# Patient Record
Sex: MALE | Race: BLACK OR AFRICAN AMERICAN | Employment: OTHER | URBAN - METROPOLITAN AREA
[De-identification: names, ages, dates, MRNs, and addresses within clinical notes are randomized per-mention and may not be internally consistent; named-entity substitution may affect disease eponyms.]

---

## 2017-01-14 DIAGNOSIS — E10.29: ICD-10-CM

## 2017-01-16 ENCOUNTER — TELEPHONE (OUTPATIENT)
Dept: INTERNAL MEDICINE CLINIC | Age: 72
End: 2017-01-16

## 2017-01-16 RX ORDER — GLIMEPIRIDE 1 MG/1
TABLET ORAL
Qty: 90 TAB | Refills: 2 | Status: SHIPPED | OUTPATIENT
Start: 2017-01-16 | End: 2017-11-29 | Stop reason: SDUPTHER

## 2017-01-18 ENCOUNTER — DOCUMENTATION ONLY (OUTPATIENT)
Dept: INTERNAL MEDICINE CLINIC | Age: 72
End: 2017-01-18

## 2017-01-18 NOTE — PROGRESS NOTES
Pt's son made f/u appt for 01/25/2017 and was given lab appt for 01/20/2016 please order labs or advise if not needed

## 2017-01-19 DIAGNOSIS — E55.9 VITAMIN D DEFICIENCY: ICD-10-CM

## 2017-01-19 DIAGNOSIS — E11.65 TYPE 2 DIABETES MELLITUS WITH HYPERGLYCEMIA, WITHOUT LONG-TERM CURRENT USE OF INSULIN (HCC): ICD-10-CM

## 2017-01-19 DIAGNOSIS — I10 ESSENTIAL HYPERTENSION: ICD-10-CM

## 2017-01-19 DIAGNOSIS — N18.30 CKD (CHRONIC KIDNEY DISEASE) STAGE 3, GFR 30-59 ML/MIN (HCC): ICD-10-CM

## 2017-01-19 DIAGNOSIS — E78.00 HYPERCHOLESTEROLEMIA: Primary | ICD-10-CM

## 2017-01-20 ENCOUNTER — HOSPITAL ENCOUNTER (OUTPATIENT)
Dept: LAB | Age: 72
Discharge: HOME | End: 2017-01-20
Payer: MEDICARE

## 2017-01-20 ENCOUNTER — LAB ONLY (OUTPATIENT)
Dept: INTERNAL MEDICINE CLINIC | Age: 72
End: 2017-01-20

## 2017-01-20 DIAGNOSIS — I10 ESSENTIAL HYPERTENSION: ICD-10-CM

## 2017-01-20 DIAGNOSIS — E78.00 HYPERCHOLESTEROLEMIA: ICD-10-CM

## 2017-01-20 DIAGNOSIS — E11.65 TYPE 2 DIABETES MELLITUS WITH HYPERGLYCEMIA, WITHOUT LONG-TERM CURRENT USE OF INSULIN (HCC): ICD-10-CM

## 2017-01-20 DIAGNOSIS — E55.9 VITAMIN D DEFICIENCY: ICD-10-CM

## 2017-01-20 DIAGNOSIS — N18.30 CKD (CHRONIC KIDNEY DISEASE) STAGE 3, GFR 30-59 ML/MIN (HCC): ICD-10-CM

## 2017-01-20 LAB
ALBUMIN SERPL BCP-MCNC: 3.5 G/DL (ref 3.4–5)
ALBUMIN/GLOB SERPL: 0.9 {RATIO} (ref 0.8–1.7)
ALP SERPL-CCNC: 61 U/L (ref 45–117)
ALT SERPL-CCNC: 18 U/L (ref 16–61)
ANION GAP BLD CALC-SCNC: 12 MMOL/L (ref 3–18)
AST SERPL W P-5'-P-CCNC: 15 U/L (ref 15–37)
BASOPHILS # BLD AUTO: 0 K/UL (ref 0–0.06)
BASOPHILS # BLD: 0 % (ref 0–2)
BILIRUB SERPL-MCNC: 0.8 MG/DL (ref 0.2–1)
BUN SERPL-MCNC: 34 MG/DL (ref 7–18)
BUN/CREAT SERPL: 15 (ref 12–20)
CALCIUM SERPL-MCNC: 9.1 MG/DL (ref 8.5–10.1)
CHLORIDE SERPL-SCNC: 108 MMOL/L (ref 100–108)
CHOLEST SERPL-MCNC: 176 MG/DL
CO2 SERPL-SCNC: 24 MMOL/L (ref 21–32)
CREAT SERPL-MCNC: 2.31 MG/DL (ref 0.6–1.3)
DIFFERENTIAL METHOD BLD: ABNORMAL
EOSINOPHIL # BLD: 0.3 K/UL (ref 0–0.4)
EOSINOPHIL NFR BLD: 4 % (ref 0–5)
ERYTHROCYTE [DISTWIDTH] IN BLOOD BY AUTOMATED COUNT: 17 % (ref 11.6–14.5)
EST. AVERAGE GLUCOSE BLD GHB EST-MCNC: 134 MG/DL
GLOBULIN SER CALC-MCNC: 3.8 G/DL (ref 2–4)
GLUCOSE SERPL-MCNC: 85 MG/DL (ref 74–99)
HBA1C MFR BLD: 6.3 % (ref 4.2–5.6)
HCT VFR BLD AUTO: 38.8 % (ref 36–48)
HDLC SERPL-MCNC: 54 MG/DL (ref 40–60)
HDLC SERPL: 3.3 {RATIO} (ref 0–5)
HGB BLD-MCNC: 12.6 G/DL (ref 13–16)
LDLC SERPL CALC-MCNC: 101.6 MG/DL (ref 0–100)
LIPID PROFILE,FLP: ABNORMAL
LYMPHOCYTES # BLD AUTO: 46 % (ref 21–52)
LYMPHOCYTES # BLD: 3.1 K/UL (ref 0.9–3.6)
MCH RBC QN AUTO: 26.5 PG (ref 24–34)
MCHC RBC AUTO-ENTMCNC: 32.5 G/DL (ref 31–37)
MCV RBC AUTO: 81.7 FL (ref 74–97)
MONOCYTES # BLD: 0.5 K/UL (ref 0.05–1.2)
MONOCYTES NFR BLD AUTO: 7 % (ref 3–10)
NEUTS SEG # BLD: 2.9 K/UL (ref 1.8–8)
NEUTS SEG NFR BLD AUTO: 43 % (ref 40–73)
PLATELET # BLD AUTO: 252 K/UL (ref 135–420)
PMV BLD AUTO: 10.7 FL (ref 9.2–11.8)
POTASSIUM SERPL-SCNC: 4.6 MMOL/L (ref 3.5–5.5)
PROT SERPL-MCNC: 7.3 G/DL (ref 6.4–8.2)
RBC # BLD AUTO: 4.75 M/UL (ref 4.7–5.5)
SODIUM SERPL-SCNC: 144 MMOL/L (ref 136–145)
T4 FREE SERPL-MCNC: 1.2 NG/DL (ref 0.7–1.5)
TRIGL SERPL-MCNC: 102 MG/DL (ref ?–150)
TSH SERPL DL<=0.05 MIU/L-ACNC: 2.01 UIU/ML (ref 0.36–3.74)
VLDLC SERPL CALC-MCNC: 20.4 MG/DL
WBC # BLD AUTO: 6.9 K/UL (ref 4.6–13.2)

## 2017-01-20 PROCEDURE — 80053 COMPREHEN METABOLIC PANEL: CPT | Performed by: HOSPITALIST

## 2017-01-20 PROCEDURE — 82306 VITAMIN D 25 HYDROXY: CPT | Performed by: HOSPITALIST

## 2017-01-20 PROCEDURE — 84443 ASSAY THYROID STIM HORMONE: CPT | Performed by: HOSPITALIST

## 2017-01-20 PROCEDURE — 80061 LIPID PANEL: CPT | Performed by: HOSPITALIST

## 2017-01-20 PROCEDURE — 36415 COLL VENOUS BLD VENIPUNCTURE: CPT | Performed by: HOSPITALIST

## 2017-01-20 PROCEDURE — 85025 COMPLETE CBC W/AUTO DIFF WBC: CPT | Performed by: HOSPITALIST

## 2017-01-20 PROCEDURE — 84439 ASSAY OF FREE THYROXINE: CPT | Performed by: HOSPITALIST

## 2017-01-20 PROCEDURE — 83036 HEMOGLOBIN GLYCOSYLATED A1C: CPT | Performed by: HOSPITALIST

## 2017-01-21 LAB — 25(OH)D3 SERPL-MCNC: 27 NG/ML (ref 30–100)

## 2017-01-24 DIAGNOSIS — M10.00 IDIOPATHIC GOUT, UNSPECIFIED CHRONICITY, UNSPECIFIED SITE: ICD-10-CM

## 2017-01-24 DIAGNOSIS — I10 ESSENTIAL HYPERTENSION WITH GOAL BLOOD PRESSURE LESS THAN 140/90: ICD-10-CM

## 2017-01-24 RX ORDER — CARVEDILOL 12.5 MG/1
TABLET ORAL
Qty: 180 TAB | Refills: 1 | Status: SHIPPED | OUTPATIENT
Start: 2017-01-24 | End: 2017-04-26 | Stop reason: SDUPTHER

## 2017-01-24 RX ORDER — ALLOPURINOL 100 MG/1
TABLET ORAL
Qty: 180 TAB | Refills: 2 | Status: SHIPPED | OUTPATIENT
Start: 2017-01-24 | End: 2017-04-26 | Stop reason: SDUPTHER

## 2017-01-25 ENCOUNTER — OFFICE VISIT (OUTPATIENT)
Dept: INTERNAL MEDICINE CLINIC | Age: 72
End: 2017-01-25

## 2017-01-25 VITALS
DIASTOLIC BLOOD PRESSURE: 70 MMHG | RESPIRATION RATE: 18 BRPM | HEIGHT: 66 IN | BODY MASS INDEX: 28.48 KG/M2 | TEMPERATURE: 98.8 F | HEART RATE: 56 BPM | OXYGEN SATURATION: 97 % | WEIGHT: 177.2 LBS | SYSTOLIC BLOOD PRESSURE: 142 MMHG

## 2017-01-25 DIAGNOSIS — I10 ESSENTIAL HYPERTENSION: ICD-10-CM

## 2017-01-25 DIAGNOSIS — N18.30 CKD (CHRONIC KIDNEY DISEASE) STAGE 3, GFR 30-59 ML/MIN (HCC): ICD-10-CM

## 2017-01-25 DIAGNOSIS — Z00.00 MEDICARE ANNUAL WELLNESS VISIT, SUBSEQUENT: ICD-10-CM

## 2017-01-25 DIAGNOSIS — Z12.5 SCREENING FOR PROSTATE CANCER: ICD-10-CM

## 2017-01-25 DIAGNOSIS — Z13.31 SCREENING FOR DEPRESSION: ICD-10-CM

## 2017-01-25 DIAGNOSIS — E78.00 HYPERCHOLESTEROLEMIA: ICD-10-CM

## 2017-01-25 DIAGNOSIS — Z13.39 SCREENING FOR ALCOHOLISM: ICD-10-CM

## 2017-01-25 DIAGNOSIS — K21.9 GASTROESOPHAGEAL REFLUX DISEASE WITHOUT ESOPHAGITIS: ICD-10-CM

## 2017-01-25 DIAGNOSIS — E11.65 TYPE 2 DIABETES MELLITUS WITH HYPERGLYCEMIA, WITHOUT LONG-TERM CURRENT USE OF INSULIN (HCC): Primary | ICD-10-CM

## 2017-01-25 DIAGNOSIS — Z71.89 ADVANCE CARE PLANNING: ICD-10-CM

## 2017-01-25 DIAGNOSIS — E55.9 VITAMIN D DEFICIENCY: ICD-10-CM

## 2017-01-25 NOTE — PATIENT INSTRUCTIONS
DASH Diet: Care Instructions  Your Care Instructions  The DASH diet is an eating plan that can help lower your blood pressure. DASH stands for Dietary Approaches to Stop Hypertension. Hypertension is high blood pressure. The DASH diet focuses on eating foods that are high in calcium, potassium, and magnesium. These nutrients can lower blood pressure. The foods that are highest in these nutrients are fruits, vegetables, low-fat dairy products, nuts, seeds, and legumes. But taking calcium, potassium, and magnesium supplements instead of eating foods that are high in those nutrients does not have the same effect. The DASH diet also includes whole grains, fish, and poultry. The DASH diet is one of several lifestyle changes your doctor may recommend to lower your high blood pressure. Your doctor may also want you to decrease the amount of sodium in your diet. Lowering sodium while following the DASH diet can lower blood pressure even further than just the DASH diet alone. Follow-up care is a key part of your treatment and safety. Be sure to make and go to all appointments, and call your doctor if you are having problems. It's also a good idea to know your test results and keep a list of the medicines you take. How can you care for yourself at home? Following the DASH diet  · Eat 4 to 5 servings of fruit each day. A serving is 1 medium-sized piece of fruit, ½ cup chopped or canned fruit, 1/4 cup dried fruit, or 4 ounces (½ cup) of fruit juice. Choose fruit more often than fruit juice. · Eat 4 to 5 servings of vegetables each day. A serving is 1 cup of lettuce or raw leafy vegetables, ½ cup of chopped or cooked vegetables, or 4 ounces (½ cup) of vegetable juice. Choose vegetables more often than vegetable juice. · Get 2 to 3 servings of low-fat and fat-free dairy each day. A serving is 8 ounces of milk, 1 cup of yogurt, or 1 ½ ounces of cheese. · Eat 6 to 8 servings of grains each day.  A serving is 1 slice of bread, 1 ounce of dry cereal, or ½ cup of cooked rice, pasta, or cooked cereal. Try to choose whole-grain products as much as possible. · Limit lean meat, poultry, and fish to 2 servings each day. A serving is 3 ounces, about the size of a deck of cards. · Eat 4 to 5 servings of nuts, seeds, and legumes (cooked dried beans, lentils, and split peas) each week. A serving is 1/3 cup of nuts, 2 tablespoons of seeds, or ½ cup of cooked beans or peas. · Limit fats and oils to 2 to 3 servings each day. A serving is 1 teaspoon of vegetable oil or 2 tablespoons of salad dressing. · Limit sweets and added sugars to 5 servings or less a week. A serving is 1 tablespoon jelly or jam, ½ cup sorbet, or 1 cup of lemonade. · Eat less than 2,300 milligrams (mg) of sodium a day. If you limit your sodium to 1,500 mg a day, you can lower your blood pressure even more. Tips for success  · Start small. Do not try to make dramatic changes to your diet all at once. You might feel that you are missing out on your favorite foods and then be more likely to not follow the plan. Make small changes, and stick with them. Once those changes become habit, add a few more changes. · Try some of the following:  ¨ Make it a goal to eat a fruit or vegetable at every meal and at snacks. This will make it easy to get the recommended amount of fruits and vegetables each day. ¨ Try yogurt topped with fruit and nuts for a snack or healthy dessert. ¨ Add lettuce, tomato, cucumber, and onion to sandwiches. ¨ Combine a ready-made pizza crust with low-fat mozzarella cheese and lots of vegetable toppings. Try using tomatoes, squash, spinach, broccoli, carrots, cauliflower, and onions. ¨ Have a variety of cut-up vegetables with a low-fat dip as an appetizer instead of chips and dip. ¨ Sprinkle sunflower seeds or chopped almonds over salads. Or try adding chopped walnuts or almonds to cooked vegetables. ¨ Try some vegetarian meals using beans and peas. Add garbanzo or kidney beans to salads. Make burritos and tacos with mashed anderson beans or black beans. Where can you learn more? Go to http://theresa-mumtaz.info/. Enter U348 in the search box to learn more about \"DASH Diet: Care Instructions. \"  Current as of: March 23, 2016  Content Version: 11.1  © 7698-4990 [employer]. Care instructions adapted under license by [employer] (which disclaims liability or warranty for this information). If you have questions about a medical condition or this instruction, always ask your healthcare professional. [place] [address] any warranty or liability for your use of this information. Learning About Diabetes Food Guidelines  Your Care Instructions  Meal planning is important to manage diabetes. It helps keep your blood sugar at a target level (which you set with your doctor). You don't have to eat special foods. You can eat what your family eats, including sweets once in a while. But you do have to pay attention to how often you eat and how much you eat of certain foods. You may want to work with a dietitian or a certified diabetes educator (CDE) to help you plan meals and snacks. A dietitian or CDE can also help you lose weight if that is one of your goals. What should you know about eating carbs? Managing the amount of carbohydrate (carbs) you eat is an important part of healthy meals when you have diabetes. Carbohydrate is found in many foods. · Learn which foods have carbs. And learn the amounts of carbs in different foods. ¨ Bread, cereal, pasta, and rice have about 15 grams of carbs in a serving. A serving is 1 slice of bread (1 ounce), ½ cup of cooked cereal, or 1/3 cup of cooked pasta or rice. ¨ Fruits have 15 grams of carbs in a serving.  A serving is 1 small fresh fruit, such as an apple or orange; ½ of a banana; ½ cup of cooked or canned fruit; ½ cup of fruit juice; 1 cup of melon or raspberries; or 2 tablespoons of dried fruit. ¨ Milk and no-sugar-added yogurt have 15 grams of carbs in a serving. A serving is 1 cup of milk or 2/3 cup of no-sugar-added yogurt. ¨ Starchy vegetables have 15 grams of carbs in a serving. A serving is ½ cup of mashed potatoes or sweet potato; 1 cup winter squash; ½ of a small baked potato; ½ cup of cooked beans; or ½ cup cooked corn or green peas. · Learn how much carbs to eat each day and at each meal. A dietitian or CDE can teach you how to keep track of the amount of carbs you eat. This is called carbohydrate counting. · If you are not sure how to count carbohydrate grams, use the Plate Method to plan meals. It is a good, quick way to make sure that you have a balanced meal. It also helps you spread carbs throughout the day. ¨ Divide your plate by types of foods. Put non-starchy vegetables on half the plate, meat or other protein food on one-quarter of the plate, and a grain or starchy vegetable in the final quarter of the plate. To this you can add a small piece of fruit and 1 cup of milk or yogurt, depending on how many carbs you are supposed to eat at a meal.  · Try to eat about the same amount of carbs at each meal. Do not \"save up\" your daily allowance of carbs to eat at one meal.  · Proteins have very little or no carbs per serving. Examples of proteins are beef, chicken, turkey, fish, eggs, tofu, cheese, cottage cheese, and peanut butter. A serving size of meat is 3 ounces, which is about the size of a deck of cards. Examples of meat substitute serving sizes (equal to 1 ounce of meat) are 1/4 cup of cottage cheese, 1 egg, 1 tablespoon of peanut butter, and ½ cup of tofu. How can you eat out and still eat healthy? · Learn to estimate the serving sizes of foods that have carbohydrate. If you measure food at home, it will be easier to estimate the amount in a serving of restaurant food.   · If the meal you order has too much carbohydrate (such as potatoes, corn, or baked beans), ask to have a low-carbohydrate food instead. Ask for a salad or green vegetables. · If you use insulin, check your blood sugar before and after eating out to help you plan how much to eat in the future. · If you eat more carbohydrate at a meal than you had planned, take a walk or do other exercise. This will help lower your blood sugar. What else should you know? · Limit saturated fat, such as the fat from meat and dairy products. This is a healthy choice because people who have diabetes are at higher risk of heart disease. So choose lean cuts of meat and nonfat or low-fat dairy products. Use olive or canola oil instead of butter or shortening when cooking. · Don't skip meals. Your blood sugar may drop too low if you skip meals and take insulin or certain medicines for diabetes. · Check with your doctor before you drink alcohol. Alcohol can cause your blood sugar to drop too low. Alcohol can also cause a bad reaction if you take certain diabetes medicines. Follow-up care is a key part of your treatment and safety. Be sure to make and go to all appointments, and call your doctor if you are having problems. It's also a good idea to know your test results and keep a list of the medicines you take. Where can you learn more? Go to http://theresa-mumtaz.info/. Enter U181 in the search box to learn more about \"Learning About Diabetes Food Guidelines. \"  Current as of: May 23, 2016  Content Version: 11.1  © 3916-5049 [employer]. Care instructions adapted under license by [employer] (which disclaims liability or warranty for this information). If you have questions about a medical condition or this instruction, always ask your healthcare professional. [place] [address] any warranty or liability for your use of this information. Well Visit, Over 72: Care Instructions  Your Care Instructions  Physical exams can help you stay healthy.  Your doctor has checked your overall health and may have suggested ways to take good care of yourself. He or she also may have recommended tests. At home, you can help prevent illness with healthy eating, regular exercise, and other steps. Follow-up care is a key part of your treatment and safety. Be sure to make and go to all appointments, and call your doctor if you are having problems. It's also a good idea to know your test results and keep a list of the medicines you take. How can you care for yourself at home? · Reach and stay at a healthy weight. This will lower your risk for many problems, such as obesity, diabetes, heart disease, and high blood pressure. · Get at least 30 minutes of exercise on most days of the week. Walking is a good choice. You also may want to do other activities, such as running, swimming, cycling, or playing tennis or team sports. · Do not smoke. Smoking can make health problems worse. If you need help quitting, talk to your doctor about stop-smoking programs and medicines. These can increase your chances of quitting for good. · Protect your skin from too much sun. When you're outdoors from 10 a.m. to 4 p.m., stay in the shade or cover up with clothing and a hat with a wide brim. Wear sunglasses that block UV rays. Even when it's cloudy, put broad-spectrum sunscreen (SPF 30 or higher) on any exposed skin. · See a dentist one or two times a year for checkups and to have your teeth cleaned. · Wear a seat belt in the car. · Limit alcohol to 2 drinks a day for men and 1 drink a day for women. Too much alcohol can cause health problems. Follow your doctor's advice about when to have certain tests. These tests can spot problems early. For men and women  · Cholesterol. Your doctor will tell you how often to have this done based on your overall health and other things that can increase your risk for heart attack and stroke. · Blood pressure.  Have your blood pressure checked during a routine doctor visit. Your doctor will tell you how often to check your blood pressure based on your age, your blood pressure results, and other factors. · Diabetes. Ask your doctor whether you should have tests for diabetes. · Vision. Experts recommend that you have yearly exams for glaucoma and other age-related eye problems. · Hearing. Tell your doctor if you notice any change in your hearing. You can have tests to find out how well you hear. · Colon cancer tests. Keep having colon cancer tests as your doctor recommends. You can have one of several types of tests. · Heart attack and stroke risk. At least every 4 to 6 years, you should have your risk for heart attack and stroke assessed. Your doctor uses factors such as your age, blood pressure, cholesterol, and whether you smoke or have diabetes to show what your risk for a heart attack or stroke is over the next 10 years. · Osteoporosis. Talk to your doctor about whether you should have a bone density test to find out whether you have thinning bones. Also ask your doctor about whether you should take calcium and vitamin D supplements. For women  · Pap test and pelvic exam. You may no longer need a Pap test. Talk with your doctor about whether to stop or continue to have Pap tests. · Breast exam and mammogram. Ask how often you should have a mammogram, which is an X-ray of your breasts. A mammogram can spot breast cancer before it can be felt and when it is easiest to treat. · Thyroid disease. Talk to your doctor about whether to have your thyroid checked as part of a regular physical exam. Women have an increased chance of a thyroid problem. For men  · Prostate exam. Talk to your doctor about whether you should have a blood test (called a PSA test) for prostate cancer. Experts disagree on whether men should have this test. Some experts recommend that you discuss the benefits and risks of the test with your doctor. · Abdominal aortic aneurysm.  Ask your doctor whether you should have a test to check for an aneurysm. You may need a test if you ever smoked or if your parent, brother, sister, or child has had an aneurysm. When should you call for help? Watch closely for changes in your health, and be sure to contact your doctor if you have any problems or symptoms that concern you. Where can you learn more? Go to http://theresa-mumtaz.info/. Enter P061 in the search box to learn more about \"Well Visit, Over 65: Care Instructions. \"  Current as of: July 19, 2016  Content Version: 11.1  © 4251-9057 [employer]. Care instructions adapted under license by [employer] (which disclaims liability or warranty for this information). If you have questions about a medical condition or this instruction, always ask your healthcare professional. [address] any warranty or liability for your use of this information.

## 2017-01-25 NOTE — PROGRESS NOTES
Patient stated that he is here   1. Have you been to the ER, urgent care clinic since your last visit? Hospitalized since your last visit? No    2. Have you seen or consulted any other health care providers outside of the [address] [place] [address] since your last visit? Include any pap smears or colon screening.  No

## 2017-01-25 NOTE — PROGRESS NOTES
Chief Complaint   Patient presents with    Diabetes     ER follow up       HPI:  Patient is a 66year old  male with medical problems listed below presents today for follow up of DM 2, Hypertension, Hyperlipidemia, CKD 3, GERD, Vit D def, etc and for Medicare Annual Wellness Visit. He has been feeling well and voices no complaints today. He is complaint with his medications with no adverse effects reported. Past Medical History   Diagnosis Date    Cataract      both    Colon polyp 9-2004    Diabetes (HCC)      IDDM    DJD (degenerative joint disease) of knee     ED (erectile dysfunction)     GERD (gastroesophageal reflux disease) 12-01-08    Glaucoma suspect     Gout, unspecified 10-25-06    Hyperlipidemia     Hypertension     Insomnia 7-20-06    Phimosis 6-16-00    Prostate cancer ([facility] [address] 3-3-09    Stroke ([facility]        No Known Allergies      Current Outpatient Prescriptions   Medication Sig Dispense Refill    carvedilol (COREG) 12.5 mg tablet TAKE 1 TABLET BY MOUTH TWICE A  Tab 1    allopurinol (ZYLOPRIM) 100 mg tablet TAKE 1 TABLET BY MOUTH TWICE DAILY 180 Tab 2    glimepiride (AMARYL) 1 mg tablet TAKE 1 TABLET BY MOUTH DAILY BEFORE BREAKFAST 90 Tab 2    Insulin Needles, Disposable, (BD INSULIN PEN NEEDLE UF MINI) 31 gauge x 3/16\" ndle Sig: test blood glucose BID. E11.9 100 Pen Needle 5    CRESTOR 20 mg tablet TAKE 1 TABLET BY MOUTH NIGHTLY 90 Tab 3    dilTIAZem (TIAZAC) 360 mg ER capsule TAKE 1 CAP BY MOUTH DAILY. 90 Cap 1    carvedilol (COREG) 6.25 mg tablet TAKE 1 TABLET BY MOUTH TWICE A DAY 10 Tab 0    insulin glargine (LANTUS SOLOSTAR) 100 unit/mL (3 mL) pen 10 Units by SubCUTAneous route daily. 1 Each 0    pantoprazole (PROTONIX) 40 mg tablet Take 1 Tab by mouth Daily (before breakfast).  30 Tab 1    allopurinol (ZYLOPRIM) 100 mg tablet TAKE 1 TABLET BY MOUTH TWICE DAILY 180 Tab 2    aspirin-dipyridamole (AGGRENOX)  mg per SR capsule Take 1 Cap by mouth two (2) times a day. 180 Cap 3    Cholecalciferol, Vitamin D3, 50,000 unit cap Take 1 Cap by mouth every seven (7) days. 8 Cap 1    sitaGLIPtin (JANUVIA) 50 mg tablet Take 1 Tab by mouth daily. 90 Tab 3    glucose 4 gram chewable tablet Take 4 tablets by mouth as needed. 50 tablet 0    insulin lispro (HUMALOG KWIKPEN) 100 unit/mL kwikpen Check FSBS Three times daily with meals,   For sugar between 150 and 200- give 1 units SQ,   For sugar between 201 and 250- give 3 units SQ,   For sugar between 251 and 300- give 5 units SQ,   For sugar between 301 and 400- give 7 units SQ,  For sugars > 400, contact PCP 1 each 2    OTHER Check CBC, CMP, Mg in 5 days, results to PCP 1 each 0    omega-3 fatty acids-vitamin e (FISH OIL) 1,000 mg Cap Take 1,400 mg by mouth daily.             ROS:  Constitutional: Negative for fever, chills, or fatigue  Neurological: Negative for headache, dizziness, visual disturbance, or loss of conciousness  Respiratory: Negative for SOB, hemoptysis, or wheezing  Cardiovascular: Negative for chest pain, palpitation, or leg swelling  Gastrointestinal: Negative for abdominal pain, nausea, vomiting, diarrhea, blood in stool, melena, or heartburn  Musculoskeletal: Negative for falls      Physical Exam:  Visit Vitals    /70 (BP 1 Location: Left arm, BP Patient Position: Sitting)    Pulse (!) 56    Temp 98.8 °F (37.1 °C) (Oral)    Resp 18    Ht 5' 6\" (1.676 m)    Wt 177 lb 3.2 oz (80.4 kg)    SpO2 97%    BMI 28.6 kg/m2     General: a & o x 3, afebrile, well-nourished, interacting appropriately, in no acute distress  Skin: warm and dry, no rashes, no bruises  Neck: supple, symmetrical, no thyromegaly  Respiratory: symmetrical chest expansion, lung sounds clear bilaterally, good air entry, good respiratory effort, no wheezes or crackles  Cardiovascular: normal S1S2, regular rate and rhythm, no murmurs,  no carotid or abdominal bruits, no peripheral edema, no JVD  Abdomen: non-distended, normoactive bowel sounds x 4 quadrants, soft, non-tender to palpation  Diabetic foot exam: pedal pulses palpable and no calluses noted        Assessment/Plan:    ICD-10-CM ICD-9-CM    1. Type 2 diabetes mellitus with hyperglycemia, without long-term current use of insulin (HCC) E11.65 250.00 Controlled with recent HBA1C at 6.3. Continue current meds and he was counseled on diabetic diet. HEMOGLOBIN A1C W/O EAG     790.29 MICROALBUMIN, UR, RAND W/ MICROALBUMIN/CREA RATIO   2. Essential hypertension I10 401.9 Controlled  Continue current meds and he was counseled on low salt diet. 3. Hypercholesterolemia E78.00 272.0 Recent lipid panel checked 1/20/17 reviewed with pt and revealed Cho 176 and . Continue Crestor and he was counseled on low fat diet. 4. Gastroesophageal reflux disease without esophagitis K21.9 530.81 Stable   5. Vitamin D deficiency E55.9 268.9 Recent Vit D is low at 32 and he was advised to continue taking weekly Vit D.   6. CKD (chronic kidney disease) stage 3, GFR 30-59 ml/min N18.3 585. 3 Stable with Cr slightly improved on recent labs   7. Medicare annual wellness visit, subsequent Z00.00 V70.0 Medicare Annual Wellness Visit was completed at the office today. 8. Screening for alcoholism Z13.89 V79.1    9. Screening for depression Z13.89 V79.0    10. Advance care planning Z71.89 V65.49 Advance directive was discussed with pt today and paperwork previously given and he will fill and return to us   11.  Screening for prostate cancer Z12.5 V76.44 PSA DIAGNOSTIC (PROSTATIC SPECIFIC AG)         Orders Placed This Encounter    PROSTATE SPECIFIC AG     Standing Status:   Future     Standing Expiration Date:   1/26/2018    HEMOGLOBIN A1C W/O EAG     Standing Status:   Future     Standing Expiration Date:   1/26/2018    MICROALBUMIN, UR, RAND W/ MICROALBUMIN/CREA RATIO     Standing Status:   Future     Standing Expiration Date:   1/26/2018       Recent labs reviewed with pt      Additional Notes: Discussed today's diagnosis, treatment plans. Discussed medication indications and side effects. After Visit Summary: Discussed provided printed patient instructions. Answered questions accordingly. Follow-up Disposition: In 3 months with labs 1 week prior        Mary Bucio DO, MPH  Internal Medicine        This is a Subsequent Medicare Annual Wellness Visit providing Personalized Prevention Plan Services (PPPS) (Performed 12 months after initial AWV and PPPS )    I have reviewed the patient's medical history in detail and updated the computerized patient record. History     Past Medical History   Diagnosis Date    Cataract      both    Colon polyp 9-2004    Diabetes (HCC)      IDDM    DJD (degenerative joint disease) of knee     ED (erectile dysfunction)     GERD (gastroesophageal reflux disease) 12-01-08    Glaucoma suspect     Gout, unspecified 10-25-06    Hyperlipidemia     Hypertension     Insomnia 7-20-06    Phimosis 6-16-00    Prostate cancer ([facility] [address] 3-3-09    Stroke [facility])       Past Surgical History   Procedure Laterality Date    Hx polypectomy  9-2004    Pr colonoscopy flx dx w/collj spec when pfrmd  9/1/2004     polyp/Dr Alvarado    Pr colonoscopy flx dx w/collj spec when pfrmd  9-2007     incomplete; Dr. Kevin Chicasoter     Current Outpatient Prescriptions   Medication Sig Dispense Refill    carvedilol (COREG) 12.5 mg tablet TAKE 1 TABLET BY MOUTH TWICE A  Tab 1    allopurinol (ZYLOPRIM) 100 mg tablet TAKE 1 TABLET BY MOUTH TWICE DAILY 180 Tab 2    glimepiride (AMARYL) 1 mg tablet TAKE 1 TABLET BY MOUTH DAILY BEFORE BREAKFAST 90 Tab 2    Insulin Needles, Disposable, (BD INSULIN PEN NEEDLE UF MINI) 31 gauge x 3/16\" ndle Sig: test blood glucose BID. E11.9 100 Pen Needle 5    CRESTOR 20 mg tablet TAKE 1 TABLET BY MOUTH NIGHTLY 90 Tab 3    dilTIAZem (TIAZAC) 360 mg ER capsule TAKE 1 CAP BY MOUTH DAILY.  90 Cap 1    carvedilol (COREG) 6.25 mg tablet TAKE 1 TABLET BY MOUTH TWICE A DAY 10 Tab 0    insulin glargine (LANTUS SOLOSTAR) 100 unit/mL (3 mL) pen 10 Units by SubCUTAneous route daily. 1 Each 0    pantoprazole (PROTONIX) 40 mg tablet Take 1 Tab by mouth Daily (before breakfast). 30 Tab 1    allopurinol (ZYLOPRIM) 100 mg tablet TAKE 1 TABLET BY MOUTH TWICE DAILY 180 Tab 2    aspirin-dipyridamole (AGGRENOX)  mg per SR capsule Take 1 Cap by mouth two (2) times a day. 180 Cap 3    Cholecalciferol, Vitamin D3, 50,000 unit cap Take 1 Cap by mouth every seven (7) days. 8 Cap 1    sitaGLIPtin (JANUVIA) 50 mg tablet Take 1 Tab by mouth daily. 90 Tab 3    glucose 4 gram chewable tablet Take 4 tablets by mouth as needed. 50 tablet 0    insulin lispro (HUMALOG KWIKPEN) 100 unit/mL kwikpen Check FSBS Three times daily with meals,   For sugar between 150 and 200- give 1 units SQ,   For sugar between 201 and 250- give 3 units SQ,   For sugar between 251 and 300- give 5 units SQ,   For sugar between 301 and 400- give 7 units SQ,  For sugars > 400, contact PCP 1 each 2    OTHER Check CBC, CMP, Mg in 5 days, results to PCP 1 each 0    omega-3 fatty acids-vitamin e (FISH OIL) 1,000 mg Cap Take 1,400 mg by mouth daily.        No Known Allergies  Family History   Problem Relation Age of Onset    Hypertension Mother     Diabetes Father     Cancer Father      prostate    Heart Disease Father 70     CABG    Hypertension Sister     Hypertension Brother     Diabetes Brother      Social History   Substance Use Topics    Smoking status: Never Smoker    Smokeless tobacco: Never Used    Alcohol use No     Patient Active Problem List   Diagnosis Code    HTN (hypertension) I10    Type I (juvenile type) diabetes mellitus with renal manifestations, not stated as uncontrolled E10.29    Hypercholesterolemia E78.00    Gout M10.9    GERD (gastroesophageal reflux disease) K21.9    Vitamin D deficiency E55.9    Stroke ([address] I63.9    Prostate cancer ([facility] [address] C61    Acute encephalopathy G93.40    Hypoglycemia E16.2    Need for Tdap vaccination Z23    Proteinuria R80.9    CKD (chronic kidney disease) stage 3, GFR 30-59 ml/min N18.3    Diabetes mellitus type 2, controlled ([facility] E11.9    Advance care planning Z71.89    Encounter for immunization Z23    Need for pneumococcal vaccine Z23    Screening for prostate cancer Z12.5    Medicare annual wellness visit, subsequent Z00.00    History of CVA (cerebrovascular accident) Z80.78    Pneumonia J18.9   [facility] discharge follow-up Z09    Gastrointestinal hemorrhage associated with duodenitis K29.81    Need for influenza vaccination Z23    Hyperglycemia due to type 2 diabetes mellitus ([facility] [address] E11.65       Depression Risk Factor Screening:     PHQ 2 / 9, over the last two weeks 2/26/2014   Little interest or pleasure in doing things Not at all   Feeling down, depressed or hopeless Not at all   Total Score PHQ 2 0     Alcohol Risk Factor Screening: On any occasion during the past 3 months, have you had more than 4 drinks containing alcohol? No    Do you average more than 14 drinks per week? No      Functional Ability and Level of Safety:     Hearing Loss   none    Activities of Daily Living   Self-care. Requires assistance with: no ADLs    Fall Risk     Fall Risk Assessment, last 12 mths 1/13/2016   Able to walk? Yes   Fall in past 12 months? No     Abuse Screen   Patient is not abused    Review of Systems   A comprehensive review of systems was negative except for that written in the HPI.     Physical Examination   GEN: A A O X 3, NAD  CVS: RRR  RESP: CTAB    Evaluation of Cognitive Function:  Mood/affect:  happy  Appearance: age appropriate and casually dressed  Family member/caregiver input: none      Patient Care Team:  Gonzalo Mendes DO as PCP - General (Internal Medicine)    Advice/Referrals/Counseling   Education and counseling provided:  Are appropriate based on today's review and evaluation  End-of-Life planning (with patient's consent) - Advance directive was discussed with pt today and paperwork previously given and he will fill and return to   Pneumococcal Vaccine - Prevnar given 1/13/16  Influenza Vaccine - Flu shot given 9/14/16  Hepatitis B Vaccine  Prostate cancer screening tests (PSA, covered annually) - Last PSA checked 1/13/16 as < 0.1. Colorectal cancer screening tests - Colonoscopy was done 9/1/07  Cardiovascular screening blood test  Bone mass measurement (DEXA)  Screening for glaucoma - Glaucoma screening was done 4/28/16  Diabetes screening test - Last HBA1C done 1/20/17 was 6.3  Medical nutrition therapy for individuals with diabetes or renal disease  Diabetes outpatient self-management training services      Assessment/Plan       ICD-10-CM ICD-9-CM    1. Type 2 diabetes mellitus with hyperglycemia, without long-term current use of insulin (HCC) E11.65 250.00 Controlled with recent HBA1C at 6.3. Continue current meds and he was counseled on diabetic diet. HEMOGLOBIN A1C W/O EAG     790.29 MICROALBUMIN, UR, RAND W/ MICROALBUMIN/CREA RATIO   2. Essential hypertension I10 401.9 Controlled  Continue current meds and he was counseled on low salt diet. 3. Hypercholesterolemia E78.00 272.0 Recent lipid panel checked 1/20/17 reviewed with pt and revealed Cho 176 and . Continue Crestor and he was counseled on low fat diet. 4. Gastroesophageal reflux disease without esophagitis K21.9 530.81 Stable   5. Vitamin D deficiency E55.9 268.9 Recent Vit D is low at 32 and he was advised to continue taking weekly Vit D.   6. CKD (chronic kidney disease) stage 3, GFR 30-59 ml/min N18.3 585. 3 Stable with Cr slightly improved on recent labs   7. Medicare annual wellness visit, subsequent Z00.00 V70.0 Medicare Annual Wellness Visit was completed at the office today. 8. Screening for alcoholism Z13.89 V79.1    9. Screening for depression Z13.89 V79.0    10.  Advance care planning Z71.89 V65.49 Advance directive was discussed with pt today and paperwork previously given and he will fill and return to us   11.  Screening for prostate cancer Z12.5 V76.44 PSA DIAGNOSTIC (PROSTATIC SPECIFIC AG)         Orders Placed This Encounter    PROSTATE SPECIFIC AG     Standing Status:   Future     Standing Expiration Date:   1/26/2018    HEMOGLOBIN A1C W/O EAG     Standing Status:   Future     Standing Expiration Date:   1/26/2018    MICROALBUMIN, UR, RAND W/ MICROALBUMIN/CREA RATIO     Standing Status:   Future     Standing Expiration Date:   1/26/2018           Marielena Hedrick DO, MPH  Internal Medicine

## 2017-01-25 NOTE — ACP (ADVANCE CARE PLANNING)
Advance Care Planning (ACP) Provider Conversation Snapshot    Date of ACP Conversation: 01/25/17  Persons included in Conversation:  patient  Length of ACP Conversation in minutes:  <16 minutes (Non-Billable)    Authorized Decision Maker (if patient is incapable of making informed decisions): This person is:    Other Legally Authorized Decision Maker (e.g. Next of Kin)          For Patients with Decision Making Capacity:   Values/Goals: Exploration of values, goals, and preferences if recovery is not expected, even with continued medical treatment in the event of:  Imminent death    Conversation Outcomes / Follow-Up Plan:   Recommended completion of Advance Directive form after review of ACP materials and conversation with prospective healthcare agent

## 2017-01-25 NOTE — MR AVS SNAPSHOT
Visit Information Date & Time Provider Department Dept. Phone Encounter #  
 1/25/2017  4:15 PM Mary Bucio, DO [facility] [employer] [id number] Follow-up Instructions Return in about 3 months (around 4/25/2017) for Labs 1 week before. Upcoming Health Maintenance Date Due ZOSTER VACCINE AGE 60> 8/31/2004 DTaP/Tdap/Td series (1 - Tdap) 8/16/2013 FOOT EXAM Q1 4/13/2017 EYE EXAM RETINAL OR DILATED Q1 4/28/2017 MICROALBUMIN Q1 7/13/2017 HEMOGLOBIN A1C Q6M 7/20/2017 COLONOSCOPY 9/1/2017 LIPID PANEL Q1 1/20/2018 MEDICARE YEARLY EXAM 1/26/2018 GLAUCOMA SCREENING Q2Y 4/28/2018 Allergies as of 1/25/2017  Review Complete On: 1/25/2017 By: Mary Bucio, DO No Known Allergies Current Immunizations  Reviewed on 9/14/2016 Name Date Influenza Vaccine 8/15/2013 Influenza Vaccine (Quad) PF 9/14/2016, 1/13/2016 Influenza Vaccine PF 9/30/2014  5:47 PM  
 Pneumococcal Conjugate (PCV-13) 1/13/2016 Pneumococcal Vaccine (Unspecified Type) 8/15/2013 Td 8/15/2013 Not reviewed this visit You Were Diagnosed With   
  
 Codes Comments Type 2 diabetes mellitus with hyperglycemia, without long-term current use of insulin (HCC)    -  Primary ICD-10-CM: E11.65 ICD-9-CM: 250.00, 790.29 Essential hypertension     ICD-10-CM: I10 
ICD-9-CM: 401.9 Hypercholesterolemia     ICD-10-CM: E78.00 ICD-9-CM: 272.0 Gastroesophageal reflux disease without esophagitis     ICD-10-CM: K21.9 ICD-9-CM: 530.81 Vitamin D deficiency     ICD-10-CM: E55.9 ICD-9-CM: 268.9 Medicare annual wellness visit, subsequent     ICD-10-CM: Z00.00 ICD-9-CM: V70.0 Screening for alcoholism     ICD-10-CM: Z13.89 ICD-9-CM: V79.1 Screening for depression     ICD-10-CM: Z13.89 ICD-9-CM: V79.0 Advance care planning     ICD-10-CM: Z71.89 ICD-9-CM: V65.49 Screening for prostate cancer     ICD-10-CM: Z12.5 ICD-9-CM: V76.44 Vitals BP Pulse Temp Resp Height(growth percentile) Weight(growth percentile) 142/70 (BP 1 Location: Left arm, BP Patient Position: Sitting) (!) 56 98.8 °F (37.1 °C) (Oral) 18 5' 6\" (1.676 m) 177 lb 3.2 oz (80.4 kg) SpO2 BMI Smoking Status 97% 28.6 kg/m2 Never Smoker BMI and BSA Data Body Mass Index Body Surface Area  
 28.6 kg/m 2 1.93 m 2 Preferred Pharmacy Pharmacy Name Phone [facility][id number] Gonzalez [phone] Your Updated Medication List  
  
   
This list is accurate as of: 1/25/17  5:00 PM.  Always use your most recent med list.  
  
  
  
  
 * allopurinol 100 mg tablet Commonly known as:  ZYLOPRIM  
TAKE 1 TABLET BY MOUTH TWICE DAILY  
  
 * allopurinol 100 mg tablet Commonly known as:  ZYLOPRIM  
TAKE 1 TABLET BY MOUTH TWICE DAILY  
  
 aspirin-dipyridamole  mg per SR capsule Commonly known as:  AGGRENOX Take 1 Cap by mouth two (2) times a day. * carvedilol 6.25 mg tablet Commonly known as:  COREG  
TAKE 1 TABLET BY MOUTH TWICE A DAY * carvedilol 12.5 mg tablet Commonly known as:  COREG  
TAKE 1 TABLET BY MOUTH TWICE A DAY Cholecalciferol (Vitamin D3) 50,000 unit Cap Take 1 Cap by mouth every seven (7) days. CRESTOR 20 mg tablet Generic drug:  rosuvastatin TAKE 1 TABLET BY MOUTH NIGHTLY  
  
 dilTIAZem 360 mg ER capsule Commonly known as:  [facility] TAKE 1 CAP BY MOUTH DAILY. FISH OIL 1,000 mg Cap Generic drug:  omega-3 fatty acids-vitamin e Take 1,400 mg by mouth daily. glimepiride 1 mg tablet Commonly known as:  AMARYL  
TAKE 1 TABLET BY MOUTH DAILY BEFORE BREAKFAST  
  
 glucose 4 gram chewable tablet Take 4 tablets by mouth as needed. insulin glargine 100 unit/mL (3 mL) pen Commonly known as:  LANTUS SOLOSTAR  
10 Units by SubCUTAneous route daily. insulin lispro 100 unit/mL kwikpen Commonly known as:  HumaLOG KwikPen Check FSBS Three times daily with meals,  For sugar between 150 and 200- give 1 units SQ,  For sugar between 201 and 250- give 3 units SQ,  For sugar between 251 and 300- give 5 units SQ,  For sugar between 301 and 400- give 7 units SQ, For sugars > 400, contact PCP Insulin Needles (Disposable) 31 gauge x 3/16\" Ndle Commonly known as:  BD INSULIN PEN NEEDLE UF MINI Sig: test blood glucose BID. E11.9 OTHER Check CBC, CMP, Mg in 5 days, results to PCP  
  
 pantoprazole 40 mg tablet Commonly known as:  PROTONIX Take 1 Tab by mouth Daily (before breakfast). SITagliptin 50 mg tablet Commonly known as:  Mordecai Heads Take 1 Tab by mouth daily. * Notice: This list has 4 medication(s) that are the same as other medications prescribed for you. Read the directions carefully, and ask your doctor or other care provider to review them with you. Follow-up Instructions Return in about 3 months (around 4/25/2017) for Labs 1 week before. To-Do List   
 04/25/2017 Lab:  HEMOGLOBIN A1C W/O EAG   
  
 04/25/2017 Lab:  MICROALBUMIN, UR, RAND W/ MICROALBUMIN/CREA RATIO   
  
 04/25/2017 Lab:  PSA DIAGNOSTIC (PROSTATIC SPECIFIC AG) Patient Instructions DASH Diet: Care Instructions Your Care Instructions The DASH diet is an eating plan that can help lower your blood pressure. DASH stands for Dietary Approaches to Stop Hypertension. Hypertension is high blood pressure. The DASH diet focuses on eating foods that are high in calcium, potassium, and magnesium. These nutrients can lower blood pressure. The foods that are highest in these nutrients are fruits, vegetables, low-fat dairy products, nuts, seeds, and legumes. But taking calcium, potassium, and magnesium supplements instead of eating foods that are high in those nutrients does not have the same effect. The DASH diet also includes whole grains, fish, and poultry. The DASH diet is one of several lifestyle changes your doctor may recommend to lower your high blood pressure. Your doctor may also want you to decrease the amount of sodium in your diet. Lowering sodium while following the DASH diet can lower blood pressure even further than just the DASH diet alone. Follow-up care is a key part of your treatment and safety. Be sure to make and go to all appointments, and call your doctor if you are having problems. It's also a good idea to know your test results and keep a list of the medicines you take. How can you care for yourself at home? Following the DASH diet · Eat 4 to 5 servings of fruit each day. A serving is 1 medium-sized piece of fruit, ½ cup chopped or canned fruit, 1/4 cup dried fruit, or 4 ounces (½ cup) of fruit juice. Choose fruit more often than fruit juice. · Eat 4 to 5 servings of vegetables each day. A serving is 1 cup of lettuce or raw leafy vegetables, ½ cup of chopped or cooked vegetables, or 4 ounces (½ cup) of vegetable juice. Choose vegetables more often than vegetable juice. · Get 2 to 3 servings of low-fat and fat-free dairy each day. A serving is 8 ounces of milk, 1 cup of yogurt, or 1 ½ ounces of cheese. · Eat 6 to 8 servings of grains each day. A serving is 1 slice of bread, 1 ounce of dry cereal, or ½ cup of cooked rice, pasta, or cooked cereal. Try to choose whole-grain products as much as possible. · Limit lean meat, poultry, and fish to 2 servings each day. A serving is 3 ounces, about the size of a deck of cards. · Eat 4 to 5 servings of nuts, seeds, and legumes (cooked dried beans, lentils, and split peas) each week. A serving is 1/3 cup of nuts, 2 tablespoons of seeds, or ½ cup of cooked beans or peas. · Limit fats and oils to 2 to 3 servings each day. A serving is 1 teaspoon of vegetable oil or 2 tablespoons of salad dressing. · Limit sweets and added sugars to 5 servings or less a week.  A serving is 1 tablespoon jelly or jam, ½ cup sorbet, or 1 cup of lemonade. · Eat less than 2,300 milligrams (mg) of sodium a day. If you limit your sodium to 1,500 mg a day, you can lower your blood pressure even more. Tips for success · Start small. Do not try to make dramatic changes to your diet all at once. You might feel that you are missing out on your favorite foods and then be more likely to not follow the plan. Make small changes, and stick with them. Once those changes become habit, add a few more changes. · Try some of the following: ¨ Make it a goal to eat a fruit or vegetable at every meal and at snacks. This will make it easy to get the recommended amount of fruits and vegetables each day. ¨ Try yogurt topped with fruit and nuts for a snack or healthy dessert. ¨ Add lettuce, tomato, cucumber, and onion to sandwiches. ¨ Combine a ready-made pizza crust with low-fat mozzarella cheese and lots of vegetable toppings. Try using tomatoes, squash, spinach, broccoli, carrots, cauliflower, and onions. ¨ Have a variety of cut-up vegetables with a low-fat dip as an appetizer instead of chips and dip. ¨ Sprinkle sunflower seeds or chopped almonds over salads. Or try adding chopped walnuts or almonds to cooked vegetables. ¨ Try some vegetarian meals using beans and peas. Add garbanzo or kidney beans to salads. Make burritos and tacos with mashed anderson beans or black beans. Where can you learn more? Go to http://theresa-mumtaz.info/. Enter W480 in the search box to learn more about \"DASH Diet: Care Instructions. \" Current as of: March 23, 2016 Content Version: 11.1 © 3599-1841 [employer]. Care instructions adapted under license by [employer] (which disclaims liability or warranty for this information).  If you have questions about a medical condition or this instruction, always ask your healthcare professional. [employer] disclaims any warranty or liability for your use of this information. Learning About Diabetes Food Guidelines Your Care Instructions Meal planning is important to manage diabetes. It helps keep your blood sugar at a target level (which you set with your doctor). You don't have to eat special foods. You can eat what your family eats, including sweets once in a while. But you do have to pay attention to how often you eat and how much you eat of certain foods. You may want to work with a dietitian or a certified diabetes educator (CDE) to help you plan meals and snacks. A dietitian or CDE can also help you lose weight if that is one of your goals. What should you know about eating carbs? Managing the amount of carbohydrate (carbs) you eat is an important part of healthy meals when you have diabetes. Carbohydrate is found in many foods. · Learn which foods have carbs. And learn the amounts of carbs in different foods. ¨ Bread, cereal, pasta, and rice have about 15 grams of carbs in a serving. A serving is 1 slice of bread (1 ounce), ½ cup of cooked cereal, or 1/3 cup of cooked pasta or rice. ¨ Fruits have 15 grams of carbs in a serving. A serving is 1 small fresh fruit, such as an apple or orange; ½ of a banana; ½ cup of cooked or canned fruit; ½ cup of fruit juice; 1 cup of melon or raspberries; or 2 tablespoons of dried fruit. ¨ Milk and no-sugar-added yogurt have 15 grams of carbs in a serving. A serving is 1 cup of milk or 2/3 cup of no-sugar-added yogurt. ¨ Starchy vegetables have 15 grams of carbs in a serving. A serving is ½ cup of mashed potatoes or sweet potato; 1 cup winter squash; ½ of a small baked potato; ½ cup of cooked beans; or ½ cup cooked corn or green peas. · Learn how much carbs to eat each day and at each meal. A dietitian or CDE can teach you how to keep track of the amount of carbs you eat. This is called carbohydrate counting. · If you are not sure how to count carbohydrate grams, use the Plate Method to plan meals. It is a good, quick way to make sure that you have a balanced meal. It also helps you spread carbs throughout the day. ¨ Divide your plate by types of foods. Put non-starchy vegetables on half the plate, meat or other protein food on one-quarter of the plate, and a grain or starchy vegetable in the final quarter of the plate. To this you can add a small piece of fruit and 1 cup of milk or yogurt, depending on how many carbs you are supposed to eat at a meal. 
· Try to eat about the same amount of carbs at each meal. Do not \"save up\" your daily allowance of carbs to eat at one meal. 
· Proteins have very little or no carbs per serving. Examples of proteins are beef, chicken, turkey, fish, eggs, tofu, cheese, cottage cheese, and peanut butter. A serving size of meat is 3 ounces, which is about the size of a deck of cards. Examples of meat substitute serving sizes (equal to 1 ounce of meat) are 1/4 cup of cottage cheese, 1 egg, 1 tablespoon of peanut butter, and ½ cup of tofu. How can you eat out and still eat healthy? · Learn to estimate the serving sizes of foods that have carbohydrate. If you measure food at home, it will be easier to estimate the amount in a serving of restaurant food. · If the meal you order has too much carbohydrate (such as potatoes, corn, or baked beans), ask to have a low-carbohydrate food instead. Ask for a salad or green vegetables. · If you use insulin, check your blood sugar before and after eating out to help you plan how much to eat in the future. · If you eat more carbohydrate at a meal than you had planned, take a walk or do other exercise. This will help lower your blood sugar. What else should you know? · Limit saturated fat, such as the fat from meat and dairy products.  This is a healthy choice because people who have diabetes are at higher risk of heart disease. So choose lean cuts of meat and nonfat or low-fat dairy products. Use olive or canola oil instead of butter or shortening when cooking. · Don't skip meals. Your blood sugar may drop too low if you skip meals and take insulin or certain medicines for diabetes. · Check with your doctor before you drink alcohol. Alcohol can cause your blood sugar to drop too low. Alcohol can also cause a bad reaction if you take certain diabetes medicines. Follow-up care is a key part of your treatment and safety. Be sure to make and go to all appointments, and call your doctor if you are having problems. It's also a good idea to know your test results and keep a list of the medicines you take. Where can you learn more? Go to http://theresa-mumtaz.info/. Enter N375 in the search box to learn more about \"Learning About Diabetes Food Guidelines. \" Current as of: May 23, 2016 Content Version: 11.1 © 9287-8718 [employer]. Care instructions adapted under license by [employer] (which disclaims liability or warranty for this information). If you have questions about a medical condition or this instruction, always ask your healthcare professional. [place] [address] any warranty or liability for your use of this information. Well Visit, Over 72: Care Instructions Your Care Instructions Physical exams can help you stay healthy. Your doctor has checked your overall health and may have suggested ways to take good care of yourself. He or she also may have recommended tests. At home, you can help prevent illness with healthy eating, regular exercise, and other steps. Follow-up care is a key part of your treatment and safety. Be sure to make and go to all appointments, and call your doctor if you are having problems. It's also a good idea to know your test results and keep a list of the medicines you take. How can you care for yourself at home? · Reach and stay at a healthy weight. This will lower your risk for many problems, such as obesity, diabetes, heart disease, and high blood pressure. · Get at least 30 minutes of exercise on most days of the week. Walking is a good choice. You also may want to do other activities, such as running, swimming, cycling, or playing tennis or team sports. · Do not smoke. Smoking can make health problems worse. If you need help quitting, talk to your doctor about stop-smoking programs and medicines. These can increase your chances of quitting for good. · Protect your skin from too much sun. When you're outdoors from 10 a.m. to 4 p.m., stay in the shade or cover up with clothing and a hat with a wide brim. Wear sunglasses that block UV rays. Even when it's cloudy, put broad-spectrum sunscreen (SPF 30 or higher) on any exposed skin. · See a dentist one or two times a year for checkups and to have your teeth cleaned. · Wear a seat belt in the car. · Limit alcohol to 2 drinks a day for men and 1 drink a day for women. Too much alcohol can cause health problems. Follow your doctor's advice about when to have certain tests. These tests can spot problems early. For men and women · Cholesterol. Your doctor will tell you how often to have this done based on your overall health and other things that can increase your risk for heart attack and stroke. · Blood pressure. Have your blood pressure checked during a routine doctor visit. Your doctor will tell you how often to check your blood pressure based on your age, your blood pressure results, and other factors. · Diabetes. Ask your doctor whether you should have tests for diabetes. · Vision. Experts recommend that you have yearly exams for glaucoma and other age-related eye problems. · Hearing. Tell your doctor if you notice any change in your hearing. You can have tests to find out how well you hear. · Colon cancer tests.  Keep having colon cancer tests as your doctor recommends. You can have one of several types of tests. · Heart attack and stroke risk. At least every 4 to 6 years, you should have your risk for heart attack and stroke assessed. Your doctor uses factors such as your age, blood pressure, cholesterol, and whether you smoke or have diabetes to show what your risk for a heart attack or stroke is over the next 10 years. · Osteoporosis. Talk to your doctor about whether you should have a bone density test to find out whether you have thinning bones. Also ask your doctor about whether you should take calcium and vitamin D supplements. For women · Pap test and pelvic exam. You may no longer need a Pap test. Talk with your doctor about whether to stop or continue to have Pap tests. · Breast exam and mammogram. Ask how often you should have a mammogram, which is an X-ray of your breasts. A mammogram can spot breast cancer before it can be felt and when it is easiest to treat. · Thyroid disease. Talk to your doctor about whether to have your thyroid checked as part of a regular physical exam. Women have an increased chance of a thyroid problem. For men · Prostate exam. Talk to your doctor about whether you should have a blood test (called a PSA test) for prostate cancer. Experts disagree on whether men should have this test. Some experts recommend that you discuss the benefits and risks of the test with your doctor. · Abdominal aortic aneurysm. Ask your doctor whether you should have a test to check for an aneurysm. You may need a test if you ever smoked or if your parent, brother, sister, or child has had an aneurysm. When should you call for help? Watch closely for changes in your health, and be sure to contact your doctor if you have any problems or symptoms that concern you. Where can you learn more? Go to http://theresa-mumtaz.info/. Enter M198 in the search box to learn more about \"Well Visit, Over 65: Care Instructions. \" 
 Current as of: July 19, 2016 Content Version: 11.1 © 8598-0673 [employer]FFiRiS, Brain[employer]INS. Care instructions adapted under license by [employer] (which disclaims liability or warranty for this information). If you have questions about a medical condition or this instruction, always ask your healthcare professional. [address] any warranty or liability for your use of this information. Introducing [facility]! Trisha Gonzalez introduces [employer] patient portal. Now you can access parts of your medical record, email your doctor's office, and request medication refills online. 1. In your internet browser, go to [email or web address] 2. Click on the First Time User? Click Here link in the Sign In box. You will see the New Member Sign Up page. 3. Enter your LIN[employer] Access Code exactly as it appears below. You will not need to use this code after youve completed the sign-up process. If you do not sign up before the expiration date, you must request a new code. · LIN[employer] Access Code: [email or web address] Expires: 4/25/2017  4:19 PM 
 
4. Enter the last four digits of your Social Security Number (xxxx) and Date of Birth (mm/dd/yyyy) as indicated and click Submit. You will be taken to the next sign-up page. 5. Create a [employer] ID. This will be your [employer] login ID and cannot be changed, so think of one that is secure and easy to remember. 6. Create a [employer] password. You can change your password at any time. 7. Enter your Password Reset Question and Answer. This can be used at a later time if you forget your password. 8. Enter your e-mail address. You will receive e-mail notification when new information is available in [address] 9. Click Sign Up. You can now view and download portions of your medical record. 10. Click the Download Summary menu link to download a portable copy of your medical information. If you have questions, please visit the Frequently Asked Questions section of the [employer] website. Remember, [place] is NOT to be used for urgent needs. For medical emergencies, dial 911. Now available from your iPhone and Android! Please provide this summary of care documentation to your next provider. Your primary care clinician is listed as Marian Bee. If you have any questions after today's visit, please call [phone].

## 2017-02-01 ENCOUNTER — TELEPHONE (OUTPATIENT)
Dept: INTERNAL MEDICINE CLINIC | Age: 72
End: 2017-02-01

## 2017-02-01 DIAGNOSIS — E11.65 TYPE 2 DIABETES MELLITUS WITH HYPERGLYCEMIA, UNSPECIFIED LONG TERM INSULIN USE STATUS: Primary | ICD-10-CM

## 2017-02-01 RX ORDER — INSULIN GLARGINE 100 [IU]/ML
INJECTION, SOLUTION SUBCUTANEOUS
Qty: 1 EACH | Refills: 3 | Status: SHIPPED | OUTPATIENT
Start: 2017-02-01 | End: 2018-10-20

## 2017-02-01 NOTE — TELEPHONE ENCOUNTER
Provider d/c lantus and started the pt on basaglar. Called the pt to inform him of this but there was no answer. So i left him a message requesting a call back. i also called the pharmacy and cancelled the order for the lantus. Spoke with toya.

## 2017-02-03 NOTE — TELEPHONE ENCOUNTER
Pt son Carla Hunag. notified of changes with lantus being discontinued and basalar sent to pharmacy [facility] pharmacy. Mr. Carla Huang verbalized understanding and had no additional questions.

## 2017-02-03 NOTE — TELEPHONE ENCOUNTER
I called the pt again in order to verify with him that he is aware of his medication change. There was no answer and i was unable to reach the pt.

## 2017-04-19 ENCOUNTER — HOSPITAL ENCOUNTER (OUTPATIENT)
Dept: LAB | Age: 72
Discharge: HOME | End: 2017-04-19
Payer: MEDICARE

## 2017-04-19 ENCOUNTER — LAB ONLY (OUTPATIENT)
Dept: INTERNAL MEDICINE CLINIC | Age: 72
End: 2017-04-19

## 2017-04-19 DIAGNOSIS — Z12.5 SCREENING FOR PROSTATE CANCER: ICD-10-CM

## 2017-04-19 DIAGNOSIS — E11.65 TYPE 2 DIABETES MELLITUS WITH HYPERGLYCEMIA, WITHOUT LONG-TERM CURRENT USE OF INSULIN (HCC): ICD-10-CM

## 2017-04-19 LAB
HBA1C MFR BLD: 6.5 % (ref 4.2–5.6)
PSA SERPL-MCNC: <0.1 NG/ML (ref 0–4)

## 2017-04-19 PROCEDURE — 82043 UR ALBUMIN QUANTITATIVE: CPT | Performed by: HOSPITALIST

## 2017-04-19 PROCEDURE — 36415 COLL VENOUS BLD VENIPUNCTURE: CPT | Performed by: HOSPITALIST

## 2017-04-19 PROCEDURE — 84153 ASSAY OF PSA TOTAL: CPT | Performed by: HOSPITALIST

## 2017-04-19 PROCEDURE — 83036 HEMOGLOBIN GLYCOSYLATED A1C: CPT | Performed by: HOSPITALIST

## 2017-04-20 LAB
CREAT UR-MCNC: 65.46 MG/DL (ref 30–125)
MICROALBUMIN UR-MCNC: 155.1 MG/DL (ref 0–3)
MICROALBUMIN/CREAT UR-RTO: 2369 MG/G (ref 0–30)

## 2017-04-26 ENCOUNTER — OFFICE VISIT (OUTPATIENT)
Dept: INTERNAL MEDICINE CLINIC | Age: 72
End: 2017-04-26

## 2017-04-26 VITALS
BODY MASS INDEX: 27.13 KG/M2 | TEMPERATURE: 98.3 F | SYSTOLIC BLOOD PRESSURE: 133 MMHG | DIASTOLIC BLOOD PRESSURE: 70 MMHG | HEART RATE: 67 BPM | RESPIRATION RATE: 18 BRPM | HEIGHT: 66 IN | WEIGHT: 168.8 LBS | OXYGEN SATURATION: 96 %

## 2017-04-26 DIAGNOSIS — I10 ESSENTIAL HYPERTENSION: Primary | ICD-10-CM

## 2017-04-26 DIAGNOSIS — K21.9 GASTROESOPHAGEAL REFLUX DISEASE WITHOUT ESOPHAGITIS: ICD-10-CM

## 2017-04-26 DIAGNOSIS — E78.00 HYPERCHOLESTEROLEMIA: ICD-10-CM

## 2017-04-26 DIAGNOSIS — E11.65 TYPE 2 DIABETES MELLITUS WITH HYPERGLYCEMIA, WITH LONG-TERM CURRENT USE OF INSULIN (HCC): ICD-10-CM

## 2017-04-26 DIAGNOSIS — R05.9 COUGH: ICD-10-CM

## 2017-04-26 DIAGNOSIS — M10.00 IDIOPATHIC GOUT, UNSPECIFIED CHRONICITY, UNSPECIFIED SITE: ICD-10-CM

## 2017-04-26 DIAGNOSIS — E55.9 VITAMIN D DEFICIENCY: ICD-10-CM

## 2017-04-26 DIAGNOSIS — N18.30 CKD (CHRONIC KIDNEY DISEASE) STAGE 3, GFR 30-59 ML/MIN (HCC): ICD-10-CM

## 2017-04-26 DIAGNOSIS — Z79.4 TYPE 2 DIABETES MELLITUS WITH HYPERGLYCEMIA, WITH LONG-TERM CURRENT USE OF INSULIN (HCC): ICD-10-CM

## 2017-04-26 RX ORDER — CARVEDILOL 12.5 MG/1
TABLET ORAL
Qty: 180 TAB | Refills: 1 | Status: SHIPPED | OUTPATIENT
Start: 2017-04-26 | End: 2017-09-11 | Stop reason: SDUPTHER

## 2017-04-26 RX ORDER — DILTIAZEM HYDROCHLORIDE 360 MG/1
CAPSULE, EXTENDED RELEASE ORAL
Qty: 90 CAP | Refills: 1 | Status: SHIPPED | OUTPATIENT
Start: 2017-04-26 | End: 2017-10-18 | Stop reason: SDUPTHER

## 2017-04-26 RX ORDER — HYDROCODONE POLISTIREX AND CHLORPHENIRAMINE POLISTIREX 10; 8 MG/5ML; MG/5ML
1 SUSPENSION, EXTENDED RELEASE ORAL
Qty: 240 ML | Refills: 0 | Status: SHIPPED | OUTPATIENT
Start: 2017-04-26 | End: 2018-06-04 | Stop reason: SDUPTHER

## 2017-04-26 RX ORDER — ALLOPURINOL 100 MG/1
TABLET ORAL
Qty: 180 TAB | Refills: 2 | Status: SHIPPED | OUTPATIENT
Start: 2017-04-26 | End: 2017-04-26 | Stop reason: SDUPTHER

## 2017-04-26 RX ORDER — ALLOPURINOL 100 MG/1
TABLET ORAL
Qty: 180 TAB | Refills: 2 | Status: SHIPPED | OUTPATIENT
Start: 2017-04-26 | End: 2018-10-20

## 2017-04-26 NOTE — PROGRESS NOTES
Chief Complaint   Patient presents with    Hypertension    Diabetes    Cholesterol Problem    Vitamin D Deficiency    Results     labs       HPI:  Patient is a 66year old  male with medical problems listed below presents today for follow up of DM 2, Hypertension, Hyperlipidemia, CKD 3, GERD, Vit D def, etc. He endorsed recent cough that is sometimes productive ongoing for a few months. Otherwise, he has been feeling well and voices no other complaints today. He is complaint with his medications with no adverse effects reported. He is requesting refill of some medications today. Past Medical History:   Diagnosis Date    Cataract     both    Colon polyp 9-2004    Diabetes (HCC)     IDDM    DJD (degenerative joint disease) of knee     ED (erectile dysfunction)     GERD (gastroesophageal reflux disease) 12-01-08    Glaucoma suspect     Gout, unspecified 10-25-06    Hyperlipidemia     Hypertension     Insomnia 7-20-06    Phimosis 6-16-00    Prostate cancer ([facility] [address] 3-3-09    Stroke ([facility] [address]        No Known Allergies      Current Outpatient Prescriptions   Medication Sig Dispense Refill    carvedilol (COREG) 12.5 mg tablet TAKE 1 TABLET BY MOUTH TWICE A  Tab 1    glimepiride (AMARYL) 1 mg tablet TAKE 1 TABLET BY MOUTH DAILY BEFORE BREAKFAST 90 Tab 2    Insulin Needles, Disposable, (BD INSULIN PEN NEEDLE UF MINI) 31 gauge x 3/16\" ndle Sig: test blood glucose BID. E11.9 100 Pen Needle 5    CRESTOR 20 mg tablet TAKE 1 TABLET BY MOUTH NIGHTLY 90 Tab 3    dilTIAZem (TIAZAC) 360 mg ER capsule TAKE 1 CAP BY MOUTH DAILY. 90 Cap 1    carvedilol (COREG) 6.25 mg tablet TAKE 1 TABLET BY MOUTH TWICE A DAY 10 Tab 0    insulin glargine (LANTUS SOLOSTAR) 100 unit/mL (3 mL) pen 10 Units by SubCUTAneous route daily. 1 Each 0    pantoprazole (PROTONIX) 40 mg tablet Take 1 Tab by mouth Daily (before breakfast).  30 Tab 1    aspirin-dipyridamole (AGGRENOX)  mg per SR capsule Take 1 Cap by mouth two (2) times a day. 180 Cap 3    Cholecalciferol, Vitamin D3, 50,000 unit cap Take 1 Cap by mouth every seven (7) days. 8 Cap 1    sitaGLIPtin (JANUVIA) 50 mg tablet Take 1 Tab by mouth daily. 90 Tab 3    glucose 4 gram chewable tablet Take 4 tablets by mouth as needed. 50 tablet 0    OTHER Check CBC, CMP, Mg in 5 days, results to PCP 1 each 0    omega-3 fatty acids-vitamin e (FISH OIL) 1,000 mg Cap Take 1,400 mg by mouth daily.       insulin glargine (BASAGLAR KWIKPEN) 100 unit/mL (3 mL) pen Sig: Take 10 units SC daily 1 Each 3    allopurinol (ZYLOPRIM) 100 mg tablet TAKE 1 TABLET BY MOUTH TWICE DAILY 180 Tab 2    insulin lispro (HUMALOG KWIKPEN) 100 unit/mL kwikpen Check FSBS Three times daily with meals,   For sugar between 150 and 200- give 1 units SQ,   For sugar between 201 and 250- give 3 units SQ,   For sugar between 251 and 300- give 5 units SQ,   For sugar between 301 and 400- give 7 units SQ,  For sugars > 400, contact PCP 1 each 2          ROS:  Constitutional: Negative for fever, chills, or fatigue  Neurological: Negative for headache, dizziness, visual disturbance, or loss of conciousness  Respiratory: Negative for SOB, hemoptysis, or wheezing  Cardiovascular: Negative for chest pain, palpitation, or leg swelling  Gastrointestinal: Negative for abdominal pain, nausea, vomiting, diarrhea, blood in stool, melena, or heartburn  Musculoskeletal: Negative for falls      Physical Exam:  Visit Vitals    /70 (BP 1 Location: Left arm, BP Patient Position: Sitting)    Pulse 67    Temp 98.3 °F (36.8 °C) (Oral)    Resp 18    Ht 5' 6\" (1.676 m)    Wt 168 lb 12.8 oz (76.6 kg)    SpO2 96%    BMI 27.25 kg/m2     General: a & o x 3, afebrile, well-nourished, interacting appropriately, in no acute distress  Skin: warm and dry, no rashes, no bruises  Neck: supple, symmetrical, no thyromegaly  Respiratory: symmetrical chest expansion, lung sounds clear bilaterally, good air entry, good respiratory effort, no wheezes or crackles  Cardiovascular: normal S1S2, regular rate and rhythm, no murmurs,  no carotid or abdominal bruits, no peripheral edema, no JVD  Abdomen: non-distended, normoactive bowel sounds x 4 quadrants, soft, non-tender to palpation  Diabetic foot exam: pedal pulses palpable and no calluses noted        Assessment/Plan:    ICD-10-CM ICD-9-CM    1. Essential hypertension I10 401.9 Controlled  Continue current meds and he was counseled on low salt diet. dilTIAZem (TIAZAC) 360 mg ER capsule      carvedilol (COREG) 12.5 mg tablet      CBC WITH AUTOMATED DIFF      METABOLIC PANEL, COMPREHENSIVE      T4, FREE      TSH 3RD GENERATION   2. Type 2 diabetes mellitus with hyperglycemia, with long-term current use of insulin (HCC) E11.65 250.00 Controlled with recent HBA1C at 6.5. Continue current meds and he was counseled on diabetic diet. HEMOGLOBIN A1C W/O EAG    Z79.4 790.29      V58.67    3. CKD (chronic kidney disease) stage 3, GFR 30-59 ml/min N18.3 585. 3 Stable   4. Hypercholesterolemia E78.00 272.0 Continue Crestor 20 mg daily and he was counseled on low fat diet - will check lipid panel at f/u in 3 months. LIPID PANEL   5. Gastroesophageal reflux disease without esophagitis K21.9 530.81 Stable   6. Vitamin D deficiency E55.9 268.9 VITAMIN D, 25 HYDROXY   7. Idiopathic gout, unspecified chronicity, unspecified site M10.00 274.9 allopurinol (ZYLOPRIM) 100 mg tablet   8. Cough R05 786.2 Tussionex started today.   chlorpheniramine-HYDROcodone (TUSSIONEX) 10-8 mg/5 mL suspension         Orders Placed This Encounter    CBC WITH AUTOMATED DIFF     Standing Status:   Future     Standing Expiration Date:   10/23/2017    HEMOGLOBIN A1C W/O EAG     Standing Status:   Future     Standing Expiration Date:   4/27/2018    LIPID PANEL     Standing Status:   Future     Standing Expiration Date:   74/21/5256    METABOLIC PANEL, COMPREHENSIVE     Standing Status:   Future     Standing Expiration Date:   10/23/2017    T4, FREE     Standing Status:   Future     Standing Expiration Date:   10/23/2017    TSH 3RD GENERATION     Standing Status:   Future     Standing Expiration Date:   8/24/2017    VITAMIN D, 25 HYDROXY     Standing Status:   Future     Standing Expiration Date:   8/26/2017    DISCONTD: allopurinol (ZYLOPRIM) 100 mg tablet     Sig: TAKE 1 TABLET BY MOUTH TWICE DAILY     Dispense:  180 Tab     Refill:  2    dilTIAZem (TIAZAC) 360 mg ER capsule     Sig: TAKE 1 CAP BY MOUTH DAILY. Dispense:  90 Cap     Refill:  1    carvedilol (COREG) 12.5 mg tablet     Sig: TAKE 1 TABLET BY MOUTH TWICE A DAY     Dispense:  180 Tab     Refill:  1    chlorpheniramine-HYDROcodone (TUSSIONEX) 10-8 mg/5 mL suspension     Sig: Take 5 mL by mouth every twelve (12) hours as needed for Cough. Max Daily Amount: 10 mL. Dispense:  240 mL     Refill:  0    allopurinol (ZYLOPRIM) 100 mg tablet     Sig: TAKE 1 TABLET BY MOUTH TWICE DAILY     Dispense:  180 Tab     Refill:  2       Recent labs reviewed with pt      Additional Notes: Discussed today's diagnosis, treatment plans. Discussed medication indications and side effects. After Visit Summary: Discussed provided printed patient instructions. Answered questions accordingly. Follow-up Disposition:  In 3 months with labs 1 week prior        Mary Bucio DO, MPH  Internal Medicine

## 2017-04-26 NOTE — MR AVS SNAPSHOT
Visit Information Date & Time Provider Department Dept. Phone Encounter #  
 4/26/2017  4:15  Navarro Bucio,  [facility]ists at [facility] 8 [id number] Follow-up Instructions Return in about 3 months (around 7/26/2017) for Labs 1 week before. Upcoming Health Maintenance Date Due ZOSTER VACCINE AGE 60> 8/31/2004 DTaP/Tdap/Td series (1 - Tdap) 8/16/2013 FOOT EXAM Q1 4/13/2017 EYE EXAM RETINAL OR DILATED Q1 4/28/2017 COLONOSCOPY 9/1/2017 HEMOGLOBIN A1C Q6M 10/19/2017 LIPID PANEL Q1 1/20/2018 MEDICARE YEARLY EXAM 1/26/2018 MICROALBUMIN Q1 4/19/2018 GLAUCOMA SCREENING Q2Y 4/28/2018 Allergies as of 4/26/2017  Review Complete On: 4/26/2017 By: Musa Silva LPN No Known Allergies Current Immunizations  Reviewed on 9/14/2016 Name Date Influenza Vaccine 8/15/2013 Influenza Vaccine (Quad) PF 9/14/2016, 1/13/2016 Influenza Vaccine PF 9/30/2014  5:47 PM  
 Pneumococcal Conjugate (PCV-13) 1/13/2016 Pneumococcal Vaccine (Unspecified Type) 8/15/2013 Td 8/15/2013 Not reviewed this visit You Were Diagnosed With   
  
 Codes Comments Type 2 diabetes mellitus with hyperglycemia, with long-term current use of insulin (HCC)    -  Primary ICD-10-CM: E11.65, Z79.4 ICD-9-CM: 250.00, 790.29, V58.67 Idiopathic gout, unspecified chronicity, unspecified site     ICD-10-CM: M10.00 ICD-9-CM: 274.9 Essential hypertension     ICD-10-CM: I10 
ICD-9-CM: 401.9 CKD (chronic kidney disease) stage 3, GFR 30-59 ml/min     ICD-10-CM: N18.3 ICD-9-CM: 716. 3 Hypercholesterolemia     ICD-10-CM: E78.00 ICD-9-CM: 272.0 Gastroesophageal reflux disease without esophagitis     ICD-10-CM: K21.9 ICD-9-CM: 530.81 Vitamin D deficiency     ICD-10-CM: E55.9 ICD-9-CM: 268.9 Cough     ICD-10-CM: R05 ICD-9-CM: 719. 2 Vitals BP Pulse Temp Resp Height(growth percentile) Weight(growth percentile) 133/70 (BP 1 Location: Left arm, BP Patient Position: Sitting) 67 98.3 °F (36.8 °C) (Oral) 18 5' 6\" (1.676 m) 168 lb 12.8 oz (76.6 kg) SpO2 BMI Smoking Status 96% 27.25 kg/m2 Never Smoker Vitals History BMI and BSA Data Body Mass Index Body Surface Area  
 27.25 kg/m 2 1.89 m 2 Preferred Pharmacy Pharmacy Name Phone [id number] [phone] [facility] [place] [address] [phone] Your Updated Medication List  
  
   
This list is accurate as of: 4/26/17  5:06 PM.  Always use your most recent med list.  
  
  
  
  
 allopurinol 100 mg tablet Commonly known as:  ZYLOPRIM  
TAKE 1 TABLET BY MOUTH TWICE DAILY  
  
 aspirin-dipyridamole  mg per SR capsule Commonly known as:  AGGRENOX Take 1 Cap by mouth two (2) times a day. * carvedilol 6.25 mg tablet Commonly known as:  COREG  
TAKE 1 TABLET BY MOUTH TWICE A DAY * carvedilol 12.5 mg tablet Commonly known as:  COREG  
TAKE 1 TABLET BY MOUTH TWICE A DAY  
  
 chlorpheniramine-HYDROcodone 10-8 mg/5 mL suspension Commonly known as:  [place] Take 5 mL by mouth every twelve (12) hours as needed for Cough. Max Daily Amount: 10 mL. Cholecalciferol (Vitamin D3) 50,000 unit Cap Take 1 Cap by mouth every seven (7) days. CRESTOR 20 mg tablet Generic drug:  rosuvastatin TAKE 1 TABLET BY MOUTH NIGHTLY  
  
 dilTIAZem 360 mg ER capsule Commonly known as:  [facility] TAKE 1 CAP BY MOUTH DAILY. FISH OIL 1,000 mg Cap Generic drug:  omega-3 fatty acids-vitamin e Take 1,400 mg by mouth daily. glimepiride 1 mg tablet Commonly known as:  AMARYL  
TAKE 1 TABLET BY MOUTH DAILY BEFORE BREAKFAST  
  
 glucose 4 gram chewable tablet Take 4 tablets by mouth as needed. * insulin glargine 100 unit/mL (3 mL) pen Commonly known as:  LANTUS SOLOSTAR  
10 Units by SubCUTAneous route daily. * insulin glargine 100 unit/mL (3 mL) pen Commonly known Leeroy Boast Sig: Take 10 units SC daily  
  
 insulin lispro 100 unit/mL kwikpen Commonly known as:  HumaLOG KwikPen Check FSBS Three times daily with meals,  For sugar between 150 and 200- give 1 units SQ,  For sugar between 201 and 250- give 3 units SQ,  For sugar between 251 and 300- give 5 units SQ,  For sugar between 301 and 400- give 7 units SQ, For sugars > 400, contact PCP Insulin Needles (Disposable) 31 gauge x 3/16\" Ndle Commonly known as:  BD INSULIN PEN NEEDLE UF MINI Sig: test blood glucose BID. E11.9 OTHER Check CBC, CMP, Mg in 5 days, results to PCP  
  
 pantoprazole 40 mg tablet Commonly known as:  PROTONIX Take 1 Tab by mouth Daily (before breakfast). SITagliptin 50 mg tablet Commonly known as:  Mahi Aleajndro Take 1 Tab by mouth daily. * Notice: This list has 4 medication(s) that are the same as other medications prescribed for you. Read the directions carefully, and ask your doctor or other care provider to review them with you. Prescriptions Printed Refills  
 dilTIAZem (TIAZAC) 360 mg ER capsule 1 Sig: TAKE 1 CAP BY MOUTH DAILY. Class: Print  
 carvedilol (COREG) 12.5 mg tablet 1 Sig: TAKE 1 TABLET BY MOUTH TWICE A DAY Class: Print  
 chlorpheniramine-HYDROcodone (TUSSIONEX) 10-8 mg/5 mL suspension 0 Sig: Take 5 mL by mouth every twelve (12) hours as needed for Cough. Max Daily Amount: 10 mL. Class: Print Route: Oral  
  
Prescriptions Sent to Pharmacy Refills  
 allopurinol (ZYLOPRIM) 100 mg tablet 2 Sig: TAKE 1 TABLET BY MOUTH TWICE DAILY Class: Normal  
 Pharmacy: [address] [place] [address] #: [phone] Follow-up Instructions Return in about 3 months (around 7/26/2017) for Labs 1 week before. To-Do List   
 07/25/2017 Lab:  CBC WITH AUTOMATED DIFF   
  
 07/25/2017 Lab:  HEMOGLOBIN A1C W/O EAG   
  
 07/25/2017 Lab: LIPID PANEL   
  
 07/25/2017 Lab:  METABOLIC PANEL, COMPREHENSIVE   
  
 07/25/2017 Lab:  T4, FREE   
  
 07/25/2017 Lab:  TSH 3RD GENERATION   
  
 07/25/2017 Lab:  VITAMIN D, 25 HYDROXY Patient Instructions A Healthy Lifestyle: Care Instructions Your Care Instructions A healthy lifestyle can help you feel good, stay at a healthy weight, and have plenty of energy for both work and play. A healthy lifestyle is something you can share with your whole family. A healthy lifestyle also can lower your risk for serious health problems, such as high blood pressure, heart disease, and diabetes. You can follow a few steps listed below to improve your health and the health of your family. Follow-up care is a key part of your treatment and safety. Be sure to make and go to all appointments, and call your doctor if you are having problems. Its also a good idea to know your test results and keep a list of the medicines you take. How can you care for yourself at home? · Do not eat too much sugar, fat, or fast foods. You can still have dessert and treats now and then. The goal is moderation. · Start small to improve your eating habits. Pay attention to portion sizes, drink less juice and soda pop, and eat more fruits and vegetables. ¨ Eat a healthy amount of food. A 3-ounce serving of meat, for example, is about the size of a deck of cards. Fill the rest of your plate with vegetables and whole grains. ¨ Limit the amount of soda and sports drinks you have every day. Drink more water when you are thirsty. ¨ Eat at least 5 servings of fruits and vegetables every day. It may seem like a lot, but it is not hard to reach this goal. A serving or helping is 1 piece of fruit, 1 cup of vegetables, or 2 cups of leafy, raw vegetables. Have an apple or some carrot sticks as an afternoon snack instead of a candy bar.  Try to have fruits and/or vegetables at every meal. 
 · Make exercise part of your daily routine. You may want to start with simple activities, such as walking, bicycling, or slow swimming. Try to be active 30 to 60 minutes every day. You do not need to do all 30 to 60 minutes all at once. For example, you can exercise 3 times a day for 10 or 20 minutes. Moderate exercise is safe for most people, but it is always a good idea to talk to your doctor before starting an exercise program. 
· Keep moving. Emmanuel Boers the lawn, work in the garden, or [employer]. Take the stairs instead of the elevator at work. · If you smoke, quit. People who smoke have an increased risk for heart attack, stroke, cancer, and other lung illnesses. Quitting is hard, but there are ways to boost your chance of quitting tobacco for good. ¨ Use nicotine gum, patches, or lozenges. ¨ Ask your doctor about stop-smoking programs and medicines. ¨ Keep trying. In addition to reducing your risk of diseases in the future, you will notice some benefits soon after you stop using tobacco. If you have shortness of breath or asthma symptoms, they will likely get better within a few weeks after you quit. · Limit how much alcohol you drink. Moderate amounts of alcohol (up to 2 drinks a day for men, 1 drink a day for women) are okay. But drinking too much can lead to liver problems, high blood pressure, and other health problems. Family health If you have a family, there are many things you can do together to improve your health. · Eat meals together as a family as often as possible. · Eat healthy foods. This includes fruits, vegetables, lean meats and dairy, and whole grains. · Include your family in your fitness plan. Most people think of activities such as jogging or tennis as the way to fitness, but there are many ways you and your family can be more active. Anything that makes you breathe hard and gets your heart pumping is exercise. Here are some tips: ¨ Walk to do errands or to take your child to school or the bus. ¨ Go for a family bike ride after dinner instead of watching TV. Where can you learn more? Go to http://theresa-mumtaz.info/. Enter P543 in the search box to learn more about \"A Healthy Lifestyle: Care Instructions. \" Current as of: July 26, 2016 Content Version: 11.2 © 6013-7533 [employer]. Care instructions adapted under license by [employer] (which disclaims liability or warranty for this information). If you have questions about a medical condition or this instruction, always ask your healthcare professional. [place] [address] any warranty or liability for your use of this information. DASH Diet: Care Instructions Your Care Instructions The DASH diet is an eating plan that can help lower your blood pressure. DASH stands for Dietary Approaches to Stop Hypertension. Hypertension is high blood pressure. The DASH diet focuses on eating foods that are high in calcium, potassium, and magnesium. These nutrients can lower blood pressure. The foods that are highest in these nutrients are fruits, vegetables, low-fat dairy products, nuts, seeds, and legumes. But taking calcium, potassium, and magnesium supplements instead of eating foods that are high in those nutrients does not have the same effect. The DASH diet also includes whole grains, fish, and poultry. The DASH diet is one of several lifestyle changes your doctor may recommend to lower your high blood pressure. Your doctor may also want you to decrease the amount of sodium in your diet. Lowering sodium while following the DASH diet can lower blood pressure even further than just the DASH diet alone. Follow-up care is a key part of your treatment and safety. Be sure to make and go to all appointments, and call your doctor if you are having problems.  It's also a good idea to know your test results and keep a list of the medicines you take. How can you care for yourself at home? Following the DASH diet · Eat 4 to 5 servings of fruit each day. A serving is 1 medium-sized piece of fruit, ½ cup chopped or canned fruit, 1/4 cup dried fruit, or 4 ounces (½ cup) of fruit juice. Choose fruit more often than fruit juice. · Eat 4 to 5 servings of vegetables each day. A serving is 1 cup of lettuce or raw leafy vegetables, ½ cup of chopped or cooked vegetables, or 4 ounces (½ cup) of vegetable juice. Choose vegetables more often than vegetable juice. · Get 2 to 3 servings of low-fat and fat-free dairy each day. A serving is 8 ounces of milk, 1 cup of yogurt, or 1 ½ ounces of cheese. · Eat 6 to 8 servings of grains each day. A serving is 1 slice of bread, 1 ounce of dry cereal, or ½ cup of cooked rice, pasta, or cooked cereal. Try to choose whole-grain products as much as possible. · Limit lean meat, poultry, and fish to 2 servings each day. A serving is 3 ounces, about the size of a deck of cards. · Eat 4 to 5 servings of nuts, seeds, and legumes (cooked dried beans, lentils, and split peas) each week. A serving is 1/3 cup of nuts, 2 tablespoons of seeds, or ½ cup of cooked beans or peas. · Limit fats and oils to 2 to 3 servings each day. A serving is 1 teaspoon of vegetable oil or 2 tablespoons of salad dressing. · Limit sweets and added sugars to 5 servings or less a week. A serving is 1 tablespoon jelly or jam, ½ cup sorbet, or 1 cup of lemonade. · Eat less than 2,300 milligrams (mg) of sodium a day. If you limit your sodium to 1,500 mg a day, you can lower your blood pressure even more. Tips for success · Start small. Do not try to make dramatic changes to your diet all at once. You might feel that you are missing out on your favorite foods and then be more likely to not follow the plan. Make small changes, and stick with them. Once those changes become habit, add a few more changes. · Try some of the following: ¨ Make it a goal to eat a fruit or vegetable at every meal and at snacks. This will make it easy to get the recommended amount of fruits and vegetables each day. ¨ Try yogurt topped with fruit and nuts for a snack or healthy dessert. ¨ Add lettuce, tomato, cucumber, and onion to sandwiches. ¨ Combine a ready-made pizza crust with low-fat mozzarella cheese and lots of vegetable toppings. Try using tomatoes, squash, spinach, broccoli, carrots, cauliflower, and onions. ¨ Have a variety of cut-up vegetables with a low-fat dip as an appetizer instead of chips and dip. ¨ Sprinkle sunflower seeds or chopped almonds over salads. Or try adding chopped walnuts or almonds to cooked vegetables. ¨ Try some vegetarian meals using beans and peas. Add garbanzo or kidney beans to salads. Make burritos and tacos with mashed anderson beans or black beans. Where can you learn more? Go to http://theresaITI Techmumtaz.info/. Enter S437 in the search box to learn more about \"DASH Diet: Care Instructions. \" Current as of: March 23, 2016 Content Version: 11.2 © 7169-1352 [employer]. Care instructions adapted under license by [employer] (which disclaims liability or warranty for this information). If you have questions about a medical condition or this instruction, always ask your healthcare professional. [place] [address] any warranty or liability for your use of this information. Learning About Diabetes Food Guidelines Your Care Instructions Meal planning is important to manage diabetes. It helps keep your blood sugar at a target level (which you set with your doctor). You don't have to eat special foods. You can eat what your family eats, including sweets once in a while. But you do have to pay attention to how often you eat and how much you eat of certain foods.  
You may want to work with a dietitian or a certified diabetes educator (CDE) to help you plan meals and snacks. A dietitian or CDE can also help you lose weight if that is one of your goals. What should you know about eating carbs? Managing the amount of carbohydrate (carbs) you eat is an important part of healthy meals when you have diabetes. Carbohydrate is found in many foods. · Learn which foods have carbs. And learn the amounts of carbs in different foods. ¨ Bread, cereal, pasta, and rice have about 15 grams of carbs in a serving. A serving is 1 slice of bread (1 ounce), ½ cup of cooked cereal, or 1/3 cup of cooked pasta or rice. ¨ Fruits have 15 grams of carbs in a serving. A serving is 1 small fresh fruit, such as an apple or orange; ½ of a banana; ½ cup of cooked or canned fruit; ½ cup of fruit juice; 1 cup of melon or raspberries; or 2 tablespoons of dried fruit. ¨ Milk and no-sugar-added yogurt have 15 grams of carbs in a serving. A serving is 1 cup of milk or 2/3 cup of no-sugar-added yogurt. ¨ Starchy vegetables have 15 grams of carbs in a serving. A serving is ½ cup of mashed potatoes or sweet potato; 1 cup winter squash; ½ of a small baked potato; ½ cup of cooked beans; or ½ cup cooked corn or green peas. · Learn how much carbs to eat each day and at each meal. A dietitian or CDE can teach you how to keep track of the amount of carbs you eat. This is called carbohydrate counting. · If you are not sure how to count carbohydrate grams, use the Plate Method to plan meals. It is a good, quick way to make sure that you have a balanced meal. It also helps you spread carbs throughout the day. ¨ Divide your plate by types of foods. Put non-starchy vegetables on half the plate, meat or other protein food on one-quarter of the plate, and a grain or starchy vegetable in the final quarter of the plate.  To this you can add a small piece of fruit and 1 cup of milk or yogurt, depending on how many carbs you are supposed to eat at a meal. 
 · Try to eat about the same amount of carbs at each meal. Do not \"save up\" your daily allowance of carbs to eat at one meal. 
· Proteins have very little or no carbs per serving. Examples of proteins are beef, chicken, turkey, fish, eggs, tofu, cheese, cottage cheese, and peanut butter. A serving size of meat is 3 ounces, which is about the size of a deck of cards. Examples of meat substitute serving sizes (equal to 1 ounce of meat) are 1/4 cup of cottage cheese, 1 egg, 1 tablespoon of peanut butter, and ½ cup of tofu. How can you eat out and still eat healthy? · Learn to estimate the serving sizes of foods that have carbohydrate. If you measure food at home, it will be easier to estimate the amount in a serving of restaurant food. · If the meal you order has too much carbohydrate (such as potatoes, corn, or baked beans), ask to have a low-carbohydrate food instead. Ask for a salad or green vegetables. · If you use insulin, check your blood sugar before and after eating out to help you plan how much to eat in the future. · If you eat more carbohydrate at a meal than you had planned, take a walk or do other exercise. This will help lower your blood sugar. What else should you know? · Limit saturated fat, such as the fat from meat and dairy products. This is a healthy choice because people who have diabetes are at higher risk of heart disease. So choose lean cuts of meat and nonfat or low-fat dairy products. Use olive or canola oil instead of butter or shortening when cooking. · Don't skip meals. Your blood sugar may drop too low if you skip meals and take insulin or certain medicines for diabetes. · Check with your doctor before you drink alcohol. Alcohol can cause your blood sugar to drop too low. Alcohol can also cause a bad reaction if you take certain diabetes medicines. Follow-up care is a key part of your treatment and safety.  Be sure to make and go to all appointments, and call your doctor if you are having problems. It's also a good idea to know your test results and keep a list of the medicines you take. Where can you learn more? Go to http://theresa-mumtaz.info/. Enter IMartin in the search box to learn more about \"Learning About Diabetes Food Guidelines. \" Current as of: May 23, 2016 Content Version: 11.2 © 0174-9432 [employer]. Care instructions adapted under license by [employer] (which disclaims liability or warranty for this information). If you have questions about a medical condition or this instruction, always ask your healthcare professional. [address] any warranty or liability for your use of this information. Introducing [facility]! Adriano Parrish introduces [employer] patient portal. Now you can access parts of your medical record, email your doctor's office, and request medication refills online. 1. In your internet browser, go to [email or web address] 2. Click on the First Time User? Click Here link in the Sign In box. You will see the New Member Sign Up page. 3. Enter your [employer] Access Code exactly as it appears below. You will not need to use this code after youve completed the sign-up process. If you do not sign up before the expiration date, you must request a new code. · [employer] Access Code: [email or web address] Expires: 7/25/2017  5:06 PM 
 
4. Enter the last four digits of your Social Security Number (xxxx) and Date of Birth (mm/dd/yyyy) as indicated and click Submit. You will be taken to the next sign-up page. 5. Create a Counsylt ID. This will be your [employer] login ID and cannot be changed, so think of one that is secure and easy to remember. 6. Create a [employer] password. You can change your password at any time. 7. Enter your Password Reset Question and Answer.  This can be used at a later time if you forget your password. 8. Enter your e-mail address. You will receive e-mail notification when new information is available in [address] 9. Click Sign Up. You can now view and download portions of your medical record. 10. Click the Download Summary menu link to download a portable copy of your medical information. If you have questions, please visit the Frequently Asked Questions section of the [employer] website. Remember, [employer] is NOT to be used for urgent needs. For medical emergencies, dial 911. Now available from your iPhone and Android! Please provide this summary of care documentation to your next provider. Your primary care clinician is listed as [address] If you have any questions after today's visit, please call [phone].

## 2017-04-26 NOTE — PROGRESS NOTES
Chief Complaint   Patient presents with    Hypertension    Diabetes    Cholesterol Problem    Vitamin D Deficiency    Results     labs   Patient is here today for 4 month F/U.    1. Have you been to the ER, urgent care clinic since your last visit? Hospitalized since your last visit? No    2. Have you seen or consulted any other health care providers outside of the [address] [place] [address] since your last visit? Include any pap smears or colon screening.  No

## 2017-04-26 NOTE — PATIENT INSTRUCTIONS
A Healthy Lifestyle: Care Instructions  Your Care Instructions  A healthy lifestyle can help you feel good, stay at a healthy weight, and have plenty of energy for both work and play. A healthy lifestyle is something you can share with your whole family. A healthy lifestyle also can lower your risk for serious health problems, such as high blood pressure, heart disease, and diabetes. You can follow a few steps listed below to improve your health and the health of your family. Follow-up care is a key part of your treatment and safety. Be sure to make and go to all appointments, and call your doctor if you are having problems. Its also a good idea to know your test results and keep a list of the medicines you take. How can you care for yourself at home? · Do not eat too much sugar, fat, or fast foods. You can still have dessert and treats now and then. The goal is moderation. · Start small to improve your eating habits. Pay attention to portion sizes, drink less juice and soda pop, and eat more fruits and vegetables. ¨ Eat a healthy amount of food. A 3-ounce serving of meat, for example, is about the size of a deck of cards. Fill the rest of your plate with vegetables and whole grains. ¨ Limit the amount of soda and sports drinks you have every day. Drink more water when you are thirsty. ¨ Eat at least 5 servings of fruits and vegetables every day. It may seem like a lot, but it is not hard to reach this goal. A serving or helping is 1 piece of fruit, 1 cup of vegetables, or 2 cups of leafy, raw vegetables. Have an apple or some carrot sticks as an afternoon snack instead of a candy bar. Try to have fruits and/or vegetables at every meal.  · Make exercise part of your daily routine. You may want to start with simple activities, such as walking, bicycling, or slow swimming. Try to be active 30 to 60 minutes every day. You do not need to do all 30 to 60 minutes all at once.  For example, you can exercise 3 times a day for 10 or 20 minutes. Moderate exercise is safe for most people, but it is always a good idea to talk to your doctor before starting an exercise program.  · Keep moving. Meghan Biswas the lawn, work in the garden, or [employer]. Take the stairs instead of the elevator at work. · If you smoke, quit. People who smoke have an increased risk for heart attack, stroke, cancer, and other lung illnesses. Quitting is hard, but there are ways to boost your chance of quitting tobacco for good. ¨ Use nicotine gum, patches, or lozenges. ¨ Ask your doctor about stop-smoking programs and medicines. ¨ Keep trying. In addition to reducing your risk of diseases in the future, you will notice some benefits soon after you stop using tobacco. If you have shortness of breath or asthma symptoms, they will likely get better within a few weeks after you quit. · Limit how much alcohol you drink. Moderate amounts of alcohol (up to 2 drinks a day for men, 1 drink a day for women) are okay. But drinking too much can lead to liver problems, high blood pressure, and other health problems. Family health  If you have a family, there are many things you can do together to improve your health. · Eat meals together as a family as often as possible. · Eat healthy foods. This includes fruits, vegetables, lean meats and dairy, and whole grains. · Include your family in your fitness plan. Most people think of activities such as jogging or tennis as the way to fitness, but there are many ways you and your family can be more active. Anything that makes you breathe hard and gets your heart pumping is exercise. Here are some tips:  ¨ Walk to do errands or to take your child to school or the bus. ¨ Go for a family bike ride after dinner instead of watching TV. Where can you learn more? Go to http://theresa-mumtaz.info/. Enter M150 in the search box to learn more about \"A Healthy Lifestyle: Care Instructions. \"  Current as of: July 26, 2016  Content Version: 11.2  © 5569-8501 [employer]. Care instructions adapted under license by [employer] (which disclaims liability or warranty for this information). If you have questions about a medical condition or this instruction, always ask your healthcare professional. [address] any warranty or liability for your use of this information. DASH Diet: Care Instructions  Your Care Instructions  The DASH diet is an eating plan that can help lower your blood pressure. DASH stands for Dietary Approaches to Stop Hypertension. Hypertension is high blood pressure. The DASH diet focuses on eating foods that are high in calcium, potassium, and magnesium. These nutrients can lower blood pressure. The foods that are highest in these nutrients are fruits, vegetables, low-fat dairy products, nuts, seeds, and legumes. But taking calcium, potassium, and magnesium supplements instead of eating foods that are high in those nutrients does not have the same effect. The DASH diet also includes whole grains, fish, and poultry. The DASH diet is one of several lifestyle changes your doctor may recommend to lower your high blood pressure. Your doctor may also want you to decrease the amount of sodium in your diet. Lowering sodium while following the DASH diet can lower blood pressure even further than just the DASH diet alone. Follow-up care is a key part of your treatment and safety. Be sure to make and go to all appointments, and call your doctor if you are having problems. It's also a good idea to know your test results and keep a list of the medicines you take. How can you care for yourself at home? Following the DASH diet  · Eat 4 to 5 servings of fruit each day. A serving is 1 medium-sized piece of fruit, ½ cup chopped or canned fruit, 1/4 cup dried fruit, or 4 ounces (½ cup) of fruit juice. Choose fruit more often than fruit juice.   · Eat 4 to 5 servings of vegetables each day. A serving is 1 cup of lettuce or raw leafy vegetables, ½ cup of chopped or cooked vegetables, or 4 ounces (½ cup) of vegetable juice. Choose vegetables more often than vegetable juice. · Get 2 to 3 servings of low-fat and fat-free dairy each day. A serving is 8 ounces of milk, 1 cup of yogurt, or 1 ½ ounces of cheese. · Eat 6 to 8 servings of grains each day. A serving is 1 slice of bread, 1 ounce of dry cereal, or ½ cup of cooked rice, pasta, or cooked cereal. Try to choose whole-grain products as much as possible. · Limit lean meat, poultry, and fish to 2 servings each day. A serving is 3 ounces, about the size of a deck of cards. · Eat 4 to 5 servings of nuts, seeds, and legumes (cooked dried beans, lentils, and split peas) each week. A serving is 1/3 cup of nuts, 2 tablespoons of seeds, or ½ cup of cooked beans or peas. · Limit fats and oils to 2 to 3 servings each day. A serving is 1 teaspoon of vegetable oil or 2 tablespoons of salad dressing. · Limit sweets and added sugars to 5 servings or less a week. A serving is 1 tablespoon jelly or jam, ½ cup sorbet, or 1 cup of lemonade. · Eat less than 2,300 milligrams (mg) of sodium a day. If you limit your sodium to 1,500 mg a day, you can lower your blood pressure even more. Tips for success  · Start small. Do not try to make dramatic changes to your diet all at once. You might feel that you are missing out on your favorite foods and then be more likely to not follow the plan. Make small changes, and stick with them. Once those changes become habit, add a few more changes. · Try some of the following:  ¨ Make it a goal to eat a fruit or vegetable at every meal and at snacks. This will make it easy to get the recommended amount of fruits and vegetables each day. ¨ Try yogurt topped with fruit and nuts for a snack or healthy dessert. ¨ Add lettuce, tomato, cucumber, and onion to sandwiches.   ¨ Combine a ready-made pizza crust with low-fat mozzarella cheese and lots of vegetable toppings. Try using tomatoes, squash, spinach, broccoli, carrots, cauliflower, and onions. ¨ Have a variety of cut-up vegetables with a low-fat dip as an appetizer instead of chips and dip. ¨ Sprinkle sunflower seeds or chopped almonds over salads. Or try adding chopped walnuts or almonds to cooked vegetables. ¨ Try some vegetarian meals using beans and peas. Add garbanzo or kidney beans to salads. Make burritos and tacos with mashed anderson beans or black beans. Where can you learn more? Go to http://theresaMobilyTripmumtaz.info/. Enter R031 in the search box to learn more about \"DASH Diet: Care Instructions. \"  Current as of: March 23, 2016  Content Version: 11.2  © 5929-9457 [employer]. Care instructions adapted under license by [employer] (which disclaims liability or warranty for this information). If you have questions about a medical condition or this instruction, always ask your healthcare professional. [place] [address] any warranty or liability for your use of this information. Learning About Diabetes Food Guidelines  Your Care Instructions  Meal planning is important to manage diabetes. It helps keep your blood sugar at a target level (which you set with your doctor). You don't have to eat special foods. You can eat what your family eats, including sweets once in a while. But you do have to pay attention to how often you eat and how much you eat of certain foods. You may want to work with a dietitian or a certified diabetes educator (CDE) to help you plan meals and snacks. A dietitian or CDE can also help you lose weight if that is one of your goals. What should you know about eating carbs? Managing the amount of carbohydrate (carbs) you eat is an important part of healthy meals when you have diabetes. Carbohydrate is found in many foods. · Learn which foods have carbs.  And learn the amounts of carbs in different foods. ¨ Bread, cereal, pasta, and rice have about 15 grams of carbs in a serving. A serving is 1 slice of bread (1 ounce), ½ cup of cooked cereal, or 1/3 cup of cooked pasta or rice. ¨ Fruits have 15 grams of carbs in a serving. A serving is 1 small fresh fruit, such as an apple or orange; ½ of a banana; ½ cup of cooked or canned fruit; ½ cup of fruit juice; 1 cup of melon or raspberries; or 2 tablespoons of dried fruit. ¨ Milk and no-sugar-added yogurt have 15 grams of carbs in a serving. A serving is 1 cup of milk or 2/3 cup of no-sugar-added yogurt. ¨ Starchy vegetables have 15 grams of carbs in a serving. A serving is ½ cup of mashed potatoes or sweet potato; 1 cup winter squash; ½ of a small baked potato; ½ cup of cooked beans; or ½ cup cooked corn or green peas. · Learn how much carbs to eat each day and at each meal. A dietitian or CDE can teach you how to keep track of the amount of carbs you eat. This is called carbohydrate counting. · If you are not sure how to count carbohydrate grams, use the Plate Method to plan meals. It is a good, quick way to make sure that you have a balanced meal. It also helps you spread carbs throughout the day. ¨ Divide your plate by types of foods. Put non-starchy vegetables on half the plate, meat or other protein food on one-quarter of the plate, and a grain or starchy vegetable in the final quarter of the plate. To this you can add a small piece of fruit and 1 cup of milk or yogurt, depending on how many carbs you are supposed to eat at a meal.  · Try to eat about the same amount of carbs at each meal. Do not \"save up\" your daily allowance of carbs to eat at one meal.  · Proteins have very little or no carbs per serving. Examples of proteins are beef, chicken, turkey, fish, eggs, tofu, cheese, cottage cheese, and peanut butter. A serving size of meat is 3 ounces, which is about the size of a deck of cards.  Examples of meat substitute serving sizes (equal to 1 ounce of meat) are 1/4 cup of cottage cheese, 1 egg, 1 tablespoon of peanut butter, and ½ cup of tofu. How can you eat out and still eat healthy? · Learn to estimate the serving sizes of foods that have carbohydrate. If you measure food at home, it will be easier to estimate the amount in a serving of restaurant food. · If the meal you order has too much carbohydrate (such as potatoes, corn, or baked beans), ask to have a low-carbohydrate food instead. Ask for a salad or green vegetables. · If you use insulin, check your blood sugar before and after eating out to help you plan how much to eat in the future. · If you eat more carbohydrate at a meal than you had planned, take a walk or do other exercise. This will help lower your blood sugar. What else should you know? · Limit saturated fat, such as the fat from meat and dairy products. This is a healthy choice because people who have diabetes are at higher risk of heart disease. So choose lean cuts of meat and nonfat or low-fat dairy products. Use olive or canola oil instead of butter or shortening when cooking. · Don't skip meals. Your blood sugar may drop too low if you skip meals and take insulin or certain medicines for diabetes. · Check with your doctor before you drink alcohol. Alcohol can cause your blood sugar to drop too low. Alcohol can also cause a bad reaction if you take certain diabetes medicines. Follow-up care is a key part of your treatment and safety. Be sure to make and go to all appointments, and call your doctor if you are having problems. It's also a good idea to know your test results and keep a list of the medicines you take. Where can you learn more? Go to http://theresa-mumtaz.info/. Enter D931 in the search box to learn more about \"Learning About Diabetes Food Guidelines. \"  Current as of: May 23, 2016  Content Version: 11.2  © 3246-1636 [employer].  Care instructions adapted under license by [address] (which disclaims liability or warranty for this information). If you have questions about a medical condition or this instruction, always ask your healthcare professional. [address] any warranty or liability for your use of this information.

## 2017-06-05 DIAGNOSIS — I10 ESSENTIAL HYPERTENSION: ICD-10-CM

## 2017-06-05 RX ORDER — DILTIAZEM HYDROCHLORIDE 360 MG/1
CAPSULE, EXTENDED RELEASE ORAL
Qty: 90 CAP | Refills: 1 | Status: CANCELLED | OUTPATIENT
Start: 2017-06-05

## 2017-06-05 NOTE — TELEPHONE ENCOUNTER
Requested Prescriptions     Pending Prescriptions Disp Refills    dilTIAZem (TIAZAC) 360 mg ER capsule 90 Cap 1     Sig: TAKE 1 CAP BY MOUTH DAILY.        Last office visit was  4/26/17  Next office visit : 8/26/17    Please assist.

## 2017-08-12 DIAGNOSIS — I10 ESSENTIAL HYPERTENSION: ICD-10-CM

## 2017-08-15 RX ORDER — DILTIAZEM HYDROCHLORIDE 360 MG/1
CAPSULE, EXTENDED RELEASE ORAL
Qty: 90 CAP | Refills: 0 | Status: SHIPPED | OUTPATIENT
Start: 2017-08-15 | End: 2017-11-10 | Stop reason: SDUPTHER

## 2017-09-11 DIAGNOSIS — I10 ESSENTIAL HYPERTENSION: ICD-10-CM

## 2017-09-11 RX ORDER — CARVEDILOL 12.5 MG/1
TABLET ORAL
Qty: 180 TAB | Refills: 0 | Status: SHIPPED | OUTPATIENT
Start: 2017-09-11 | End: 2017-12-07 | Stop reason: SDUPTHER

## 2017-09-11 NOTE — TELEPHONE ENCOUNTER
I call Pt in regards to Rx refill on [facility]. Informed Pt that Rx was refilled and sent to the pharmacy. Pt verbalized understanding.

## 2017-10-11 ENCOUNTER — HOSPITAL ENCOUNTER (OUTPATIENT)
Dept: LAB | Age: 72
Discharge: HOME | End: 2017-10-11
Payer: MEDICARE

## 2017-10-11 DIAGNOSIS — E78.00 HYPERCHOLESTEROLEMIA: ICD-10-CM

## 2017-10-11 DIAGNOSIS — Z79.4 TYPE 2 DIABETES MELLITUS WITH HYPERGLYCEMIA, WITH LONG-TERM CURRENT USE OF INSULIN (HCC): ICD-10-CM

## 2017-10-11 DIAGNOSIS — I10 ESSENTIAL HYPERTENSION: ICD-10-CM

## 2017-10-11 DIAGNOSIS — E11.65 TYPE 2 DIABETES MELLITUS WITH HYPERGLYCEMIA, WITH LONG-TERM CURRENT USE OF INSULIN (HCC): ICD-10-CM

## 2017-10-11 LAB
ALBUMIN SERPL-MCNC: 3.5 G/DL (ref 3.4–5)
ALBUMIN/GLOB SERPL: 0.9 {RATIO} (ref 0.8–1.7)
ALP SERPL-CCNC: 56 U/L (ref 45–117)
ALT SERPL-CCNC: 21 U/L (ref 16–61)
ANION GAP SERPL CALC-SCNC: 11 MMOL/L (ref 3–18)
AST SERPL-CCNC: 14 U/L (ref 15–37)
BASOPHILS # BLD: 0 K/UL (ref 0–0.06)
BASOPHILS NFR BLD: 1 % (ref 0–2)
BILIRUB SERPL-MCNC: 0.3 MG/DL (ref 0.2–1)
BUN SERPL-MCNC: 35 MG/DL (ref 7–18)
BUN/CREAT SERPL: 12 (ref 12–20)
CALCIUM SERPL-MCNC: 8.7 MG/DL (ref 8.5–10.1)
CHLORIDE SERPL-SCNC: 111 MMOL/L (ref 100–108)
CHOLEST SERPL-MCNC: 102 MG/DL
CO2 SERPL-SCNC: 21 MMOL/L (ref 21–32)
CREAT SERPL-MCNC: 2.87 MG/DL (ref 0.6–1.3)
DIFFERENTIAL METHOD BLD: ABNORMAL
EOSINOPHIL # BLD: 0.3 K/UL (ref 0–0.4)
EOSINOPHIL NFR BLD: 4 % (ref 0–5)
ERYTHROCYTE [DISTWIDTH] IN BLOOD BY AUTOMATED COUNT: 17.5 % (ref 11.6–14.5)
GLOBULIN SER CALC-MCNC: 3.8 G/DL (ref 2–4)
GLUCOSE SERPL-MCNC: 93 MG/DL (ref 74–99)
HBA1C MFR BLD: 6.4 % (ref 4.2–5.6)
HCT VFR BLD AUTO: 37 % (ref 36–48)
HDLC SERPL-MCNC: 51 MG/DL (ref 40–60)
HDLC SERPL: 2 {RATIO} (ref 0–5)
HGB BLD-MCNC: 12.1 G/DL (ref 13–16)
LDLC SERPL CALC-MCNC: 40 MG/DL (ref 0–100)
LIPID PROFILE,FLP: NORMAL
LYMPHOCYTES # BLD: 3 K/UL (ref 0.9–3.6)
LYMPHOCYTES NFR BLD: 45 % (ref 21–52)
MCH RBC QN AUTO: 26.8 PG (ref 24–34)
MCHC RBC AUTO-ENTMCNC: 32.7 G/DL (ref 31–37)
MCV RBC AUTO: 81.9 FL (ref 74–97)
MONOCYTES # BLD: 0.4 K/UL (ref 0.05–1.2)
MONOCYTES NFR BLD: 6 % (ref 3–10)
NEUTS SEG # BLD: 3 K/UL (ref 1.8–8)
NEUTS SEG NFR BLD: 44 % (ref 40–73)
PLATELET # BLD AUTO: 225 K/UL (ref 135–420)
PMV BLD AUTO: 10.6 FL (ref 9.2–11.8)
POTASSIUM SERPL-SCNC: 4.5 MMOL/L (ref 3.5–5.5)
PROT SERPL-MCNC: 7.3 G/DL (ref 6.4–8.2)
RBC # BLD AUTO: 4.52 M/UL (ref 4.7–5.5)
SODIUM SERPL-SCNC: 143 MMOL/L (ref 136–145)
T4 FREE SERPL-MCNC: 1.2 NG/DL (ref 0.7–1.5)
TRIGL SERPL-MCNC: 55 MG/DL (ref ?–150)
TSH SERPL DL<=0.05 MIU/L-ACNC: 1.77 UIU/ML (ref 0.36–3.74)
VLDLC SERPL CALC-MCNC: 11 MG/DL
WBC # BLD AUTO: 6.6 K/UL (ref 4.6–13.2)

## 2017-10-11 PROCEDURE — 80061 LIPID PANEL: CPT | Performed by: HOSPITALIST

## 2017-10-11 PROCEDURE — 80053 COMPREHEN METABOLIC PANEL: CPT | Performed by: HOSPITALIST

## 2017-10-11 PROCEDURE — 85025 COMPLETE CBC W/AUTO DIFF WBC: CPT | Performed by: HOSPITALIST

## 2017-10-11 PROCEDURE — 84439 ASSAY OF FREE THYROXINE: CPT | Performed by: HOSPITALIST

## 2017-10-11 PROCEDURE — 83036 HEMOGLOBIN GLYCOSYLATED A1C: CPT | Performed by: HOSPITALIST

## 2017-10-11 PROCEDURE — 84443 ASSAY THYROID STIM HORMONE: CPT | Performed by: HOSPITALIST

## 2017-10-11 PROCEDURE — 82306 VITAMIN D 25 HYDROXY: CPT | Performed by: HOSPITALIST

## 2017-10-11 PROCEDURE — 36415 COLL VENOUS BLD VENIPUNCTURE: CPT | Performed by: HOSPITALIST

## 2017-10-12 LAB — 25(OH)D3 SERPL-MCNC: 36.7 NG/ML (ref 30–100)

## 2017-10-18 ENCOUNTER — OFFICE VISIT (OUTPATIENT)
Dept: FAMILY MEDICINE CLINIC | Age: 72
End: 2017-10-18

## 2017-10-18 VITALS
TEMPERATURE: 98.3 F | DIASTOLIC BLOOD PRESSURE: 92 MMHG | OXYGEN SATURATION: 98 % | SYSTOLIC BLOOD PRESSURE: 158 MMHG | HEIGHT: 66 IN | WEIGHT: 166.9 LBS | RESPIRATION RATE: 18 BRPM | BODY MASS INDEX: 26.82 KG/M2 | HEART RATE: 78 BPM

## 2017-10-18 DIAGNOSIS — E78.00 HYPERCHOLESTEROLEMIA: ICD-10-CM

## 2017-10-18 DIAGNOSIS — K21.9 GASTROESOPHAGEAL REFLUX DISEASE WITHOUT ESOPHAGITIS: ICD-10-CM

## 2017-10-18 DIAGNOSIS — E11.65 TYPE 2 DIABETES MELLITUS WITH HYPERGLYCEMIA, WITHOUT LONG-TERM CURRENT USE OF INSULIN (HCC): Primary | ICD-10-CM

## 2017-10-18 DIAGNOSIS — N40.0 BENIGN PROSTATIC HYPERPLASIA, UNSPECIFIED WHETHER LOWER URINARY TRACT SYMPTOMS PRESENT: ICD-10-CM

## 2017-10-18 DIAGNOSIS — Z12.11 ENCOUNTER FOR SCREENING COLONOSCOPY: ICD-10-CM

## 2017-10-18 DIAGNOSIS — E55.9 VITAMIN D DEFICIENCY: ICD-10-CM

## 2017-10-18 DIAGNOSIS — N18.30 CKD (CHRONIC KIDNEY DISEASE) STAGE 3, GFR 30-59 ML/MIN (HCC): ICD-10-CM

## 2017-10-18 DIAGNOSIS — I10 ESSENTIAL HYPERTENSION: ICD-10-CM

## 2017-10-18 RX ORDER — CARVEDILOL 25 MG/1
25 TABLET ORAL 2 TIMES DAILY WITH MEALS
Qty: 60 TAB | Refills: 5 | Status: SHIPPED | OUTPATIENT
Start: 2017-10-18 | End: 2018-10-20

## 2017-10-18 NOTE — PATIENT INSTRUCTIONS
A Healthy Lifestyle: Care Instructions  Your Care Instructions  A healthy lifestyle can help you feel good, stay at a healthy weight, and have plenty of energy for both work and play. A healthy lifestyle is something you can share with your whole family. A healthy lifestyle also can lower your risk for serious health problems, such as high blood pressure, heart disease, and diabetes. You can follow a few steps listed below to improve your health and the health of your family. Follow-up care is a key part of your treatment and safety. Be sure to make and go to all appointments, and call your doctor if you are having problems. Its also a good idea to know your test results and keep a list of the medicines you take. How can you care for yourself at home? · Do not eat too much sugar, fat, or fast foods. You can still have dessert and treats now and then. The goal is moderation. · Start small to improve your eating habits. Pay attention to portion sizes, drink less juice and soda pop, and eat more fruits and vegetables. ¨ Eat a healthy amount of food. A 3-ounce serving of meat, for example, is about the size of a deck of cards. Fill the rest of your plate with vegetables and whole grains. ¨ Limit the amount of soda and sports drinks you have every day. Drink more water when you are thirsty. ¨ Eat at least 5 servings of fruits and vegetables every day. It may seem like a lot, but it is not hard to reach this goal. A serving or helping is 1 piece of fruit, 1 cup of vegetables, or 2 cups of leafy, raw vegetables. Have an apple or some carrot sticks as an afternoon snack instead of a candy bar. Try to have fruits and/or vegetables at every meal.  · Make exercise part of your daily routine. You may want to start with simple activities, such as walking, bicycling, or slow swimming. Try to be active 30 to 60 minutes every day. You do not need to do all 30 to 60 minutes all at once.  For example, you can exercise 3 times a day for 10 or 20 minutes. Moderate exercise is safe for most people, but it is always a good idea to talk to your doctor before starting an exercise program.  · Keep moving. Janice [place] the lawn, work in the garden, or [employer]. Take the stairs instead of the elevator at work. · If you smoke, quit. People who smoke have an increased risk for heart attack, stroke, cancer, and other lung illnesses. Quitting is hard, but there are ways to boost your chance of quitting tobacco for good. ¨ Use nicotine gum, patches, or lozenges. ¨ Ask your doctor about stop-smoking programs and medicines. ¨ Keep trying. In addition to reducing your risk of diseases in the future, you will notice some benefits soon after you stop using tobacco. If you have shortness of breath or asthma symptoms, they will likely get better within a few weeks after you quit. · Limit how much alcohol you drink. Moderate amounts of alcohol (up to 2 drinks a day for men, 1 drink a day for women) are okay. But drinking too much can lead to liver problems, high blood pressure, and other health problems. Family health  If you have a family, there are many things you can do together to improve your health. · Eat meals together as a family as often as possible. · Eat healthy foods. This includes fruits, vegetables, lean meats and dairy, and whole grains. · Include your family in your fitness plan. Most people think of activities such as jogging or tennis as the way to fitness, but there are many ways you and your family can be more active. Anything that makes you breathe hard and gets your heart pumping is exercise. Here are some tips:  ¨ Walk to do errands or to take your child to school or the bus. ¨ Go for a family bike ride after dinner instead of watching TV. Where can you learn more? Go to http://theresa-mumtaz.info/. Enter W415 in the search box to learn more about \"A Healthy Lifestyle: Care Instructions. \"  Current as of: July 26, 2016  Content Version: 11.3  © 6969-5746 [employer]. Care instructions adapted under license by [employer] (which disclaims liability or warranty for this information). If you have questions about a medical condition or this instruction, always ask your healthcare professional. [address] any warranty or liability for your use of this information. DASH Diet: Care Instructions  Your Care Instructions  The DASH diet is an eating plan that can help lower your blood pressure. DASH stands for Dietary Approaches to Stop Hypertension. Hypertension is high blood pressure. The DASH diet focuses on eating foods that are high in calcium, potassium, and magnesium. These nutrients can lower blood pressure. The foods that are highest in these nutrients are fruits, vegetables, low-fat dairy products, nuts, seeds, and legumes. But taking calcium, potassium, and magnesium supplements instead of eating foods that are high in those nutrients does not have the same effect. The DASH diet also includes whole grains, fish, and poultry. The DASH diet is one of several lifestyle changes your doctor may recommend to lower your high blood pressure. Your doctor may also want you to decrease the amount of sodium in your diet. Lowering sodium while following the DASH diet can lower blood pressure even further than just the DASH diet alone. Follow-up care is a key part of your treatment and safety. Be sure to make and go to all appointments, and call your doctor if you are having problems. It's also a good idea to know your test results and keep a list of the medicines you take. How can you care for yourself at home? Following the DASH diet  · Eat 4 to 5 servings of fruit each day. A serving is 1 medium-sized piece of fruit, ½ cup chopped or canned fruit, 1/4 cup dried fruit, or 4 ounces (½ cup) of fruit juice. Choose fruit more often than fruit juice.   · Eat 4 to 5 servings of vegetables each day. A serving is 1 cup of lettuce or raw leafy vegetables, ½ cup of chopped or cooked vegetables, or 4 ounces (½ cup) of vegetable juice. Choose vegetables more often than vegetable juice. · Get 2 to 3 servings of low-fat and fat-free dairy each day. A serving is 8 ounces of milk, 1 cup of yogurt, or 1 ½ ounces of cheese. · Eat 6 to 8 servings of grains each day. A serving is 1 slice of bread, 1 ounce of dry cereal, or ½ cup of cooked rice, pasta, or cooked cereal. Try to choose whole-grain products as much as possible. · Limit lean meat, poultry, and fish to 2 servings each day. A serving is 3 ounces, about the size of a deck of cards. · Eat 4 to 5 servings of nuts, seeds, and legumes (cooked dried beans, lentils, and split peas) each week. A serving is 1/3 cup of nuts, 2 tablespoons of seeds, or ½ cup of cooked beans or peas. · Limit fats and oils to 2 to 3 servings each day. A serving is 1 teaspoon of vegetable oil or 2 tablespoons of salad dressing. · Limit sweets and added sugars to 5 servings or less a week. A serving is 1 tablespoon jelly or jam, ½ cup sorbet, or 1 cup of lemonade. · Eat less than 2,300 milligrams (mg) of sodium a day. If you limit your sodium to 1,500 mg a day, you can lower your blood pressure even more. Tips for success  · Start small. Do not try to make dramatic changes to your diet all at once. You might feel that you are missing out on your favorite foods and then be more likely to not follow the plan. Make small changes, and stick with them. Once those changes become habit, add a few more changes. · Try some of the following:  ¨ Make it a goal to eat a fruit or vegetable at every meal and at snacks. This will make it easy to get the recommended amount of fruits and vegetables each day. ¨ Try yogurt topped with fruit and nuts for a snack or healthy dessert. ¨ Add lettuce, tomato, cucumber, and onion to sandwiches.   ¨ Combine a ready-made pizza crust with low-fat mozzarella cheese and lots of vegetable toppings. Try using tomatoes, squash, spinach, broccoli, carrots, cauliflower, and onions. ¨ Have a variety of cut-up vegetables with a low-fat dip as an appetizer instead of chips and dip. ¨ Sprinkle sunflower seeds or chopped almonds over salads. Or try adding chopped walnuts or almonds to cooked vegetables. ¨ Try some vegetarian meals using beans and peas. Add garbanzo or kidney beans to salads. Make burritos and tacos with mashed anderson beans or black beans. Where can you learn more? Go to http://theresa-mumtaz.info/. Enter I333 in the search box to learn more about \"DASH Diet: Care Instructions. \"  Current as of: April 3, 2017  Content Version: 11.3  © 5608-8194 [employer]. Care instructions adapted under license by [employer] (which disclaims liability or warranty for this information). If you have questions about a medical condition or this instruction, always ask your healthcare professional. [place] [address] any warranty or liability for your use of this information. Learning About Diabetes Food Guidelines  Your Care Instructions  Meal planning is important to manage diabetes. It helps keep your blood sugar at a target level (which you set with your doctor). You don't have to eat special foods. You can eat what your family eats, including sweets once in a while. But you do have to pay attention to how often you eat and how much you eat of certain foods. You may want to work with a dietitian or a certified diabetes educator (CDE) to help you plan meals and snacks. A dietitian or CDE can also help you lose weight if that is one of your goals. What should you know about eating carbs? Managing the amount of carbohydrate (carbs) you eat is an important part of healthy meals when you have diabetes. Carbohydrate is found in many foods. · Learn which foods have carbs.  And learn the amounts of carbs in different foods. ¨ Bread, cereal, pasta, and rice have about 15 grams of carbs in a serving. A serving is 1 slice of bread (1 ounce), ½ cup of cooked cereal, or 1/3 cup of cooked pasta or rice. ¨ Fruits have 15 grams of carbs in a serving. A serving is 1 small fresh fruit, such as an apple or orange; ½ of a banana; ½ cup of cooked or canned fruit; ½ cup of fruit juice; 1 cup of melon or raspberries; or 2 tablespoons of dried fruit. ¨ Milk and no-sugar-added yogurt have 15 grams of carbs in a serving. A serving is 1 cup of milk or 2/3 cup of no-sugar-added yogurt. ¨ Starchy vegetables have 15 grams of carbs in a serving. A serving is ½ cup of mashed potatoes or sweet potato; 1 cup winter squash; ½ of a small baked potato; ½ cup of cooked beans; or ½ cup cooked corn or green peas. · Learn how much carbs to eat each day and at each meal. A dietitian or CDE can teach you how to keep track of the amount of carbs you eat. This is called carbohydrate counting. · If you are not sure how to count carbohydrate grams, use the Plate Method to plan meals. It is a good, quick way to make sure that you have a balanced meal. It also helps you spread carbs throughout the day. ¨ Divide your plate by types of foods. Put non-starchy vegetables on half the plate, meat or other protein food on one-quarter of the plate, and a grain or starchy vegetable in the final quarter of the plate. To this you can add a small piece of fruit and 1 cup of milk or yogurt, depending on how many carbs you are supposed to eat at a meal.  · Try to eat about the same amount of carbs at each meal. Do not \"save up\" your daily allowance of carbs to eat at one meal.  · Proteins have very little or no carbs per serving. Examples of proteins are beef, chicken, turkey, fish, eggs, tofu, cheese, cottage cheese, and peanut butter. A serving size of meat is 3 ounces, which is about the size of a deck of cards.  Examples of meat substitute serving sizes (equal to 1 ounce of meat) are 1/4 cup of cottage cheese, 1 egg, 1 tablespoon of peanut butter, and ½ cup of tofu. How can you eat out and still eat healthy? · Learn to estimate the serving sizes of foods that have carbohydrate. If you measure food at home, it will be easier to estimate the amount in a serving of restaurant food. · If the meal you order has too much carbohydrate (such as potatoes, corn, or baked beans), ask to have a low-carbohydrate food instead. Ask for a salad or green vegetables. · If you use insulin, check your blood sugar before and after eating out to help you plan how much to eat in the future. · If you eat more carbohydrate at a meal than you had planned, take a walk or do other exercise. This will help lower your blood sugar. What else should you know? · Limit saturated fat, such as the fat from meat and dairy products. This is a healthy choice because people who have diabetes are at higher risk of heart disease. So choose lean cuts of meat and nonfat or low-fat dairy products. Use olive or canola oil instead of butter or shortening when cooking. · Don't skip meals. Your blood sugar may drop too low if you skip meals and take insulin or certain medicines for diabetes. · Check with your doctor before you drink alcohol. Alcohol can cause your blood sugar to drop too low. Alcohol can also cause a bad reaction if you take certain diabetes medicines. Follow-up care is a key part of your treatment and safety. Be sure to make and go to all appointments, and call your doctor if you are having problems. It's also a good idea to know your test results and keep a list of the medicines you take. Where can you learn more? Go to http://theresa-mumtaz.info/. Enter H675 in the search box to learn more about \"Learning About Diabetes Food Guidelines. \"  Current as of: March 13, 2017  Content Version: 11.3  © 4971-9893 [employer].  Care instructions adapted under license by [address] (which disclaims liability or warranty for this information). If you have questions about a medical condition or this instruction, always ask your healthcare professional. [address] any warranty or liability for your use of this information.

## 2017-10-18 NOTE — PROGRESS NOTES
Chief Complaint   Patient presents with    Hypertension    Vitamin D Deficiency    Diabetes    Results     lab       HPI:  Patient is a 67year old  male with medical problems listed below presents today for follow up of DM 2, Hypertension, Hyperlipidemia, CKD 3, GERD, Vit D def, etc. He has been feeling well and voices no complaints today. He is complaint with his medications with no adverse effects reported. He was accompanied by his son to today's visit who stated that patient has BPH and wants referral to Urology. Blood pressure is elevated at the office today initially at 158/92 taken by nurse with repeat by me at 170/84. Past Medical History:   Diagnosis Date    Cataract     both    Colon polyp 9-2004    Diabetes (HCC)     IDDM    DJD (degenerative joint disease) of knee     ED (erectile dysfunction)     GERD (gastroesophageal reflux disease) 12-01-08    Glaucoma suspect     Gout, unspecified 10-25-06    Hyperlipidemia     Hypertension     Insomnia 7-20-06    Phimosis 6-16-00    Prostate cancer ([facility] [address] 3-3-09    Stroke ([facility] [address]        No Known Allergies      Current Outpatient Prescriptions   Medication Sig Dispense Refill    carvedilol (COREG) 12.5 mg tablet TAKE 1 TABLET BY MOUTH TWICE A  Tab 0    dilTIAZem (TIAZAC) 360 mg ER capsule TAKE ONE CAPSULE BY MOUTH EVERY DAY 90 Cap 0    dilTIAZem (TIAZAC) 360 mg ER capsule TAKE 1 CAP BY MOUTH DAILY.  90 Cap 1    allopurinol (ZYLOPRIM) 100 mg tablet TAKE 1 TABLET BY MOUTH TWICE DAILY 180 Tab 2    insulin glargine (BASAGLAR KWIKPEN) 100 unit/mL (3 mL) pen Sig: Take 10 units SC daily 1 Each 3    glimepiride (AMARYL) 1 mg tablet TAKE 1 TABLET BY MOUTH DAILY BEFORE BREAKFAST 90 Tab 2    Insulin Needles, Disposable, (BD INSULIN PEN NEEDLE UF MINI) 31 gauge x 3/16\" ndle Sig: test blood glucose BID. E11.9 100 Pen Needle 5    CRESTOR 20 mg tablet TAKE 1 TABLET BY MOUTH NIGHTLY 90 Tab 3    insulin glargine (LANTUS SOLOSTAR) 100 unit/mL (3 mL) pen 10 Units by SubCUTAneous route daily. 1 Each 0    aspirin-dipyridamole (AGGRENOX)  mg per SR capsule Take 1 Cap by mouth two (2) times a day. 180 Cap 3    Cholecalciferol, Vitamin D3, 50,000 unit cap Take 1 Cap by mouth every seven (7) days. 8 Cap 1    glucose 4 gram chewable tablet Take 4 tablets by mouth as needed. 50 tablet 0    insulin lispro (HUMALOG KWIKPEN) 100 unit/mL kwikpen Check FSBS Three times daily with meals,   For sugar between 150 and 200- give 1 units SQ,   For sugar between 201 and 250- give 3 units SQ,   For sugar between 251 and 300- give 5 units SQ,   For sugar between 301 and 400- give 7 units SQ,  For sugars > 400, contact PCP 1 each 2    omega-3 fatty acids-vitamin e (FISH OIL) 1,000 mg Cap Take 1,400 mg by mouth daily.  chlorpheniramine-HYDROcodone (TUSSIONEX) 10-8 mg/5 mL suspension Take 5 mL by mouth every twelve (12) hours as needed for Cough. Max Daily Amount: 10 mL. 240 mL 0    pantoprazole (PROTONIX) 40 mg tablet Take 1 Tab by mouth Daily (before breakfast). 30 Tab 1    sitaGLIPtin (JANUVIA) 50 mg tablet Take 1 Tab by mouth daily.  90 Tab 3    OTHER Check CBC, CMP, Mg in 5 days, results to PCP 1 each 0          ROS:  Constitutional: Negative for fever, chills, or fatigue  Neurological: Negative for headache, dizziness, visual disturbance, or loss of conciousness  Respiratory: Negative for SOB, hemoptysis, or wheezing  Cardiovascular: Negative for chest pain, palpitation, or leg swelling  Gastrointestinal: Negative for abdominal pain, nausea, vomiting, diarrhea, blood in stool, melena, or heartburn  Musculoskeletal: Negative for falls      Physical Exam:  Visit Vitals    BP (!) 158/92 (BP 1 Location: Left arm, BP Patient Position: Sitting)    Pulse 78    Temp 98.3 °F (36.8 °C) (Oral)    Resp 18    Ht 5' 6\" (1.676 m)    Wt 166 lb 14.4 oz (75.7 kg)    SpO2 98%    BMI 26.94 kg/m2     General: a & o x 3, afebrile, well-nourished, interacting appropriately, in no acute distress  Skin: warm and dry, no rashes, no bruises  Neck: supple, symmetrical, no thyromegaly  Respiratory: symmetrical chest expansion, lung sounds clear bilaterally, good air entry, good respiratory effort, no wheezes or crackles  Cardiovascular: normal S1S2, regular rate and rhythm, no murmurs,  no carotid or abdominal bruits, no peripheral edema, no JVD  Abdomen: non-distended, normoactive bowel sounds x 4 quadrants, soft, non-tender to palpation  Diabetic foot exam: pedal pulses palpable, no calluses noted, and passed monofilament test.        Assessment/Plan:    ICD-10-CM ICD-9-CM    1. Type 2 diabetes mellitus with hyperglycemia, without long-term current use of insulin (HCC) E11.65 250.00 Controlled with recent HBA1C at 6.4. Continue current meds and he was counseled on diabetic diet. Diabetic foot exam:     Left: Reflexes 2+     Vibratory sensation normal    Proprioception normal   Sharp/dull discrimination normal    Filament test normal sensation with micro filament   Pulse DP: 2+ (normal)   Pulse PT: 2+ (normal)   Deformities: None  Right: Reflexes 2+   Vibratory sensation normal   Proprioception normal   Sharp/dull discrimination normal   Filament test normal sensation with micro filament   Pulse DP: 2+ (normal)   Pulse PT: 2+ (normal)   Deformities: None  HEMOGLOBIN A1C W/O EAG     790.29    2. Essential hypertension I10 401.9 BP not optimal.  Coreg increased from 12.5 to 25 mg BID and he was advised to continue other BP meds and was counseled on low salt diet. carvedilol (COREG) 25 mg tablet   3. Hypercholesterolemia E78.00 272.0 Recent lipid panel done 10/11/17 reviewed with pt and improved revealing Cho 102 and LDL 40. Continue Crestor 20 mg daily and he was counseled on low fat diet    4. Vitamin D deficiency E55.9 268.9 Recent Vit D is good. 5. Gastroesophageal reflux disease without esophagitis K21.9 530.81 Stable   6.  CKD (chronic kidney disease) stage 3, GFR 30-59 ml/min N18.3 585. 3 Recent Cr increased at 2.87 and will refer to Nephrology today. REFERRAL TO NEPHROLOGY   7. Benign prostatic hyperplasia, unspecified whether lower urinary tract symptoms present N40.0 600.00 REFERRAL TO UROLOGY   8. Encounter for screening colonoscopy Z12.11 V76.51 REFERRAL TO COLON AND RECTAL SURGERY           Orders Placed This Encounter    HEMOGLOBIN A1C W/O EAG     Standing Status:   Future     Standing Expiration Date:   10/19/2018    REFERRAL TO NEPHROLOGY     Referral Priority:   Routine     Referral Type:   Consultation     Referral Reason:   Specialty Services Required     Referred to Provider:   MD Devon Chapaihsan [facility]     Referral Priority:   Routine     Referral Type:   Consultation     Referral Reason:   Specialty Services Required     Referred to Provider:   Susana Guerra MD    REFERRAL TO COLON AND RECTAL SURGERY     Referral Priority:   Routine     Referral Type:   Consultation     Referral Reason:   Specialty Services Required     Referred to Provider:   Jocelyn Sin MD    carvedilol (COREG) 25 mg tablet     Sig: Take 1 Tab by mouth two (2) times daily (with meals). Dispense:  60 Tab     Refill:  5       Recent labs reviewed with pt      Additional Notes: Discussed today's diagnosis, treatment plans. Discussed medication indications and side effects. After Visit Summary: Discussed provided printed patient instructions. Answered questions accordingly. Follow-up Disposition:  In 3 months with labs 1 week prior        Mary Bucio DO, MPH  Internal Medicine

## 2017-10-18 NOTE — PROGRESS NOTES
Chief Complaint   Patient presents with    Hypertension    Vitamin D Deficiency    Diabetes    Results     lab   Patient is here today for 3 mth F/U. Pt will need referral for Colon screening and Podiatry and Glaucoma. Pt will need a [place]. Pt is no longer taking Januvia and Tussionex. 1. Have you been to the ER, urgent care clinic since your last visit? Hospitalized since your last visit? No  2. Have you seen or consulted any other health care providers outside of the [facility] since your last visit? Include any pap smears or colon screening.  No

## 2017-11-10 DIAGNOSIS — I10 ESSENTIAL HYPERTENSION: ICD-10-CM

## 2017-11-10 RX ORDER — DILTIAZEM HYDROCHLORIDE 360 MG/1
CAPSULE, EXTENDED RELEASE ORAL
Qty: 90 CAP | Refills: 0 | Status: SHIPPED | OUTPATIENT
Start: 2017-11-10 | End: 2018-01-16 | Stop reason: SDUPTHER

## 2017-11-17 ENCOUNTER — TELEPHONE (OUTPATIENT)
Dept: SURGERY | Age: 72
End: 2017-11-17

## 2017-11-17 ENCOUNTER — DOCUMENTATION ONLY (OUTPATIENT)
Dept: SURGERY | Age: 72
End: 2017-11-17

## 2017-11-17 ENCOUNTER — TELEPHONE (OUTPATIENT)
Dept: FAMILY MEDICINE CLINIC | Age: 72
End: 2017-11-17

## 2017-11-17 NOTE — TELEPHONE ENCOUNTER
Spoke with shan at [facility] office and made aware pt had last cn with [facility] and they are going to contact them regarding f/u cn

## 2017-11-20 NOTE — TELEPHONE ENCOUNTER
I called [facility] [place] for appt. Office is currently closed due to lunch will call back at 1:00 p.m.

## 2017-11-29 DIAGNOSIS — E78.00 HYPERCHOLESTEROLEMIA: ICD-10-CM

## 2017-12-07 DIAGNOSIS — I10 ESSENTIAL HYPERTENSION: ICD-10-CM

## 2017-12-08 RX ORDER — GLIMEPIRIDE 1 MG/1
TABLET ORAL
Qty: 90 TAB | Refills: 2 | Status: SHIPPED | OUTPATIENT
Start: 2017-12-08 | End: 2018-02-05 | Stop reason: SDUPTHER

## 2017-12-08 RX ORDER — CARVEDILOL 12.5 MG/1
TABLET ORAL
Qty: 180 TAB | Refills: 0 | Status: SHIPPED | OUTPATIENT
Start: 2017-12-08 | End: 2018-04-05 | Stop reason: SDUPTHER

## 2017-12-08 RX ORDER — ROSUVASTATIN CALCIUM 20 MG/1
TABLET, COATED ORAL
Qty: 90 TAB | Refills: 3 | Status: SHIPPED | OUTPATIENT
Start: 2017-12-08 | End: 2018-03-21 | Stop reason: SDUPTHER

## 2018-01-01 DIAGNOSIS — I10 ESSENTIAL HYPERTENSION: ICD-10-CM

## 2018-01-03 RX ORDER — CARVEDILOL 12.5 MG/1
TABLET ORAL
Qty: 180 TAB | Refills: 0 | Status: SHIPPED | OUTPATIENT
Start: 2018-01-03 | End: 2018-10-23 | Stop reason: SDUPTHER

## 2018-01-16 DIAGNOSIS — I10 ESSENTIAL HYPERTENSION: ICD-10-CM

## 2018-01-16 RX ORDER — DILTIAZEM HYDROCHLORIDE 360 MG/1
CAPSULE, EXTENDED RELEASE ORAL
Qty: 90 CAP | Refills: 1 | Status: SHIPPED | OUTPATIENT
Start: 2018-01-16 | End: 2018-07-31 | Stop reason: SDUPTHER

## 2018-01-17 NOTE — TELEPHONE ENCOUNTER
I called Pt in regards to Rx refill. Unable to LM no Pts ph#. Called emergency contact they don't know the Pt.

## 2018-02-05 DIAGNOSIS — E11.9 DIABETES MELLITUS WITH NO COMPLICATION (HCC): ICD-10-CM

## 2018-02-05 RX ORDER — GLIMEPIRIDE 1 MG/1
TABLET ORAL
Qty: 90 TAB | Refills: 1 | Status: SHIPPED | OUTPATIENT
Start: 2018-02-05 | End: 2018-05-31 | Stop reason: SDUPTHER

## 2018-02-05 NOTE — TELEPHONE ENCOUNTER
Pt called in requesting refill of his   Requested Prescriptions     Pending Prescriptions Disp Refills    glimepiride (AMARYL) 1 mg tablet 90 Tab 2   .

## 2018-02-15 DIAGNOSIS — E11.9 DIABETES MELLITUS WITH NO COMPLICATION (HCC): ICD-10-CM

## 2018-02-15 RX ORDER — PEN NEEDLE, DIABETIC 31 GX3/16"
NEEDLE, DISPOSABLE MISCELLANEOUS
Qty: 100 PEN NEEDLE | Refills: 5 | Status: SHIPPED | OUTPATIENT
Start: 2018-02-15 | End: 2019-03-27 | Stop reason: SDUPTHER

## 2018-02-15 NOTE — TELEPHONE ENCOUNTER
Pt son called in for refill asap due to the old rx being  and he doesn't have enough for today.  Thank you

## 2018-06-04 ENCOUNTER — OFFICE VISIT (OUTPATIENT)
Dept: FAMILY MEDICINE CLINIC | Age: 73
End: 2018-06-04

## 2018-06-04 ENCOUNTER — HOSPITAL ENCOUNTER (OUTPATIENT)
Dept: LAB | Age: 73
Discharge: HOME | End: 2018-06-04
Payer: MEDICARE

## 2018-06-04 VITALS
BODY MASS INDEX: 26.33 KG/M2 | WEIGHT: 163.8 LBS | DIASTOLIC BLOOD PRESSURE: 78 MMHG | OXYGEN SATURATION: 98 % | HEART RATE: 64 BPM | RESPIRATION RATE: 18 BRPM | HEIGHT: 66 IN | SYSTOLIC BLOOD PRESSURE: 146 MMHG | TEMPERATURE: 98.5 F

## 2018-06-04 DIAGNOSIS — E55.9 VITAMIN D DEFICIENCY: ICD-10-CM

## 2018-06-04 DIAGNOSIS — Z00.00 MEDICARE ANNUAL WELLNESS VISIT, SUBSEQUENT: ICD-10-CM

## 2018-06-04 DIAGNOSIS — Z71.89 ADVANCE CARE PLANNING: ICD-10-CM

## 2018-06-04 DIAGNOSIS — R05.9 COUGH: ICD-10-CM

## 2018-06-04 DIAGNOSIS — I10 ESSENTIAL HYPERTENSION: ICD-10-CM

## 2018-06-04 DIAGNOSIS — E10.29: ICD-10-CM

## 2018-06-04 DIAGNOSIS — E78.00 HYPERCHOLESTEROLEMIA: ICD-10-CM

## 2018-06-04 DIAGNOSIS — E11.65 TYPE 2 DIABETES MELLITUS WITH HYPERGLYCEMIA, WITHOUT LONG-TERM CURRENT USE OF INSULIN (HCC): ICD-10-CM

## 2018-06-04 DIAGNOSIS — Z13.31 SCREENING FOR DEPRESSION: ICD-10-CM

## 2018-06-04 DIAGNOSIS — Z13.39 SCREENING FOR ALCOHOLISM: ICD-10-CM

## 2018-06-04 DIAGNOSIS — I10 ESSENTIAL HYPERTENSION: Primary | ICD-10-CM

## 2018-06-04 DIAGNOSIS — M10.00 IDIOPATHIC GOUT, UNSPECIFIED CHRONICITY, UNSPECIFIED SITE: ICD-10-CM

## 2018-06-04 PROBLEM — E11.21 TYPE 2 DIABETES WITH NEPHROPATHY (HCC): Status: ACTIVE | Noted: 2018-06-04

## 2018-06-04 LAB
ALBUMIN SERPL-MCNC: 3.6 G/DL (ref 3.4–5)
ALBUMIN/GLOB SERPL: 0.9 {RATIO} (ref 0.8–1.7)
ALP SERPL-CCNC: 62 U/L (ref 45–117)
ALT SERPL-CCNC: 18 U/L (ref 16–61)
ANION GAP SERPL CALC-SCNC: 8 MMOL/L (ref 3–18)
AST SERPL-CCNC: 14 U/L (ref 15–37)
BASOPHILS # BLD: 0 K/UL (ref 0–0.06)
BASOPHILS NFR BLD: 0 % (ref 0–2)
BILIRUB SERPL-MCNC: 0.3 MG/DL (ref 0.2–1)
BUN SERPL-MCNC: 30 MG/DL (ref 7–18)
BUN/CREAT SERPL: 10 (ref 12–20)
CALCIUM SERPL-MCNC: 8.7 MG/DL (ref 8.5–10.1)
CHLORIDE SERPL-SCNC: 112 MMOL/L (ref 100–108)
CHOLEST SERPL-MCNC: 115 MG/DL
CO2 SERPL-SCNC: 23 MMOL/L (ref 21–32)
CREAT SERPL-MCNC: 3.04 MG/DL (ref 0.6–1.3)
DIFFERENTIAL METHOD BLD: ABNORMAL
EOSINOPHIL # BLD: 0.2 K/UL (ref 0–0.4)
EOSINOPHIL NFR BLD: 4 % (ref 0–5)
ERYTHROCYTE [DISTWIDTH] IN BLOOD BY AUTOMATED COUNT: 17.4 % (ref 11.6–14.5)
EST. AVERAGE GLUCOSE BLD GHB EST-MCNC: 137 MG/DL
GLOBULIN SER CALC-MCNC: 4 G/DL (ref 2–4)
GLUCOSE SERPL-MCNC: 201 MG/DL (ref 74–99)
HBA1C MFR BLD: 6.4 % (ref 4.2–5.6)
HCT VFR BLD AUTO: 36.1 % (ref 36–48)
HDLC SERPL-MCNC: 63 MG/DL (ref 40–60)
HDLC SERPL: 1.8 {RATIO} (ref 0–5)
HGB BLD-MCNC: 11.8 G/DL (ref 13–16)
LDLC SERPL CALC-MCNC: 41.2 MG/DL (ref 0–100)
LIPID PROFILE,FLP: ABNORMAL
LYMPHOCYTES # BLD: 2.4 K/UL (ref 0.9–3.6)
LYMPHOCYTES NFR BLD: 39 % (ref 21–52)
MCH RBC QN AUTO: 27.1 PG (ref 24–34)
MCHC RBC AUTO-ENTMCNC: 32.7 G/DL (ref 31–37)
MCV RBC AUTO: 83 FL (ref 74–97)
MONOCYTES # BLD: 0.5 K/UL (ref 0.05–1.2)
MONOCYTES NFR BLD: 7 % (ref 3–10)
NEUTS SEG # BLD: 3.1 K/UL (ref 1.8–8)
NEUTS SEG NFR BLD: 50 % (ref 40–73)
PLATELET # BLD AUTO: 232 K/UL (ref 135–420)
PMV BLD AUTO: 11.3 FL (ref 9.2–11.8)
POTASSIUM SERPL-SCNC: 5 MMOL/L (ref 3.5–5.5)
PROT SERPL-MCNC: 7.6 G/DL (ref 6.4–8.2)
RBC # BLD AUTO: 4.35 M/UL (ref 4.7–5.5)
SODIUM SERPL-SCNC: 143 MMOL/L (ref 136–145)
T4 FREE SERPL-MCNC: 1.1 NG/DL (ref 0.7–1.5)
TRIGL SERPL-MCNC: 54 MG/DL (ref ?–150)
TSH SERPL DL<=0.05 MIU/L-ACNC: 1.15 UIU/ML (ref 0.36–3.74)
VLDLC SERPL CALC-MCNC: 10.8 MG/DL
WBC # BLD AUTO: 6.3 K/UL (ref 4.6–13.2)

## 2018-06-04 PROCEDURE — 83036 HEMOGLOBIN GLYCOSYLATED A1C: CPT | Performed by: HOSPITALIST

## 2018-06-04 PROCEDURE — 82043 UR ALBUMIN QUANTITATIVE: CPT | Performed by: HOSPITALIST

## 2018-06-04 PROCEDURE — 82306 VITAMIN D 25 HYDROXY: CPT | Performed by: HOSPITALIST

## 2018-06-04 PROCEDURE — 36415 COLL VENOUS BLD VENIPUNCTURE: CPT | Performed by: HOSPITALIST

## 2018-06-04 PROCEDURE — 80061 LIPID PANEL: CPT | Performed by: HOSPITALIST

## 2018-06-04 PROCEDURE — 80053 COMPREHEN METABOLIC PANEL: CPT | Performed by: HOSPITALIST

## 2018-06-04 PROCEDURE — 84443 ASSAY THYROID STIM HORMONE: CPT | Performed by: HOSPITALIST

## 2018-06-04 PROCEDURE — 85025 COMPLETE CBC W/AUTO DIFF WBC: CPT | Performed by: HOSPITALIST

## 2018-06-04 PROCEDURE — 84439 ASSAY OF FREE THYROXINE: CPT | Performed by: HOSPITALIST

## 2018-06-04 RX ORDER — PANTOPRAZOLE SODIUM 40 MG/1
40 TABLET, DELAYED RELEASE ORAL
Qty: 90 TAB | Refills: 0 | Status: SHIPPED | OUTPATIENT
Start: 2018-06-04 | End: 2018-09-07 | Stop reason: SDUPTHER

## 2018-06-04 RX ORDER — HYDROCODONE POLISTIREX AND CHLORPHENIRAMINE POLISTIREX 10; 8 MG/5ML; MG/5ML
1 SUSPENSION, EXTENDED RELEASE ORAL
Qty: 240 ML | Refills: 0 | Status: SHIPPED | OUTPATIENT
Start: 2018-06-04 | End: 2018-11-15

## 2018-06-04 RX ORDER — ROSUVASTATIN CALCIUM 20 MG/1
TABLET, COATED ORAL
Qty: 90 TAB | Refills: 0 | Status: SHIPPED | OUTPATIENT
Start: 2018-06-04 | End: 2019-05-15 | Stop reason: SDUPTHER

## 2018-06-04 RX ORDER — MAGNESIUM SULFATE 100 %
16 CRYSTALS MISCELLANEOUS AS NEEDED
Qty: 50 TAB | Refills: 0 | Status: SHIPPED | OUTPATIENT
Start: 2018-06-04

## 2018-06-04 RX ORDER — DILTIAZEM HYDROCHLORIDE 360 MG/1
CAPSULE, EXTENDED RELEASE ORAL
Qty: 90 CAP | Refills: 0 | Status: SHIPPED | OUTPATIENT
Start: 2018-06-04 | End: 2019-05-15 | Stop reason: SDUPTHER

## 2018-06-04 RX ORDER — ALLOPURINOL 100 MG/1
TABLET ORAL
Qty: 180 TAB | Refills: 0 | Status: SHIPPED | OUTPATIENT
Start: 2018-06-04 | End: 2018-10-20

## 2018-06-04 RX ORDER — CARVEDILOL 12.5 MG/1
TABLET ORAL
Qty: 180 TAB | Refills: 0 | Status: SHIPPED | OUTPATIENT
Start: 2018-06-04 | End: 2018-09-07 | Stop reason: SDUPTHER

## 2018-06-04 NOTE — MR AVS SNAPSHOT
[facility] [place] 
 
 
 [address] [address] [facility] [address] [phone] Patient: Zach Anne MRN:  [id number] Visit Information Date & Time Provider Department Dept. Phone Encounter #  
 6/4/2018  1:30  [address] [facility] [phone] [id number] Follow-up Instructions Return in about 1 week (around 6/11/2018) for to review labs. Upcoming Health Maintenance Date Due ZOSTER VACCINE AGE 60> 6/30/2004 DTaP/Tdap/Td series (1 - Tdap) 8/16/2013 EYE EXAM RETINAL OR DILATED Q1 4/28/2017 COLONOSCOPY 9/1/2017 MEDICARE YEARLY EXAM 3/28/2018 HEMOGLOBIN A1C Q6M 4/11/2018 MICROALBUMIN Q1 4/19/2018 GLAUCOMA SCREENING Q2Y 4/28/2018 LIPID PANEL Q1 10/11/2018 FOOT EXAM Q1 10/18/2018 Allergies as of 6/4/2018  Review Complete On: 6/4/2018 By: Zenaida Ocampo No Known Allergies Current Immunizations  Reviewed on 9/14/2016 Name Date Influenza Vaccine 8/15/2013 Influenza Vaccine (Quad) PF 9/14/2016, 1/13/2016 Influenza Vaccine PF 9/30/2014  5:47 PM  
 Pneumococcal Conjugate (PCV-13) 1/13/2016 Pneumococcal Vaccine (Unspecified Type) 8/15/2013 Td 8/15/2013 Not reviewed this visit You Were Diagnosed With   
  
 Codes Comments Medicare annual wellness visit, subsequent    -  Primary ICD-10-CM: Z00.00 ICD-9-CM: V70.0 Essential hypertension     ICD-10-CM: I10 
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 2 Vitamin D deficiency     ICD-10-CM: E55.9 ICD-9-CM: 268.9  Advance care planning     ICD-10-CM: Z71.89 
 ICD-9-CM: V65.49 Screening for depression     ICD-10-CM: Z13.89 ICD-9-CM: V79.0 Screening for alcoholism     ICD-10-CM: Z13.89 ICD-9-CM: V79.1 Vitals BP Pulse Temp Resp Height(growth percentile) Weight(growth percentile) 146/78 64 98.5 °F (36.9 °C) (Oral) 18 5' 6\" (1.676 m) 163 lb 12.8 oz (74.3 kg) SpO2 BMI Smoking Status 98% 26.44 kg/m2 Never Smoker Vitals History BMI and BSA Data Body Mass Index Body Surface Area  
 26.44 kg/m 2 1.86 m 2 Preferred Pharmacy Pharmacy Name Phone [id number]- [address] [phone] Your Updated Medication List  
  
   
This list is accurate as of 6/4/18  2:27 PM.  Always use your most recent med list.  
  
  
  
  
 * allopurinol 100 mg tablet Commonly known as:  ZYLOPRIM  
TAKE 1 TABLET BY MOUTH TWICE DAILY  
  
 * allopurinol 100 mg tablet Commonly known as:  ZYLOPRIM  
TAKE 1 TABLET BY MOUTH TWICE DAILY  
  
 aspirin-dipyridamole  mg per SR capsule Commonly known as:  AGGRENOX Take 1 Cap by mouth two (2) times a day. * carvedilol 25 mg tablet Commonly known as:  Vicente Mood Take 1 Tab by mouth two (2) times daily (with meals). * carvedilol 12.5 mg tablet Commonly known as:  COREG  
TAKE 1 TABLET BY MOUTH TWICE A DAY * carvedilol 12.5 mg tablet Commonly known as:  COREG  
TAKE 1 TABLET BY MOUTH TWICE A DAY  
  
 chlorpheniramine-HYDROcodone 10-8 mg/5 mL suspension Commonly known as:  Vannie Games Take 5 mL by mouth every twelve (12) hours as needed for Cough. Max Daily Amount: 10 mL. cholecalciferol 50,000 unit capsule Commonly known as:  VITAMIN D3 Take 1 Cap by mouth every seven (7) days. * dilTIAZem 360 mg ER capsule Commonly known as:  [facility] TAKE ONE CAPSULE BY MOUTH EVERY DAY  
  
 * dilTIAZem 360 mg ER capsule Commonly known as:  [facility] TAKE ONE CAPSULE BY MOUTH EVERY DAY  
  
 FISH OIL 1,000 mg Cap Generic drug:  omega-3 fatty acids-vitamin e Take 1,400 mg by mouth daily. glimepiride 1 mg tablet Commonly known as:  AMARYL  
TAKE 1 TABLET BY MOUTH DAILY BEFORE BREAKFAST  
  
 glucose 4 gram chewable tablet Take 4 Tabs by mouth as needed. * insulin glargine 100 unit/mL (3 mL) Inpn Commonly known as:  LANTUS SOLOSTAR U-100 INSULIN  
10 Units by SubCUTAneous route daily. * insulin glargine 100 unit/mL (3 mL) Inpn Commonly known as:  BASAGLAR KWIKPEN U-100 INSULIN Sig: Take 10 units SC daily  
  
 insulin lispro 100 unit/mL kwikpen Commonly known as:  HumaLOG KwikPen Insulin Check FSBS Three times daily with meals,  For sugar between 150 and 200- give 1 units SQ,  For sugar between 201 and 250- give 3 units SQ, Insulin Needles (Disposable) 31 gauge x 3/16\" Ndle Commonly known as:  BD ULTRA-FINE MINI PEN NEEDLE Sig: test blood glucose BID. E11.9 OTHER Check CBC, CMP, Mg in 5 days, results to PCP  
  
 pantoprazole 40 mg tablet Commonly known as:  PROTONIX Take 1 Tab by mouth Daily (before breakfast). rosuvastatin 20 mg tablet Commonly known as:  CRESTOR  
TAKE 1 TABLET BY MOUTH NIGHTLY SITagliptin 50 mg tablet Commonly known as:  Monse Dy Take 1 Tab by mouth daily. * Notice: This list has 9 medication(s) that are the same as other medications prescribed for you. Read the directions carefully, and ask your doctor or other care provider to review them with you. Prescriptions Printed Refills  
 chlorpheniramine-HYDROcodone (TUSSIONEX) 10-8 mg/5 mL suspension 0 Sig: Take 5 mL by mouth every twelve (12) hours as needed for Cough. Max Daily Amount: 10 mL. Class: Print Route: Oral  
  
Prescriptions Sent to Pharmacy Refills  
 carvedilol (COREG) 12.5 mg tablet 0 Sig: TAKE 1 TABLET BY MOUTH TWICE A DAY  Class: Normal  
 Pharmacy: [address] [place] [address], [address] [place] [address] RD Ph #: [phone]  
 allopurinol (ZYLOPRIM) 100 mg tablet 0 Sig: TAKE 1 TABLET BY MOUTH TWICE DAILY Class: Normal  
 Pharmacy: [facility] [address] Ph #: [phone]  
 dilTIAZem (TIAZAC) 360 mg ER capsule 0 Sig: TAKE ONE CAPSULE BY MOUTH EVERY DAY Class: Normal  
 Pharmacy: [address] [place] [address] Ph #: [phone] SITagliptin (JANUVIA) 50 mg tablet 1 Sig: Take 1 Tab by mouth daily. Class: Normal  
 Pharmacy: [address] [place] [address] Ph #: [phone] Route: Oral  
 glucose 4 gram chewable tablet 0 Sig: Take 4 Tabs by mouth as needed. Class: Normal  
 Pharmacy: [address] [place] [address] Ph #: [phone] Route: Oral  
 rosuvastatin (CRESTOR) 20 mg tablet 0 Sig: TAKE 1 TABLET BY MOUTH NIGHTLY Class: Normal  
 Pharmacy: [address] [place] [address] [address] Ph #: [phone]  
 pantoprazole (PROTONIX) 40 mg tablet 0 Sig: Take 1 Tab by mouth Daily (before breakfast). Class: Normal  
 Pharmacy: [address] [place] [address] Ph #: [phone] Route: Oral  
  
We Performed the Following REFERRAL TO OPHTHALMOLOGY [REF57 Custom] Comments:  
 Pt with DM 2 and needs diabetic eye exam - please evaluate and treat. Thanks. Follow-up Instructions Return in about 1 week (around 6/11/2018) for to review labs. To-Do List   
 06/04/2018 Lab:  CBC WITH AUTOMATED DIFF   
  
 06/04/2018 Lab:  HEMOGLOBIN A1C WITH EAG   
  
 06/04/2018 Lab:  LIPID PANEL   
  
 06/04/2018 Lab:  METABOLIC PANEL, COMPREHENSIVE   
  
 06/04/2018 Lab:  MICROALBUMIN, UR, RAND W/ MICROALB/CREAT RATIO   
  
 06/04/2018 Lab:  T4, FREE   
  
 06/04/2018 Lab:  TSH 3RD GENERATION   
  
 06/04/2018 Lab:  VITAMIN D, 25 HYDROXY Referral Information Referral ID Referred By Referred To  
  
 [id number] Kadi Melton, [address] [facility] [address] [facility], [address] Phone: [phone] Fax: [phone] Visits Status Start Date End Date 1 New Request 6/4/18 6/4/19 If your referral has a status of pending review or denied, additional information will be sent to support the outcome of this decision. Patient Instructions DASH Diet: Care Instructions Your Care Instructions The DASH diet is an eating plan that can help lower your blood pressure. DASH stands for Dietary Approaches to Stop Hypertension. Hypertension is high blood pressure. The DASH diet focuses on eating foods that are high in calcium, potassium, and magnesium. These nutrients can lower blood pressure. The foods that are highest in these nutrients are fruits, vegetables, low-fat dairy products, nuts, seeds, and legumes. But taking calcium, potassium, and magnesium supplements instead of eating foods that are high in those nutrients does not have the same effect. The DASH diet also includes whole grains, fish, and poultry. The DASH diet is one of several lifestyle changes your doctor may recommend to lower your high blood pressure. Your doctor may also want you to decrease the amount of sodium in your diet. Lowering sodium while following the DASH diet can lower blood pressure even further than just the DASH diet alone. Follow-up care is a key part of your treatment and safety. Be sure to make and go to all appointments, and call your doctor if you are having problems. It's also a good idea to know your test results and keep a list of the medicines you take. How can you care for yourself at home? Following the DASH diet · Eat 4 to 5 servings of fruit each day.  A serving is 1 medium-sized piece of fruit, ½ cup chopped or canned fruit, 1/4 cup dried fruit, or 4 ounces (½ cup) of fruit juice. Choose fruit more often than fruit juice. · Eat 4 to 5 servings of vegetables each day. A serving is 1 cup of lettuce or raw leafy vegetables, ½ cup of chopped or cooked vegetables, or 4 ounces (½ cup) of vegetable juice. Choose vegetables more often than vegetable juice. · Get 2 to 3 servings of low-fat and fat-free dairy each day. A serving is 8 ounces of milk, 1 cup of yogurt, or 1 ½ ounces of cheese. · Eat 6 to 8 servings of grains each day. A serving is 1 slice of bread, 1 ounce of dry cereal, or ½ cup of cooked rice, pasta, or cooked cereal. Try to choose whole-grain products as much as possible. · Limit lean meat, poultry, and fish to 2 servings each day. A serving is 3 ounces, about the size of a deck of cards. · Eat 4 to 5 servings of nuts, seeds, and legumes (cooked dried beans, lentils, and split peas) each week. A serving is 1/3 cup of nuts, 2 tablespoons of seeds, or ½ cup of cooked beans or peas. · Limit fats and oils to 2 to 3 servings each day. A serving is 1 teaspoon of vegetable oil or 2 tablespoons of salad dressing. · Limit sweets and added sugars to 5 servings or less a week. A serving is 1 tablespoon jelly or jam, ½ cup sorbet, or 1 cup of lemonade. · Eat less than 2,300 milligrams (mg) of sodium a day. If you limit your sodium to 1,500 mg a day, you can lower your blood pressure even more. Tips for success · Start small. Do not try to make dramatic changes to your diet all at once. You might feel that you are missing out on your favorite foods and then be more likely to not follow the plan. Make small changes, and stick with them. Once those changes become habit, add a few more changes. · Try some of the following: ¨ Make it a goal to eat a fruit or vegetable at every meal and at snacks. This will make it easy to get the recommended amount of fruits and vegetables each day. ¨ Try yogurt topped with fruit and nuts for a snack or healthy dessert. ¨ Add lettuce, tomato, cucumber, and onion to sandwiches. ¨ Combine a ready-made pizza crust with low-fat mozzarella cheese and lots of vegetable toppings. Try using tomatoes, squash, spinach, broccoli, carrots, cauliflower, and onions. ¨ Have a variety of cut-up vegetables with a low-fat dip as an appetizer instead of chips and dip. ¨ Sprinkle sunflower seeds or chopped almonds over salads. Or try adding chopped walnuts or almonds to cooked vegetables. ¨ Try some vegetarian meals using beans and peas. Add garbanzo or kidney beans to salads. Make burritos and tacos with mashed anderson beans or black beans. Where can you learn more? Go to http://theresaAlter-Gmumtaz.info/. Enter L166 in the search box to learn more about \"DASH Diet: Care Instructions. \" Current as of: September 21, 2016 Content Version: 11.4 © 3588-3121 [employer]. Care instructions adapted under license by Apps[employer]er (which disclaims liability or warranty for this information). If you have questions about a medical condition or this instruction, always ask your healthcare professional. [address] any warranty or liability for your use of this information. Learning About the [address] Diet What is the Mediterranean diet? The Mediterranean diet is a style of eating rather than a diet plan. It features foods eaten in [place], [place], [place] and [place], and other countries along the [facility]. It emphasizes eating foods like fish, fruits, vegetables, beans, high-fiber breads and whole grains, nuts, and olive oil. This style of eating includes limited red meat, cheese, and sweets. Why choose the Mediterranean diet? A Mediterranean-style diet may improve heart health. It contains more fat than other heart-healthy diets.  But the fats are mainly from nuts, unsaturated oils (such as fish oils and olive oil), and certain nut or seed oils (such as canola, soybean, or flaxseed oil). These fats may help protect the heart and blood vessels. How can you get started on the Mediterranean diet? Here are some things you can do to switch to a more Mediterranean way of eating. What to eat · Eat a variety of fruits and vegetables each day, such as grapes, blueberries, tomatoes, broccoli, peppers, figs, olives, spinach, eggplant, beans, lentils, and chickpeas. · Eat a variety of whole-grain foods each day, such as oats, brown rice, and whole wheat bread, pasta, and couscous. · Eat fish at least 2 times a week. Try tuna, salmon, mackerel, lake trout, herring, or sardines. · Eat moderate amounts of low-fat dairy products, such as milk, cheese, or yogurt. · Eat moderate amounts of poultry and eggs. · Choose healthy (unsaturated) fats, such as nuts, olive oil, and certain nut or seed oils like canola, soybean, and flaxseed. · Limit unhealthy (saturated) fats, such as butter, palm oil, and coconut oil. And limit fats found in animal products, such as meat and dairy products made with whole milk. Try to eat red meat only a few times a month in very small amounts. · Limit sweets and desserts to only a few times a week. This includes sugar-sweetened drinks like soda. The Mediterranean diet may also include red wine with your meal-1 glass each day for women and up to 2 glasses a day for men. Tips for eating at home · Use herbs, spices, garlic, lemon zest, and citrus juice instead of salt to add flavor to foods. · Add avocado slices to your sandwich instead of willis. · Have fish for lunch or dinner instead of red meat. Brush the fish with olive oil, and broil or grill it. · Sprinkle your salad with seeds or nuts instead of cheese. · Cook with olive or canola oil instead of butter or oils that are high in saturated fat. · Switch from 2% milk or whole milk to 1% or fat-free milk. · Dip raw vegetables in a vinaigrette dressing or hummus instead of dips made from mayonnaise or sour cream. 
· Have a piece of fruit for dessert instead of a piece of cake. Try baked apples, or have some dried fruit. Tips for eating out · Try broiled, grilled, baked, or poached fish instead of having it fried or breaded. · Ask your  to have your meals prepared with olive oil instead of butter. · Order dishes made with marinara sauce or sauces made from olive oil. Avoid sauces made from cream or mayonnaise. · Choose whole-grain breads, whole wheat pasta and pizza crust, brown rice, beans, and lentils. · Cut back on butter or margarine on bread. Instead, you can dip your bread in a small amount of olive oil. · Ask for a side salad or grilled vegetables instead of french fries or chips. Where can you learn more? Go to http://theresa-ummtaz.info/. Enter 285-060-2056 in the search box to learn more about \"Learning About the Mediterranean Diet. \" Current as of: May 12, 2017 Content Version: 11.4 © 7432-9535 HazelTree. Care instructions adapted under license by [employer] (which disclaims liability or warranty for this information). If you have questions about a medical condition or this instruction, always ask your healthcare professional. [address] [address] any warranty or liability for your use of this information. Learning About Diabetes Food Guidelines Your Care Instructions Meal planning is important to manage diabetes. It helps keep your blood sugar at a target level (which you set with your doctor). You don't have to eat special foods. You can eat what your family eats, including sweets once in a while. But you do have to pay attention to how often you eat and how much you eat of certain foods.  
You may want to work with a dietitian or a certified diabetes educator (CDE) to help you plan meals and snacks. A dietitian or CDE can also help you lose weight if that is one of your goals. What should you know about eating carbs? Managing the amount of carbohydrate (carbs) you eat is an important part of healthy meals when you have diabetes. Carbohydrate is found in many foods. · Learn which foods have carbs. And learn the amounts of carbs in different foods. ¨ Bread, cereal, pasta, and rice have about 15 grams of carbs in a serving. A serving is 1 slice of bread (1 ounce), ½ cup of cooked cereal, or 1/3 cup of cooked pasta or rice. ¨ Fruits have 15 grams of carbs in a serving. A serving is 1 small fresh fruit, such as an apple or orange; ½ of a banana; ½ cup of cooked or canned fruit; ½ cup of fruit juice; 1 cup of melon or raspberries; or 2 tablespoons of dried fruit. ¨ Milk and no-sugar-added yogurt have 15 grams of carbs in a serving. A serving is 1 cup of milk or 2/3 cup of no-sugar-added yogurt. ¨ Starchy vegetables have 15 grams of carbs in a serving. A serving is ½ cup of mashed potatoes or sweet potato; 1 cup winter squash; ½ of a small baked potato; ½ cup of cooked beans; or ½ cup cooked corn or green peas. · Learn how much carbs to eat each day and at each meal. A dietitian or CDE can teach you how to keep track of the amount of carbs you eat. This is called carbohydrate counting. · If you are not sure how to count carbohydrate grams, use the Plate Method to plan meals. It is a good, quick way to make sure that you have a balanced meal. It also helps you spread carbs throughout the day. ¨ Divide your plate by types of foods. Put non-starchy vegetables on half the plate, meat or other protein food on one-quarter of the plate, and a grain or starchy vegetable in the final quarter of the plate.  To this you can add a small piece of fruit and 1 cup of milk or yogurt, depending on how many carbs you are supposed to eat at a meal. 
 · Try to eat about the same amount of carbs at each meal. Do not \"save up\" your daily allowance of carbs to eat at one meal. 
· Proteins have very little or no carbs per serving. Examples of proteins are beef, chicken, turkey, fish, eggs, tofu, cheese, cottage cheese, and peanut butter. A serving size of meat is 3 ounces, which is about the size of a deck of cards. Examples of meat substitute serving sizes (equal to 1 ounce of meat) are 1/4 cup of cottage cheese, 1 egg, 1 tablespoon of peanut butter, and ½ cup of tofu. How can you eat out and still eat healthy? · Learn to estimate the serving sizes of foods that have carbohydrate. If you measure food at home, it will be easier to estimate the amount in a serving of restaurant food. · If the meal you order has too much carbohydrate (such as potatoes, corn, or baked beans), ask to have a low-carbohydrate food instead. Ask for a salad or green vegetables. · If you use insulin, check your blood sugar before and after eating out to help you plan how much to eat in the future. · If you eat more carbohydrate at a meal than you had planned, take a walk or do other exercise. This will help lower your blood sugar. What else should you know? · Limit saturated fat, such as the fat from meat and dairy products. This is a healthy choice because people who have diabetes are at higher risk of heart disease. So choose lean cuts of meat and nonfat or low-fat dairy products. Use olive or canola oil instead of butter or shortening when cooking. · Don't skip meals. Your blood sugar may drop too low if you skip meals and take insulin or certain medicines for diabetes. · Check with your doctor before you drink alcohol. Alcohol can cause your blood sugar to drop too low. Alcohol can also cause a bad reaction if you take certain diabetes medicines. Follow-up care is a key part of your treatment and safety.  Be sure to make and go to all appointments, and call your doctor if you are having problems. It's also a good idea to know your test results and keep a list of the medicines you take. Where can you learn more? Go to http://theresa-mumtaz.info/. Enter G170 in the search box to learn more about \"Learning About Diabetes Food Guidelines. \" Current as of: March 13, 2017 Content Version: 11.4 © 1695-4815 Nextt. Care instructions adapted under license by Moximed (which disclaims liability or warranty for this information). If you have questions about a medical condition or this instruction, always ask your healthcare professional. [address] any warranty or liability for your use of this information. Introducing [facility]! Alma Mtz introduces Vidcaster patient portal. Now you can access parts of your medical record, email your doctor's office, and request medication refills online. 1. In your internet browser, go to [email or web address] 2. Click on the First Time User? Click Here link in the Sign In box. You will see the New Member Sign Up page. 3. Enter your Vidcaster Access Code exactly as it appears below. You will not need to use this code after youve completed the sign-up process. If you do not sign up before the expiration date, you must request a new code. · Vidcaster Access Code: [email or web address] Expires: 9/2/2018  2:27 PM 
 
4. Enter the last four digits of your Social Security Number (xxxx) and Date of Birth (mm/dd/yyyy) as indicated and click Submit. You will be taken to the next sign-up page. 5. Create a Vidcaster ID. This will be your Vidcaster login ID and cannot be changed, so think of one that is secure and easy to remember. 6. Create a Vidcaster password. You can change your password at any time. 7. Enter your Password Reset Question and Answer.  This can be used at a later time if you forget your password. 8. Enter your e-mail address. You will receive e-mail notification when new information is available in [address] 9. Click Sign Up. You can now view and download portions of your medical record. 10. Click the Download Summary menu link to download a portable copy of your medical information. If you have questions, please visit the Frequently Asked Questions section of the Whyd website. Remember, Whyd is NOT to be used for urgent needs. For medical emergencies, dial 911. Now available from your iPhone and Android! Please provide this summary of care documentation to your next provider. Your primary care clinician is listed as Corona Gregg. If you have any questions after today's visit, please call [phone].

## 2018-06-04 NOTE — ACP (ADVANCE CARE PLANNING)

## 2018-06-04 NOTE — PROGRESS NOTES
Chief Complaint   Patient presents with    Other     reschuled from missed appointment 6 month check up       HPI:  Patient is a 67year old  male with medical problems listed below presents today for follow up of DM 2, Hypertension, Hyperlipidemia, Vit D def, etc and for Medicare Annual Wellness Visit. He endorsed recent productive cough for which he requests refill of Tussionex. Otherwise, he has been feeling well and voices no other complaints today. He is complaint with his medications with no adverse effects reported. He is requesting refill of several medications and was accompanied by his son to today's visit. He did not have labs done prior to today's appointment and will be done today.       Past Medical History:   Diagnosis Date    Cataract     both    Colon polyp 9-2004    Diabetes (HCC)     IDDM    DJD (degenerative joint disease) of knee     ED (erectile dysfunction)     GERD (gastroesophageal reflux disease) 12-01-08    Glaucoma suspect     Gout, unspecified 10-25-06    Hyperlipidemia     Hypertension     Insomnia 7-20-06    Phimosis 6-16-00    Prostate cancer ([facility] 3-3-09    Stroke ([facility]        No Known Allergies      Current Outpatient Prescriptions   Medication Sig Dispense Refill    glimepiride (AMARYL) 1 mg tablet TAKE 1 TABLET BY MOUTH DAILY BEFORE BREAKFAST 90 Tab 1    carvedilol (COREG) 12.5 mg tablet TAKE 1 TABLET BY MOUTH TWICE A DAY 60 Tab 0    insulin lispro (HUMALOG KWIKPEN INSULIN) 100 unit/mL kwikpen Check FSBS Three times daily with meals,  For sugar between 150 and 200- give 1 units SQ,  For sugar between 201 and 250- give 3 units SQ, 5 Pen 2    dilTIAZem (TIAZAC) 360 mg ER capsule TAKE ONE CAPSULE BY MOUTH EVERY DAY 90 Cap 0    allopurinol (ZYLOPRIM) 100 mg tablet TAKE 1 TABLET BY MOUTH TWICE DAILY 180 Tab 0    Insulin Needles, Disposable, (BD INSULIN PEN NEEDLE UF MINI) 31 gauge x 3/16\" ndle Sig: test blood glucose BID. E11.9 100 Pen Needle 5    dilTIAZem (TIAZAC) 360 mg ER capsule TAKE ONE CAPSULE BY MOUTH EVERY DAY 90 Cap 1    carvedilol (COREG) 12.5 mg tablet TAKE 1 TABLET BY MOUTH TWICE A  Tab 0    carvedilol (COREG) 25 mg tablet Take 1 Tab by mouth two (2) times daily (with meals). 60 Tab 5    allopurinol (ZYLOPRIM) 100 mg tablet TAKE 1 TABLET BY MOUTH TWICE DAILY 180 Tab 2    insulin glargine (BASAGLAR KWIKPEN) 100 unit/mL (3 mL) pen Sig: Take 10 units SC daily 1 Each 3    insulin glargine (LANTUS SOLOSTAR) 100 unit/mL (3 mL) pen 10 Units by SubCUTAneous route daily. 1 Each 0    aspirin-dipyridamole (AGGRENOX)  mg per SR capsule Take 1 Cap by mouth two (2) times a day. 180 Cap 3    Cholecalciferol, Vitamin D3, 50,000 unit cap Take 1 Cap by mouth every seven (7) days. 8 Cap 1    sitaGLIPtin (JANUVIA) 50 mg tablet Take 1 Tab by mouth daily. 90 Tab 3    glucose 4 gram chewable tablet Take 4 tablets by mouth as needed. 50 tablet 0    OTHER Check CBC, CMP, Mg in 5 days, results to PCP 1 each 0    omega-3 fatty acids-vitamin e (FISH OIL) 1,000 mg Cap Take 1,400 mg by mouth daily.  rosuvastatin (CRESTOR) 20 mg tablet TAKE 1 TABLET BY MOUTH NIGHTLY 90 Tab 0    chlorpheniramine-HYDROcodone (TUSSIONEX) 10-8 mg/5 mL suspension Take 5 mL by mouth every twelve (12) hours as needed for Cough. Max Daily Amount: 10 mL. 240 mL 0    pantoprazole (PROTONIX) 40 mg tablet Take 1 Tab by mouth Daily (before breakfast).  30 Tab 1          ROS:  Constitutional: Negative for fever, chills, or fatigue  Neurological: Negative for headache, dizziness, visual disturbance, or loss of conciousness  Respiratory: Negative for SOB, hemoptysis, or wheezing  Cardiovascular: Negative for chest pain, palpitation, or leg swelling  Gastrointestinal: Negative for abdominal pain, nausea, vomiting, diarrhea, blood in stool, melena, or heartburn  Musculoskeletal: Negative for falls      Physical Exam:  Visit Vitals    /78    Pulse 64    Temp 98.5 °F (36.9 °C) (Oral)    Resp 18    Ht 5' 6\" (1.676 m)    Wt 163 lb 12.8 oz (74.3 kg)    SpO2 98%    BMI 26.44 kg/m2       General: a & o x 3, afebrile, well-nourished, interacting appropriately, in no acute distress  Skin: warm and dry, no rashes, no bruises  Neck: supple, symmetrical, no thyromegaly  Respiratory: symmetrical chest expansion, lung sounds clear bilaterally, good air entry, good respiratory effort, no wheezes or crackles  Cardiovascular: normal S1S2, regular rate and rhythm, no murmurs,  no carotid or abdominal bruits, no peripheral edema, no JVD  Abdomen: non-distended, normoactive bowel sounds x 4 quadrants, soft, non-tender to palpation  Diabetic foot exam: pedal pulses palpable, no calluses noted, and passed monofilament test.        Assessment/Plan:    ICD-10-CM ICD-9-CM    1. Essential hypertension I10 401.9 Moderate control  Continue current meds and he was counseled on low salt diet. carvedilol (COREG) 12.5 mg tablet      dilTIAZem (TIAZAC) 360 mg ER capsule      METABOLIC PANEL, COMPREHENSIVE      CBC WITH AUTOMATED DIFF      TSH 3RD GENERATION      T4, FREE   2. Type 2 diabetes mellitus with hyperglycemia, without long-term current use of insulin (HCC) E11.65 250.00 HEMOGLOBIN A1C W/O EAG     790.29    3. Idiopathic gout, unspecified chronicity, unspecified site M10.00 274.9 allopurinol (ZYLOPRIM) 100 mg tablet   4. DM renal manif type I E10.29 250.41 Continue current meds and he was counseled on diabetic diet. SITagliptin (JANUVIA) 50 mg tablet      HEMOGLOBIN A1C WITH EAG      MICROALBUMIN, UR, RAND W/ MICROALB/CREAT RATIO      REFERRAL TO OPHTHALMOLOGY   5. Hypercholesterolemia E78.00 272.0 Continue Crestor and he was counseled on low fat diet. rosuvastatin (CRESTOR) 20 mg tablet      LIPID PANEL   6. Cough R05 786.2 chlorpheniramine-HYDROcodone (TUSSIONEX) 10-8 mg/5 mL suspension   7.  Vitamin D deficiency E55.9 268.9 VITAMIN D, 25 HYDROXY   8. Medicare annual wellness visit, subsequent Z00.00 V70.0 Medicare Annual Wellness Visit was done at the office today. 9. Advance care planning Z71.89 V65.49    10. Screening for depression Z13.89 V79.0    11. Screening for alcoholism Z13.89 V79.1          Orders Placed This Encounter    METABOLIC PANEL, COMPREHENSIVE     Standing Status:   Future     Standing Expiration Date:   6/5/2019    CBC WITH AUTOMATED DIFF     Standing Status:   Future     Standing Expiration Date:   6/5/2019    LIPID PANEL     Standing Status:   Future     Standing Expiration Date:   6/5/2019    VITAMIN D, 25 HYDROXY     Standing Status:   Future     Standing Expiration Date:   5/30/2019    TSH 3RD GENERATION     Standing Status:   Future     Standing Expiration Date:   6/5/2019    T4, FREE     Standing Status:   Future     Standing Expiration Date:   6/5/2019    HEMOGLOBIN A1C WITH EAG     Standing Status:   Future     Standing Expiration Date:   6/5/2019    MICROALBUMIN, UR, RAND W/ MICROALB/CREAT RATIO     Standing Status:   Future     Standing Expiration Date:   6/5/2019    REFERRAL TO OPHTHALMOLOGY     Referral Priority:   Routine     Referral Type:   Consultation     Referral Reason:   Specialty Services Required     Referred to Provider:   Fred Welch MD    carvedilol (COREG) 12.5 mg tablet     Sig: TAKE 1 TABLET BY MOUTH TWICE A DAY     Dispense:  180 Tab     Refill:  0    allopurinol (ZYLOPRIM) 100 mg tablet     Sig: TAKE 1 TABLET BY MOUTH TWICE DAILY     Dispense:  180 Tab     Refill:  0    dilTIAZem (TIAZAC) 360 mg ER capsule     Sig: TAKE ONE CAPSULE BY MOUTH EVERY DAY     Dispense:  90 Cap     Refill:  0    SITagliptin (JANUVIA) 50 mg tablet     Sig: Take 1 Tab by mouth daily. Dispense:  90 Tab     Refill:  1    glucose 4 gram chewable tablet     Sig: Take 4 Tabs by mouth as needed.      Dispense:  50 Tab     Refill:  0    rosuvastatin (CRESTOR) 20 mg tablet     Sig: TAKE 1 TABLET BY MOUTH NIGHTLY     Dispense:  90 Tab     Refill:  0  chlorpheniramine-HYDROcodone (TUSSIONEX) 10-8 mg/5 mL suspension     Sig: Take 5 mL by mouth every twelve (12) hours as needed for Cough. Max Daily Amount: 10 mL. Dispense:  240 mL     Refill:  0    pantoprazole (PROTONIX) 40 mg tablet     Sig: Take 1 Tab by mouth Daily (before breakfast). Dispense:  90 Tab     Refill:  0       Recent labs reviewed with pt      Additional Notes: Discussed today's diagnosis, treatment plans. Discussed medication indications and side effects. After Visit Summary: Discussed provided printed patient instructions. Answered questions accordingly. Follow-up Disposition: In 1 week to review labs      Mary Bucio DO, MPH  Internal Medicine          This is the Subsequent Medicare Annual Wellness Exam, performed 12 months or more after the Initial AWV or the last Subsequent AWV    I have reviewed the patient's medical history in detail and updated the computerized patient record.      History     Past Medical History:   Diagnosis Date    Cataract     both    Colon polyp 9-2004    Diabetes (HCC)     IDDM    DJD (degenerative joint disease) of knee     ED (erectile dysfunction)     GERD (gastroesophageal reflux disease) 12-01-08    Glaucoma suspect     Gout, unspecified 10-25-06    Hyperlipidemia     Hypertension     Insomnia 7-20-06    Phimosis 6-16-00    [facility] 3-3-09    Stroke [facility])       Past Surgical History:   Procedure Laterality Date    HX POLYPECTOMY  9-2004    SD COLONOSCOPY FLX DX W/COLLJ SPEC WHEN PFRMD  9/1/2004    polyp/Dr Alvarado    SD COLONOSCOPY FLX DX W/COLLJ SPEC WHEN PFRMD  9-2007    incomplete; Dr. Chloe Wheeler     Current Outpatient Prescriptions   Medication Sig Dispense Refill    carvedilol (COREG) 12.5 mg tablet TAKE 1 TABLET BY MOUTH TWICE A  Tab 0    allopurinol (ZYLOPRIM) 100 mg tablet TAKE 1 TABLET BY MOUTH TWICE DAILY 180 Tab 0    dilTIAZem (TIAZAC) 360 mg ER capsule TAKE ONE CAPSULE BY MOUTH EVERY DAY 90 Cap 0    SITagliptin (JANUVIA) 50 mg tablet Take 1 Tab by mouth daily. 90 Tab 1    glucose 4 gram chewable tablet Take 4 Tabs by mouth as needed. 50 Tab 0    rosuvastatin (CRESTOR) 20 mg tablet TAKE 1 TABLET BY MOUTH NIGHTLY 90 Tab 0    chlorpheniramine-HYDROcodone (TUSSIONEX) 10-8 mg/5 mL suspension Take 5 mL by mouth every twelve (12) hours as needed for Cough. Max Daily Amount: 10 mL. 240 mL 0    pantoprazole (PROTONIX) 40 mg tablet Take 1 Tab by mouth Daily (before breakfast). 90 Tab 0    glimepiride (AMARYL) 1 mg tablet TAKE 1 TABLET BY MOUTH DAILY BEFORE BREAKFAST 90 Tab 1    insulin lispro (HUMALOG KWIKPEN INSULIN) 100 unit/mL kwikpen Check FSBS Three times daily with meals,  For sugar between 150 and 200- give 1 units SQ,  For sugar between 201 and 250- give 3 units SQ, 5 Pen 2    Insulin Needles, Disposable, (BD INSULIN PEN NEEDLE UF MINI) 31 gauge x 3/16\" ndle Sig: test blood glucose BID. E11.9 100 Pen Needle 5    dilTIAZem (TIAZAC) 360 mg ER capsule TAKE ONE CAPSULE BY MOUTH EVERY DAY 90 Cap 1    carvedilol (COREG) 12.5 mg tablet TAKE 1 TABLET BY MOUTH TWICE A  Tab 0    carvedilol (COREG) 25 mg tablet Take 1 Tab by mouth two (2) times daily (with meals). 60 Tab 5    allopurinol (ZYLOPRIM) 100 mg tablet TAKE 1 TABLET BY MOUTH TWICE DAILY 180 Tab 2    insulin glargine (BASAGLAR KWIKPEN) 100 unit/mL (3 mL) pen Sig: Take 10 units SC daily 1 Each 3    insulin glargine (LANTUS SOLOSTAR) 100 unit/mL (3 mL) pen 10 Units by SubCUTAneous route daily. 1 Each 0    aspirin-dipyridamole (AGGRENOX)  mg per SR capsule Take 1 Cap by mouth two (2) times a day. 180 Cap 3    Cholecalciferol, Vitamin D3, 50,000 unit cap Take 1 Cap by mouth every seven (7) days. 8 Cap 1    OTHER Check CBC, CMP, Mg in 5 days, results to PCP 1 each 0    omega-3 fatty acids-vitamin e (FISH OIL) 1,000 mg Cap Take 1,400 mg by mouth daily.        No Known Allergies  Family History   Problem Relation Age of Onset  Hypertension Mother     Diabetes Father     Cancer Father      prostate    Heart Disease Father 70     CABG    Hypertension Sister     Hypertension Brother     Diabetes Brother      Social History   Substance Use Topics    Smoking status: Never Smoker    Smokeless tobacco: Never Used    Alcohol use No     Patient Active Problem List   Diagnosis Code    HTN (hypertension) I10    Type I (juvenile type) diabetes mellitus with renal manifestations, not stated as uncontrolled(250.41) ([facility] [address] E10.29    Hypercholesterolemia E78.00    Gout M10.9    GERD (gastroesophageal reflux disease) K21.9    Vitamin D deficiency E55.9    Stroke ([facility] [address] I63.9    [facility] C61    Acute encephalopathy G93.40    Hypoglycemia E16.2    Need for Tdap vaccination Z23    Proteinuria R80.9    CKD (chronic kidney disease) stage 3, GFR 30-59 ml/min N18.3    Diabetes mellitus type 2, controlled ([facility] E11.9    Advance care planning Z71.89    Encounter for immunization Z23    Need for pneumococcal vaccine Z23    Screening for alcoholism Z13.89    Medicare annual wellness visit, subsequent Z00.00    History of CVA (cerebrovascular accident) Z80.78    Pneumonia J18.9   [facility] discharge follow-up Z09    Gastrointestinal hemorrhage associated with duodenitis K29.81    Need for influenza vaccination Z23    Hyperglycemia due to type 2 diabetes mellitus (HCC) E11.65    Cough R05    BPH (benign prostatic hyperplasia) N40.0    Type 2 diabetes with nephropathy (HCC) E11.21       Depression Risk Factor Screening:     PHQ over the last two weeks 2/26/2014   Little interest or pleasure in doing things Not at all   Feeling down, depressed or hopeless Not at all   Total Score PHQ 2 0     Alcohol Risk Factor Screening: You do not drink alcohol or very rarely. Functional Ability and Level of Safety:   Hearing Loss  Hearing is good. Activities of Daily Living  The home contains: no safety equipment.   Patient does total self care    Fall Risk  Fall Risk Assessment, last 12 mths 6/4/2018   Able to walk? Yes   Fall in past 12 months? No       Abuse Screen  Patient is not abused    Cognitive Screening   Evaluation of Cognitive Function:  Has your family/caregiver stated any concerns about your memory: no  Normal    Patient Care Team   Patient Care Team:  Mary Bucio, DO as PCP - General (Internal Medicine)  Education and counseling provided:  Are appropriate based on today's review and evaluation  End-of-Life planning (with patient's consent) - Advance directive was discussed with pt today and paperwork previously given and he will fill and return to us  Pneumococcal Vaccine - Prevnar given 1/13/16  Influenza Vaccine   Hepatitis B Vaccine  Prostate cancer screening tests (PSA, covered annually) - Last PSA checked 1/13/16 as < 0.1. Colorectal cancer screening tests - Colonoscopy was done 9/1/07  Cardiovascular screening blood test  Bone mass measurement (DEXA)  Screening for glaucoma - Glaucoma screening was done 4/28/16 and was referred today  Diabetes screening test - Last HBA1C done 10/11/17 was 6.4. Medical nutrition therapy for individuals with diabetes or renal disease  Diabetes outpatient self-management training services       Diagnoses and all orders for this visit:    1. Medicare annual wellness visit, subsequent    2. Essential hypertension  -     carvedilol (COREG) 12.5 mg tablet; TAKE 1 TABLET BY MOUTH TWICE A DAY  -     dilTIAZem (TIAZAC) 360 mg ER capsule; TAKE ONE CAPSULE BY MOUTH EVERY DAY  -     METABOLIC PANEL, COMPREHENSIVE; Future  -     CBC WITH AUTOMATED DIFF; Future  -     TSH 3RD GENERATION; Future  -     T4, FREE; Future    3. Idiopathic gout, unspecified chronicity, unspecified site  -     allopurinol (ZYLOPRIM) 100 mg tablet; TAKE 1 TABLET BY MOUTH TWICE DAILY    4. DM renal manif type I  -     SITagliptin (JANUVIA) 50 mg tablet;  Take 1 Tab by mouth daily.  -     HEMOGLOBIN A1C WITH EAG; Future  -     MICROALBUMIN, UR, RAND W/ MICROALB/CREAT RATIO; Future  -     REFERRAL TO OPHTHALMOLOGY    5. Hypercholesterolemia  -     rosuvastatin (CRESTOR) 20 mg tablet; TAKE 1 TABLET BY MOUTH NIGHTLY  -     LIPID PANEL; Future    6. Cough  -     chlorpheniramine-HYDROcodone (TUSSIONEX) 10-8 mg/5 mL suspension; Take 5 mL by mouth every twelve (12) hours as needed for Cough. Max Daily Amount: 10 mL. 7. Vitamin D deficiency  -     VITAMIN D, 25 HYDROXY; Future    8. Advance care planning    9. Screening for depression    10. Screening for alcoholism    Other orders  -     glucose 4 gram chewable tablet; Take 4 Tabs by mouth as needed. -     pantoprazole (PROTONIX) 40 mg tablet; Take 1 Tab by mouth Daily (before breakfast).         Health Maintenance Due   Topic Date Due    ZOSTER VACCINE AGE 60>  06/30/2004    DTaP/Tdap/Td series (1 - Tdap) 08/16/2013    EYE EXAM RETINAL OR DILATED Q1  04/28/2017    COLONOSCOPY  09/01/2017    MEDICARE YEARLY EXAM  03/28/2018    HEMOGLOBIN A1C Q6M  04/11/2018    MICROALBUMIN Q1  04/19/2018    GLAUCOMA SCREENING Q2Y  04/28/2018

## 2018-06-04 NOTE — PROGRESS NOTES
Angus Cunningham is a  67 y.o. male presents today for office visit for routine follow up. 6 month visit. 1. Have you been to the ER, urgent care clinic or hospitalized since your last visit? NO    2. Have you seen or consulted any other health care providers outside of the [facility] since your last visit (Include any pap smears or colon screening)?  NO

## 2018-06-04 NOTE — PATIENT INSTRUCTIONS
DASH Diet: Care Instructions  Your Care Instructions    The DASH diet is an eating plan that can help lower your blood pressure. DASH stands for Dietary Approaches to Stop Hypertension. Hypertension is high blood pressure. The DASH diet focuses on eating foods that are high in calcium, potassium, and magnesium. These nutrients can lower blood pressure. The foods that are highest in these nutrients are fruits, vegetables, low-fat dairy products, nuts, seeds, and legumes. But taking calcium, potassium, and magnesium supplements instead of eating foods that are high in those nutrients does not have the same effect. The DASH diet also includes whole grains, fish, and poultry. The DASH diet is one of several lifestyle changes your doctor may recommend to lower your high blood pressure. Your doctor may also want you to decrease the amount of sodium in your diet. Lowering sodium while following the DASH diet can lower blood pressure even further than just the DASH diet alone. Follow-up care is a key part of your treatment and safety. Be sure to make and go to all appointments, and call your doctor if you are having problems. It's also a good idea to know your test results and keep a list of the medicines you take. How can you care for yourself at home? Following the DASH diet  · Eat 4 to 5 servings of fruit each day. A serving is 1 medium-sized piece of fruit, ½ cup chopped or canned fruit, 1/4 cup dried fruit, or 4 ounces (½ cup) of fruit juice. Choose fruit more often than fruit juice. · Eat 4 to 5 servings of vegetables each day. A serving is 1 cup of lettuce or raw leafy vegetables, ½ cup of chopped or cooked vegetables, or 4 ounces (½ cup) of vegetable juice. Choose vegetables more often than vegetable juice. · Get 2 to 3 servings of low-fat and fat-free dairy each day. A serving is 8 ounces of milk, 1 cup of yogurt, or 1 ½ ounces of cheese. · Eat 6 to 8 servings of grains each day.  A serving is 1 slice of bread, 1 ounce of dry cereal, or ½ cup of cooked rice, pasta, or cooked cereal. Try to choose whole-grain products as much as possible. · Limit lean meat, poultry, and fish to 2 servings each day. A serving is 3 ounces, about the size of a deck of cards. · Eat 4 to 5 servings of nuts, seeds, and legumes (cooked dried beans, lentils, and split peas) each week. A serving is 1/3 cup of nuts, 2 tablespoons of seeds, or ½ cup of cooked beans or peas. · Limit fats and oils to 2 to 3 servings each day. A serving is 1 teaspoon of vegetable oil or 2 tablespoons of salad dressing. · Limit sweets and added sugars to 5 servings or less a week. A serving is 1 tablespoon jelly or jam, ½ cup sorbet, or 1 cup of lemonade. · Eat less than 2,300 milligrams (mg) of sodium a day. If you limit your sodium to 1,500 mg a day, you can lower your blood pressure even more. Tips for success  · Start small. Do not try to make dramatic changes to your diet all at once. You might feel that you are missing out on your favorite foods and then be more likely to not follow the plan. Make small changes, and stick with them. Once those changes become habit, add a few more changes. · Try some of the following:  ¨ Make it a goal to eat a fruit or vegetable at every meal and at snacks. This will make it easy to get the recommended amount of fruits and vegetables each day. ¨ Try yogurt topped with fruit and nuts for a snack or healthy dessert. ¨ Add lettuce, tomato, cucumber, and onion to sandwiches. ¨ Combine a ready-made pizza crust with low-fat mozzarella cheese and lots of vegetable toppings. Try using tomatoes, squash, spinach, broccoli, carrots, cauliflower, and onions. ¨ Have a variety of cut-up vegetables with a low-fat dip as an appetizer instead of chips and dip. ¨ Sprinkle sunflower seeds or chopped almonds over salads. Or try adding chopped walnuts or almonds to cooked vegetables.   ¨ Try some vegetarian meals using beans and peas. Add garbanzo or kidney beans to salads. Make burritos and tacos with mashed anderson beans or black beans. Where can you learn more? Go to http://theresa-mumtaz.info/. Enter J136 in the search box to learn more about \"DASH Diet: Care Instructions. \"  Current as of: September 21, 2016  Content Version: 11.4  © 3392-9742 [employer]. Care instructions adapted under license by [employer] (which disclaims liability or warranty for this information). If you have questions about a medical condition or this instruction, always ask your healthcare professional. [address] any warranty or liability for your use of this information. Learning About the [address] Diet  What is the Mediterranean diet? The Mediterranean diet is a style of eating rather than a diet plan. It features foods eaten in [place], [place], [place] and [place], and other countries along the [facility]. It emphasizes eating foods like fish, fruits, vegetables, beans, high-fiber breads and whole grains, nuts, and olive oil. This style of eating includes limited red meat, cheese, and sweets. Why choose the Mediterranean diet? A Mediterranean-style diet may improve heart health. It contains more fat than other heart-healthy diets. But the fats are mainly from nuts, unsaturated oils (such as fish oils and olive oil), and certain nut or seed oils (such as canola, soybean, or flaxseed oil). These fats may help protect the heart and blood vessels. How can you get started on the Mediterranean diet? Here are some things you can do to switch to a more Mediterranean way of eating. What to eat  · Eat a variety of fruits and vegetables each day, such as grapes, blueberries, tomatoes, broccoli, peppers, figs, olives, spinach, eggplant, beans, lentils, and chickpeas.   · Eat a variety of whole-grain foods each day, such as oats, brown rice, and whole wheat bread, pasta, and couscous. · Eat fish at least 2 times a week. Try tuna, salmon, mackerel, lake trout, herring, or sardines. · Eat moderate amounts of low-fat dairy products, such as milk, cheese, or yogurt. · Eat moderate amounts of poultry and eggs. · Choose healthy (unsaturated) fats, such as nuts, olive oil, and certain nut or seed oils like canola, soybean, and flaxseed. · Limit unhealthy (saturated) fats, such as butter, palm oil, and coconut oil. And limit fats found in animal products, such as meat and dairy products made with whole milk. Try to eat red meat only a few times a month in very small amounts. · Limit sweets and desserts to only a few times a week. This includes sugar-sweetened drinks like soda. The Mediterranean diet may also include red wine with your meal-1 glass each day for women and up to 2 glasses a day for men. Tips for eating at home  · Use herbs, spices, garlic, lemon zest, and citrus juice instead of salt to add flavor to foods. · Add avocado slices to your sandwich instead of willis. · Have fish for lunch or dinner instead of red meat. Brush the fish with olive oil, and broil or grill it. · Sprinkle your salad with seeds or nuts instead of cheese. · Cook with olive or canola oil instead of butter or oils that are high in saturated fat. · Switch from 2% milk or whole milk to 1% or fat-free milk. · Dip raw vegetables in a vinaigrette dressing or hummus instead of dips made from mayonnaise or sour cream.  · Have a piece of fruit for dessert instead of a piece of cake. Try baked apples, or have some dried fruit. Tips for eating out  · Try broiled, grilled, baked, or poached fish instead of having it fried or breaded. · Ask your  to have your meals prepared with olive oil instead of butter. · Order dishes made with marinara sauce or sauces made from olive oil. Avoid sauces made from cream or mayonnaise.   · Choose whole-grain breads, whole wheat pasta and pizza crust, brown rice, beans, and lentils. · Cut back on butter or margarine on bread. Instead, you can dip your bread in a small amount of olive oil. · Ask for a side salad or grilled vegetables instead of french fries or chips. Where can you learn more? Go to http://theresa-mumtaz.info/. Enter [phone] in the search box to learn more about \"Learning About the Mediterranean Diet. \"  Current as of: May 12, 2017  Content Version: 11.4  © 3097-1994 [employer]. Care instructions adapted under license by [employer] (which disclaims liability or warranty for this information). If you have questions about a medical condition or this instruction, always ask your healthcare professional. [address] any warranty or liability for your use of this information. Learning About Diabetes Food Guidelines  Your Care Instructions    Meal planning is important to manage diabetes. It helps keep your blood sugar at a target level (which you set with your doctor). You don't have to eat special foods. You can eat what your family eats, including sweets once in a while. But you do have to pay attention to how often you eat and how much you eat of certain foods. You may want to work with a dietitian or a certified diabetes educator (CDE) to help you plan meals and snacks. A dietitian or CDE can also help you lose weight if that is one of your goals. What should you know about eating carbs? Managing the amount of carbohydrate (carbs) you eat is an important part of healthy meals when you have diabetes. Carbohydrate is found in many foods. · Learn which foods have carbs. And learn the amounts of carbs in different foods. ¨ Bread, cereal, pasta, and rice have about 15 grams of carbs in a serving. A serving is 1 slice of bread (1 ounce), ½ cup of cooked cereal, or 1/3 cup of cooked pasta or rice. ¨ Fruits have 15 grams of carbs in a serving.  A serving is 1 small fresh fruit, such as an apple or orange; ½ of a banana; ½ cup of cooked or canned fruit; ½ cup of fruit juice; 1 cup of melon or raspberries; or 2 tablespoons of dried fruit. ¨ Milk and no-sugar-added yogurt have 15 grams of carbs in a serving. A serving is 1 cup of milk or 2/3 cup of no-sugar-added yogurt. ¨ Starchy vegetables have 15 grams of carbs in a serving. A serving is ½ cup of mashed potatoes or sweet potato; 1 cup winter squash; ½ of a small baked potato; ½ cup of cooked beans; or ½ cup cooked corn or green peas. · Learn how much carbs to eat each day and at each meal. A dietitian or CDE can teach you how to keep track of the amount of carbs you eat. This is called carbohydrate counting. · If you are not sure how to count carbohydrate grams, use the Plate Method to plan meals. It is a good, quick way to make sure that you have a balanced meal. It also helps you spread carbs throughout the day. ¨ Divide your plate by types of foods. Put non-starchy vegetables on half the plate, meat or other protein food on one-quarter of the plate, and a grain or starchy vegetable in the final quarter of the plate. To this you can add a small piece of fruit and 1 cup of milk or yogurt, depending on how many carbs you are supposed to eat at a meal.  · Try to eat about the same amount of carbs at each meal. Do not \"save up\" your daily allowance of carbs to eat at one meal.  · Proteins have very little or no carbs per serving. Examples of proteins are beef, chicken, turkey, fish, eggs, tofu, cheese, cottage cheese, and peanut butter. A serving size of meat is 3 ounces, which is about the size of a deck of cards. Examples of meat substitute serving sizes (equal to 1 ounce of meat) are 1/4 cup of cottage cheese, 1 egg, 1 tablespoon of peanut butter, and ½ cup of tofu. How can you eat out and still eat healthy? · Learn to estimate the serving sizes of foods that have carbohydrate.  If you measure food at home, it will be easier to estimate the amount in a serving of restaurant food. · If the meal you order has too much carbohydrate (such as potatoes, corn, or baked beans), ask to have a low-carbohydrate food instead. Ask for a salad or green vegetables. · If you use insulin, check your blood sugar before and after eating out to help you plan how much to eat in the future. · If you eat more carbohydrate at a meal than you had planned, take a walk or do other exercise. This will help lower your blood sugar. What else should you know? · Limit saturated fat, such as the fat from meat and dairy products. This is a healthy choice because people who have diabetes are at higher risk of heart disease. So choose lean cuts of meat and nonfat or low-fat dairy products. Use olive or canola oil instead of butter or shortening when cooking. · Don't skip meals. Your blood sugar may drop too low if you skip meals and take insulin or certain medicines for diabetes. · Check with your doctor before you drink alcohol. Alcohol can cause your blood sugar to drop too low. Alcohol can also cause a bad reaction if you take certain diabetes medicines. Follow-up care is a key part of your treatment and safety. Be sure to make and go to all appointments, and call your doctor if you are having problems. It's also a good idea to know your test results and keep a list of the medicines you take. Where can you learn more? Go to http://theresa-mumtaz.info/. Enter U992 in the search box to learn more about \"Learning About Diabetes Food Guidelines. \"  Current as of: March 13, 2017  Content Version: 11.4  © 6035-5462 [employer]. Care instructions adapted under license by [employer] (which disclaims liability or warranty for this information).  If you have questions about a medical condition or this instruction, always ask your healthcare professional. [address] any warranty or liability for your use of this information.

## 2018-06-05 LAB
25(OH)D3 SERPL-MCNC: 22.5 NG/ML (ref 30–100)
CREAT UR-MCNC: 92.75 MG/DL (ref 30–125)
MICROALBUMIN UR-MCNC: 185.9 MG/DL (ref 0–3)
MICROALBUMIN/CREAT UR-RTO: 2004 MG/G (ref 0–30)

## 2018-06-11 ENCOUNTER — OFFICE VISIT (OUTPATIENT)
Dept: FAMILY MEDICINE CLINIC | Age: 73
End: 2018-06-11

## 2018-06-11 VITALS
DIASTOLIC BLOOD PRESSURE: 82 MMHG | HEIGHT: 66 IN | TEMPERATURE: 98.1 F | BODY MASS INDEX: 26.39 KG/M2 | SYSTOLIC BLOOD PRESSURE: 160 MMHG | HEART RATE: 66 BPM | WEIGHT: 164.2 LBS | OXYGEN SATURATION: 98 % | RESPIRATION RATE: 18 BRPM

## 2018-06-11 DIAGNOSIS — N18.30 CKD (CHRONIC KIDNEY DISEASE) STAGE 3, GFR 30-59 ML/MIN (HCC): ICD-10-CM

## 2018-06-11 DIAGNOSIS — Z12.11 ENCOUNTER FOR SCREENING COLONOSCOPY: ICD-10-CM

## 2018-06-11 DIAGNOSIS — E78.00 HYPERCHOLESTEROLEMIA: ICD-10-CM

## 2018-06-11 DIAGNOSIS — E11.65 TYPE 2 DIABETES MELLITUS WITH HYPERGLYCEMIA, WITHOUT LONG-TERM CURRENT USE OF INSULIN (HCC): Primary | ICD-10-CM

## 2018-06-11 DIAGNOSIS — E55.9 VITAMIN D DEFICIENCY: ICD-10-CM

## 2018-06-11 DIAGNOSIS — K21.9 GASTROESOPHAGEAL REFLUX DISEASE WITHOUT ESOPHAGITIS: ICD-10-CM

## 2018-06-11 DIAGNOSIS — I10 ESSENTIAL HYPERTENSION: ICD-10-CM

## 2018-06-11 RX ORDER — ASPIRIN 325 MG
50000 TABLET, DELAYED RELEASE (ENTERIC COATED) ORAL
Qty: 8 CAP | Refills: 1 | Status: SHIPPED | OUTPATIENT
Start: 2018-06-11 | End: 2018-10-20

## 2018-06-11 NOTE — PROGRESS NOTES
Chief Complaint   Patient presents with    Labs     results       HPI:  Patient is a 67year old  male with medical problems listed below presents today to review results of labs done at his recent visit one week ago. He has been feeling well and voices no complaints today. He is complaint with his medications with no adverse effects reported and was accompanied by his son to today's visit. He is due for colonoscopy and will be referred today. Past Medical History:   Diagnosis Date    Cataract     both    Colon polyp 9-2004    Diabetes (HCC)     IDDM    DJD (degenerative joint disease) of knee     ED (erectile dysfunction)     GERD (gastroesophageal reflux disease) 12-01-08    Glaucoma suspect     Gout, unspecified 10-25-06    Hyperlipidemia     Hypertension     Insomnia 7-20-06    Phimosis 6-16-00    Prostate cancer ([address] 3-3-09    Stroke ([facility]        No Known Allergies      Current Outpatient Prescriptions   Medication Sig Dispense Refill    allopurinol (ZYLOPRIM) 100 mg tablet TAKE 1 TABLET BY MOUTH TWICE DAILY 180 Tab 0    carvedilol (COREG) 12.5 mg tablet TAKE 1 TABLET BY MOUTH TWICE A  Tab 0    allopurinol (ZYLOPRIM) 100 mg tablet TAKE 1 TABLET BY MOUTH TWICE DAILY 180 Tab 0    dilTIAZem (TIAZAC) 360 mg ER capsule TAKE ONE CAPSULE BY MOUTH EVERY DAY 90 Cap 0    SITagliptin (JANUVIA) 50 mg tablet Take 1 Tab by mouth daily. 90 Tab 1    glucose 4 gram chewable tablet Take 4 Tabs by mouth as needed. 50 Tab 0    rosuvastatin (CRESTOR) 20 mg tablet TAKE 1 TABLET BY MOUTH NIGHTLY 90 Tab 0    pantoprazole (PROTONIX) 40 mg tablet Take 1 Tab by mouth Daily (before breakfast).  90 Tab 0    glimepiride (AMARYL) 1 mg tablet TAKE 1 TABLET BY MOUTH DAILY BEFORE BREAKFAST 90 Tab 1    insulin lispro (HUMALOG KWIKPEN INSULIN) 100 unit/mL kwikpen Check FSBS Three times daily with meals,  For sugar between 150 and 200- give 1 units SQ,  For sugar between 201 and 250- give 3 units SQ, 5 Pen 2    Insulin Needles, Disposable, (BD INSULIN PEN NEEDLE UF MINI) 31 gauge x 3/16\" ndle Sig: test blood glucose BID. E11.9 100 Pen Needle 5    dilTIAZem (TIAZAC) 360 mg ER capsule TAKE ONE CAPSULE BY MOUTH EVERY DAY 90 Cap 1    carvedilol (COREG) 12.5 mg tablet TAKE 1 TABLET BY MOUTH TWICE A  Tab 0    allopurinol (ZYLOPRIM) 100 mg tablet TAKE 1 TABLET BY MOUTH TWICE DAILY 180 Tab 2    insulin glargine (BASAGLAR KWIKPEN) 100 unit/mL (3 mL) pen Sig: Take 10 units SC daily 1 Each 3    insulin glargine (LANTUS SOLOSTAR) 100 unit/mL (3 mL) pen 10 Units by SubCUTAneous route daily. 1 Each 0    aspirin-dipyridamole (AGGRENOX)  mg per SR capsule Take 1 Cap by mouth two (2) times a day. 180 Cap 3    Cholecalciferol, Vitamin D3, 50,000 unit cap Take 1 Cap by mouth every seven (7) days. 8 Cap 1    OTHER Check CBC, CMP, Mg in 5 days, results to PCP 1 each 0    omega-3 fatty acids-vitamin e (FISH OIL) 1,000 mg Cap Take 1,400 mg by mouth daily.  chlorpheniramine-HYDROcodone (TUSSIONEX) 10-8 mg/5 mL suspension Take 5 mL by mouth every twelve (12) hours as needed for Cough. Max Daily Amount: 10 mL. 240 mL 0    carvedilol (COREG) 25 mg tablet Take 1 Tab by mouth two (2) times daily (with meals).  60 Tab 5          ROS:  Constitutional: Negative for fever, chills, or fatigue  Neurological: Negative for headache, dizziness, visual disturbance, or loss of conciousness  Respiratory: Negative for SOB, hemoptysis, or wheezing  Cardiovascular: Negative for chest pain, palpitation, or leg swelling  Gastrointestinal: Negative for abdominal pain, nausea, vomiting, diarrhea, blood in stool, melena, or heartburn  Musculoskeletal: Negative for falls      Physical Exam:  Visit Vitals    /82    Pulse 66    Temp 98.1 °F (36.7 °C) (Oral)    Resp 18    Ht 5' 6\" (1.676 m)    Wt 164 lb 3.2 oz (74.5 kg)    SpO2 98%    BMI 26.5 kg/m2     General: a & o x 3, afebrile, well-nourished, interacting appropriately, in no acute distress  Skin: warm and dry, no rashes, no bruises  Neck: supple, symmetrical, no thyromegaly  Respiratory: symmetrical chest expansion, lung sounds clear bilaterally, good air entry, good respiratory effort, no wheezes or crackles  Cardiovascular: normal S1S2, regular rate and rhythm, no murmurs,  no carotid or abdominal bruits, no JVD  Abdomen: non-distended, normoactive bowel sounds x 4 quadrants, soft, non-tender to palpation  Diabetic foot exam: pedal pulses palpable and no calluses noted  Extremity: No edema noted        Assessment/Plan:    ICD-10-CM ICD-9-CM    1. Type 2 diabetes mellitus with hyperglycemia, without long-term current use of insulin (HCC) E11.65 250.00 Controlled with recent HBA1C at 6.4. Continue current meds and he was counseled on diabetic diet. HEMOGLOBIN A1C W/O EAG     790.29    2. Essential hypertension I10 401.9 Fair control  Continue current meds and he was counseled on low salt diet. 3. Vitamin D deficiency E55.9 268.9 Recent Vit D is low at 22.5  cholecalciferol (VITAMIN D3) 50,000 unit capsule Q week sent to pharmacy. 4. Hypercholesterolemia E78.00 272.0 Recent lipid panel is well controlled. Continue Crestor and he was counseled on low fat diet. 5. CKD (chronic kidney disease) stage 3, GFR 30-59 ml/min N18.3 585. 3 Followed by Nephrology   6. Gastroesophageal reflux disease without esophagitis K21.9 530.81 Stable   7. Encounter for screening colonoscopy Z12.11 V76.51 REFERRAL TO GASTROENTEROLOGY           Orders Placed This Encounter    HEMOGLOBIN A1C W/O EAG     Standing Status:   Future     Standing Expiration Date:   6/12/2019    REFERRAL TO GASTROENTEROLOGY     Referral Priority:   Routine     Referral Type:   Consultation     Referral Reason:   Specialty Services Required     Referred to Provider:   Geneva Martinez MD    cholecalciferol (VITAMIN D3) 50,000 unit capsule     Sig: Take 1 Cap by mouth every seven (7) days. Dispense:  8 Cap     Refill:  1           Recent labs reviewed with pt        Additional Notes: Discussed today's diagnosis, treatment plans. Discussed medication indications and side effects. After Visit Summary: Discussed provided printed patient instructions. Answered questions accordingly. Follow-up Disposition:  In 3 months with labs 1 week prior      Mary Bucio DO, MPH  Internal Medicine

## 2018-06-11 NOTE — PATIENT INSTRUCTIONS
DASH Diet: Care Instructions  Your Care Instructions    The DASH diet is an eating plan that can help lower your blood pressure. DASH stands for Dietary Approaches to Stop Hypertension. Hypertension is high blood pressure. The DASH diet focuses on eating foods that are high in calcium, potassium, and magnesium. These nutrients can lower blood pressure. The foods that are highest in these nutrients are fruits, vegetables, low-fat dairy products, nuts, seeds, and legumes. But taking calcium, potassium, and magnesium supplements instead of eating foods that are high in those nutrients does not have the same effect. The DASH diet also includes whole grains, fish, and poultry. The DASH diet is one of several lifestyle changes your doctor may recommend to lower your high blood pressure. Your doctor may also want you to decrease the amount of sodium in your diet. Lowering sodium while following the DASH diet can lower blood pressure even further than just the DASH diet alone. Follow-up care is a key part of your treatment and safety. Be sure to make and go to all appointments, and call your doctor if you are having problems. It's also a good idea to know your test results and keep a list of the medicines you take. How can you care for yourself at home? Following the DASH diet  · Eat 4 to 5 servings of fruit each day. A serving is 1 medium-sized piece of fruit, ½ cup chopped or canned fruit, 1/4 cup dried fruit, or 4 ounces (½ cup) of fruit juice. Choose fruit more often than fruit juice. · Eat 4 to 5 servings of vegetables each day. A serving is 1 cup of lettuce or raw leafy vegetables, ½ cup of chopped or cooked vegetables, or 4 ounces (½ cup) of vegetable juice. Choose vegetables more often than vegetable juice. · Get 2 to 3 servings of low-fat and fat-free dairy each day. A serving is 8 ounces of milk, 1 cup of yogurt, or 1 ½ ounces of cheese. · Eat 6 to 8 servings of grains each day.  A serving is 1 slice of bread, 1 ounce of dry cereal, or ½ cup of cooked rice, pasta, or cooked cereal. Try to choose whole-grain products as much as possible. · Limit lean meat, poultry, and fish to 2 servings each day. A serving is 3 ounces, about the size of a deck of cards. · Eat 4 to 5 servings of nuts, seeds, and legumes (cooked dried beans, lentils, and split peas) each week. A serving is 1/3 cup of nuts, 2 tablespoons of seeds, or ½ cup of cooked beans or peas. · Limit fats and oils to 2 to 3 servings each day. A serving is 1 teaspoon of vegetable oil or 2 tablespoons of salad dressing. · Limit sweets and added sugars to 5 servings or less a week. A serving is 1 tablespoon jelly or jam, ½ cup sorbet, or 1 cup of lemonade. · Eat less than 2,300 milligrams (mg) of sodium a day. If you limit your sodium to 1,500 mg a day, you can lower your blood pressure even more. Tips for success  · Start small. Do not try to make dramatic changes to your diet all at once. You might feel that you are missing out on your favorite foods and then be more likely to not follow the plan. Make small changes, and stick with them. Once those changes become habit, add a few more changes. · Try some of the following:  ¨ Make it a goal to eat a fruit or vegetable at every meal and at snacks. This will make it easy to get the recommended amount of fruits and vegetables each day. ¨ Try yogurt topped with fruit and nuts for a snack or healthy dessert. ¨ Add lettuce, tomato, cucumber, and onion to sandwiches. ¨ Combine a ready-made pizza crust with low-fat mozzarella cheese and lots of vegetable toppings. Try using tomatoes, squash, spinach, broccoli, carrots, cauliflower, and onions. ¨ Have a variety of cut-up vegetables with a low-fat dip as an appetizer instead of chips and dip. ¨ Sprinkle sunflower seeds or chopped almonds over salads. Or try adding chopped walnuts or almonds to cooked vegetables.   ¨ Try some vegetarian meals using beans and peas. Add garbanzo or kidney beans to salads. Make burritos and tacos with mashed anderson beans or black beans. Where can you learn more? Go to http://theresa-mumtaz.info/. Enter B923 in the search box to learn more about \"DASH Diet: Care Instructions. \"  Current as of: September 21, 2016  Content Version: 11.4  © 2701-0887 [employer]. Care instructions adapted under license by [employer] (which disclaims liability or warranty for this information). If you have questions about a medical condition or this instruction, always ask your healthcare professional. [address] any warranty or liability for your use of this information. Learning About Diabetes Food Guidelines  Your Care Instructions    Meal planning is important to manage diabetes. It helps keep your blood sugar at a target level (which you set with your doctor). You don't have to eat special foods. You can eat what your family eats, including sweets once in a while. But you do have to pay attention to how often you eat and how much you eat of certain foods. You may want to work with a dietitian or a certified diabetes educator (CDE) to help you plan meals and snacks. A dietitian or CDE can also help you lose weight if that is one of your goals. What should you know about eating carbs? Managing the amount of carbohydrate (carbs) you eat is an important part of healthy meals when you have diabetes. Carbohydrate is found in many foods. · Learn which foods have carbs. And learn the amounts of carbs in different foods. ¨ Bread, cereal, pasta, and rice have about 15 grams of carbs in a serving. A serving is 1 slice of bread (1 ounce), ½ cup of cooked cereal, or 1/3 cup of cooked pasta or rice. ¨ Fruits have 15 grams of carbs in a serving.  A serving is 1 small fresh fruit, such as an apple or orange; ½ of a banana; ½ cup of cooked or canned fruit; ½ cup of fruit juice; 1 cup of melon or raspberries; or 2 tablespoons of dried fruit. ¨ Milk and no-sugar-added yogurt have 15 grams of carbs in a serving. A serving is 1 cup of milk or 2/3 cup of no-sugar-added yogurt. ¨ Starchy vegetables have 15 grams of carbs in a serving. A serving is ½ cup of mashed potatoes or sweet potato; 1 cup winter squash; ½ of a small baked potato; ½ cup of cooked beans; or ½ cup cooked corn or green peas. · Learn how much carbs to eat each day and at each meal. A dietitian or CDE can teach you how to keep track of the amount of carbs you eat. This is called carbohydrate counting. · If you are not sure how to count carbohydrate grams, use the Plate Method to plan meals. It is a good, quick way to make sure that you have a balanced meal. It also helps you spread carbs throughout the day. ¨ Divide your plate by types of foods. Put non-starchy vegetables on half the plate, meat or other protein food on one-quarter of the plate, and a grain or starchy vegetable in the final quarter of the plate. To this you can add a small piece of fruit and 1 cup of milk or yogurt, depending on how many carbs you are supposed to eat at a meal.  · Try to eat about the same amount of carbs at each meal. Do not \"save up\" your daily allowance of carbs to eat at one meal.  · Proteins have very little or no carbs per serving. Examples of proteins are beef, chicken, turkey, fish, eggs, tofu, cheese, cottage cheese, and peanut butter. A serving size of meat is 3 ounces, which is about the size of a deck of cards. Examples of meat substitute serving sizes (equal to 1 ounce of meat) are 1/4 cup of cottage cheese, 1 egg, 1 tablespoon of peanut butter, and ½ cup of tofu. How can you eat out and still eat healthy? · Learn to estimate the serving sizes of foods that have carbohydrate. If you measure food at home, it will be easier to estimate the amount in a serving of restaurant food.   · If the meal you order has too much carbohydrate (such as potatoes, corn, or baked beans), ask to have a low-carbohydrate food instead. Ask for a salad or green vegetables. · If you use insulin, check your blood sugar before and after eating out to help you plan how much to eat in the future. · If you eat more carbohydrate at a meal than you had planned, take a walk or do other exercise. This will help lower your blood sugar. What else should you know? · Limit saturated fat, such as the fat from meat and dairy products. This is a healthy choice because people who have diabetes are at higher risk of heart disease. So choose lean cuts of meat and nonfat or low-fat dairy products. Use olive or canola oil instead of butter or shortening when cooking. · Don't skip meals. Your blood sugar may drop too low if you skip meals and take insulin or certain medicines for diabetes. · Check with your doctor before you drink alcohol. Alcohol can cause your blood sugar to drop too low. Alcohol can also cause a bad reaction if you take certain diabetes medicines. Follow-up care is a key part of your treatment and safety. Be sure to make and go to all appointments, and call your doctor if you are having problems. It's also a good idea to know your test results and keep a list of the medicines you take. Where can you learn more? Go to http://theresa-mumtaz.info/. Enter L230 in the search box to learn more about \"Learning About Diabetes Food Guidelines. \"  Current as of: March 13, 2017  Content Version: 11.4  © 4074-3065 [employer]. Care instructions adapted under license by [employer] (which disclaims liability or warranty for this information). If you have questions about a medical condition or this instruction, always ask your healthcare professional. [place] [address] any warranty or liability for your use of this information. Learning About the [address] Diet  What is the Mediterranean diet?     The Mediterranean diet is a style of eating rather than a diet plan. It features foods eaten in [place], [place], [place], and other countries along the [facility]. It emphasizes eating foods like fish, fruits, vegetables, beans, high-fiber breads and whole grains, nuts, and olive oil. This style of eating includes limited red meat, cheese, and sweets. Why choose the Mediterranean diet? A Mediterranean-style diet may improve heart health. It contains more fat than other heart-healthy diets. But the fats are mainly from nuts, unsaturated oils (such as fish oils and olive oil), and certain nut or seed oils (such as canola, soybean, or flaxseed oil). These fats may help protect the heart and blood vessels. How can you get started on the Mediterranean diet? Here are some things you can do to switch to a more Mediterranean way of eating. What to eat  · Eat a variety of fruits and vegetables each day, such as grapes, blueberries, tomatoes, broccoli, peppers, figs, olives, spinach, eggplant, beans, lentils, and chickpeas. · Eat a variety of whole-grain foods each day, such as oats, brown rice, and whole wheat bread, pasta, and couscous. · Eat fish at least 2 times a week. Try tuna, salmon, mackerel, lake trout, herring, or sardines. · Eat moderate amounts of low-fat dairy products, such as milk, cheese, or yogurt. · Eat moderate amounts of poultry and eggs. · Choose healthy (unsaturated) fats, such as nuts, olive oil, and certain nut or seed oils like canola, soybean, and flaxseed. · Limit unhealthy (saturated) fats, such as butter, palm oil, and coconut oil. And limit fats found in animal products, such as meat and dairy products made with whole milk. Try to eat red meat only a few times a month in very small amounts. · Limit sweets and desserts to only a few times a week. This includes sugar-sweetened drinks like soda.   The Mediterranean diet may also include red wine with your meal-1 glass each day for women and up to 2 glasses a day for men. Tips for eating at home  · Use herbs, spices, garlic, lemon zest, and citrus juice instead of salt to add flavor to foods. · Add avocado slices to your sandwich instead of willis. · Have fish for lunch or dinner instead of red meat. Brush the fish with olive oil, and broil or grill it. · Sprinkle your salad with seeds or nuts instead of cheese. · Cook with olive or canola oil instead of butter or oils that are high in saturated fat. · Switch from 2% milk or whole milk to 1% or fat-free milk. · Dip raw vegetables in a vinaigrette dressing or hummus instead of dips made from mayonnaise or sour cream.  · Have a piece of fruit for dessert instead of a piece of cake. Try baked apples, or have some dried fruit. Tips for eating out  · Try broiled, grilled, baked, or poached fish instead of having it fried or breaded. · Ask your  to have your meals prepared with olive oil instead of butter. · Order dishes made with marinara sauce or sauces made from olive oil. Avoid sauces made from cream or mayonnaise. · Choose whole-grain breads, whole wheat pasta and pizza crust, brown rice, beans, and lentils. · Cut back on butter or margarine on bread. Instead, you can dip your bread in a small amount of olive oil. · Ask for a side salad or grilled vegetables instead of french fries or chips. Where can you learn more? Go to http://theresa-mumtaz.info/. Enter [phone] in the search box to learn more about \"Learning About the Mediterranean Diet. \"  Current as of: May 12, 2017  Content Version: 11.4  © 8577-2622 [employer]. Care instructions adapted under license by [employer] (which disclaims liability or warranty for this information).  If you have questions about a medical condition or this instruction, always ask your healthcare professional. [address] any warranty or liability for your use of this information.

## 2018-06-11 NOTE — PROGRESS NOTES
Pietro Ayala is a  67 y.o. male presents today for office visit for routine follow up. 1. Have you been to the ER, urgent care clinic or hospitalized since your last visit? NO    2. Have you seen or consulted any other health care providers outside of the [facility] since your last visit (Include any pap smears or colon screening)?  NO

## 2018-06-11 NOTE — MR AVS SNAPSHOT
[facility] 
 
 
 [address] [address] [address]10 [facility] [address] 
[phone] Patient: Pietro Ayala MRN:  [id number] Visit Information Date & Time Provider Department Dept. Phone Encounter #  
 6/11/2018  1:30  [address] [facility] [phone] [id number] Follow-up Instructions Return in about 3 months (around 9/11/2018) for Labs 1 week before. Upcoming Health Maintenance Date Due ZOSTER VACCINE AGE 60> 6/30/2004 DTaP/Tdap/Td series (1 - Tdap) 8/16/2013 EYE EXAM RETINAL OR DILATED Q1 4/28/2017 COLONOSCOPY 9/1/2017 GLAUCOMA SCREENING Q2Y 4/28/2018 FOOT EXAM Q1 10/18/2018 HEMOGLOBIN A1C Q6M 12/4/2018 MICROALBUMIN Q1 6/4/2019 LIPID PANEL Q1 6/4/2019 Allergies as of 6/11/2018  Review Complete On: 6/11/2018 By: Monica Lu No Known Allergies Current Immunizations  Reviewed on 9/14/2016 Name Date Influenza Vaccine 8/15/2013 Influenza Vaccine (Quad) PF 9/14/2016, 1/13/2016 Influenza Vaccine PF 9/30/2014  5:47 PM  
 Pneumococcal Conjugate (PCV-13) 1/13/2016 Pneumococcal Vaccine (Unspecified Type) 8/15/2013 Td 8/15/2013 Not reviewed this visit You Were Diagnosed With   
  
 Codes Comments Essential hypertension    -  Primary ICD-10-CM: I10 
ICD-9-CM: 401.9 Vitamin D deficiency     ICD-10-CM: E55.9 ICD-9-CM: 268.9 Type 2 diabetes mellitus with hyperglycemia, without long-term current use of insulin (HCC)     ICD-10-CM: E11.65 ICD-9-CM: 250.00, 790.29 Hypercholesterolemia     ICD-10-CM: E78.00 ICD-9-CM: 272.0 CKD (chronic kidney disease) stage 3, GFR 30-59 ml/min     ICD-10-CM: N18.3 ICD-9-CM: 375. 3 Gastroesophageal reflux disease without esophagitis     ICD-10-CM: K21.9 ICD-9-CM: 530.81 Encounter for screening colonoscopy     ICD-10-CM: Z12.11 ICD-9-CM: V76.51 Vitals BP Pulse Temp Resp Height(growth percentile) Weight(growth percentile) 160/82 66 98.1 °F (36.7 °C) (Oral) 18 5' 6\" (1.676 m) 164 lb 3.2 oz (74.5 kg) SpO2 BMI Smoking Status 98% 26.5 kg/m2 Never Smoker Vitals History BMI and BSA Data Body Mass Index Body Surface Area  
 26.5 kg/m 2 1.86 m 2 Preferred Pharmacy Pharmacy Name Phone [facility] #[id number] Jacinto [phone] Your Updated Medication List  
  
   
This list is accurate as of 6/11/18  2:54 PM.  Always use your most recent med list.  
  
  
  
  
 * allopurinol 100 mg tablet Commonly known as:  ZYLOPRIM  
TAKE 1 TABLET BY MOUTH TWICE DAILY  
  
 * allopurinol 100 mg tablet Commonly known as:  ZYLOPRIM  
TAKE 1 TABLET BY MOUTH TWICE DAILY  
  
 * allopurinol 100 mg tablet Commonly known as:  ZYLOPRIM  
TAKE 1 TABLET BY MOUTH TWICE DAILY  
  
 aspirin-dipyridamole  mg per SR capsule Commonly known as:  AGGRENOX Take 1 Cap by mouth two (2) times a day. * carvedilol 25 mg tablet Commonly known as:  Nico Heal Take 1 Tab by mouth two (2) times daily (with meals). * carvedilol 12.5 mg tablet Commonly known as:  COREG  
TAKE 1 TABLET BY MOUTH TWICE A DAY * carvedilol 12.5 mg tablet Commonly known as:  COREG  
TAKE 1 TABLET BY MOUTH TWICE A DAY  
  
 chlorpheniramine-HYDROcodone 10-8 mg/5 mL suspension Commonly known as:  Irene President Take 5 mL by mouth every twelve (12) hours as needed for Cough. Max Daily Amount: 10 mL. cholecalciferol 50,000 unit capsule Commonly known as:  VITAMIN D3 Take 1 Cap by mouth every seven (7) days. * dilTIAZem 360 mg ER capsule Commonly known as:  [facility] TAKE ONE CAPSULE BY MOUTH EVERY DAY  
  
 * dilTIAZem 360 mg ER capsule Commonly known as:  [facility] TAKE ONE CAPSULE BY MOUTH EVERY DAY  
  
 FISH OIL 1,000 mg Cap Generic drug:  omega-3 fatty acids-vitamin e  
 Take 1,400 mg by mouth daily. glimepiride 1 mg tablet Commonly known as:  AMARYL  
TAKE 1 TABLET BY MOUTH DAILY BEFORE BREAKFAST  
  
 glucose 4 gram chewable tablet Take 4 Tabs by mouth as needed. * insulin glargine 100 unit/mL (3 mL) Inpn Commonly known as:  LANTUS SOLOSTAR U-100 INSULIN  
10 Units by SubCUTAneous route daily. * insulin glargine 100 unit/mL (3 mL) Inpn Commonly known as:  BASAGLAR KWIKPEN U-100 INSULIN Sig: Take 10 units SC daily  
  
 insulin lispro 100 unit/mL kwikpen Commonly known as:  HumaLOG KwikPen Insulin Check FSBS Three times daily with meals,  For sugar between 150 and 200- give 1 units SQ,  For sugar between 201 and 250- give 3 units SQ, Insulin Needles (Disposable) 31 gauge x 3/16\" Ndle Commonly known as:  BD ULTRA-FINE MINI PEN NEEDLE Sig: test blood glucose BID. E11.9 OTHER Check CBC, CMP, Mg in 5 days, results to PCP  
  
 pantoprazole 40 mg tablet Commonly known as:  PROTONIX Take 1 Tab by mouth Daily (before breakfast). rosuvastatin 20 mg tablet Commonly known as:  CRESTOR  
TAKE 1 TABLET BY MOUTH NIGHTLY SITagliptin 50 mg tablet Commonly known as:  Leonela Landaman Take 1 Tab by mouth daily. * Notice: This list has 10 medication(s) that are the same as other medications prescribed for you. Read the directions carefully, and ask your doctor or other care provider to review them with you. Prescriptions Sent to Pharmacy Refills  
 cholecalciferol (VITAMIN D3) 50,000 unit capsule 1 Sig: Take 1 Cap by mouth every seven (7) days. Class: Normal  
 Pharmacy: [address] [place] [address] #: [phone] Route: Oral  
  
We Performed the Following REFERRAL TO GASTROENTEROLOGY [QZU98 Custom] Follow-up Instructions Return in about 3 months (around 9/11/2018) for Labs 1 week before. To-Do List   
 09/09/2018   Lab:  HEMOGLOBIN A1C W/O EAG   
 Referral Information Referral ID Referred By Referred To  
  
 [id number] Disha Pérez MD   
   [address] [facility] [address] [place], [address] Visits Status Start Date End Date 1 New Request 6/11/18 6/11/19 If your referral has a status of pending review or denied, additional information will be sent to support the outcome of this decision. Patient Instructions DASH Diet: Care Instructions Your Care Instructions The DASH diet is an eating plan that can help lower your blood pressure. DASH stands for Dietary Approaches to Stop Hypertension. Hypertension is high blood pressure. The DASH diet focuses on eating foods that are high in calcium, potassium, and magnesium. These nutrients can lower blood pressure. The foods that are highest in these nutrients are fruits, vegetables, low-fat dairy products, nuts, seeds, and legumes. But taking calcium, potassium, and magnesium supplements instead of eating foods that are high in those nutrients does not have the same effect. The DASH diet also includes whole grains, fish, and poultry. The DASH diet is one of several lifestyle changes your doctor may recommend to lower your high blood pressure. Your doctor may also want you to decrease the amount of sodium in your diet. Lowering sodium while following the DASH diet can lower blood pressure even further than just the DASH diet alone. Follow-up care is a key part of your treatment and safety. Be sure to make and go to all appointments, and call your doctor if you are having problems. It's also a good idea to know your test results and keep a list of the medicines you take. How can you care for yourself at home? Following the DASH diet · Eat 4 to 5 servings of fruit each day. A serving is 1 medium-sized piece of fruit, ½ cup chopped or canned fruit, 1/4 cup dried fruit, or 4 ounces (½ cup) of fruit juice. Choose fruit more often than fruit juice. · Eat 4 to 5 servings of vegetables each day. A serving is 1 cup of lettuce or raw leafy vegetables, ½ cup of chopped or cooked vegetables, or 4 ounces (½ cup) of vegetable juice. Choose vegetables more often than vegetable juice. · Get 2 to 3 servings of low-fat and fat-free dairy each day. A serving is 8 ounces of milk, 1 cup of yogurt, or 1 ½ ounces of cheese. · Eat 6 to 8 servings of grains each day. A serving is 1 slice of bread, 1 ounce of dry cereal, or ½ cup of cooked rice, pasta, or cooked cereal. Try to choose whole-grain products as much as possible. · Limit lean meat, poultry, and fish to 2 servings each day. A serving is 3 ounces, about the size of a deck of cards. · Eat 4 to 5 servings of nuts, seeds, and legumes (cooked dried beans, lentils, and split peas) each week. A serving is 1/3 cup of nuts, 2 tablespoons of seeds, or ½ cup of cooked beans or peas. · Limit fats and oils to 2 to 3 servings each day. A serving is 1 teaspoon of vegetable oil or 2 tablespoons of salad dressing. · Limit sweets and added sugars to 5 servings or less a week. A serving is 1 tablespoon jelly or jam, ½ cup sorbet, or 1 cup of lemonade. · Eat less than 2,300 milligrams (mg) of sodium a day. If you limit your sodium to 1,500 mg a day, you can lower your blood pressure even more. Tips for success · Start small. Do not try to make dramatic changes to your diet all at once. You might feel that you are missing out on your favorite foods and then be more likely to not follow the plan. Make small changes, and stick with them. Once those changes become habit, add a few more changes. · Try some of the following: ¨ Make it a goal to eat a fruit or vegetable at every meal and at snacks. This will make it easy to get the recommended amount of fruits and vegetables each day. ¨ Try yogurt topped with fruit and nuts for a snack or healthy dessert. ¨ Add lettuce, tomato, cucumber, and onion to sandwiches. ¨ Combine a ready-made pizza crust with low-fat mozzarella cheese and lots of vegetable toppings. Try using tomatoes, squash, spinach, broccoli, carrots, cauliflower, and onions. ¨ Have a variety of cut-up vegetables with a low-fat dip as an appetizer instead of chips and dip. ¨ Sprinkle sunflower seeds or chopped almonds over salads. Or try adding chopped walnuts or almonds to cooked vegetables. ¨ Try some vegetarian meals using beans and peas. Add garbanzo or kidney beans to salads. Make burritos and tacos with mashed anderson beans or black beans. Where can you learn more? Go to http://theresaAcetec Semiconductormumtaz.info/. Enter W668 in the search box to learn more about \"DASH Diet: Care Instructions. \" Current as of: September 21, 2016 Content Version: 11.4 © 6947-4436 Adjacent Applications. Care instructions adapted under license by [employer] (which disclaims liability or warranty for this information). If you have questions about a medical condition or this instruction, always ask your healthcare professional. [address] [address] any warranty or liability for your use of this information. Learning About Diabetes Food Guidelines Your Care Instructions Meal planning is important to manage diabetes. It helps keep your blood sugar at a target level (which you set with your doctor). You don't have to eat special foods. You can eat what your family eats, including sweets once in a while. But you do have to pay attention to how often you eat and how much you eat of certain foods. You may want to work with a dietitian or a certified diabetes educator (CDE) to help you plan meals and snacks. A dietitian or CDE can also help you lose weight if that is one of your goals. What should you know about eating carbs? Managing the amount of carbohydrate (carbs) you eat is an important part of healthy meals when you have diabetes. Carbohydrate is found in many foods. · Learn which foods have carbs. And learn the amounts of carbs in different foods. ¨ Bread, cereal, pasta, and rice have about 15 grams of carbs in a serving. A serving is 1 slice of bread (1 ounce), ½ cup of cooked cereal, or 1/3 cup of cooked pasta or rice. ¨ Fruits have 15 grams of carbs in a serving. A serving is 1 small fresh fruit, such as an apple or orange; ½ of a banana; ½ cup of cooked or canned fruit; ½ cup of fruit juice; 1 cup of melon or raspberries; or 2 tablespoons of dried fruit. ¨ Milk and no-sugar-added yogurt have 15 grams of carbs in a serving. A serving is 1 cup of milk or 2/3 cup of no-sugar-added yogurt. ¨ Starchy vegetables have 15 grams of carbs in a serving. A serving is ½ cup of mashed potatoes or sweet potato; 1 cup winter squash; ½ of a small baked potato; ½ cup of cooked beans; or ½ cup cooked corn or green peas. · Learn how much carbs to eat each day and at each meal. A dietitian or CDE can teach you how to keep track of the amount of carbs you eat. This is called carbohydrate counting. · If you are not sure how to count carbohydrate grams, use the Plate Method to plan meals. It is a good, quick way to make sure that you have a balanced meal. It also helps you spread carbs throughout the day. ¨ Divide your plate by types of foods. Put non-starchy vegetables on half the plate, meat or other protein food on one-quarter of the plate, and a grain or starchy vegetable in the final quarter of the plate. To this you can add a small piece of fruit and 1 cup of milk or yogurt, depending on how many carbs you are supposed to eat at a meal. 
· Try to eat about the same amount of carbs at each meal. Do not \"save up\" your daily allowance of carbs to eat at one meal. 
· Proteins have very little or no carbs per serving. Examples of proteins are beef, chicken, turkey, fish, eggs, tofu, cheese, cottage cheese, and peanut butter.  A serving size of meat is 3 ounces, which is about the size of a deck of cards. Examples of meat substitute serving sizes (equal to 1 ounce of meat) are 1/4 cup of cottage cheese, 1 egg, 1 tablespoon of peanut butter, and ½ cup of tofu. How can you eat out and still eat healthy? · Learn to estimate the serving sizes of foods that have carbohydrate. If you measure food at home, it will be easier to estimate the amount in a serving of restaurant food. · If the meal you order has too much carbohydrate (such as potatoes, corn, or baked beans), ask to have a low-carbohydrate food instead. Ask for a salad or green vegetables. · If you use insulin, check your blood sugar before and after eating out to help you plan how much to eat in the future. · If you eat more carbohydrate at a meal than you had planned, take a walk or do other exercise. This will help lower your blood sugar. What else should you know? · Limit saturated fat, such as the fat from meat and dairy products. This is a healthy choice because people who have diabetes are at higher risk of heart disease. So choose lean cuts of meat and nonfat or low-fat dairy products. Use olive or canola oil instead of butter or shortening when cooking. · Don't skip meals. Your blood sugar may drop too low if you skip meals and take insulin or certain medicines for diabetes. · Check with your doctor before you drink alcohol. Alcohol can cause your blood sugar to drop too low. Alcohol can also cause a bad reaction if you take certain diabetes medicines. Follow-up care is a key part of your treatment and safety. Be sure to make and go to all appointments, and call your doctor if you are having problems. It's also a good idea to know your test results and keep a list of the medicines you take. Where can you learn more? Go to http://theresa-mumtaz.info/. Enter N231 in the search box to learn more about \"Learning About Diabetes Food Guidelines. \" Current as of: March 13, 2017 Content Version: 11.4 © 4494-3158 Healthwise, Incorporated. Care instructions adapted under license by [employer] (which disclaims liability or warranty for this information). If you have questions about a medical condition or this instruction, always ask your healthcare professional. Mary Johnson any warranty or liability for your use of this information. Learning About the [address] Diet What is the Mediterranean diet? The Mediterranean diet is a style of eating rather than a diet plan. It features foods eaten in [place], [place], [place] and [place], and other countries along the [facility]. It emphasizes eating foods like fish, fruits, vegetables, beans, high-fiber breads and whole grains, nuts, and olive oil. This style of eating includes limited red meat, cheese, and sweets. Why choose the Mediterranean diet? A Mediterranean-style diet may improve heart health. It contains more fat than other heart-healthy diets. But the fats are mainly from nuts, unsaturated oils (such as fish oils and olive oil), and certain nut or seed oils (such as canola, soybean, or flaxseed oil). These fats may help protect the heart and blood vessels. How can you get started on the Mediterranean diet? Here are some things you can do to switch to a more Mediterranean way of eating. What to eat · Eat a variety of fruits and vegetables each day, such as grapes, blueberries, tomatoes, broccoli, peppers, figs, olives, spinach, eggplant, beans, lentils, and chickpeas. · Eat a variety of whole-grain foods each day, such as oats, brown rice, and whole wheat bread, pasta, and couscous. · Eat fish at least 2 times a week. Try tuna, salmon, mackerel, lake trout, herring, or sardines. · Eat moderate amounts of low-fat dairy products, such as milk, cheese, or yogurt. · Eat moderate amounts of poultry and eggs.  
· Choose healthy (unsaturated) fats, such as nuts, olive oil, and certain nut or seed oils like canola, soybean, and flaxseed. · Limit unhealthy (saturated) fats, such as butter, palm oil, and coconut oil. And limit fats found in animal products, such as meat and dairy products made with whole milk. Try to eat red meat only a few times a month in very small amounts. · Limit sweets and desserts to only a few times a week. This includes sugar-sweetened drinks like soda. The Mediterranean diet may also include red wine with your meal-1 glass each day for women and up to 2 glasses a day for men. Tips for eating at home · Use herbs, spices, garlic, lemon zest, and citrus juice instead of salt to add flavor to foods. · Add avocado slices to your sandwich instead of willis. · Have fish for lunch or dinner instead of red meat. Brush the fish with olive oil, and broil or grill it. · Sprinkle your salad with seeds or nuts instead of cheese. · Cook with olive or canola oil instead of butter or oils that are high in saturated fat. · Switch from 2% milk or whole milk to 1% or fat-free milk. · Dip raw vegetables in a vinaigrette dressing or hummus instead of dips made from mayonnaise or sour cream. 
· Have a piece of fruit for dessert instead of a piece of cake. Try baked apples, or have some dried fruit. Tips for eating out · Try broiled, grilled, baked, or poached fish instead of having it fried or breaded. · Ask your  to have your meals prepared with olive oil instead of butter. · Order dishes made with marinara sauce or sauces made from olive oil. Avoid sauces made from cream or mayonnaise. · Choose whole-grain breads, whole wheat pasta and pizza crust, brown rice, beans, and lentils. · Cut back on butter or margarine on bread. Instead, you can dip your bread in a small amount of olive oil. · Ask for a side salad or grilled vegetables instead of french fries or chips. Where can you learn more? Go to http://theresa-mumtaz.info/. Enter 623-156-3055 in the search box to learn more about \"Learning About the Mediterranean Diet. \" Current as of: May 12, 2017 Content Version: 11.4 © 4322-0001 Healthwise, Incorporated. Care instructions adapted under license by [employer] (which disclaims liability or warranty for this information). If you have questions about a medical condition or this instruction, always ask your healthcare professional. [place] [address] any warranty or liability for your use of this information. Introducing [address] Candelario Dahl introduces [employer] patient portal. Now you can access parts of your medical record, email your doctor's office, and request medication refills online. 1. In your internet browser, go to https://Try The World. inVentiv Health/Try The World 2. Click on the First Time User? Click Here link in the Sign In box. You will see the New Member Sign Up page. 3. Enter your [employer] Access Code exactly as it appears below. You will not need to use this code after youve completed the sign-up process. If you do not sign up before the expiration date, you must request a new code. · [employer] Access Code: [email or web address] Expires: 9/2/2018  2:27 PM 
 
4. Enter the last four digits of your Social Security Number (xxxx) and Date of Birth (mm/dd/yyyy) as indicated and click Submit. You will be taken to the next sign-up page. 5. Create a [employer] ID. This will be your [employer] login ID and cannot be changed, so think of one that is secure and easy to remember. 6. Create a [employer] password. You can change your password at any time. 7. Enter your Password Reset Question and Answer. This can be used at a later time if you forget your password. 8. Enter your e-mail address. You will receive e-mail notification when new information is available in [address] 9. Click Sign Up. You can now view and download portions of your medical record. 10. Click the Download Summary menu link to download a portable copy of your medical information. If you have questions, please visit the Frequently Asked Questions section of the [employer] website. Remember, [employer] is NOT to be used for urgent needs. For medical emergencies, dial 911. Now available from your iPhone and Android! Please provide this summary of care documentation to your next provider. Your primary care clinician is listed as Noemi Suero. If you have any questions after today's visit, please call [phone].

## 2018-06-11 NOTE — TELEPHONE ENCOUNTER
Daily Note     Today's date: 2018  Patient name: Khang Preston  : 1983  MRN: [id number]  Referring provider: Joesph Gong MD  Dx:   Encounter Diagnosis     ICD-10-CM    1  Rupture of anterior cruciate ligament of right knee, initial encounter S83 511A    2  Right knee pain, unspecified chronicity M25 561        Start Time: 1600  Stop Time: 1700  Total time in clinic (min): 60 minutes    Subjective: Pt reports she is feeling ok today, states she has pain especially with rain or at night  Objective: See treatment diary below    Precautions: Anxiety, latex allergy, neck pain, GERD    Daily Treatment Diary         Manual              Patellar mobs HY            PROM HY                                                       Exercise Diary              Heel slides 10" x 15            Hamstring stretch 20" x 4            Quad sets (towel at knee) 10x5"            Quad sets (towel at ankle) 10x5"            SLR:  4 way c brace 2x10 ea            Clam shells 10x2"            Prone hamstring curls 15x                                                                                                                                                               Modalities              CP prn 10'                                          Assessment: Tolerated treatment well  Patient demonstrated fatigue post treatment  Pt performed entire exercise program with no signs of increased pain or adverse symptoms  Pt educated on proper fit and alignment of brace and how to adjust with decreased swelling, Pt verbalized understanding  Pt also educated on scar tissue massage once scar is fully closed  Pt will benefit from further skilled PT to increase strength, flexibility and function  Continue to progress as able  Plan: Continue per plan of care  Per Latosha Nelson Pt already have a relationship with [facility] and Dr. [place] office referred pt back tho that office. Latosha Nelson did call [facility] and wanted to let us know in case we needed to send them a referral.    Please advise.

## 2018-09-10 ENCOUNTER — HOSPITAL ENCOUNTER (OUTPATIENT)
Dept: LAB | Age: 73
Discharge: HOME | End: 2018-09-10
Payer: MEDICARE

## 2018-09-10 DIAGNOSIS — E11.65 TYPE 2 DIABETES MELLITUS WITH HYPERGLYCEMIA, WITHOUT LONG-TERM CURRENT USE OF INSULIN (HCC): ICD-10-CM

## 2018-09-10 LAB — HBA1C MFR BLD: 7 % (ref 4.2–5.6)

## 2018-09-10 PROCEDURE — 83036 HEMOGLOBIN GLYCOSYLATED A1C: CPT | Performed by: HOSPITALIST

## 2018-09-10 PROCEDURE — 36415 COLL VENOUS BLD VENIPUNCTURE: CPT | Performed by: HOSPITALIST

## 2018-10-20 ENCOUNTER — APPOINTMENT (OUTPATIENT)
Dept: GENERAL RADIOLOGY | Age: 73
End: 2018-10-20
Attending: EMERGENCY MEDICINE
Payer: COMMERCIAL

## 2018-10-20 ENCOUNTER — HOSPITAL ENCOUNTER (EMERGENCY)
Age: 73
Discharge: HOME | End: 2018-10-20
Attending: EMERGENCY MEDICINE
Payer: COMMERCIAL

## 2018-10-20 ENCOUNTER — APPOINTMENT (OUTPATIENT)
Dept: GENERAL RADIOLOGY | Age: 73
End: 2018-10-20
Attending: PHYSICIAN ASSISTANT
Payer: COMMERCIAL

## 2018-10-20 ENCOUNTER — APPOINTMENT (OUTPATIENT)
Dept: CT IMAGING | Age: 73
End: 2018-10-20
Attending: PHYSICIAN ASSISTANT
Payer: COMMERCIAL

## 2018-10-20 VITALS
DIASTOLIC BLOOD PRESSURE: 55 MMHG | OXYGEN SATURATION: 91 % | RESPIRATION RATE: 18 BRPM | TEMPERATURE: 99.3 F | HEART RATE: 60 BPM | SYSTOLIC BLOOD PRESSURE: 135 MMHG

## 2018-10-20 DIAGNOSIS — E86.0 DEHYDRATION: ICD-10-CM
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
## 2018-10-20 DIAGNOSIS — S93.401A SPRAIN OF RIGHT ANKLE, UNSPECIFIED LIGAMENT, INITIAL ENCOUNTER: Primary | ICD-10-CM

## 2018-10-20 LAB
ALBUMIN SERPL-MCNC: 3.2 G/DL (ref 3.4–5)
ALBUMIN/GLOB SERPL: 0.7 {RATIO} (ref 0.8–1.7)
ALP SERPL-CCNC: 62 U/L (ref 45–117)
ALT SERPL-CCNC: 18 U/L (ref 16–61)
AMORPH CRY URNS QL MICRO: ABNORMAL
ANION GAP SERPL CALC-SCNC: 10 MMOL/L (ref 3–18)
APPEARANCE UR: CLEAR
AST SERPL-CCNC: 18 U/L (ref 15–37)
BACTERIA URNS QL MICRO: NEGATIVE /HPF
BASOPHILS # BLD: 0 K/UL (ref 0–0.1)
BASOPHILS NFR BLD: 0 % (ref 0–2)
BILIRUB SERPL-MCNC: 0.4 MG/DL (ref 0.2–1)
BILIRUB UR QL: NEGATIVE
BUN SERPL-MCNC: 35 MG/DL (ref 7–18)
BUN/CREAT SERPL: 11 (ref 12–20)
CALCIUM SERPL-MCNC: 9 MG/DL (ref 8.5–10.1)
CHLORIDE SERPL-SCNC: 103 MMOL/L (ref 100–108)
CO2 SERPL-SCNC: 23 MMOL/L (ref 21–32)
COLOR UR: YELLOW
CREAT SERPL-MCNC: 3.05 MG/DL (ref 0.6–1.3)
DIFFERENTIAL METHOD BLD: ABNORMAL
EOSINOPHIL # BLD: 0.3 K/UL (ref 0–0.4)
EOSINOPHIL NFR BLD: 3 % (ref 0–5)
EPITH CASTS URNS QL MICRO: ABNORMAL /LPF (ref 0–5)
ERYTHROCYTE [DISTWIDTH] IN BLOOD BY AUTOMATED COUNT: 16.3 % (ref 11.6–14.5)
GLOBULIN SER CALC-MCNC: 4.7 G/DL (ref 2–4)
GLUCOSE BLD STRIP.AUTO-MCNC: 154 MG/DL (ref 70–110)
GLUCOSE SERPL-MCNC: 230 MG/DL (ref 74–99)
GLUCOSE UR STRIP.AUTO-MCNC: 500 MG/DL
HCT VFR BLD AUTO: 34.6 % (ref 36–48)
HGB BLD-MCNC: 11.4 G/DL (ref 13–16)
HGB UR QL STRIP: ABNORMAL
INR PPP: 1.3 (ref 0.8–1.2)
KETONES UR QL STRIP.AUTO: NEGATIVE MG/DL
LEUKOCYTE ESTERASE UR QL STRIP.AUTO: NEGATIVE
LYMPHOCYTES # BLD: 2 K/UL (ref 0.9–3.6)
LYMPHOCYTES NFR BLD: 20 % (ref 21–52)
MCH RBC QN AUTO: 27 PG (ref 24–34)
MCHC RBC AUTO-ENTMCNC: 32.9 G/DL (ref 31–37)
MCV RBC AUTO: 81.8 FL (ref 74–97)
MONOCYTES # BLD: 0.5 K/UL (ref 0.05–1.2)
MONOCYTES NFR BLD: 5 % (ref 3–10)
NEUTS SEG # BLD: 7 K/UL (ref 1.8–8)
NEUTS SEG NFR BLD: 72 % (ref 40–73)
NITRITE UR QL STRIP.AUTO: NEGATIVE
PH UR STRIP: 5 [PH] (ref 5–8)
PLATELET # BLD AUTO: 202 K/UL (ref 135–420)
PMV BLD AUTO: 11.1 FL (ref 9.2–11.8)
POTASSIUM SERPL-SCNC: 4.6 MMOL/L (ref 3.5–5.5)
PROT SERPL-MCNC: 7.9 G/DL (ref 6.4–8.2)
PROT UR STRIP-MCNC: 300 MG/DL
PROTHROMBIN TIME: 16 SEC (ref 11.5–15.2)
RBC # BLD AUTO: 4.23 M/UL (ref 4.7–5.5)
RBC #/AREA URNS HPF: ABNORMAL /HPF (ref 0–5)
SODIUM SERPL-SCNC: 136 MMOL/L (ref 136–145)
SP GR UR REFRACTOMETRY: 1.01 (ref 1–1.03)
UROBILINOGEN UR QL STRIP.AUTO: 0.2 EU/DL (ref 0.2–1)
WBC # BLD AUTO: 9.8 K/UL (ref 4.6–13.2)
WBC URNS QL MICRO: ABNORMAL /HPF (ref 0–4)

## 2018-10-20 PROCEDURE — 85025 COMPLETE CBC W/AUTO DIFF WBC: CPT | Performed by: PHYSICIAN ASSISTANT

## 2018-10-20 PROCEDURE — 93005 ELECTROCARDIOGRAM TRACING: CPT

## 2018-10-20 PROCEDURE — 96360 HYDRATION IV INFUSION INIT: CPT

## 2018-10-20 PROCEDURE — 74011250637 HC RX REV CODE- 250/637: Performed by: EMERGENCY MEDICINE

## 2018-10-20 PROCEDURE — 99284 EMERGENCY DEPT VISIT MOD MDM: CPT

## 2018-10-20 PROCEDURE — 70450 CT HEAD/BRAIN W/O DYE: CPT

## 2018-10-20 PROCEDURE — 71046 X-RAY EXAM CHEST 2 VIEWS: CPT

## 2018-10-20 PROCEDURE — 81001 URINALYSIS AUTO W/SCOPE: CPT | Performed by: PHYSICIAN ASSISTANT

## 2018-10-20 PROCEDURE — 73610 X-RAY EXAM OF ANKLE: CPT

## 2018-10-20 PROCEDURE — 80053 COMPREHEN METABOLIC PANEL: CPT | Performed by: PHYSICIAN ASSISTANT

## 2018-10-20 PROCEDURE — 74011250636 HC RX REV CODE- 250/636: Performed by: EMERGENCY MEDICINE

## 2018-10-20 PROCEDURE — 82962 GLUCOSE BLOOD TEST: CPT

## 2018-10-20 PROCEDURE — 73630 X-RAY EXAM OF FOOT: CPT

## 2018-10-20 PROCEDURE — L1930 AFO PLASTIC: HCPCS

## 2018-10-20 PROCEDURE — 85610 PROTHROMBIN TIME: CPT | Performed by: PHYSICIAN ASSISTANT

## 2018-10-20 PROCEDURE — 75810000053 HC SPLINT APPLICATION

## 2018-10-20 RX ORDER — ACETAMINOPHEN 500 MG
1000 TABLET ORAL ONCE
Status: COMPLETED | OUTPATIENT
Start: 2018-10-20 | End: 2018-10-20

## 2018-10-20 RX ADMIN — SODIUM CHLORIDE 1000 ML: 900 INJECTION, SOLUTION INTRAVENOUS at 16:35

## 2018-10-20 RX ADMIN — ACETAMINOPHEN 1000 MG: 500 TABLET, FILM COATED ORAL at 18:45

## 2018-10-20 NOTE — ED PROVIDER NOTES
EMERGENCY DEPARTMENT HISTORY AND PHYSICAL EXAM 
 
3:57 PM 
 
 
Date: 10/20/2018 Patient Name: Lucy Caldera History of Presenting Illness Chief Complaint Patient presents with  Foot Injury History Provided By: Patient and Patient's Daughter Chief Complaint: Fall; Foot Swelling Duration:  1 Day Timing:  Acute Location: Right foot Quality: N/A Severity: N/A Modifying Factors: Needs more assistance with walking since fall Associated Symptoms: denies any other associated signs or symptoms Additional History (Context): Lucy Caldera is a 75 y.o. male with PMHx of HTN, DM, and stroke presenting to the ED with daughter at bedside c/o acute onset of right foot swelling that she noticed earlier today. Daughter states pt tripped and fell over a rug yesterday and injured his right foot/ankle and had difficulty getting up after the fall. She states pt has PMHx of stroke and usually walks with a cane, states pt has needed more assistance with walking since the fall. No other modifying factors for pt's symptoms. Pt denies head injury. Per daughter, pt is not on blood thinners. Denies any other symptoms or complaints. PCP: Jamir Phan,  
 
Current Facility-Administered Medications Medication Dose Route Frequency Provider Last Rate Last Dose  acetaminophen (TYLENOL) tablet 1,000 mg  1,000 mg Oral ONCE Tabby Negro MD      
 
Current Outpatient Medications Medication Sig Dispense Refill  carvedilol (COREG) 12.5 mg tablet TAKE 1 TABLET BY MOUTH TWICE A  Tab 0  
 allopurinol (ZYLOPRIM) 100 mg tablet TAKE 1 TABLET BY MOUTH TWICE DAILY 180 Tab 0  
 dilTIAZem (TIAZAC) 360 mg ER capsule TAKE ONE CAPSULE BY MOUTH EVERY DAY 90 Cap 0  
 rosuvastatin (CRESTOR) 20 mg tablet TAKE 1 TABLET BY MOUTH NIGHTLY 90 Tab 0  
 insulin lispro (HUMALOG KWIKPEN INSULIN) 100 unit/mL kwikpen Check FSBS Three times daily with meals,  For sugar between 150 and 200- give 1 units SQ,  For sugar between 201 and 250- give 3 units SQ, 5 Pen 2  
 aspirin-dipyridamole (AGGRENOX)  mg per SR capsule Take 1 Cap by mouth two (2) times a day. 180 Cap 3  
 omega-3 fatty acids-vitamin e (FISH OIL) 1,000 mg Cap Take 1,400 mg by mouth daily.  glucose 4 gram chewable tablet Take 4 Tabs by mouth as needed. 50 Tab 0  chlorpheniramine-HYDROcodone (TUSSIONEX) 10-8 mg/5 mL suspension Take 5 mL by mouth every twelve (12) hours as needed for Cough. Max Daily Amount: 10 mL. 240 mL 0  
 Insulin Needles, Disposable, (BD INSULIN PEN NEEDLE UF MINI) 31 gauge x 3/16\" ndle Sig: test blood glucose BID. E11.9 100 Pen Needle 5  carvedilol (COREG) 12.5 mg tablet TAKE 1 TABLET BY MOUTH TWICE A  Tab 0  
 OTHER Check CBC, CMP, Mg in 5 days, results to PCP 1 each 0 Past History Past Medical History: 
Past Medical History:  
Diagnosis Date  Cataract   
 both  Colon polyp 9-2004  Diabetes ([facility] [address] IDDM  DJD (degenerative joint disease) of knee  ED (erectile dysfunction)  GERD (gastroesophageal reflux disease) 12-01-08  Glaucoma suspect   Gout, unspecified 10-25-06  Hyperlipidemia  Hypertension  Insomnia 7-20-06  Phimosis 6-16-00  Prostate cancer ([facility] [address] 3-3-09  Stroke ([facility] [address] Past Surgical History: 
Past Surgical History:  
Procedure Laterality Date  HX POLYPECTOMY  [id number]  TN COLONOSCOPY FLX DX W/COLLJ [facility] WHEN PFRMD  9/1/2004  
 polyp/Dr Alvarado  TN COLONOSCOPY FLX DX W/COLLJ SPEC WHEN PFRMD  9-2007  
 incomplete; Dr. Pooja Meadows Family History: 
Family History Problem Relation Age of Onset  Hypertension Mother  Diabetes Father  Cancer Father   
     prostate  Heart Disease Father 70 CABG  Hypertension Sister  Hypertension Brother  Diabetes Brother Social History: 
Social History Tobacco Use  Smoking status: Never Smoker  Smokeless tobacco: Never Used Substance Use Topics  Alcohol use: No  
 Drug use: No  
 
 
Allergies: 
No Known Allergies Review of Systems Review of Systems Constitutional: Negative for chills, fatigue and fever. HENT: Negative for congestion, rhinorrhea and sore throat. Eyes: Negative for pain, redness, itching and visual disturbance. Respiratory: Negative for cough, chest tightness, shortness of breath and wheezing. Cardiovascular: Negative for chest pain, palpitations and leg swelling. Gastrointestinal: Negative for abdominal pain, diarrhea, nausea and vomiting. Genitourinary: Negative for decreased urine volume, dysuria, flank pain and urgency. Musculoskeletal: Negative for arthralgias, back pain and myalgias.  
     + Foot swelling (right) Skin: Negative for color change, rash and wound. Allergic/Immunologic: Negative for environmental allergies, food allergies and immunocompromised state. Neurological: Negative for dizziness and headaches. Physical Exam  
 
Visit Vitals /55 Pulse 60 Temp 99.3 °F (37.4 °C) Resp 18 SpO2 91% Physical Exam  
Constitutional: He appears well-developed and well-nourished. No distress. HENT:  
Head: Normocephalic and atraumatic. Mouth/Throat: Oropharynx is clear and moist.  
Eyes: Conjunctivae and EOM are normal. Pupils are equal, round, and reactive to light. Neck: Normal range of motion. Neck supple. Cardiovascular: Normal rate, regular rhythm and normal heart sounds. No murmur heard. Pulmonary/Chest: Effort normal and breath sounds normal. He has no wheezes. He has no rales. Abdominal: Soft. Bowel sounds are normal. He exhibits no distension. There is no tenderness. Musculoskeletal: Normal range of motion. He exhibits no deformity. Right ankle: He exhibits swelling. He exhibits no deformity. Tenderness. Medial malleolus tenderness found. Lymphadenopathy:  
  He has no cervical adenopathy. Neurological: He is alert. He has normal strength. No cranial nerve deficit or sensory deficit. He exhibits normal muscle tone. Coordination normal.  
Skin: Skin is warm and dry. No rash noted. No erythema. Psychiatric: He has a normal mood and affect. His behavior is normal.  
Nursing note and vitals reviewed. Diagnostic Study Results Labs - Recent Results (from the past 12 hour(s)) EKG, 12 LEAD, INITIAL Collection Time: 10/20/18  3:31 PM  
Result Value Ref Range Ventricular Rate 58 BPM  
 Atrial Rate 58 BPM  
 P-R Interval 140 ms QRS Duration 80 ms  
 Q-T Interval 396 ms QTC Calculation (Bezet) 388 ms Calculated P Axis 51 degrees Calculated R Axis 18 degrees Calculated T Axis 89 degrees Diagnosis Sinus bradycardia Nonspecific T wave abnormality Abnormal ECG When compared with ECG of 21-OCT-2016 20:52, No significant change was found CBC WITH AUTOMATED DIFF Collection Time: 10/20/18  3:58 PM  
Result Value Ref Range WBC 9.8 4.6 - 13.2 K/uL  
 RBC 4.23 (L) 4.70 - 5.50 M/uL  
 HGB 11.4 (L) 13.0 - 16.0 g/dL HCT 34.6 (L) 36.0 - 48.0 % MCV 81.8 74.0 - 97.0 FL  
 MCH 27.0 24.0 - 34.0 PG  
 MCHC 32.9 31.0 - 37.0 g/dL  
 RDW 16.3 (H) 11.6 - 14.5 % PLATELET 278 701 - 043 K/uL MPV 11.1 9.2 - 11.8 FL  
 NEUTROPHILS 72 40 - 73 % LYMPHOCYTES 20 (L) 21 - 52 % MONOCYTES 5 3 - 10 % EOSINOPHILS 3 0 - 5 % BASOPHILS 0 0 - 2 %  
 ABS. NEUTROPHILS 7.0 1.8 - 8.0 K/UL  
 ABS. LYMPHOCYTES 2.0 0.9 - 3.6 K/UL  
 ABS. MONOCYTES 0.5 0.05 - 1.2 K/UL  
 ABS. EOSINOPHILS 0.3 0.0 - 0.4 K/UL  
 ABS. BASOPHILS 0.0 0.0 - 0.1 K/UL  
 DF AUTOMATED METABOLIC PANEL, COMPREHENSIVE Collection Time: 10/20/18  3:58 PM  
Result Value Ref Range Sodium 136 136 - 145 mmol/L Potassium 4.6 3.5 - 5.5 mmol/L Chloride 103 100 - 108 mmol/L  
 CO2 23 21 - 32 mmol/L Anion gap 10 3.0 - 18 mmol/L Glucose 230 (H) 74 - 99 mg/dL  BUN 35 (H) 7.0 - 18 MG/DL  
 Creatinine 3.05 (H) 0.6 - 1.3 MG/DL  
 BUN/Creatinine ratio 11 (L) 12 - 20 GFR est AA 24 (L) >60 ml/min/1.73m2 GFR est non-AA 20 (L) >60 ml/min/1.73m2 Calcium 9.0 8.5 - 10.1 MG/DL Bilirubin, total 0.4 0.2 - 1.0 MG/DL  
 ALT (SGPT) 18 16 - 61 U/L  
 AST (SGOT) 18 15 - 37 U/L Alk. phosphatase 62 45 - 117 U/L Protein, total 7.9 6.4 - 8.2 g/dL Albumin 3.2 (L) 3.4 - 5.0 g/dL Globulin 4.7 (H) 2.0 - 4.0 g/dL A-G Ratio 0.7 (L) 0.8 - 1.7 PROTHROMBIN TIME + INR Collection Time: 10/20/18  3:58 PM  
Result Value Ref Range Prothrombin time 16.0 (H) 11.5 - 15.2 sec INR 1.3 (H) 0.8 - 1.2 URINALYSIS W/ RFLX MICROSCOPIC Collection Time: 10/20/18  5:31 PM  
Result Value Ref Range Color YELLOW Appearance CLEAR Specific gravity 1.015 1.005 - 1.030    
 pH (UA) 5.0 5.0 - 8.0 Protein 300 (A) NEG mg/dL Glucose 500 (A) NEG mg/dL Ketone NEGATIVE  NEG mg/dL Bilirubin NEGATIVE  NEG Blood TRACE (A) NEG Urobilinogen 0.2 0.2 - 1.0 EU/dL Nitrites NEGATIVE  NEG Leukocyte Esterase NEGATIVE  NEG    
URINE MICROSCOPIC ONLY Collection Time: 10/20/18  5:31 PM  
Result Value Ref Range WBC NONE 0 - 4 /hpf  
 RBC 0 to 3 0 - 5 /hpf Epithelial cells FEW 0 - 5 /lpf Bacteria NEGATIVE  NEG /hpf Amorphous Crystals 3+ (A) NEG Radiologic Studies -  
CT HEAD WO CONT Final Result IMPRESSION:  
 
No acute infarct, mass, nor hemorrhage. Moderate stable Atrophy and Small vessel disease.   
 Chronic lacunar infarcts. XR FOOT RT MIN 3 V    (Results Pending) XR CHEST PA LAT    (Results Pending) XR ANKLE RT MIN 3 V    (Results Pending) Medical Decision Making I am the first provider for this patient. I reviewed the vital signs, available nursing notes, past medical history, past surgical history, family history and social history. Vital Signs-Reviewed the patient's vital signs. EKG: Interpreted by the EP. Time Interpreted: 15:33 Rate: 58 Rhythm: Sinus Bradycardia Interpretation: No acute ST changes. Records Reviewed: Nursing Notes and Old Medical Records (Time of Review: 3:57 PM) ED Course: Progress Notes, Reevaluation, and Consults: 
6:59 PM 
Patient feels better after IVF and Tylenol. He is ambulating well with use of the walker. Says he feels well walking. Family states he looks much better and essentially back to baseline. Suspect his difficulty walking today was due to ankle sprain from mechanical fall and mild dehydration. No signs of infection, metabolic derangement, or CVA. Renal function at baseline. He will f/u with primary care for further assessment. Advised family on return precautions Diagnosis Clinical Impression: 1. Sprain of right ankle, unspecified ligament, initial encounter 2. Fall, initial encounter 3. Dehydration Disposition: discharge Follow-up Information None Medication List  
  
ASK your doctor about these medications   
allopurinol 100 mg tablet Commonly known as:  ZYLOPRIM 
TAKE 1 TABLET BY MOUTH TWICE DAILY 
  
aspirin-dipyridamole  mg per SR capsule Commonly known as:  AGGRENOX Take 1 Cap by mouth two (2) times a day. * carvedilol 12.5 mg tablet Commonly known as:  COREG 
TAKE 1 TABLET BY MOUTH TWICE A DAY * carvedilol 12.5 mg tablet Commonly known as:  COREG 
TAKE 1 TABLET BY MOUTH TWICE A DAY 
  
chlorpheniramine-HYDROcodone 10-8 mg/5 mL suspension Commonly known as:  [place] Take 5 mL by mouth every twelve (12) hours as needed for Cough. Max Daily Amount: 10 mL. dilTIAZem 360 mg ER capsule Commonly known as:  [place] TAKE ONE CAPSULE BY MOUTH EVERY DAY 
  
FISH OIL 1,000 mg Cap Generic drug:  omega-3 fatty acids-vitamin e 
  
glucose 4 gram chewable tablet Take 4 Tabs by mouth as needed. insulin lispro 100 unit/mL kwikpen Commonly known as:  HumaLOG KwikPen Insulin Check FSBS Three times daily with meals,  For sugar between 150 and 200- give 1 units SQ,  For sugar between 201 and 250- give 3 units SQ, Insulin Needles (Disposable) 31 gauge x 3/16\" Ndle Commonly known as:  BD ULTRA-FINE MINI PEN NEEDLE Sig: test blood glucose BID. E11.9 OTHER Check CBC, CMP, Mg in 5 days, results to PCP 
  
rosuvastatin 20 mg tablet Commonly known as:  CRESTOR 
TAKE 1 TABLET BY MOUTH NIGHTLY * This list has 2 medication(s) that are the same as other medications prescribed for you. Read the directions carefully, and ask your doctor or other care provider to review them with you.  
  
  
  
 
_______________________________ Attestations: 
Scribe Attestation Viktoria Bains acting as a scribe for and in the presence of Zbigniew Daugherty MD     
October 20, 2018 at 3:57 PM 
    
Provider Attestation:     
I personally performed the services described in the documentation, reviewed the documentation, as recorded by the scribe in my presence, and it accurately and completely records my words and actions. October 20, 2018 at 3:57 PM - Zbigniew Daugherty MD   
_______________________________

## 2018-10-20 NOTE — DISCHARGE INSTRUCTIONS
IF YOU HAVE NEW OR WORSENING SYMPTOMS, SEVERE PAIN, FALLING, WEAKNESS, CONFUSION, VISION OR SPEECH PROBLEMS, OR ANY OTHER WORRYING SIGNS THEN RETURN TO THE ER RIGHT AWAY. Ankle Sprain: Care Instructions  Your Care Instructions    An ankle sprain can happen when you twist your ankle. The ligaments that support the ankle can get stretched and torn. Often the ankle is swollen and painful. Ankle sprains may take from several weeks to several months to heal. Usually, the more pain and swelling you have, the more severe your ankle sprain is and the longer it will take to heal. You can heal faster and regain strength in your ankle with good home treatment. It is very important to give your ankle time to heal completely, so that you do not easily hurt your ankle again. Follow-up care is a key part of your treatment and safety. Be sure to make and go to all appointments, and call your doctor if you are having problems. It's also a good idea to know your test results and keep a list of the medicines you take. How can you care for yourself at home? · Prop up your foot on pillows as much as possible for the next 3 days. Try to keep your ankle above the level of your heart. This will help reduce the swelling. · Follow your doctor's directions for wearing a splint or elastic bandage. Wrapping the ankle may help reduce or prevent swelling. · Your doctor may give you a splint, a brace, an air stirrup, or another form of ankle support to protect your ankle until it is healed. Wear it as directed while your ankle is healing. Do not remove it unless your doctor tells you to. After your ankle has healed, ask your doctor whether you should wear the brace when you exercise. · Put ice or cold packs on your injured ankle for 10 to 20 minutes at a time. Try to do this every 1 to 2 hours for the next 3 days (when you are awake) or until the swelling goes down. Put a thin cloth between the ice and your skin.   · You may need to use crutches until you can walk without pain. If you do use crutches, try to bear some weight on your injured ankle if you can do so without pain. This helps the ankle heal.  · Take pain medicines exactly as directed. ? If the doctor gave you a prescription medicine for pain, take it as prescribed. ? If you are not taking a prescription pain medicine, ask your doctor if you can take an over-the-counter medicine. · If you have been given ankle exercises to do at home, do them exactly as instructed. These can promote healing and help prevent lasting weakness. When should you call for help? Call your doctor now or seek immediate medical care if:    · Your pain is getting worse.     · Your swelling is getting worse.     · Your splint feels too tight or you are unable to loosen it.    Watch closely for changes in your health, and be sure to contact your doctor if:    · You are not getting better after 1 week. Where can you learn more? Go to http://theresaLiibookmumtaz.info/. Enter A726 in the search box to learn more about \"Ankle Sprain: Care Instructions. \"  Current as of: November 29, 2017  Content Version: 11.8  © 9823-6250 [employer]. Care instructions adapted under license by [employer] (which disclaims liability or warranty for this information). If you have questions about a medical condition or this instruction, always ask your healthcare professional. [address] any warranty or liability for your use of this information. Learning About RICE (Rest, Ice, Compression, and Elevation)  What is RICE? RICE is a way to care for an injury. RICE helps relieve pain and swelling. It may also help with healing and flexibility. RICE stands for:  · Rest and protect the injured or sore area. · Ice or a cold pack used as soon as possible. · Compression, or wrapping the injured or sore area with an elastic bandage.   · Elevation (propping up) the injured or sore area. How do you do RICE? You can use RICE for home treatment when you have general aches and pains or after an injury or surgery. Rest  · Do not put weight on the injury for at least 24 to 48 hours. · Use crutches for a badly sprained knee or ankle. · Support a sprained wrist, elbow, or shoulder with a sling. Ice  · Put ice or a cold pack on the injury right away to reduce pain and swelling. Frozen vegetables will also work as an ice pack. Put a thin cloth between the ice or cold pack and your skin. The cloth protects the injured area from getting too cold. · Use ice for 10 to 15 minutes at a time for the first 48 to 72 hours. Compression  · Use compression for sprains, strains, and surgeries of the arms and legs. · Wrap the injured area with an elastic bandage or compression sleeve to reduce swelling. · Don't wrap it too tightly. If the area below it feels numb, tingles, or feels cool, loosen the wrap. Elevation  · Use elevation for areas of the body that can be propped up, such as arms and legs. · Prop up the injured area on pillows whenever you use ice. Keep it propped up anytime you sit or lie down. · Try to keep the injured area at or above the level of your heart. This will help reduce swelling and bruising. Where can you learn more? Go to http://theresa-mumtaz.info/. Enter X840 in the search box to learn more about \"Learning About RICE (Rest, Ice, Compression, and Elevation). \"  Current as of: November 29, 2017  Content Version: 11.8  © 2494-5605 "[employer]". Care instructions adapted under license by [employer] (which disclaims liability or warranty for this information). If you have questions about a medical condition or this instruction, always ask your healthcare professional. [address] any warranty or liability for your use of this information.            Oral Rehydration: Care Instructions  Your Care Instructions    Dehydration occurs when your body loses too much water. This can happen if you do not drink enough fluids or lose a lot of fluid due to diarrhea, vomiting, or sweating. Being dehydrated can cause health problems and can even be life-threatening. To replace lost fluids, you need to drink liquid that contains special chemicals called electrolytes. Electrolytes keep your body working well. Plain water does not have electrolytes. You also need to rest to prevent more fluid loss. Replacing water and electrolytes (oral rehydration) completely takes about 36 hours. But you should feel better within a few hours. Follow-up care is a key part of your treatment and safety. Be sure to make and go to all appointments, and call your doctor if you are having problems. It's also a good idea to know your test results and keep a list of the medicines you take. How can you care for yourself at home? · Take frequent sips of a drink such as Gatorade, Powerade, or other rehydration drinks that your doctor suggests. These replace both fluid and important chemicals (electrolytes) you need for balance in your blood. · Drink 2 quarts of cool liquid over 2 to 4 hours. You should have at least 10 glasses of liquid a day to replace lost fluid. If you have kidney, heart, or liver disease and have to limit fluids, talk with your doctor before you increase the amount of fluids you drink. · Make your own drink. Measure everything carefully. The drink may not work well or may even be harmful if the amounts are off. ? 1 quart water  ? ½ teaspoon salt  ? 6 teaspoons sugar  · Do not drink liquid with caffeine, such as coffee and debra. · Do not drink any alcohol. It can make you dehydrated. · Drink plenty of fluids, enough so that your urine is light yellow or clear like water. If you have kidney, heart, or liver disease and have to limit fluids, talk with your doctor before you increase the amount of fluids you drink.   When should you call for help? Call 911 anytime you think you may need emergency care. For example, call if:    · You have signs of severe dehydration, such as:  ? You are confused or unable to stay awake.  ? You passed out (lost consciousness).    Call your doctor now or seek immediate medical care if:    · You still have signs of dehydration. You have sunken eyes and a dry mouth, and you pass only a little dark urine.     · You are dizzy or lightheaded, or you feel like you may faint.     · You are not able to keep down fluids.    Watch closely for changes in your health, and be sure to contact your doctor if:    · You do not get better as expected. Where can you learn more? Go to http://theresa-mumtaz.info/. Enter I040 in the search box to learn more about \"Oral Rehydration: Care Instructions. \"  Current as of: November 20, 2017  Content Version: 11.8  © 9970-3867 [employer]. Care instructions adapted under license by [employer] (which disclaims liability or warranty for this information). If you have questions about a medical condition or this instruction, always ask your healthcare professional. [place] [address] any warranty or liability for your use of this information.

## 2018-10-20 NOTE — ED NOTES
I performed a brief evaluation, including history and physical, of the patient here in triage and I have determined that pt will need further treatment and evaluation from the main side ER physician. I have placed initial orders to help in expediting patients care. October 20, 2018 at 3:07 PM - Fany Alcazar PA-C Visit Vitals /78 (BP 1 Location: Left arm) Pulse 65 Temp 99.3 °F (37.4 °C) Resp 20 SpO2 96%

## 2018-10-20 NOTE — ED TRIAGE NOTES
Pt tripped and fell over a rug yesterday. Today his daughter noticed that his right foot was swollen and pt needs help to walk. Normally walks with a cane due to a previous CVA

## 2018-10-21 LAB
ATRIAL RATE: 58 BPM
CALCULATED P AXIS, ECG09: 51 DEGREES
CALCULATED R AXIS, ECG10: 18 DEGREES
CALCULATED T AXIS, ECG11: 89 DEGREES
DIAGNOSIS, 93000: NORMAL
P-R INTERVAL, ECG05: 140 MS
Q-T INTERVAL, ECG07: 396 MS
QRS DURATION, ECG06: 80 MS
QTC CALCULATION (BEZET), ECG08: 388 MS
VENTRICULAR RATE, ECG03: 58 BPM

## 2018-10-21 NOTE — ED NOTES
I have reviewed discharge instructions with the patient and spouse. The patient and spouse verbalized understanding. Patient armband removed and shredded Current Discharge Medication List

## 2018-10-23 ENCOUNTER — DOCUMENTATION ONLY (OUTPATIENT)
Dept: FAMILY MEDICINE CLINIC | Age: 73
End: 2018-10-23

## 2018-10-23 ENCOUNTER — OFFICE VISIT (OUTPATIENT)
Dept: FAMILY MEDICINE CLINIC | Age: 73
End: 2018-10-23

## 2018-10-23 VITALS
TEMPERATURE: 99.1 F | OXYGEN SATURATION: 97 % | WEIGHT: 165 LBS | HEART RATE: 61 BPM | RESPIRATION RATE: 20 BRPM | DIASTOLIC BLOOD PRESSURE: 62 MMHG | HEIGHT: 66 IN | BODY MASS INDEX: 26.52 KG/M2 | SYSTOLIC BLOOD PRESSURE: 140 MMHG

## 2018-10-23 DIAGNOSIS — R93.6 ABNORMAL X-RAY OF EXTREMITY: Primary | ICD-10-CM

## 2018-10-23 RX ORDER — CHOLECALCIFEROL TAB 125 MCG (5000 UNIT) 125 MCG
TAB ORAL
COMMUNITY
End: 2019-05-15 | Stop reason: SDUPTHER

## 2018-10-23 RX ORDER — ASPIRIN 81 MG/1
81 TABLET ORAL DAILY
COMMUNITY

## 2018-10-23 NOTE — PATIENT INSTRUCTIONS
Broken Ankle: Care Instructions  Your Care Instructions    An ankle may break (fracture) during sports, a fall, or other accidents. Fractures can range from a small, hairline crack, to a bone or bones broken into two or more pieces. Your treatment depends on how bad the break is. Your doctor may have put your ankle in a splint or cast to allow it to heal or to keep it stable until you see another doctor. It may take weeks or months for your ankle to heal. You can help your ankle heal with some care at home. You heal best when you take good care of yourself. Eat a variety of healthy foods, and don't smoke. You may have had a sedative to help you relax. You may be unsteady after having sedation. It can take a few hours for the medicine's effects to wear off. Common side effects of sedation include nausea, vomiting, and feeling sleepy or tired. The doctor has checked you carefully, but problems can develop later. If you notice any problems or new symptoms,  get medical treatment right away. Follow-up care is a key part of your treatment and safety. Be sure to make and go to all appointments, and call your doctor if you are having problems. It's also a good idea to know your test results and keep a list of the medicines you take. How can you care for yourself at home? · If the doctor gave you a sedative:  ? For 24 hours, don't do anything that requires attention to detail. It takes time for the medicine's effects to completely wear off.  ? For your safety, do not drive or operate any machinery that could be dangerous. Wait until the medicine wears off and you can think clearly and react easily. · Put ice or a cold pack on your ankle for 10 to 20 minutes at a time. Try to do this every 1 to 2 hours for the next 3 days (when you are awake). Put a thin cloth between the ice and your cast or splint. Keep your cast or splint dry. · Follow the cast care instructions your doctor gives you.  If you have a splint, do not take it off unless your doctor tells you to. · Be safe with medicines. Take pain medicines exactly as directed. ? If the doctor gave you a prescription medicine for pain, take it as prescribed. ? If you are not taking a prescription pain medicine, ask your doctor if you can take an over-the-counter medicine. · Prop up your leg on pillows in the first few days after the injury. Keep the ankle higher than the level of your heart. This will help reduce swelling. · Do not put weight on your ankle unless your doctor tells you to. Use crutches to walk. · Follow instructions for exercises to keep your leg strong. · Wiggle your toes often to reduce swelling and stiffness. When should you call for help? Call 911 anytime you think you may need emergency care. For example, call if:    · You have chest pain, are short of breath, or you cough up blood.     · You are very sleepy and you have trouble waking up.    Call your doctor now or seek immediate medical care if:    · You have new or worse nausea or vomiting.     · You have new or worse pain.     · Your foot is cool or pale or changes color.     · You have tingling, weakness, or numbness in your toes.     · Your cast or splint feels too tight.     · You have signs of a blood clot in your leg (called a deep vein thrombosis), such as:  ? Pain in your calf, back of the knee, thigh, or groin. ? Redness or swelling in your leg.    Watch closely for changes in your health, and be sure to contact your doctor if:    · You have a problem with your splint or cast.     · You do not get better as expected. Where can you learn more? Go to http://theresa-mumtaz.info/. Enter P763 in the search box to learn more about \"Broken Ankle: Care Instructions. \"  Current as of: November 29, 2017  Content Version: 11.8  © 9360-5052 [employer].  Care instructions adapted under license by [employer] (which disclaims liability or warranty for this information). If you have questions about a medical condition or this instruction, always ask your healthcare professional. [place] [address] any warranty or liability for your use of this information.

## 2018-10-23 NOTE — PROGRESS NOTES
1. Have you been to the ER, urgent care clinic since your last visit? Hospitalized since your last visit? Yes Where: [facility] emergency room    2. Have you seen or consulted any other health care providers outside of the [address] [place] [address] since your last visit? Include any pap smears or colon screening.  No Spoke with Lady Logan    Incision status: healing well    Edema/Swelling: mild     Pain level and status: mild to moderate amount of pain    Use of pain medications: Bowels: active and without     [address]  active: completed     Outpatient therapy: none    Do you have all of your medications: yes, but is not taking as prescribed    Changes in medications: none    Has an appt with cardiologist in December. Next call will be the last call.      Follow up appointments:    Future Appointments  Date Time Provider Kevin Haji   12/11/2017 11:15 AM Aryan Apotne MD [facility]   12/21/2017 1:45 PM Bruce Alcala MD  [facility]     Naheed Mei MSN, MA, RN  Care Transition Coordinator  [phone]

## 2018-10-23 NOTE — PROGRESS NOTES
Pt's daughter Brenda Diaz dropped off [place] paperwork today.  She said it is not new paperwork    She was given a copy of \"form for a form\" and is aware paperwork may take 7 to 10 business days for completion    Please call when ready [phone]

## 2018-10-23 NOTE — PROGRESS NOTES
HISTORY OF PRESENT ILLNESS  Aung Henriquez is a 75 y.o. male. HPI  Patient is here today for evaluation and treatment of:  Right Foot Swelling    Right Foot Swelling:  Pt fell on Friday and afterward he was unable to get up. He was taken to the ED on Saturday morning. He had an xray of the foot. Was given IV fluids as it was thought that pt may be dehydrated,. He has pain in the ankle  The right ankle is more swollen than the left. Pt's daughter also noted that his right foot was darker than his left foot. Xrays of the foot and ankle have been reviewed. He has no hip pain; He has had a stroke. Pts BP is initially elevated. Some better at second check. Current Outpatient Medications:     carvedilol (COREG) 12.5 mg tablet, TAKE 1 TABLET BY MOUTH TWICE A DAY, Disp: 180 Tab, Rfl: 0    allopurinol (ZYLOPRIM) 100 mg tablet, TAKE 1 TABLET BY MOUTH TWICE DAILY, Disp: 180 Tab, Rfl: 0    dilTIAZem (TIAZAC) 360 mg ER capsule, TAKE ONE CAPSULE BY MOUTH EVERY DAY, Disp: 90 Cap, Rfl: 0    glucose 4 gram chewable tablet, Take 4 Tabs by mouth as needed. , Disp: 50 Tab, Rfl: 0    rosuvastatin (CRESTOR) 20 mg tablet, TAKE 1 TABLET BY MOUTH NIGHTLY, Disp: 90 Tab, Rfl: 0    chlorpheniramine-HYDROcodone (TUSSIONEX) 10-8 mg/5 mL suspension, Take 5 mL by mouth every twelve (12) hours as needed for Cough. Max Daily Amount: 10 mL., Disp: 240 mL, Rfl: 0    insulin lispro (HUMALOG KWIKPEN INSULIN) 100 unit/mL kwikpen, Check FSBS Three times daily with meals,  For sugar between 150 and 200- give 1 units SQ,  For sugar between 201 and 250- give 3 units SQ,, Disp: 5 Pen, Rfl: 2    Insulin Needles, Disposable, (BD INSULIN PEN NEEDLE UF MINI) 31 gauge x 3/16\" ndle, Sig: test blood glucose BID. E11.9, Disp: 100 Pen Needle, Rfl: 5    omega-3 fatty acids-vitamin e (FISH OIL) 1,000 mg Cap, Take 1,400 mg by mouth daily. , Disp: , Rfl:     aspirin delayed-release 81 mg tablet, Take 81 mg by mouth daily. , Disp: , Rfl:    cholecalciferol, VITAMIN D3, (VITAMIN D3) 5,000 unit tab tablet, Take  by mouth every seven (7) days. , Disp: , Rfl:     OTHER, Check CBC, CMP, Mg in 5 days, results to PCP, Disp: 1 each, Rfl: 0     PMH,  Meds, Allergies, Family History, Social history reviewed    Review of Systems   Constitutional: Negative for chills and fever. Respiratory: Negative for shortness of breath. Cardiovascular: Negative for chest pain. Physical Exam   Constitutional: He appears well-developed and well-nourished. No distress. Cardiovascular: Normal rate and regular rhythm. Pulmonary/Chest: Breath sounds normal. No respiratory distress. He has no wheezes. He has no rales. Musculoskeletal: He exhibits edema (at the forefoot of the right foot). He exhibits no tenderness or deformity. No TTP at the medial malleolus ; ROM is intact with flexion, inversion and eversion of the right ankle. - no TTP      Visit Vitals  /65 (BP 1 Location: Left arm, BP Patient Position: Sitting)   Pulse 61   Temp 99.1 °F (37.3 °C) (Oral)   Resp 20   Ht 5' 6\" (1.676 m)   Wt 165 lb (74.8 kg)   SpO2 97%   BMI 26.63 kg/m²     Xray ankle - either an ossicle or avulsion fracture. ASSESSMENT and PLAN    ICD-10-CM ICD-9-CM    1. Abnormal x-ray of extremity R93.7 793.7 REFERRAL TO ORTHOPEDICS       As above, new   treatment plan as listed below  Orders Placed This Encounter    Dorothy Mathias [address] [facility]    aspirin delayed-release 81 mg tablet    cholecalciferol, VITAMIN D3, (VITAMIN D3) 5,000 unit tab tablet     Ortho consult given xray findings  Continue in ankle brace at this time  Follow-up Disposition:  Return if symptoms worsen or fail to improve. An After Visit Summary was printed and given to the patient. This has been fully explained to the patient, who indicates understanding.

## 2018-10-26 ENCOUNTER — HOSPITAL ENCOUNTER (EMERGENCY)
Age: 73
Discharge: HOME | End: 2018-10-26
Attending: EMERGENCY MEDICINE
Payer: MEDICARE

## 2018-10-26 VITALS
DIASTOLIC BLOOD PRESSURE: 87 MMHG | WEIGHT: 165 LBS | OXYGEN SATURATION: 97 % | BODY MASS INDEX: 25.9 KG/M2 | RESPIRATION RATE: 18 BRPM | SYSTOLIC BLOOD PRESSURE: 172 MMHG | HEART RATE: 84 BPM | TEMPERATURE: 98.5 F | HEIGHT: 67 IN

## 2018-10-26 DIAGNOSIS — N30.01 ACUTE CYSTITIS WITH HEMATURIA: ICD-10-CM

## 2018-10-26 DIAGNOSIS — N18.9 CHRONIC KIDNEY DISEASE, UNSPECIFIED CKD STAGE: ICD-10-CM

## 2018-10-26 DIAGNOSIS — R33.9 URINARY RETENTION: Primary | ICD-10-CM

## 2018-10-26 LAB
ANION GAP SERPL CALC-SCNC: 14 MMOL/L (ref 3–18)
APPEARANCE UR: ABNORMAL
BACTERIA URNS QL MICRO: ABNORMAL /HPF
BASOPHILS # BLD: 0 K/UL (ref 0–0.1)
BASOPHILS NFR BLD: 0 % (ref 0–2)
BILIRUB UR QL: NEGATIVE
BUN SERPL-MCNC: 39 MG/DL (ref 7–18)
BUN/CREAT SERPL: 13 (ref 12–20)
CALCIUM SERPL-MCNC: 8.6 MG/DL (ref 8.5–10.1)
CHLORIDE SERPL-SCNC: 106 MMOL/L (ref 100–108)
CO2 SERPL-SCNC: 20 MMOL/L (ref 21–32)
COLOR UR: YELLOW
CREAT SERPL-MCNC: 2.92 MG/DL (ref 0.6–1.3)
DIFFERENTIAL METHOD BLD: ABNORMAL
EOSINOPHIL # BLD: 0 K/UL (ref 0–0.4)
EOSINOPHIL NFR BLD: 0 % (ref 0–5)
EPITH CASTS URNS QL MICRO: ABNORMAL /LPF (ref 0–5)
ERYTHROCYTE [DISTWIDTH] IN BLOOD BY AUTOMATED COUNT: 15.8 % (ref 11.6–14.5)
GLUCOSE SERPL-MCNC: 183 MG/DL (ref 74–99)
GLUCOSE UR STRIP.AUTO-MCNC: NEGATIVE MG/DL
HCT VFR BLD AUTO: 32.1 % (ref 36–48)
HGB BLD-MCNC: 10.5 G/DL (ref 13–16)
HGB UR QL STRIP: ABNORMAL
KETONES UR QL STRIP.AUTO: ABNORMAL MG/DL
LEUKOCYTE ESTERASE UR QL STRIP.AUTO: ABNORMAL
LYMPHOCYTES # BLD: 1.2 K/UL (ref 0.9–3.6)
LYMPHOCYTES NFR BLD: 16 % (ref 21–52)
MCH RBC QN AUTO: 26.5 PG (ref 24–34)
MCHC RBC AUTO-ENTMCNC: 32.7 G/DL (ref 31–37)
MCV RBC AUTO: 81.1 FL (ref 74–97)
MONOCYTES # BLD: 0.7 K/UL (ref 0.05–1.2)
MONOCYTES NFR BLD: 9 % (ref 3–10)
MUCOUS THREADS URNS QL MICRO: ABNORMAL /LPF
NEUTS SEG # BLD: 5.7 K/UL (ref 1.8–8)
NEUTS SEG NFR BLD: 75 % (ref 40–73)
NITRITE UR QL STRIP.AUTO: NEGATIVE
PH UR STRIP: 5.5 [PH] (ref 5–8)
PLATELET # BLD AUTO: 258 K/UL (ref 135–420)
PMV BLD AUTO: 10.5 FL (ref 9.2–11.8)
POTASSIUM SERPL-SCNC: 4.8 MMOL/L (ref 3.5–5.5)
PROT UR STRIP-MCNC: 300 MG/DL
RBC # BLD AUTO: 3.96 M/UL (ref 4.7–5.5)
RBC #/AREA URNS HPF: ABNORMAL /HPF (ref 0–5)
SODIUM SERPL-SCNC: 140 MMOL/L (ref 136–145)
SP GR UR REFRACTOMETRY: 1.01 (ref 1–1.03)
UROBILINOGEN UR QL STRIP.AUTO: 0.2 EU/DL (ref 0.2–1)
WBC # BLD AUTO: 7.6 K/UL (ref 4.6–13.2)
WBC URNS QL MICRO: ABNORMAL /HPF (ref 0–4)

## 2018-10-26 PROCEDURE — 77030005514 HC CATH URETH FOL14 BARD -A

## 2018-10-26 PROCEDURE — 74011000250 HC RX REV CODE- 250: Performed by: EMERGENCY MEDICINE

## 2018-10-26 PROCEDURE — 80048 BASIC METABOLIC PNL TOTAL CA: CPT | Performed by: EMERGENCY MEDICINE

## 2018-10-26 PROCEDURE — 51702 INSERT TEMP BLADDER CATH: CPT

## 2018-10-26 PROCEDURE — 96374 THER/PROPH/DIAG INJ IV PUSH: CPT

## 2018-10-26 PROCEDURE — 99283 EMERGENCY DEPT VISIT LOW MDM: CPT

## 2018-10-26 PROCEDURE — 74011250636 HC RX REV CODE- 250/636: Performed by: EMERGENCY MEDICINE

## 2018-10-26 PROCEDURE — 81001 URINALYSIS AUTO W/SCOPE: CPT | Performed by: EMERGENCY MEDICINE

## 2018-10-26 PROCEDURE — 51798 US URINE CAPACITY MEASURE: CPT

## 2018-10-26 PROCEDURE — 85025 COMPLETE CBC W/AUTO DIFF WBC: CPT | Performed by: EMERGENCY MEDICINE

## 2018-10-26 PROCEDURE — 87077 CULTURE AEROBIC IDENTIFY: CPT | Performed by: EMERGENCY MEDICINE

## 2018-10-26 PROCEDURE — 87186 SC STD MICRODIL/AGAR DIL: CPT | Performed by: EMERGENCY MEDICINE

## 2018-10-26 PROCEDURE — 87086 URINE CULTURE/COLONY COUNT: CPT | Performed by: EMERGENCY MEDICINE

## 2018-10-26 RX ORDER — SULFAMETHOXAZOLE AND TRIMETHOPRIM 800; 160 MG/1; MG/1
1 TABLET ORAL 2 TIMES DAILY
Qty: 14 TAB | Refills: 0 | Status: SHIPPED | OUTPATIENT
Start: 2018-10-26 | End: 2018-11-02

## 2018-10-26 RX ADMIN — CEFTRIAXONE SODIUM 1 G: 1 INJECTION, POWDER, FOR SOLUTION INTRAMUSCULAR; INTRAVENOUS at 11:38

## 2018-10-26 NOTE — DISCHARGE INSTRUCTIONS
Chronic Kidney Disease: Care Instructions  Your Care Instructions    Chronic kidney disease happens when your kidneys don't work as well as they should. Your kidneys have a few important jobs. They remove waste from your blood. This waste leaves your body in your urine. They also balance your body's fluids and chemicals. When your kidneys don't work well, extra waste and fluid can build up. This can poison the body and sometimes cause death. The most common causes of this disease are diabetes and high blood pressure. In some cases, the disease develops in 2 to 3 months. But it usually develops over many years. If you take medicine and make healthy changes to your lifestyle, you may be able to prevent the disease from getting worse. But if your kidney damage gets worse, you may need dialysis or a kidney transplant. Dialysis uses a machine to filter waste from the blood. A transplant is surgery to give you a healthy kidney from another person. Follow-up care is a key part of your treatment and safety. Be sure to make and go to all appointments, and call your doctor if you are having problems. It's also a good idea to know your test results and keep a list of the medicines you take. How can you care for yourself at home?   Treatments and appointments    · Be safe with medicines. Take your medicines exactly as prescribed. Call your doctor if you have any problems with your medicine. You also may take medicine to control your blood pressure or to treat diabetes. Many people who have diabetes take blood pressure medicine.     · If you have diabetes, do your best to keep your blood sugar in your target range. You may do this by eating healthy food and exercising. You may also take medicines.     · Go to your dialysis appointments if you have this treatment.     · Do not take ibuprofen (Advil, Motrin), naproxen (Aleve), or similar medicines, unless your doctor tells you to.  These may make the disease worse.     · Do not take any vitamins, over-the-counter medicines, or herbal products without talking to your doctor first.     · Do not smoke or use other tobacco products. Smoking can reduce blood flow to the kidneys. If you need help quitting, talk to your doctor about stop-smoking programs and medicines. These can increase your chances of quitting for good.     · Do not drink alcohol or use illegal drugs.     · Talk to your doctor about an exercise plan. Exercise helps lower your blood pressure. It also makes you feel better.     · If you have an advance directive, let your doctor know. It may include a living will and a durable power of  for health care. If you don't have one, you may want to prepare one. It lets your doctor and loved ones know your health care wishes if you become unable to speak for yourself. Diet    · Talk to a registered dietitian. He or she can help you make a meal plan that is right for you. Most people with kidney disease need to limit salt (sodium), fluids, and protein. Some also have to limit potassium and phosphorus.     · You may have to give up many foods you like. But try to focus on the fact that this will help you stay healthy for as long as possible.     · If you have a hard time eating enough, talk to your doctor or dietitian about ways to add calories to your diet.     · Your diet may change as your disease changes. See your doctor for regular testing. And work with a dietitian to change your diet as needed. When should you call for help? Call 911 anytime you think you may need emergency care.  For example, call if:    · You passed out (lost consciousness).    Call your doctor now or seek immediate medical care if:    · You have less urine than normal or no urine.     · You have trouble urinating or can urinate only very small amounts.     · You are confused or have trouble thinking clearly.     · You feel weaker or more tired than usual.     · You are very thirsty, lightheaded, or dizzy.     · You have nausea and vomiting.     · You have new swelling of your arms or feet, or your swelling is worse.     · You have blood in your urine.     · You have new or worse trouble breathing.    Watch closely for changes in your health, and be sure to contact your doctor if:    · You have any problems with your medicine or other treatment. Where can you learn more? Go to http://theresa-mumtaz.info/. Enter N276 in the search box to learn more about \"Chronic Kidney Disease: Care Instructions. \"  Current as of: March 15, 2018  Content Version: 11.8  © 1224-9272 [employer]. Care instructions adapted under license by [employer] (which disclaims liability or warranty for this information). If you have questions about a medical condition or this instruction, always ask your healthcare professional. [place] [address] any warranty or liability for your use of this information. Urinary Tract Infections in Men: Care Instructions  Your Care Instructions    A urinary tract infection, or UTI, is a general term for an infection anywhere between the kidneys and the tip of the penis. UTIs can also be a result of a prostate problem. Most cause pain or burning when you urinate. Most UTIs are caused by bacteria and can be cured with antibiotics. It is important to complete your treatment so that the infection does not get worse. Follow-up care is a key part of your treatment and safety. Be sure to make and go to all appointments, and call your doctor if you are having problems. It's also a good idea to know your test results and keep a list of the medicines you take. How can you care for yourself at home? · Take your antibiotics as prescribed. Do not stop taking them just because you feel better. You need to take the full course of antibiotics. · Take your medicines exactly as prescribed.  Your doctor may have prescribed a medicine, such as phenazopyridine (Pyridium), to help relieve pain when you urinate. This turns your urine orange. You may stop taking it when your symptoms get better. But be sure to take all of your antibiotics, which treat the infection. · Drink extra water for the next day or two. This will help make the urine less concentrated and help wash out the bacteria causing the infection. (If you have kidney, heart, or liver disease and have to limit your fluids, talk with your doctor before you increase your fluid intake.)  · Avoid drinks that are carbonated or have caffeine. They can irritate the bladder. · Urinate often. Try to empty your bladder each time. · To relieve pain, take a hot bath or lay a heating pad (set on low) over your lower belly or genital area. Never go to sleep with a heating pad in place. To help prevent UTIs  · Drink plenty of fluids, enough so that your urine is light yellow or clear like water. If you have kidney, heart, or liver disease and have to limit fluids, talk with your doctor before you increase the amount of fluids you drink. · Urinate when you have the urge. Do not hold your urine for a long time. Urinate before you go to sleep. · Keep your penis clean. Catheter care  If you have a drainage tube (catheter) in place, the following steps will help you care for it. · Always wash your hands before and after touching your catheter. · Check the area around the urethra for inflammation or signs of infection. Signs of infection include irritated, swollen, red, or tender skin, or pus around the catheter. · Clean the area around the catheter with soap and water two times a day. Dry with a clean towel afterward. · Do not apply powder or lotion to the skin around the catheter. To empty the urine collection bag  · Wash your hands with soap and water. · Without touching the drain spout, remove the spout from its sleeve at the bottom of the collection bag. Open the valve on the spout.   · Let the urine flow out of the bag and into the toilet or a container. Do not let the tubing or drain spout touch anything. · After you empty the bag, clean the end of the drain spout with tissue and water. Close the valve and put the drain spout back into its sleeve at the bottom of the collection bag. · Wash your hands with soap and water. When should you call for help? Call your doctor now or seek immediate medical care if:    · Symptoms such as a fever, chills, nausea, or vomiting get worse or happen for the first time.     · You have new pain in your back just below your rib cage. This is called flank pain.     · There is new blood or pus in your urine.     · You are not able to take or keep down your antibiotics.    Watch closely for changes in your health, and be sure to contact your doctor if:    · You are not getting better after taking an antibiotic for 2 days.     · Your symptoms go away but then come back. Where can you learn more? Go to http://theresa-mumtaz.info/. Enter X130 in the search box to learn more about \"Urinary Tract Infections in Men: Care Instructions. \"  Current as of: March 21, 2018  Content Version: 11.8  © 5193-7732 [employer]. Care instructions adapted under license by [employer] (which disclaims liability or warranty for this information). If you have questions about a medical condition or this instruction, always ask your healthcare professional. [place] [address] any warranty or liability for your use of this information. Urinary Retention: Care Instructions  Your Care Instructions    Urinary retention means that you aren't able to urinate. In men, it is often caused by a blockage of the urinary tract from an enlarged prostate gland. In men and women, it can also be caused by an infection or nerve damage. Or it may be a side effect of a medicine. The doctor may have drained the urine from your bladder.  If you still have problems passing urine, you may need to use a catheter at home. This is used to empty your bladder until the problem can be fixed. Your doctor may put a catheter in your bladder before you go home. If so, he or she will tell you when to come back to have the catheter removed. The doctor has checked you closely. But problems can develop later. If you notice any problems or new symptoms, get medical treatment right away. Follow-up care is a key part of your treatment and safety. Be sure to make and go to all appointments, and call your doctor if you are having problems. It's also a good idea to know your test results and keep a list of the medicines you take. How can you care for yourself at home? · Take your medicines exactly as prescribed. Call your doctor if you think you are having a problem with your medicine. You will get more details on the specific medicines your doctor prescribes. · Check with your doctor before you use any over-the-counter medicines. Many cold and allergy medicines, for example, can make this problem worse. Make sure your doctor knows all of the medicines, vitamins, supplements, and herbal remedies you take. · Spread out through the day the amount of fluid you drink. Do not drink a lot at bedtime. · Avoid alcohol and caffeine. · If you have been given a catheter, or if one is already in place, follow the instructions you were given. Always wash your hands before and after you handle the catheter. When should you call for help? Call your doctor now or seek immediate medical care if:    · You cannot urinate at all, or it is getting harder to urinate.     · You have symptoms of a urinary tract infection. These may include:  ? Pain or burning when you urinate. ? A frequent need to urinate without being able to pass much urine. ? Pain in the flank, which is just below the rib cage and above the waist on either side of the back. ? Blood in your urine.   ? A fever.    Watch closely for changes in your health, and be sure to contact your doctor if:    · You have any problems with your catheter.     · You do not get better as expected. Where can you learn more? Go to http://theresa-mumtaz.info/. Enter M244 in the search box to learn more about \"Urinary Retention: Care Instructions. \"  Current as of: March 21, 2018  Content Version: 11.8  © 8629-1570 [employer]. Care instructions adapted under license by [employer] (which disclaims liability or warranty for this information). If you have questions about a medical condition or this instruction, always ask your healthcare professional. [address] any warranty or liability for your use of this information.

## 2018-10-26 NOTE — ED PROVIDER NOTES
EMERGENCY DEPARTMENT HISTORY AND PHYSICAL EXAM 
 
10:03 AM 
 
 
Date: 10/26/2018 Patient Name: Cade Dia History of Presenting Illness Chief Complaint Patient presents with  Urinary Retention History Provided By: Patient and Patient's Son Chief Complaint: Urinary Retention Duration: 1 Days Timing:  Acute Location: Urinary area Quality: decreased urine flow Severity: Moderate Modifying Factors: Nothing makes the Sx better or worse. Associated Symptoms: denies any other associated signs or symptoms Additional History (Context): Cade Dia is a 75 y.o. male with PMHx of HTN, diabetes, GERD, stroke, DJD, gout who presents with an acute onset of moderate urinary retention that started x 1 day ago. Pt has decreased urine flow and nothing makes the Sx better or worse. Pt reports that he last urinated x 1 day ago and per pt son his last episode of urinary retention was x 2 years ago. Pt is followed by a Urologist (Dr. Lori Mesa). Pt son also reports that he had a fall x 1 week ago and after the fall, the pt wore a \"diaper\". They are unsure if wearing the diaper caused the urinary retention. Denies any current or prior dysuria, hematuria, fever, back pain, abd pain, vomiting. Denies any further complaints or symptoms at the moment. PCP: Gunnar Wade DO Past History Past Medical History: 
Past Medical History:  
Diagnosis Date  Cataract   
 both  Colon polyp 9-2004  Diabetes ([facility] [address] IDDM  DJD (degenerative joint disease) of knee  ED (erectile dysfunction)  GERD (gastroesophageal reflux disease) 12-01-08  Glaucoma suspect   Gout, unspecified 10-25-06  Hyperlipidemia  Hypertension  Insomnia 7-20-06  Phimosis 6-16-00  Prostate cancer ([address] 3-3-09  Stroke ([facility] [address] Past Surgical History: 
Past Surgical History:  
Procedure Laterality Date  HX POLYPECTOMY  [id number]  [place] COLONOSCOPY FLX DX W/COLLJ [facility] INPATIENT REHABILITATION WHEN PFRMD  9/1/2004  
 polyp/Dr Alvarado  IA COLONOSCOPY FLX DX W/COLLJ SPEC WHEN PFRMD  9-2007  
 incomplete; Dr. Mejia Late Family History: 
Family History Problem Relation Age of Onset  Hypertension Mother  Diabetes Father  Cancer Father   
     prostate  Heart Disease Father 70 CABG  Hypertension Sister  Hypertension Brother  Diabetes Brother Social History: 
Social History Tobacco Use  Smoking status: Former Smoker  Smokeless tobacco: Never Used Substance Use Topics  Alcohol use: No  
 Drug use: No  
 
 
Allergies: 
No Known Allergies Review of Systems Review of Systems Constitutional: Negative for fever. HENT: Negative for sore throat. Eyes: Negative for redness and visual disturbance. Respiratory: Negative for shortness of breath and wheezing. Cardiovascular: Negative for chest pain. Gastrointestinal: Negative for abdominal pain, nausea and vomiting. Endocrine: Negative for polyuria. Genitourinary: Positive for decreased urine volume. Negative for dysuria and hematuria. Musculoskeletal: Negative for arthralgias, back pain and neck stiffness. Skin: Negative for rash. Neurological: Negative for headaches. All other systems reviewed and are negative. Physical Exam  
 
Visit Vitals /87 Pulse 84 Temp 98.5 °F (36.9 °C) Resp 18 Ht 5' 6.5\" (1.689 m) Wt 74.8 kg (165 lb) SpO2 97% BMI 26.23 kg/m² Physical Exam  
Constitutional: He is oriented to person, place, and time. He appears well-developed and well-nourished. No distress. HENT:  
Head: Normocephalic and atraumatic. Mouth/Throat: Oropharynx is clear and moist.  
Eyes: Conjunctivae are normal. Pupils are equal, round, and reactive to light. No scleral icterus. Neck: Normal range of motion. Neck supple. Cardiovascular: Normal rate and intact distal pulses. Capillary refill < 3 seconds Pulmonary/Chest: Effort normal and breath sounds normal. No respiratory distress. He has no wheezes. Abdominal: Soft. Bowel sounds are normal. He exhibits no distension. There is no tenderness. Musculoskeletal: Normal range of motion. He exhibits no edema. Lymphadenopathy:  
  He has no cervical adenopathy. Neurological: He is alert and oriented to person, place, and time. No cranial nerve deficit. Skin: Skin is warm and dry. He is not diaphoretic. Nursing note and vitals reviewed. Diagnostic Study Results Labs - Recent Results (from the past 12 hour(s)) URINALYSIS W/ RFLX MICROSCOPIC Collection Time: 10/26/18 10:05 AM  
Result Value Ref Range Color YELLOW Appearance TURBID Specific gravity 1.011 1.005 - 1.030    
 pH (UA) 5.5 5.0 - 8.0 Protein 300 (A) NEG mg/dL Glucose NEGATIVE  NEG mg/dL Ketone TRACE (A) NEG mg/dL Bilirubin NEGATIVE  NEG Blood MODERATE (A) NEG Urobilinogen 0.2 0.2 - 1.0 EU/dL Nitrites NEGATIVE  NEG Leukocyte Esterase LARGE (A) NEG URINE MICROSCOPIC ONLY Collection Time: 10/26/18 10:05 AM  
Result Value Ref Range WBC TOO NUMEROUS TO COUNT 0 - 4 /hpf  
 RBC 4 to 10 0 - 5 /hpf Epithelial cells FEW 0 - 5 /lpf Bacteria 2+ (A) NEG /hpf Mucus 1+ (A) NEG /lpf METABOLIC PANEL, BASIC Collection Time: 10/26/18 11:30 AM  
Result Value Ref Range Sodium 140 136 - 145 mmol/L Potassium 4.8 3.5 - 5.5 mmol/L Chloride 106 100 - 108 mmol/L  
 CO2 20 (L) 21 - 32 mmol/L Anion gap 14 3.0 - 18 mmol/L Glucose 183 (H) 74 - 99 mg/dL BUN 39 (H) 7.0 - 18 MG/DL Creatinine 2.92 (H) 0.6 - 1.3 MG/DL  
 BUN/Creatinine ratio 13 12 - 20 GFR est AA 26 (L) >60 ml/min/1.73m2 GFR est non-AA 21 (L) >60 ml/min/1.73m2 Calcium 8.6 8.5 - 10.1 MG/DL  
CBC WITH AUTOMATED DIFF Collection Time: 10/26/18 11:30 AM  
Result Value Ref Range WBC 7.6 4.6 - 13.2 K/uL  
 RBC 3.96 (L) 4.70 - 5.50 M/uL HGB 10.5 (L) 13.0 - 16.0 g/dL HCT 32.1 (L) 36.0 - 48.0 % MCV 81.1 74.0 - 97.0 FL  
 MCH 26.5 24.0 - 34.0 PG  
 MCHC 32.7 31.0 - 37.0 g/dL  
 RDW 15.8 (H) 11.6 - 14.5 % PLATELET 361 068 - 261 K/uL MPV 10.5 9.2 - 11.8 FL  
 NEUTROPHILS 75 (H) 40 - 73 % LYMPHOCYTES 16 (L) 21 - 52 % MONOCYTES 9 3 - 10 % EOSINOPHILS 0 0 - 5 % BASOPHILS 0 0 - 2 %  
 ABS. NEUTROPHILS 5.7 1.8 - 8.0 K/UL  
 ABS. LYMPHOCYTES 1.2 0.9 - 3.6 K/UL  
 ABS. MONOCYTES 0.7 0.05 - 1.2 K/UL  
 ABS. EOSINOPHILS 0.0 0.0 - 0.4 K/UL  
 ABS. BASOPHILS 0.0 0.0 - 0.1 K/UL  
 DF AUTOMATED Radiologic Studies - No orders to display Medical Decision Making I am the first provider for this patient. I reviewed the vital signs, available nursing notes, past medical history, past surgical history, family history and social history. Vital Signs-Reviewed the patient's vital signs. Pulse Oximetry Analysis - 98 % on RA, normal  
 
Records Reviewed: Nursing Notes (Time of Review: 10:03 AM) Provider Notes (Medical Decision Making): MDM Number of Diagnoses or Management Options Acute cystitis with hematuria:  
Chronic kidney disease, unspecified CKD stage:  
Urinary retention:  
Diagnosis management comments: DDX: acute urinary retention, 750 cc on bladder scanner. Cloudy urine in gutierrez after emergently placed. Possible UTI. Check kidney function. Plan to leave gutierrez catheter in with leg bag and give pt a Urology follow up. Amount and/or Complexity of Data Reviewed Clinical lab tests: ordered and reviewed Tests in the medicine section of CPT®: reviewed and ordered Patient Progress Patient progress: improved Medications  
cefTRIAXone (ROCEPHIN) 1 g in sterile water (preservative free) 10 mL IV syringe (1 g IntraVENous Given 10/26/18 4464) ED Course: Progress Notes, Reevaluation, and Consults: 
Pt has 744 cc at bedside on bladder scanner. The urine is cloudy. Pt has UTI. Gave Rocephin and cultured the urine. Cr stable, has CKD I have reassessed the patient. I have discussed the workup, results and plan with the patient and patient is in agreement. Patient is feeling better. Patient will be prescribed bactrim. Patient was discharge in stable condition. Patient was given outpatient follow up. Patient is to return to emergency department if any new or worsening condition. Diagnosis Clinical Impression: 1. Urinary retention 2. Acute cystitis with hematuria 3. Chronic kidney disease, unspecified CKD stage Disposition: discharged Follow-up Information Follow up With Specialties Details Why Contact Info Simran Wade MD Urology Schedule an appointment as soon as possible for a visit today  [address] [facility] [address] [facility] 
[phone] Atul Lopez DO Internal Medicine Schedule an appointment as soon as possible for a visit in 3 days  [address] [facility] [address] [place] [id number] 
[phone] [address] [facility] EMERGENCY DEPT Emergency Medicine  As needed, If symptoms worsen Talia [address] [facility] [id number] [phone] Medication List  
  
START taking these medications   
trimethoprim-sulfamethoxazole 160-800 mg per tablet Commonly known as:  BACTRIM DS Take 1 Tab by mouth two (2) times a day for 7 days. ASK your doctor about these medications   
allopurinol 100 mg tablet Commonly known as:  ZYLOPRIM 
TAKE 1 TABLET BY MOUTH TWICE DAILY 
  
aspirin delayed-release 81 mg tablet 
  
carvedilol 12.5 mg tablet Commonly known as:  COREG 
TAKE 1 TABLET BY MOUTH TWICE A DAY 
  
chlorpheniramine-HYDROcodone 10-8 mg/5 mL suspension Commonly known as:  Collette Grieve Take 5 mL by mouth every twelve (12) hours as needed for Cough. Max Daily Amount: 10 mL. dilTIAZem 360 mg ER capsule Commonly known as:  [facility] TAKE ONE CAPSULE BY MOUTH EVERY DAY 
  
 FISH OIL 1,000 mg Cap Generic drug:  omega-3 fatty acids-vitamin e 
  
glucose 4 gram chewable tablet Take 4 Tabs by mouth as needed. insulin lispro 100 unit/mL kwikpen Commonly known as:  HumaLOG KwikPen Insulin Check FSBS Three times daily with meals,  For sugar between 150 and 200- give 1 units SQ,  For sugar between 201 and 250- give 3 units SQ, Insulin Needles (Disposable) 31 gauge x 3/16\" Ndle Commonly known as:  BD ULTRA-FINE MINI PEN NEEDLE Sig: test blood glucose BID. E11.9 OTHER Check CBC, CMP, Mg in 5 days, results to PCP 
  
rosuvastatin 20 mg tablet Commonly known as:  CRESTOR 
TAKE 1 TABLET BY MOUTH NIGHTLY 
  
VITAMIN D3 5,000 unit Tab tablet Generic drug:  cholecalciferol (VITAMIN D3) Where to Get Your Medications Information about where to get these medications is not yet available Ask your nurse or doctor about these medications · trimethoprim-sulfamethoxazole 160-800 mg per tablet 
  
 
_______________________________ Attestations: 
Scribe Attestation Maty Gonzalez acting as a scribe for and in the presence of David AguileraPahokeeDO October 26, 2018 at 10:03 AM 
    
Provider Attestation:     
I personally performed the services described in the documentation, reviewed the documentation, as recorded by the scribe in my presence, and it accurately and completely records my words and actions. October 26, 2018 at 10:03 AM - Orlando Garcia DO   
____________ 
___________________

## 2018-10-26 NOTE — ED NOTES
Pt bladder scan = 744 urinary retention. Dr. Teresita Casillas aware. Sparrow catheter 16 Micronesian placed - pt tolerated well - draining cloudy yellow urine foamy with 650 cc drained.

## 2018-10-28 LAB
BACTERIA SPEC CULT: ABNORMAL
SERVICE CMNT-IMP: ABNORMAL

## 2018-10-29 ENCOUNTER — TELEPHONE (OUTPATIENT)
Dept: FAMILY MEDICINE CLINIC | Age: 73
End: 2018-10-29

## 2018-10-29 NOTE — TELEPHONE ENCOUNTER
Pt son came into office today wanting to know can he get a referral to go to the urologist. They never went to Dr. Maranda Oro office before. Can you do another referral for the urologist. He was seen in [facility] Friday morning. He couldn't pass his urine.  Please advise [phone]

## 2018-10-30 DIAGNOSIS — R33.9 URINARY RETENTION: Primary | ICD-10-CM

## 2018-10-30 NOTE — TELEPHONE ENCOUNTER
Son is aware of the following:   Dr. Ching Cremani  [phone]  Nov 2, 2018 at 8:45 am check in 8:30 am  [address]

## 2018-10-30 NOTE — TELEPHONE ENCOUNTER
Please call and notify pt that he has been referred to Urologist Dr. Eddy Love for urinary retention.

## 2018-11-02 ENCOUNTER — OFFICE VISIT (OUTPATIENT)
Dept: UROLOGY | Age: 73
End: 2018-11-02

## 2018-11-02 ENCOUNTER — TELEPHONE (OUTPATIENT)
Dept: FAMILY MEDICINE CLINIC | Age: 73
End: 2018-11-02

## 2018-11-02 VITALS
TEMPERATURE: 98.7 F | BODY MASS INDEX: 26.23 KG/M2 | OXYGEN SATURATION: 94 % | HEART RATE: 64 BPM | HEIGHT: 67 IN | SYSTOLIC BLOOD PRESSURE: 148 MMHG | DIASTOLIC BLOOD PRESSURE: 67 MMHG

## 2018-11-02 DIAGNOSIS — C61 CANCER OF PROSTATE (HCC): ICD-10-CM

## 2018-11-02 DIAGNOSIS — R33.9 URINARY RETENTION: Primary | ICD-10-CM

## 2018-11-02 DIAGNOSIS — N30.00 ACUTE CYSTITIS WITHOUT HEMATURIA: ICD-10-CM

## 2018-11-02 RX ORDER — TAMSULOSIN HYDROCHLORIDE 0.4 MG/1
CAPSULE ORAL
Qty: 30 CAP | Refills: 6 | Status: SHIPPED | OUTPATIENT
Start: 2018-11-02 | End: 2019-04-29 | Stop reason: SDUPTHER

## 2018-11-02 RX ORDER — SULFAMETHOXAZOLE AND TRIMETHOPRIM 800; 160 MG/1; MG/1
1 TABLET ORAL 2 TIMES DAILY
Qty: 20 TAB | Refills: 0 | Status: SHIPPED | OUTPATIENT
Start: 2018-11-02 | End: 2018-11-15

## 2018-11-02 NOTE — PROGRESS NOTES
Mr. Ayleen Moncada has a reminder for a \"due or due soon\" health maintenance. I have asked that he contact his primary care provider for follow-up on this health maintenance. RBV Per Dr. John Valdes perform voiding trial now. Per Dr. John Valdes urinary bag removed and piston syringe connected to catheter, 150 cc of sterile water instilled into bladder, balloon deflated, catheter removed. Patient unable to void any urine back. Patient tolerated well. RBV Per Dr. John Valdes urinary meatus cleansed with betadine, 16 FR coude catheter inserted into bladder, balloon inflated with 10 cc of sterile water, catheter connected to leg bag placed on left thigh. Patient tolerated well.

## 2018-11-02 NOTE — PATIENT INSTRUCTIONS
Urinary Retention: Care Instructions Your Care Instructions Urinary retention means that you aren't able to urinate. In men, it is often caused by a blockage of the urinary tract from an enlarged prostate gland. In men and women, it can also be caused by an infection or nerve damage. Or it may be a side effect of a medicine. The doctor may have drained the urine from your bladder. If you still have problems passing urine, you may need to use a catheter at home. This is used to empty your bladder until the problem can be fixed. Your doctor may put a catheter in your bladder before you go home. If so, he or she will tell you when to come back to have the catheter removed. The doctor has checked you closely. But problems can develop later. If you notice any problems or new symptoms, get medical treatment right away. Follow-up care is a key part of your treatment and safety. Be sure to make and go to all appointments, and call your doctor if you are having problems. It's also a good idea to know your test results and keep a list of the medicines you take. How can you care for yourself at home? · Take your medicines exactly as prescribed. Call your doctor if you think you are having a problem with your medicine. You will get more details on the specific medicines your doctor prescribes. · Check with your doctor before you use any over-the-counter medicines. Many cold and allergy medicines, for example, can make this problem worse. Make sure your doctor knows all of the medicines, vitamins, supplements, and herbal remedies you take. · Spread out through the day the amount of fluid you drink. Do not drink a lot at bedtime. · Avoid alcohol and caffeine. · If you have been given a catheter, or if one is already in place, follow the instructions you were given. Always wash your hands before and after you handle the catheter. When should you call for help? Call your doctor now or seek immediate medical care if: 
  · You cannot urinate at all, or it is getting harder to urinate.  
  · You have symptoms of a urinary tract infection. These may include: 
? Pain or burning when you urinate. ? A frequent need to urinate without being able to pass much urine. ? Pain in the flank, which is just below the rib cage and above the waist on either side of the back. ? Blood in your urine. ? A fever.  
 Watch closely for changes in your health, and be sure to contact your doctor if: 
  · You have any problems with your catheter.  
  · You do not get better as expected. Where can you learn more? Go to http://theresa-mumtaz.info/. Enter M244 in the search box to learn more about \"Urinary Retention: Care Instructions. \" Current as of: March 21, 2018 Content Version: 11.8 © 7091-9542 [employer]. Care instructions adapted under license by [employer] (which disclaims liability or warranty for this information). If you have questions about a medical condition or this instruction, always ask your healthcare professional. [address] any warranty or liability for your use of this information.

## 2018-11-02 NOTE — PROGRESS NOTES
Terrance Jiménez Chief Complaint Patient presents with  New Patient Has Catheter  Urinary Retention History and Physical   
The patient is a pleasant 78-year-old -American male who presents with acute urinary retention. The patient states that he did not have any preceding difficulties and no irritative symptoms. He had not taken any decongestants or antihistamines but found he was unable to void. History reveals that the week before the patient had sustained some type of a fall and for some reason was wearing a diaper perhaps because of overflow urinary leakage. The patient was seen in the emergency room department where a third was placed with removal of 750 cc of urine. The culture ultimately grew Enterobacter and the patient was given Rocephin and then ultimately had a prescription called in for sulfa and the patient is continuing on that medication Pertinent history reveals that somewhere around 2008 the patient had prostate cancer diagnosed and underwent cryoablation of the prostate. About a year and a half ago his PSA was undetectable and I do not see any recent determinations Past Medical History:  
Diagnosis Date  Cataract   
 both  Colon polyp 9-2004  Diabetes ([facility] [address] IDDM  DJD (degenerative joint disease) of knee  ED (erectile dysfunction)  GERD (gastroesophageal reflux disease) 12-01-08  Glaucoma suspect   Gout, unspecified 10-25-06  Hyperlipidemia  Hypertension  Insomnia 7-20-06  Phimosis 6-16-00  Prostate cancer ([address] 3-3-09  Stroke ([facility] [address] Patient Active Problem List  
Diagnosis Code  
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  
 Hypercholesterolemia E78.00  Gout M10.9  GERD (gastroesophageal reflux disease) K21.9  Vitamin D deficiency E55.9  Stroke ([address] I63.9  Prostate cancer ([facility] C61  Acute encephalopathy G93.40  Hypoglycemia E16.2  Need for Tdap vaccination Z23  Proteinuria R80.9  CKD (chronic kidney disease) stage 3, GFR 30-59 ml/min (HCC) N18.3  Diabetes mellitus type 2, controlled ([facility] [address] E11.9  Advance care planning Z71.89  
 Encounter for immunization Z23  Need for pneumococcal vaccine Z23  Encounter for screening colonoscopy Z12.11  
 Medicare annual wellness visit, subsequent Z00.00  
 History of CVA (cerebrovascular accident) Z80.78  
 Pneumonia J18.9  
[facility] discharge follow-up Z09  Gastrointestinal hemorrhage associated with duodenitis K29.81  Need for influenza vaccination Z23  Hyperglycemia due to type 2 diabetes mellitus ([address] E11.65  Cough R05  BPH (benign prostatic hyperplasia) N40.0  Type 2 diabetes with nephropathy (HCC) E11.21 Past Surgical History:  
Procedure Laterality Date  HX POLYPECTOMY  [id number]  [place] COLONOSCOPY FLX DX W/COLLJ [facility] INPATIENT REHABILITATION WHEN PFRMD  9/1/2004  
 polyp/Dr Alvarado  [place] COLONOSCOPY FLX DX W/COLLJ SPEC WHEN PFRMD  9-2007  
 incomplete; Dr. Nanci Henderson Current Outpatient Medications Medication Sig Dispense Refill  cholecalciferol, VITAMIN D3, (VITAMIN D3) 5,000 unit tab tablet Take  by mouth every seven (7) days.     
 carvedilol (COREG) 12.5 mg tablet TAKE 1 TABLET BY MOUTH TWICE A  Tab 0  
 allopurinol (ZYLOPRIM) 100 mg tablet TAKE 1 TABLET BY MOUTH TWICE DAILY 180 Tab 0  
 dilTIAZem (TIAZAC) 360 mg ER capsule TAKE ONE CAPSULE BY MOUTH EVERY DAY 90 Cap 0  
 rosuvastatin (CRESTOR) 20 mg tablet TAKE 1 TABLET BY MOUTH NIGHTLY 90 Tab 0  
 insulin lispro (HUMALOG KWIKPEN INSULIN) 100 unit/mL kwikpen Check FSBS Three times daily with meals,  For sugar between 150 and 200- give 1 units SQ,  For sugar between 201 and 250- give 3 units SQ, 5 Pen 2  
 Insulin Needles, Disposable, (BD INSULIN PEN NEEDLE UF MINI) 31 gauge x 3/16\" ndle Sig: test blood glucose BID. E11.9 100 Pen Needle 5  
 trimethoprim-sulfamethoxazole (BACTRIM DS) 160-800 mg per tablet Take 1 Tab by mouth two (2) times a day for 7 days. 14 Tab 0  
 aspirin delayed-release 81 mg tablet Take 81 mg by mouth daily.  glucose 4 gram chewable tablet Take 4 Tabs by mouth as needed. 50 Tab 0  chlorpheniramine-HYDROcodone (TUSSIONEX) 10-8 mg/5 mL suspension Take 5 mL by mouth every twelve (12) hours as needed for Cough. Max Daily Amount: 10 mL. 240 mL 0  
 OTHER Check CBC, CMP, Mg in 5 days, results to PCP 1 each 0  
 omega-3 fatty acids-vitamin e (FISH OIL) 1,000 mg Cap Take 1,400 mg by mouth daily. No Known Allergies Social History Socioeconomic History  Marital status:  Spouse name: Not on file  Number of children: Not on file  Years of education: Not on file  Highest education level: Not on file Social Needs  Financial resource strain: Not on file  Food insecurity - worry: Not on file  Food insecurity - inability: Not on file  Transportation needs - medical: Not on file  Transportation needs - non-medical: Not on file Occupational History  Not on file Tobacco Use  Smoking status: Former Smoker  Smokeless tobacco: Never Used Substance and Sexual Activity  Alcohol use: No  
 Drug use: No  
 Sexual activity: No  
Other Topics Concern  Not on file Social History Narrative  Not on file Family History Problem Relation Age of Onset  Hypertension Mother  Diabetes Father  Cancer Father   
     prostate  Heart Disease Father 70 CABG  Hypertension Sister  Hypertension Brother  Diabetes Brother Visit Vitals /67 (BP 1 Location: Left arm, BP Patient Position: Sitting) Pulse 64 Temp 98.7 °F (37.1 °C) (Oral) Ht 5' 6.5\" (1.689 m) SpO2 94% BMI 26.23 kg/m² Physical   
 
 
Gen: WDWN adult NAD 
 Head  : normocephalic,  Normal ROM; eyes without normal pupils, EOMs, no masses;  conjunctiva normal 
Neck: normal movement,  no evident mass,  No evident adenopathy, trachea midline, 
Lungs: no respiratory distress or difficulties CV:  No evident peripheral swelling Abd :bowel sounds normal, no masses, tenderness, organomegaly Flanks    
-rectal exam is not done today because the patient is catheterized Extremities- no edema, arthritis, deformity, swelling Psych- oriented, no evident anxiety, no cognitive impairment evident Databases reviewed. The patient is on a Tussionex prescription but I cannot find when it was started or whether it is being continued The patient fails the voiding trial and a catheter is reinserted. I am going to continue the patient's antibiotics and I will add Flomax. Side effect and method of consumption discussed. The patient will return in 1 week for another voiding trial 
 
 
 
 
 
 
 
 
Impression/ PLAN Urinary retention, urinary infection Plan: 
Flomax Continue antibiotics Return for voiding trial repeat This visit exceeded 45minutes and >50% was counselling. It should be noted that the database review required a significant amount of time The patient understands the discussion and plan PLEASE NOTE: 
    This document has been produced using voice recognition software. Unrecognized errors in transcription may be present Triston Calderon MD

## 2018-11-02 NOTE — TELEPHONE ENCOUNTER
Patients son Linda Joy dropped off FMLA forms to be completed by Dr. Cristiane Levin. Son is the primary care giver for the patient. Patient recently had a stroke. Patient's son is aware that forms take 7 to 10 business. Forms given to Tom Mullins. Please be advised.

## 2018-11-05 ENCOUNTER — TELEPHONE (OUTPATIENT)
Dept: FAMILY MEDICINE CLINIC | Age: 73
End: 2018-11-05

## 2018-11-05 NOTE — TELEPHONE ENCOUNTER
Called pt and left message for son to call us back as I need his input while filling out the FMLA form

## 2018-11-05 NOTE — TELEPHONE ENCOUNTER
FMLA completed and family memebr notified that they can pick it up at the . Notified patient that FMLA is complete and he can pick it up at the .     Patient picked up [facility]

## 2018-11-06 NOTE — PROGRESS NOTES
Patient Daughter Caprice Monson called to check the status of [place] paperwork that she dropped off on  10/24/18. She was wondering if its been signed and is it ready for .  She can be contacted back at [phone]

## 2018-11-07 ENCOUNTER — OFFICE VISIT (OUTPATIENT)
Dept: ORTHOPEDIC SURGERY | Age: 73
End: 2018-11-07

## 2018-11-07 VITALS
RESPIRATION RATE: 16 BRPM | HEIGHT: 66 IN | WEIGHT: 158 LBS | SYSTOLIC BLOOD PRESSURE: 120 MMHG | HEART RATE: 67 BPM | TEMPERATURE: 98.6 F | BODY MASS INDEX: 25.39 KG/M2 | OXYGEN SATURATION: 97 % | DIASTOLIC BLOOD PRESSURE: 62 MMHG

## 2018-11-07 DIAGNOSIS — S93.401A SPRAIN OF RIGHT ANKLE, UNSPECIFIED LIGAMENT, INITIAL ENCOUNTER: Primary | ICD-10-CM

## 2018-11-07 NOTE — PATIENT INSTRUCTIONS
Ankle Sprain: Care Instructions Your Care Instructions An ankle sprain can happen when you twist your ankle. The ligaments that support the ankle can get stretched and torn. Often the ankle is swollen and painful. Ankle sprains may take from several weeks to several months to heal. Usually, the more pain and swelling you have, the more severe your ankle sprain is and the longer it will take to heal. You can heal faster and regain strength in your ankle with good home treatment. It is very important to give your ankle time to heal completely, so that you do not easily hurt your ankle again. Follow-up care is a key part of your treatment and safety. Be sure to make and go to all appointments, and call your doctor if you are having problems. It's also a good idea to know your test results and keep a list of the medicines you take. How can you care for yourself at home? · Prop up your foot on pillows as much as possible for the next 3 days. Try to keep your ankle above the level of your heart. This will help reduce the swelling. · Follow your doctor's directions for wearing a splint or elastic bandage. Wrapping the ankle may help reduce or prevent swelling. · Your doctor may give you a splint, a brace, an air stirrup, or another form of ankle support to protect your ankle until it is healed. Wear it as directed while your ankle is healing. Do not remove it unless your doctor tells you to. After your ankle has healed, ask your doctor whether you should wear the brace when you exercise. · Put ice or cold packs on your injured ankle for 10 to 20 minutes at a time. Try to do this every 1 to 2 hours for the next 3 days (when you are awake) or until the swelling goes down. Put a thin cloth between the ice and your skin. · You may need to use crutches until you can walk without pain.  If you do use crutches, try to bear some weight on your injured ankle if you can do so without pain. This helps the ankle heal. 
· Take pain medicines exactly as directed. ? If the doctor gave you a prescription medicine for pain, take it as prescribed. ? If you are not taking a prescription pain medicine, ask your doctor if you can take an over-the-counter medicine. · If you have been given ankle exercises to do at home, do them exactly as instructed. These can promote healing and help prevent lasting weakness. When should you call for help? Call your doctor now or seek immediate medical care if: 
  · Your pain is getting worse.  
  · Your swelling is getting worse.  
  · Your splint feels too tight or you are unable to loosen it.  
 Watch closely for changes in your health, and be sure to contact your doctor if: 
  · You are not getting better after 1 week. Where can you learn more? Go to http://theresa-mumtaz.info/. Enter H682 in the search box to learn more about \"Ankle Sprain: Care Instructions. \" Current as of: November 29, 2017 Content Version: 11.8 © 2070-9389 Healthwise, Incorporated. Care instructions adapted under license by [employer] (which disclaims liability or warranty for this information). If you have questions about a medical condition or this instruction, always ask your healthcare professional. [address] any warranty or liability for your use of this information.

## 2018-11-07 NOTE — PROGRESS NOTES
AMBULATORY PROGRESS NOTE Patient: Ed Crockett             MRN: [id number]     SSN: [id number] Body mass index is 25.5 kg/m². YOB: 1945     AGE: 75 y.o. EX: male PCP: Harry Stovall DO IMPRESSION/DIAGNOSIS AND TREATMENT PLAN  
 
DIAGNOSES 1. Sprain of right ankle, unspecified ligament, initial encounter Orders Placed This Encounter  AMB SUPPLY ORDER Ed Crockett understands his diagnoses and the proposed plan. Plan: 
 
1) DME Order: Right AS/AC brace. 2) Wear brace for 2 weeks as directed. 3) Continue activity modification as directed. RTO - 3 weeks HPI AND EXAMINATION Pio Medina IS A 75 y.o. male who presents to my outpatient office complaining of right foot pain. Mr. Emilie Guzman states that 1 week ago, he fell and injured his right medial ankle. He was placed in an AS/AC brace by the [facility] ED and reports that his pain had subsided some. He notes that he is not experiencing any pain today. He presents in the office today with his cane, which he uses regularly. Visit Vitals /62 Pulse 67 Temp 98.6 °F (37 °C) (Oral) Resp 16 Ht 5' 6\" (1.676 m) Wt 158 lb (71.7 kg) SpO2 97% BMI 25.50 kg/m² Appearance: Alert, well appearing and pleasant patient who is in no distress, oriented to person, place/time, and who follows commands. Psychiatric: Affect and mood are appropriate. Visit Vitals /62 Pulse 67 Temp 98.6 °F (37 °C) (Oral) Resp 16 Ht 5' 6\" (1.676 m) Wt 158 lb (71.7 kg) SpO2 97% BMI 25.50 kg/m² Appearance: Alert, well appearing and pleasant patient who is in no distress, oriented to person, place/time, and who follows commands. This patient is accompanied in the examination room by his  son. no dementia Psychiatric: Affect and mood are appropriate.   
Patient arrives to office via: with assistive device: CAM BOOT/CRUTCHES 
 H EENT (2): Head normocephalic & atraumatic. Both pupils are round, non icteric sclera Eye: EOM are intact and sclera are clear Neck: ROM WNL Hearings Intact, does not require hearing aid device Respiratory: Breathing is unlabored without accessory chest muscle use Cardiovascular/Peripheral Vascular: Normal Pulses to each  foot Musculoskeletal:  LOCATION:  Right FOOT/ANKLE Integumentary: No rashes, skin patches, wounds, or abrasions to the right or left legs Warm and normal color. No regions of expressible drainage. Swelling to lateral Ankle not present Swelling to Medial Ankle minimal present Gait: normal 
    Tenderness: ATFL/CFL Anterolateral ankle ligaments tenderness is not present Anterior Syndesmosis is not present Medial deltoid ligament tenderness is mild present Peroneal tendon sheath no swelling 4/5 Metatarsal base tenderness is not present Midfoot tenderness is not present Achilles tenderness is not present Cuboid tenderness is not present Proximal fibula tenderness: is not present Motor Strength/Tone Exam: Normal to the toes with respect to extension/flexion Sensory Exam:   Intact Normal Sensation to ankle/foot Stability Testing: Peroneal tendon instability : not present Instability of ankle not present, Subtalar instability not present ROM: Normal ROM noted to ankle Mild decrease to Inversion Hindfoot (Subtalar, CC, TN regions) Normal Forefoot Contractures: No Achilles or Gastrocnemius Contractures Calf tenderness: Absent for calf or gastrocnemius muscle regions Soft, supple, non tender, non taut lower extremity compartments Alignment: Neutral Hindfoot CHART REVIEW Past Medical History:  
Diagnosis Date  Cataract   
 both  Colon polyp 9-2004  Diabetes ([facility] [address] IDDM  DJD (degenerative joint disease) of knee  ED (erectile dysfunction)  GERD (gastroesophageal reflux disease) 12-01-08  Glaucoma suspect   Gout, unspecified 10-25-06  Hyperlipidemia  Hypertension  Insomnia 7-20-06  Phimosis 6-16-00  Prostate cancer ([facility] [address] 3-3-09  Stroke ([facility] Current Outpatient Medications Medication Sig  
 trimethoprim-sulfamethoxazole (BACTRIM DS, SEPTRA DS) 160-800 mg per tablet Take 1 Tab by mouth two (2) times a day for 10 days.  tamsulosin (FLOMAX) 0.4 mg capsule One cap q d pc  
 aspirin delayed-release 81 mg tablet Take 81 mg by mouth daily.  cholecalciferol, VITAMIN D3, (VITAMIN D3) 5,000 unit tab tablet Take  by mouth every seven (7) days.  carvedilol (COREG) 12.5 mg tablet TAKE 1 TABLET BY MOUTH TWICE A DAY  allopurinol (ZYLOPRIM) 100 mg tablet TAKE 1 TABLET BY MOUTH TWICE DAILY  dilTIAZem (TIAZAC) 360 mg ER capsule TAKE ONE CAPSULE BY MOUTH EVERY DAY  glucose 4 gram chewable tablet Take 4 Tabs by mouth as needed.  rosuvastatin (CRESTOR) 20 mg tablet TAKE 1 TABLET BY MOUTH NIGHTLY  chlorpheniramine-HYDROcodone (TUSSIONEX) 10-8 mg/5 mL suspension Take 5 mL by mouth every twelve (12) hours as needed for Cough. Max Daily Amount: 10 mL.  insulin lispro (HUMALOG KWIKPEN INSULIN) 100 unit/mL kwikpen Check FSBS Three times daily with meals,  For sugar between 150 and 200- give 1 units SQ,  For sugar between 201 and 250- give 3 units SQ,  
 Insulin Needles, Disposable, (BD INSULIN PEN NEEDLE UF MINI) 31 gauge x 3/16\" ndle Sig: test blood glucose BID. E11.9  
 OTHER Check CBC, CMP, Mg in 5 days, results to PCP  
 omega-3 fatty acids-vitamin e (FISH OIL) 1,000 mg Cap Take 1,400 mg by mouth daily. No current facility-administered medications for this visit. No Known Allergies Past Surgical History:  
Procedure Laterality Date  HX POLYPECTOMY  [id number]  [place] COLONOSCOPY FLX DX W/COLLJ [facility] WHEN PFRMD  9/1/2004  
 polyp/Dr Alvarado  [place] COLONOSCOPY FLX DX W/COLLJ SPEC WHEN PFRMD  9-2007  
 incomplete; Dr. Amparo Portillo Social History Occupational History  Not on file Tobacco Use  Smoking status: Former Smoker  Smokeless tobacco: Never Used Substance and Sexual Activity  Alcohol use: No  
 Drug use: No  
 Sexual activity: No  
 
Family History Problem Relation Age of Onset  Hypertension Mother  Diabetes Father  Cancer Father   
     prostate  Heart Disease Father 70 CABG  Hypertension Sister  Hypertension Brother  Diabetes Brother REVIEW OF SYSTEMS : 11/7/2018  ALL BELOW ARE Negative except : SEE HPI Constitutional: Negative for fever, chills and weight loss. Neg Weight Loss Cardiovascular: Negative for chest pain, claudication and leg swelling. SOB, DOS SANTOS Gastrointestinal/Urological: Negative for  pain, N/V/D/C, Blood in stool or urine,dysuria                         Hematuria, Incontinence, pelvic pain Musculoskeletal: see HPI. Neurological: Negative for dizziness and weakness, headaches,Visual Changes             Confusion,  Or Seizures, Psychiatric/Behavioral: Negative for depression, memory loss and substance abuse. Extremities:  Negative for hair changes, rash or skin lesion changes. Hematologic: Negative for Bleeding problems, bruising, pallor or swollen lymph nodes. Peripheral Vascular: No calf pain, vascular vein tenderness to calf pain No calf throbbing, posterior knee throbbing pain DIAGNOSTIC IMAGING  
 
XR Results (most recent): 
Results from Hospital Encounter encounter on 10/20/18 XR ANKLE RT MIN 3 V Narrative EXAM: Right Ankle X-ray CLINICAL INDICATION: Fall with swollen foot. Unable to ambulate. TECHNIQUE: 3 views of the right ankle COMPARISON: None FINDINGS:  
There is an ossicle or small avulsion fracture from the anterior aspect of the 
medial malleolus. Correlate with physical tenderness is recommended.  No 
 additional fracture or dislocation identified. The joint spaces are preserved. No evidence of erosions or periostitis. No lytic or blastic focus present. Subcutaneous edema is noted. Impression IMPRESSION: 
1. Small ossicle versus small avulsion fracture from the anterior tip of the 
medial malleolus. Correlate with physical tenderness. Please see above section of this report. I have personally reviewed the results of the above study. The interpretation of this study is my professional opinion. Written by Marbella Hough, as dictated by Dr. Boo Li. I, Dr. Boo Li, confirm that all documentation is accurate.

## 2018-11-09 ENCOUNTER — OFFICE VISIT (OUTPATIENT)
Dept: UROLOGY | Age: 73
End: 2018-11-09

## 2018-11-09 ENCOUNTER — TELEPHONE (OUTPATIENT)
Dept: FAMILY MEDICINE CLINIC | Age: 73
End: 2018-11-09

## 2018-11-09 VITALS
HEART RATE: 72 BPM | BODY MASS INDEX: 25.39 KG/M2 | OXYGEN SATURATION: 93 % | HEIGHT: 66 IN | DIASTOLIC BLOOD PRESSURE: 54 MMHG | SYSTOLIC BLOOD PRESSURE: 128 MMHG | WEIGHT: 158 LBS

## 2018-11-09 DIAGNOSIS — R33.9 URINARY RETENTION: Primary | ICD-10-CM

## 2018-11-09 NOTE — TELEPHONE ENCOUNTER
Fransisco Joceline is calling to check on the status of her [facility] paperwork that she turned in on October 23rd, She said she has not heard anything back or received any paperwork. Please advise.       [phone]

## 2018-11-09 NOTE — PATIENT INSTRUCTIONS
Acute Urinary Retention: After Your Visit to the Emergency Room  Your Care Instructions  Acute urinary retention means that you are suddenly not able to urinate. It can cause pain and other problems. Treatment depends on what is causing the problem. The doctor may have drained the urine from your bladder. If you still have problems urinating, you may need to use a catheter at home to empty your bladder until the problem can be fixed. You will need follow-up care with a doctor. Even though you have been released from the emergency room, you still need to watch for any problems. The doctor carefully checked you. But sometimes problems can develop later. If you have new symptoms, or if your symptoms do not get better, return to the emergency room or call your doctor right away. A visit to the emergency room is only one step in your treatment. Even if you feel better, you still need to do what your doctor recommends, such as going to all suggested follow-up appointments and taking medicines exactly as directed. This will help you recover and help prevent future problems. How can you care for yourself at home? · Check with your doctor before you use any over-the-counter medicines. For example, many cold medicines can make urinary retention worse. Make sure your doctor knows all of the medicines, vitamins, supplements, and herbal remedies you take. · Spread the amount of fluid you drink throughout the day. Do not drink a lot at bedtime. · Avoid alcohol and caffeine. · If you have been given a catheter, follow the instructions you were given. Always wash your hands before and after you handle the catheter. When should you call for help? Return to the emergency room now if:  · Your pain gets worse. · You have trouble urinating, or you cannot urinate at all. Call your doctor today if:  · You have symptoms of a urinary infection. For example:  ¨ You have blood or pus in your urine.   ¨ You have pain in your back just below your rib cage. This is called flank pain. ¨ You have a fever, chills, or body aches. ¨ It hurts to urinate. ¨ You have groin or belly pain. · You are urinating more often than usual.  · You have any problems with your catheter. Where can you learn more? Go to [employer]  Enter H995 in the search box to learn more about \"Acute Urinary Retention: After Your Visit to the Emergency Room. \"   © 0170-7618 Healthwise, Incorporated. Care instructions adapted under license by [employer] (which disclaims liability or warranty for this information). This care instruction is for use with your licensed healthcare professional. If you have questions about a medical condition or this instruction, always ask your healthcare professional. [address] any warranty or liability for your use of this information. Content Version: 9.4.00644;  Last Revised: September 10, 2010

## 2018-11-09 NOTE — PROGRESS NOTES
Terrance Jiménez    Chief Complaint   Patient presents with    Voiding Trial    Urinary Retention       History and Physical    The patient is a 76-year-old -American male who is known to this office with a last visit here 1 week ago. The patient had developed urinary retention with urinary infection and had a catheter placed and failed a first voiding trial.  The catheter was put back in and the patient was begun on Flomax and comes in for another voiding trial.    The patent has a pertinent history in that the patient was diagnosed in 2008 with prostate cancer and underwent cryoablation.   The patient had an undetectable PSA about a year and a half ago but there were no recent determinations    Past Medical History:   Diagnosis Date    Cataract     both    Colon polyp 9-2004    Diabetes ([address]     IDDM    DJD (degenerative joint disease) of knee     ED (erectile dysfunction)     GERD (gastroesophageal reflux disease) 12-01-08    Glaucoma suspect     Gout, unspecified 10-25-06    Hyperlipidemia     Hypertension     Insomnia 7-20-06    Phimosis 6-16-00    Prostate cancer ([address] 3-3-09    Stroke [facility])      Patient Active Problem List   Diagnosis Code    HTN (hypertension) I10    Type I (juvenile type) diabetes mellitus with renal manifestations, not stated as uncontrolled(250.41) ([address] E10.29    Hypercholesterolemia E78.00    Gout M10.9    GERD (gastroesophageal reflux disease) K21.9    Vitamin D deficiency E55.9    Stroke ([address] I63.9    Prostate cancer ([address] C61    Acute encephalopathy G93.40    Hypoglycemia E16.2    Need for Tdap vaccination Z23    Proteinuria R80.9    CKD (chronic kidney disease) stage 3, GFR 30-59 ml/min ([facility]) N18.3    Diabetes mellitus type 2, controlled ([address] E11.9    Advance care planning Z71.89    Encounter for immunization Z23    Need for pneumococcal vaccine Z23    Encounter for screening colonoscopy Z12.11    Medicare annual wellness visit, subsequent Z00.00    History of CVA (cerebrovascular accident) Z80.78    Pneumonia J18.9   [facility] discharge follow-up Z09    Gastrointestinal hemorrhage associated with duodenitis K29.81    Need for influenza vaccination Z23    Hyperglycemia due to type 2 diabetes mellitus (HCC) E11.65    Cough R05    BPH (benign prostatic hyperplasia) N40.0    Type 2 diabetes with nephropathy (HCC) E11.21     Past Surgical History:   Procedure Laterality Date    HX POLYPECTOMY  9-2004    [place] COLONOSCOPY FLX DX W/COLLJ SPEC WHEN PFRMD  9/1/2004    polyp/Dr Alvarado    [place] COLONOSCOPY FLX DX W/COLLJ SPEC WHEN PFRMD  9-2007    incomplete; Dr. Louis French     Current Outpatient Medications   Medication Sig Dispense Refill    trimethoprim-sulfamethoxazole (BACTRIM DS, SEPTRA DS) 160-800 mg per tablet Take 1 Tab by mouth two (2) times a day for 10 days. 20 Tab 0    tamsulosin (FLOMAX) 0.4 mg capsule One cap q d pc 30 Cap 6    aspirin delayed-release 81 mg tablet Take 81 mg by mouth daily.  cholecalciferol, VITAMIN D3, (VITAMIN D3) 5,000 unit tab tablet Take  by mouth every seven (7) days.  carvedilol (COREG) 12.5 mg tablet TAKE 1 TABLET BY MOUTH TWICE A  Tab 0    allopurinol (ZYLOPRIM) 100 mg tablet TAKE 1 TABLET BY MOUTH TWICE DAILY 180 Tab 0    dilTIAZem (TIAZAC) 360 mg ER capsule TAKE ONE CAPSULE BY MOUTH EVERY DAY 90 Cap 0    rosuvastatin (CRESTOR) 20 mg tablet TAKE 1 TABLET BY MOUTH NIGHTLY 90 Tab 0    chlorpheniramine-HYDROcodone (TUSSIONEX) 10-8 mg/5 mL suspension Take 5 mL by mouth every twelve (12) hours as needed for Cough.  Max Daily Amount: 10 mL. 240 mL 0    insulin lispro (HUMALOG KWIKPEN INSULIN) 100 unit/mL kwikpen Check FSBS Three times daily with meals,  For sugar between 150 and 200- give 1 units SQ,  For sugar between 201 and 250- give 3 units SQ, 5 Pen 2    Insulin Needles, Disposable, (BD INSULIN PEN NEEDLE UF MINI) 31 gauge x 3/16\" ndle Sig: test blood glucose BID. E11.9 100 Pen Needle 5    omega-3 fatty acids-vitamin e (FISH OIL) 1,000 mg Cap Take 1,400 mg by mouth daily.  glucose 4 gram chewable tablet Take 4 Tabs by mouth as needed.  50 Tab 0    OTHER Check CBC, CMP, Mg in 5 days, results to PCP 1 each 0     No Known Allergies  Social History     Socioeconomic History    Marital status:      Spouse name: Not on file    Number of children: Not on file    Years of education: Not on file    Highest education level: Not on file   Social Needs    Financial resource strain: Not on file    Food insecurity - worry: Not on file    Food insecurity - inability: Not on file    Transportation needs - medical: Not on file   [employer] needs - non-medical: Not on file   Occupational History    Not on file   Tobacco Use    Smoking status: Former Smoker    Smokeless tobacco: Never Used   Substance and Sexual Activity    Alcohol use: No    Drug use: No    Sexual activity: No   Other Topics Concern    Not on file   Social History Narrative    Not on file      Family History   Problem Relation Age of Onset    Hypertension Mother     Diabetes Father     Cancer Father         prostate    Heart Disease Father 70        CABG    Hypertension Sister     Hypertension Brother     Diabetes Brother                  Visit Vitals  /54 (BP 1 Location: Right arm, BP Patient Position: Sitting)   Pulse 72   Ht 5' 6\" (1.676 m)   Wt 158 lb (71.7 kg)   SpO2 93%   BMI 25.50 kg/m²     Physical        Gen: WDWN adult NAD  Head  : normocephalic,  Normal ROM; eyes without normal pupils, EOMs, no masses;  conjunctiva normal  Neck: normal movement,  no evident mass,  No evident adenopathy, trachea midline,  Lungs: no respiratory distress or difficulties  CV:  No evident peripheral swelling  Abd :bowel sounds normal, no masses, tenderness, organomegaly  Flanks     -    Extremities- no edema, arthritis, deformity, swelling  Psych- oriented, no evident anxiety, no cognitive impairment evident    The patient is prepared for cystoscopy. He has been counseled regarding the risks which include, but are not limited to, infection, bleeding, injury, failure to diagnose, and possible need for additional procedures. He wishes to proceed, giving his informed consent. Procedure note: The patient is in the slightly inclined supine position where he is prepped and draped in sterile fashion. Lidocaine jelly has been inserted into the urethra and a suitable time has been permitted to elapse for establishment of anesthesia. A flexible cystoscope is inserted and video cystoscopy is carried out with the patient able to observe the monitor. The findings are as follows:  1. Pendulous urethra normal    2. Bulbar urethra normal with typical catheterization changes  3. External sphincter normal  4. Prostate lateral lobe encroachment with convergence that is obstructing the prostatic fossa. The prostatic fossa is not elongated  5. Bladder neck normal  6. Trigone normal  7. Bladder wall there are typical catheterization changes. There is trabeculation present. However, the bladder is decompressed through the side-port by aspirating with the syringe and the bladder wall folds on itself rather than terra symmetrically    Pertinent observations are pointed out to the patient and all the patient's questions are entertained. The procedure is terminated . Full discussion regarding the cystoscopic findings will be carried out. Following removal of the cystoscope, the patient is left in position for a suitable period of time and allowed to dress. Post procedure instructions are given. The patient tolerated the procedure well. Impression/ PLAN  Urinary retention probably on a benign obstructive basis with secondary urinary infection. Distant history of prostate cancer with no recent PSAs to confirm undetectable PSA as it was a year and half ago.     There is a suggestion of bladder failure in the way the bladder wall folds on itself rather than terra symmetrically    Plan: Sparrow catheter is reinserted and I have had an extended discussion with the patient and the son. The alternatives are to leave the Sparrow as is or to teach intermittent self-catheterization. Alternatively, we can proceed with transurethral resection of the obstructing tissue and I would place a suprapubic tube at the same time so that we could remove the penile catheter and plug the suprapubic tube in order to give the patient voiding trials. The appearance of the bladder as I decompress it would suggest that the patient might well have detrusor failure            This visit exceeded 25 minutes and >50% was counselling  The patient understands the discussion and plan    PLEASE NOTE:      This document has been produced using voice recognition software.   Unrecognized errors in transcription may be present    Suki Garcia MD

## 2018-11-09 NOTE — TELEPHONE ENCOUNTER
Patient' son came into the office to check on the status of his sister's FMLA Forms that was dropped off early last month. Patient's daughter is also a main caregiver. Per documentation in the chart this form was completed. Form has not been completed. Form has been placed on Dr. Ingrid Soulier desk and a copy has been scanned into chart. Please call the daughter Louisa Kat or Ines Stanford when this is completed.

## 2018-11-09 NOTE — PROGRESS NOTES
Mr. Noemi Rivas has a reminder for a \"due or due soon\" health maintenance. I have asked that he contact his primary care provider for follow-up on this health maintenance. RBV Per Dr. Humza Anderson perform voding trial.  Urinary bag removed, piston syringe attached to catheter, 150 cc of sterile water instilled into bladder via gravity. Patient unable to void any urine back. Patient tolerated well. [facility]  OFFICE PROCEDURE PROGRESS NOTE        Chart reviewed for the following:   IStacie LPN, have reviewed the History, Physical and updated the Allergic reactions for [address] performed immediately prior to start of procedure:   Michelle Ferrari LPN, have performed the following reviews on [place] prior to the start of the procedure:            * Patient was identified by name and date of birth   * Agreement on procedure being performed was verified  * Risks and Benefits explained to the patient  * Procedure site verified and marked as necessary  * Patient was positioned for comfort  * Consent was signed and verified     Time: 11:13AM      Date of procedure: 11/9/2018    Procedure performed by:  Re Bianchi MD    Provider assisted by: Carlos Cruz LPN    Patient assisted by: self    How tolerated by patient: tolerated the procedure well with no complications    Post Procedural Pain Scale: 0 - No Hurt    Comments: none    Patient verbalized understanding of procedure and post procedure instructions. RBV Per Dr. Humza Anderson urinary meatus cleansed with betadine, 16 FR coude inserted into bladder, balloon inflated with 10 cc of sterile water, catheter connected to urinary leg bag and attached to left thigh. Patient tolerated well.

## 2018-11-12 ENCOUNTER — HOSPITAL ENCOUNTER (INPATIENT)
Age: 73
LOS: 3 days | Discharge: HOME | DRG: 682 | End: 2018-11-15
Attending: EMERGENCY MEDICINE | Admitting: HOSPITALIST
Payer: MEDICARE

## 2018-11-12 ENCOUNTER — APPOINTMENT (OUTPATIENT)
Dept: GENERAL RADIOLOGY | Age: 73
DRG: 682 | End: 2018-11-12
Attending: EMERGENCY MEDICINE
Payer: MEDICARE

## 2018-11-12 ENCOUNTER — APPOINTMENT (OUTPATIENT)
Dept: GENERAL RADIOLOGY | Age: 73
DRG: 682 | End: 2018-11-12
Attending: INTERNAL MEDICINE
Payer: MEDICARE

## 2018-11-12 ENCOUNTER — TELEPHONE (OUTPATIENT)
Dept: FAMILY MEDICINE CLINIC | Age: 73
End: 2018-11-12

## 2018-11-12 DIAGNOSIS — Z97.8 INDWELLING FOLEY CATHETER PRESENT: ICD-10-CM

## 2018-11-12 DIAGNOSIS — R62.7 FAILURE TO THRIVE IN ADULT: ICD-10-CM

## 2018-11-12 DIAGNOSIS — Z78.9 POOR CONDITIONING: ICD-10-CM

## 2018-11-12 DIAGNOSIS — N18.9 ACUTE RENAL FAILURE SUPERIMPOSED ON CHRONIC KIDNEY DISEASE, UNSPECIFIED CKD STAGE, UNSPECIFIED ACUTE RENAL FAILURE TYPE (HCC): Primary | ICD-10-CM

## 2018-11-12 DIAGNOSIS — N17.9 ACUTE RENAL FAILURE SUPERIMPOSED ON CHRONIC KIDNEY DISEASE, UNSPECIFIED CKD STAGE, UNSPECIFIED ACUTE RENAL FAILURE TYPE (HCC): Primary | ICD-10-CM

## 2018-11-12 DIAGNOSIS — E86.0 DEHYDRATION: ICD-10-CM

## 2018-11-12 DIAGNOSIS — R73.9 HYPERGLYCEMIA: ICD-10-CM

## 2018-11-12 DIAGNOSIS — R53.1 GENERALIZED WEAKNESS: ICD-10-CM

## 2018-11-12 PROBLEM — K59.09 OTHER CONSTIPATION: Status: ACTIVE | Noted: 2018-11-12

## 2018-11-12 PROBLEM — R33.8 BENIGN PROSTATIC HYPERPLASIA WITH URINARY RETENTION: Status: ACTIVE | Noted: 2017-10-18

## 2018-11-12 PROBLEM — E87.5 HYPERKALEMIA: Status: ACTIVE | Noted: 2018-11-12

## 2018-11-12 PROBLEM — N40.1 BENIGN PROSTATIC HYPERPLASIA WITH URINARY RETENTION: Status: ACTIVE | Noted: 2017-10-18

## 2018-11-12 LAB
ALBUMIN SERPL-MCNC: 3.4 G/DL (ref 3.4–5)
ALBUMIN/GLOB SERPL: 0.7 {RATIO} (ref 0.8–1.7)
ALP SERPL-CCNC: 58 U/L (ref 45–117)
ALT SERPL-CCNC: 21 U/L (ref 16–61)
ANION GAP SERPL CALC-SCNC: 10 MMOL/L (ref 3–18)
ANION GAP SERPL CALC-SCNC: 15 MMOL/L (ref 3–18)
APPEARANCE UR: CLEAR
AST SERPL-CCNC: 23 U/L (ref 15–37)
ATRIAL RATE: 71 BPM
BACTERIA URNS QL MICRO: ABNORMAL /HPF
BASOPHILS # BLD: 0 K/UL (ref 0–0.1)
BASOPHILS NFR BLD: 0 % (ref 0–2)
BILIRUB SERPL-MCNC: 0.2 MG/DL (ref 0.2–1)
BILIRUB UR QL: NEGATIVE
BNP SERPL-MCNC: 473 PG/ML (ref 0–900)
BUN SERPL-MCNC: 51 MG/DL (ref 7–18)
BUN SERPL-MCNC: 57 MG/DL (ref 7–18)
BUN/CREAT SERPL: 11 (ref 12–20)
BUN/CREAT SERPL: 12 (ref 12–20)
CALCIUM SERPL-MCNC: 7.8 MG/DL (ref 8.5–10.1)
CALCIUM SERPL-MCNC: 8.9 MG/DL (ref 8.5–10.1)
CALCULATED P AXIS, ECG09: 59 DEGREES
CALCULATED R AXIS, ECG10: 15 DEGREES
CALCULATED T AXIS, ECG11: 56 DEGREES
CHLORIDE SERPL-SCNC: 104 MMOL/L (ref 100–108)
CHLORIDE SERPL-SCNC: 109 MMOL/L (ref 100–108)
CO2 SERPL-SCNC: 17 MMOL/L (ref 21–32)
CO2 SERPL-SCNC: 19 MMOL/L (ref 21–32)
COLOR UR: YELLOW
CREAT SERPL-MCNC: 4.22 MG/DL (ref 0.6–1.3)
CREAT SERPL-MCNC: 4.97 MG/DL (ref 0.6–1.3)
DIAGNOSIS, 93000: NORMAL
DIFFERENTIAL METHOD BLD: ABNORMAL
EOSINOPHIL # BLD: 0.4 K/UL (ref 0–0.4)
EOSINOPHIL NFR BLD: 5 % (ref 0–5)
EPITH CASTS URNS QL MICRO: ABNORMAL /LPF (ref 0–5)
ERYTHROCYTE [DISTWIDTH] IN BLOOD BY AUTOMATED COUNT: 17.2 % (ref 11.6–14.5)
EST. AVERAGE GLUCOSE BLD GHB EST-MCNC: 171 MG/DL
GLOBULIN SER CALC-MCNC: 4.9 G/DL (ref 2–4)
GLUCOSE BLD STRIP.AUTO-MCNC: 81 MG/DL (ref 70–110)
GLUCOSE SERPL-MCNC: 100 MG/DL (ref 74–99)
GLUCOSE SERPL-MCNC: 181 MG/DL (ref 74–99)
GLUCOSE UR STRIP.AUTO-MCNC: NEGATIVE MG/DL
HBA1C MFR BLD: 7.6 % (ref 4.2–5.6)
HCT VFR BLD AUTO: 36 % (ref 36–48)
HGB BLD-MCNC: 12.2 G/DL (ref 13–16)
HGB UR QL STRIP: ABNORMAL
KETONES UR QL STRIP.AUTO: NEGATIVE MG/DL
LEUKOCYTE ESTERASE UR QL STRIP.AUTO: ABNORMAL
LYMPHOCYTES # BLD: 2.2 K/UL (ref 0.9–3.6)
LYMPHOCYTES NFR BLD: 30 % (ref 21–52)
MCH RBC QN AUTO: 26.8 PG (ref 24–34)
MCHC RBC AUTO-ENTMCNC: 33.9 G/DL (ref 31–37)
MCV RBC AUTO: 78.9 FL (ref 74–97)
MONOCYTES # BLD: 0.4 K/UL (ref 0.05–1.2)
MONOCYTES NFR BLD: 6 % (ref 3–10)
NEUTS SEG # BLD: 4.3 K/UL (ref 1.8–8)
NEUTS SEG NFR BLD: 59 % (ref 40–73)
NITRITE UR QL STRIP.AUTO: NEGATIVE
P-R INTERVAL, ECG05: 150 MS
PH UR STRIP: 6 [PH] (ref 5–8)
PLATELET # BLD AUTO: 370 K/UL (ref 135–420)
PMV BLD AUTO: 9.5 FL (ref 9.2–11.8)
POTASSIUM SERPL-SCNC: 5.5 MMOL/L (ref 3.5–5.5)
POTASSIUM SERPL-SCNC: 5.7 MMOL/L (ref 3.5–5.5)
PROT SERPL-MCNC: 8.3 G/DL (ref 6.4–8.2)
PROT UR STRIP-MCNC: 30 MG/DL
Q-T INTERVAL, ECG07: 374 MS
QRS DURATION, ECG06: 84 MS
QTC CALCULATION (BEZET), ECG08: 406 MS
RBC # BLD AUTO: 4.56 M/UL (ref 4.7–5.5)
RBC #/AREA URNS HPF: ABNORMAL /HPF (ref 0–5)
SODIUM SERPL-SCNC: 136 MMOL/L (ref 136–145)
SODIUM SERPL-SCNC: 138 MMOL/L (ref 136–145)
SP GR UR REFRACTOMETRY: 1.01 (ref 1–1.03)
TROPONIN I SERPL-MCNC: <0.02 NG/ML (ref 0–0.06)
UROBILINOGEN UR QL STRIP.AUTO: 0.2 EU/DL (ref 0.2–1)
VENTRICULAR RATE, ECG03: 71 BPM
WBC # BLD AUTO: 7.2 K/UL (ref 4.6–13.2)
WBC URNS QL MICRO: ABNORMAL /HPF (ref 0–4)

## 2018-11-12 PROCEDURE — 96360 HYDRATION IV INFUSION INIT: CPT

## 2018-11-12 PROCEDURE — 99285 EMERGENCY DEPT VISIT HI MDM: CPT

## 2018-11-12 PROCEDURE — 74011250636 HC RX REV CODE- 250/636: Performed by: EMERGENCY MEDICINE

## 2018-11-12 PROCEDURE — 74018 RADEX ABDOMEN 1 VIEW: CPT

## 2018-11-12 PROCEDURE — 96361 HYDRATE IV INFUSION ADD-ON: CPT

## 2018-11-12 PROCEDURE — 74011250637 HC RX REV CODE- 250/637: Performed by: INTERNAL MEDICINE

## 2018-11-12 PROCEDURE — 77030005514 HC CATH URETH FOL14 BARD -A

## 2018-11-12 PROCEDURE — 82962 GLUCOSE BLOOD TEST: CPT

## 2018-11-12 PROCEDURE — 84300 ASSAY OF URINE SODIUM: CPT

## 2018-11-12 PROCEDURE — 71046 X-RAY EXAM CHEST 2 VIEWS: CPT

## 2018-11-12 PROCEDURE — 81001 URINALYSIS AUTO W/SCOPE: CPT

## 2018-11-12 PROCEDURE — 36415 COLL VENOUS BLD VENIPUNCTURE: CPT

## 2018-11-12 PROCEDURE — 77030010545

## 2018-11-12 PROCEDURE — 87077 CULTURE AEROBIC IDENTIFY: CPT

## 2018-11-12 PROCEDURE — 84156 ASSAY OF PROTEIN URINE: CPT

## 2018-11-12 PROCEDURE — 93005 ELECTROCARDIOGRAM TRACING: CPT

## 2018-11-12 PROCEDURE — 65660000000 HC RM CCU STEPDOWN

## 2018-11-12 PROCEDURE — 87086 URINE CULTURE/COLONY COUNT: CPT

## 2018-11-12 PROCEDURE — 82570 ASSAY OF URINE CREATININE: CPT

## 2018-11-12 PROCEDURE — 84484 ASSAY OF TROPONIN QUANT: CPT

## 2018-11-12 PROCEDURE — 83880 ASSAY OF NATRIURETIC PEPTIDE: CPT

## 2018-11-12 PROCEDURE — 87186 SC STD MICRODIL/AGAR DIL: CPT

## 2018-11-12 PROCEDURE — 80053 COMPREHEN METABOLIC PANEL: CPT

## 2018-11-12 PROCEDURE — 83036 HEMOGLOBIN GLYCOSYLATED A1C: CPT

## 2018-11-12 PROCEDURE — 74011000250 HC RX REV CODE- 250: Performed by: INTERNAL MEDICINE

## 2018-11-12 PROCEDURE — 85025 COMPLETE CBC W/AUTO DIFF WBC: CPT

## 2018-11-12 PROCEDURE — 74011250636 HC RX REV CODE- 250/636: Performed by: INTERNAL MEDICINE

## 2018-11-12 PROCEDURE — 94761 N-INVAS EAR/PLS OXIMETRY MLT: CPT

## 2018-11-12 RX ORDER — DILTIAZEM HYDROCHLORIDE 180 MG/1
360 CAPSULE, COATED, EXTENDED RELEASE ORAL DAILY
Status: DISCONTINUED | OUTPATIENT
Start: 2018-11-12 | End: 2018-11-15 | Stop reason: HOSPADM

## 2018-11-12 RX ORDER — ONDANSETRON 2 MG/ML
4 INJECTION INTRAMUSCULAR; INTRAVENOUS
Status: DISCONTINUED | OUTPATIENT
Start: 2018-11-12 | End: 2018-11-15 | Stop reason: HOSPADM

## 2018-11-12 RX ORDER — DOCUSATE SODIUM 100 MG/1
100 CAPSULE, LIQUID FILLED ORAL
Status: DISCONTINUED | OUTPATIENT
Start: 2018-11-12 | End: 2018-11-15 | Stop reason: HOSPADM

## 2018-11-12 RX ORDER — TAMSULOSIN HYDROCHLORIDE 0.4 MG/1
0.4 CAPSULE ORAL DAILY
Status: DISCONTINUED | OUTPATIENT
Start: 2018-11-13 | End: 2018-11-15 | Stop reason: HOSPADM

## 2018-11-12 RX ORDER — ACETAMINOPHEN 325 MG/1
650 TABLET ORAL
Status: DISCONTINUED | OUTPATIENT
Start: 2018-11-12 | End: 2018-11-15 | Stop reason: HOSPADM

## 2018-11-12 RX ORDER — CARVEDILOL 12.5 MG/1
12.5 TABLET ORAL 2 TIMES DAILY WITH MEALS
Status: DISCONTINUED | OUTPATIENT
Start: 2018-11-13 | End: 2018-11-15 | Stop reason: HOSPADM

## 2018-11-12 RX ORDER — MAGNESIUM SULFATE 100 %
4 CRYSTALS MISCELLANEOUS AS NEEDED
Status: DISCONTINUED | OUTPATIENT
Start: 2018-11-12 | End: 2018-11-15 | Stop reason: HOSPADM

## 2018-11-12 RX ORDER — SODIUM CHLORIDE 9 MG/ML
75 INJECTION, SOLUTION INTRAVENOUS CONTINUOUS
Status: DISCONTINUED | OUTPATIENT
Start: 2018-11-12 | End: 2018-11-15 | Stop reason: HOSPADM

## 2018-11-12 RX ORDER — HEPARIN SODIUM 5000 [USP'U]/ML
5000 INJECTION, SOLUTION INTRAVENOUS; SUBCUTANEOUS EVERY 8 HOURS
Status: DISCONTINUED | OUTPATIENT
Start: 2018-11-12 | End: 2018-11-15 | Stop reason: HOSPADM

## 2018-11-12 RX ORDER — DEXTROSE 50 % IN WATER (D50W) INTRAVENOUS SYRINGE
25 AS NEEDED
Status: DISCONTINUED | OUTPATIENT
Start: 2018-11-12 | End: 2018-11-15 | Stop reason: SDUPTHER

## 2018-11-12 RX ORDER — INSULIN LISPRO 100 [IU]/ML
INJECTION, SOLUTION INTRAVENOUS; SUBCUTANEOUS
Status: DISCONTINUED | OUTPATIENT
Start: 2018-11-12 | End: 2018-11-15 | Stop reason: HOSPADM

## 2018-11-12 RX ORDER — OXYCODONE AND ACETAMINOPHEN 5; 325 MG/1; MG/1
1 TABLET ORAL
Status: DISCONTINUED | OUTPATIENT
Start: 2018-11-12 | End: 2018-11-15 | Stop reason: HOSPADM

## 2018-11-12 RX ORDER — ROSUVASTATIN CALCIUM 20 MG/1
20 TABLET, COATED ORAL
Status: DISCONTINUED | OUTPATIENT
Start: 2018-11-12 | End: 2018-11-15 | Stop reason: HOSPADM

## 2018-11-12 RX ORDER — DEXTROSE 50 % IN WATER (D50W) INTRAVENOUS SYRINGE
25-50 AS NEEDED
Status: DISCONTINUED | OUTPATIENT
Start: 2018-11-12 | End: 2018-11-15 | Stop reason: HOSPADM

## 2018-11-12 RX ORDER — NALOXONE HYDROCHLORIDE 0.4 MG/ML
0.4 INJECTION, SOLUTION INTRAMUSCULAR; INTRAVENOUS; SUBCUTANEOUS AS NEEDED
Status: DISCONTINUED | OUTPATIENT
Start: 2018-11-12 | End: 2018-11-15 | Stop reason: HOSPADM

## 2018-11-12 RX ADMIN — SODIUM CHLORIDE 1000 ML: 900 INJECTION, SOLUTION INTRAVENOUS at 16:10

## 2018-11-12 RX ADMIN — SODIUM CHLORIDE 125 ML/HR: 900 INJECTION, SOLUTION INTRAVENOUS at 15:19

## 2018-11-12 RX ADMIN — ROSUVASTATIN CALCIUM 20 MG: 20 TABLET, FILM COATED ORAL at 22:35

## 2018-11-12 RX ADMIN — CEFTRIAXONE SODIUM 2 G: 2 INJECTION, POWDER, FOR SOLUTION INTRAMUSCULAR; INTRAVENOUS at 22:34

## 2018-11-12 RX ADMIN — DILTIAZEM HYDROCHLORIDE 360 MG: 180 CAPSULE, COATED, EXTENDED RELEASE ORAL at 22:34

## 2018-11-12 RX ADMIN — HEPARIN SODIUM 5000 UNITS: 5000 INJECTION, SOLUTION INTRAVENOUS; SUBCUTANEOUS at 22:36

## 2018-11-12 RX ADMIN — SODIUM CHLORIDE 1000 ML: 900 INJECTION, SOLUTION INTRAVENOUS at 13:51

## 2018-11-12 NOTE — TELEPHONE ENCOUNTER
Pt's daughter calling with some current concerns. Stated she is not usually with her father on a day to day basis, but was with him this weekend. Stated he is not eating, has no appetite. Appears to have lost weight. He is constipated, wants to sleep all the time. Said he fell a couple of weeks ago.      Please advise

## 2018-11-12 NOTE — PROGRESS NOTES
TRANSFER - IN REPORT: 
 
Verbal report received from Lucia Keene RN(name) on Carlos Scruggs  being received from [facility] ED(unit) for routine progression of care Report consisted of patients Situation, Background, Assessment and  
Recommendations(SBAR). Information from the following report(s) SBAR, Kardex, ED Summary and Recent Results was reviewed with the receiving nurse. Opportunity for questions and clarification was provided. Assessment completed upon patients arrival to unit and care assumed.

## 2018-11-12 NOTE — TELEPHONE ENCOUNTER
Pt's daughter calling for status.  Form was dropped off to the office 10/23/2018 (see documentation encounter) please also see note below

## 2018-11-12 NOTE — ED TRIAGE NOTES
Patient's son relates that patient has had decreased appetite and chest congestion x couple days. States patient currently on abx for UTI.

## 2018-11-12 NOTE — ROUTINE PROCESS
TRANSFER - OUT REPORT: 
 
Verbal report given to Shonna Summers RN(name) on [facility]  being transferred to [facility] room 409(unit) for routine progression of care Report consisted of patients Situation, Background, Assessment and  
Recommendations(SBAR). Information from the following report(s) SBAR, ED Summary, [facility] and Recent Results was reviewed with the receiving nurse. Opportunity for questions and clarification was provided.

## 2018-11-13 ENCOUNTER — TELEPHONE (OUTPATIENT)
Dept: FAMILY MEDICINE CLINIC | Age: 73
End: 2018-11-13

## 2018-11-13 ENCOUNTER — APPOINTMENT (OUTPATIENT)
Dept: ULTRASOUND IMAGING | Age: 73
DRG: 682 | End: 2018-11-13
Attending: INTERNAL MEDICINE
Payer: MEDICARE

## 2018-11-13 LAB
ANION GAP SERPL CALC-SCNC: 9 MMOL/L (ref 3–18)
APPEARANCE UR: CLEAR
BACTERIA URNS QL MICRO: ABNORMAL /HPF
BASOPHILS # BLD: 0 K/UL (ref 0–0.1)
BASOPHILS NFR BLD: 0 % (ref 0–2)
BILIRUB UR QL: NEGATIVE
BUN SERPL-MCNC: 46 MG/DL (ref 7–18)
BUN/CREAT SERPL: 12 (ref 12–20)
CALCIUM SERPL-MCNC: 8.4 MG/DL (ref 8.5–10.1)
CHLORIDE SERPL-SCNC: 117 MMOL/L (ref 100–108)
CO2 SERPL-SCNC: 16 MMOL/L (ref 21–32)
COLOR UR: YELLOW
CREAT SERPL-MCNC: 3.81 MG/DL (ref 0.6–1.3)
CREAT UR-MCNC: 49.3 MG/DL (ref 30–125)
DIFFERENTIAL METHOD BLD: ABNORMAL
EOSINOPHIL # BLD: 0.5 K/UL (ref 0–0.4)
EOSINOPHIL NFR BLD: 8 % (ref 0–5)
EPITH CASTS URNS QL MICRO: ABNORMAL /LPF (ref 0–5)
ERYTHROCYTE [DISTWIDTH] IN BLOOD BY AUTOMATED COUNT: 17 % (ref 11.6–14.5)
GLUCOSE BLD STRIP.AUTO-MCNC: 107 MG/DL (ref 70–110)
GLUCOSE BLD STRIP.AUTO-MCNC: 67 MG/DL (ref 70–110)
GLUCOSE BLD STRIP.AUTO-MCNC: 98 MG/DL (ref 70–110)
GLUCOSE SERPL-MCNC: 87 MG/DL (ref 74–99)
GLUCOSE UR STRIP.AUTO-MCNC: NEGATIVE MG/DL
HCT VFR BLD AUTO: 29.2 % (ref 36–48)
HGB BLD-MCNC: 9.8 G/DL (ref 13–16)
HGB UR QL STRIP: ABNORMAL
HYALINE CASTS URNS QL MICRO: ABNORMAL /LPF (ref 0–2)
KETONES UR QL STRIP.AUTO: NEGATIVE MG/DL
LEUKOCYTE ESTERASE UR QL STRIP.AUTO: ABNORMAL
LYMPHOCYTES # BLD: 2.6 K/UL (ref 0.9–3.6)
LYMPHOCYTES NFR BLD: 40 % (ref 21–52)
MAGNESIUM SERPL-MCNC: 2.6 MG/DL (ref 1.6–2.6)
MCH RBC QN AUTO: 26.4 PG (ref 24–34)
MCHC RBC AUTO-ENTMCNC: 33.6 G/DL (ref 31–37)
MCV RBC AUTO: 78.7 FL (ref 74–97)
MONOCYTES # BLD: 0.5 K/UL (ref 0.05–1.2)
MONOCYTES NFR BLD: 7 % (ref 3–10)
NEUTS SEG # BLD: 2.9 K/UL (ref 1.8–8)
NEUTS SEG NFR BLD: 45 % (ref 40–73)
NITRITE UR QL STRIP.AUTO: NEGATIVE
PH UR STRIP: 6 [PH] (ref 5–8)
PHOSPHATE SERPL-MCNC: 4.9 MG/DL (ref 2.5–4.9)
PLATELET # BLD AUTO: 212 K/UL (ref 135–420)
PMV BLD AUTO: 9.9 FL (ref 9.2–11.8)
POTASSIUM SERPL-SCNC: 5.8 MMOL/L (ref 3.5–5.5)
POTASSIUM SERPL-SCNC: 6 MMOL/L (ref 3.5–5.5)
PROT UR STRIP-MCNC: 30 MG/DL
PROT UR-MCNC: 57 MG/DL
RBC # BLD AUTO: 3.71 M/UL (ref 4.7–5.5)
RBC #/AREA URNS HPF: ABNORMAL /HPF (ref 0–5)
SODIUM SERPL-SCNC: 142 MMOL/L (ref 136–145)
SODIUM UR-SCNC: 104 MMOL/L (ref 20–110)
SP GR UR REFRACTOMETRY: 1.01 (ref 1–1.03)
UROBILINOGEN UR QL STRIP.AUTO: 0.2 EU/DL (ref 0.2–1)
WBC # BLD AUTO: 6.4 K/UL (ref 4.6–13.2)
WBC URNS QL MICRO: ABNORMAL /HPF (ref 0–4)

## 2018-11-13 PROCEDURE — 85025 COMPLETE CBC W/AUTO DIFF WBC: CPT

## 2018-11-13 PROCEDURE — 83735 ASSAY OF MAGNESIUM: CPT

## 2018-11-13 PROCEDURE — 81001 URINALYSIS AUTO W/SCOPE: CPT

## 2018-11-13 PROCEDURE — 74011250637 HC RX REV CODE- 250/637: Performed by: INTERNAL MEDICINE

## 2018-11-13 PROCEDURE — 74011250636 HC RX REV CODE- 250/636: Performed by: INTERNAL MEDICINE

## 2018-11-13 PROCEDURE — 84100 ASSAY OF PHOSPHORUS: CPT

## 2018-11-13 PROCEDURE — 74011000250 HC RX REV CODE- 250: Performed by: INTERNAL MEDICINE

## 2018-11-13 PROCEDURE — 82962 GLUCOSE BLOOD TEST: CPT

## 2018-11-13 PROCEDURE — 65660000000 HC RM CCU STEPDOWN

## 2018-11-13 PROCEDURE — 76770 US EXAM ABDO BACK WALL COMP: CPT

## 2018-11-13 PROCEDURE — 36415 COLL VENOUS BLD VENIPUNCTURE: CPT

## 2018-11-13 PROCEDURE — 84132 ASSAY OF SERUM POTASSIUM: CPT

## 2018-11-13 RX ORDER — SODIUM POLYSTYRENE SULFONATE 15 G/60ML
30 SUSPENSION ORAL; RECTAL EVERY 4 HOURS
Status: COMPLETED | OUTPATIENT
Start: 2018-11-13 | End: 2018-11-13

## 2018-11-13 RX ADMIN — HEPARIN SODIUM 5000 UNITS: 5000 INJECTION, SOLUTION INTRAVENOUS; SUBCUTANEOUS at 12:56

## 2018-11-13 RX ADMIN — CEFTRIAXONE SODIUM 2 G: 2 INJECTION, POWDER, FOR SOLUTION INTRAMUSCULAR; INTRAVENOUS at 21:43

## 2018-11-13 RX ADMIN — HEPARIN SODIUM 5000 UNITS: 5000 INJECTION, SOLUTION INTRAVENOUS; SUBCUTANEOUS at 21:44

## 2018-11-13 RX ADMIN — HEPARIN SODIUM 5000 UNITS: 5000 INJECTION, SOLUTION INTRAVENOUS; SUBCUTANEOUS at 04:56

## 2018-11-13 RX ADMIN — CARVEDILOL 12.5 MG: 12.5 TABLET, FILM COATED ORAL at 09:09

## 2018-11-13 RX ADMIN — SODIUM CHLORIDE 100 ML/HR: 900 INJECTION, SOLUTION INTRAVENOUS at 04:39

## 2018-11-13 RX ADMIN — SODIUM POLYSTYRENE SULFONATE 30 G: 15 SUSPENSION ORAL; RECTAL at 12:50

## 2018-11-13 RX ADMIN — SODIUM POLYSTYRENE SULFONATE 30 G: 15 SUSPENSION ORAL; RECTAL at 18:44

## 2018-11-13 RX ADMIN — ROSUVASTATIN CALCIUM 20 MG: 20 TABLET, FILM COATED ORAL at 21:43

## 2018-11-13 RX ADMIN — CARVEDILOL 12.5 MG: 12.5 TABLET, FILM COATED ORAL at 18:44

## 2018-11-13 RX ADMIN — SODIUM CHLORIDE 100 ML/HR: 900 INJECTION, SOLUTION INTRAVENOUS at 14:00

## 2018-11-13 RX ADMIN — TAMSULOSIN HYDROCHLORIDE 0.4 MG: 0.4 CAPSULE ORAL at 09:09

## 2018-11-13 NOTE — PROGRESS NOTES
[facility] Hospitalist Group Progress Note Patient: Mohamud Hicks Age: 75 y.o. : 1945 MR#: [id number] SSN: [id number] Date: 2018 Subjective:  
 
Pt states \"I feel good\". No pains, SOB, or chest pain. No complaints. Assessment/Plan: 1. Acute on Chronic renal failure stage 3 worsened d/t Bactrim 2. Hypertension 3. Benign prostatic hyperplasia with urinary retention with chronic indwelling gutierrez 4. Dehydration 5. Hyperkalemia 6. Constipation 7. Failure to thrive in adult 8. Hx of CVA 9. Dementia 10. Type 2 DM 11. Possible UTI Plan; 
Renal consulted, recs to cont gutierrez, Kaexylate, and IVF Cont flomax. Recheck potassium after kaexylate. Cont SSI. Cont Rocephin for now, and follow urine cx. Goals of care: full code Disposition:  [x]PT/OT ordered   [x] Case management referral 
 
Case discussed with:  [x]Patient  []Family  [x]Nursing  []Case Management DVT Prophylaxis:  []Lovenox  []Hep SQ  [x]SCDs  []Coumadin   []On Heparin gtt Objective:  
VS:  
Visit Vitals /61 (BP 1 Location: Left arm, BP Patient Position: Supine) Pulse 65 Temp 98.7 °F (37.1 °C) Resp 16 Ht 5' 6.5\" (1.689 m) Wt 70.7 kg (155 lb 12.8 oz) SpO2 95% BMI 24.77 kg/m² Tmax/24hrs: Temp (24hrs), Av.2 °F (36.8 °C), Min:97.9 °F (36.6 °C), Max:98.7 °F (37.1 °C) Intake/Output Summary (Last 24 hours) at 2018 1100 Last data filed at 2018 5152 Gross per 24 hour Intake 260 ml Output 1700 ml Net -1440 ml General:  Awake, alert, NAD Cardiovascular:  RRR Pulmonary: CTA  
GI:  NT, normal BS Extremities:  No edema or cyanosis Neuro: AAOx2 Labs:   
Recent Results (from the past 24 hour(s)) EKG, 12 LEAD, INITIAL Collection Time: 18  1:32 PM  
Result Value Ref Range Ventricular Rate 71 BPM  
 Atrial Rate 71 BPM  
 P-R Interval 150 ms QRS Duration 84 ms Q-T Interval 374 ms QTC Calculation (Bezet) 406 ms Calculated P Axis 59 degrees Calculated R Axis 15 degrees Calculated T Axis 56 degrees Diagnosis Normal sinus rhythm Normal ECG When compared with ECG of 20-OCT-2018 15:31, No significant change was found Confirmed by Po Zhao MD, --- ([id number]) on 11/12/2018 4:12:47 PM 
  
CBC WITH AUTOMATED DIFF Collection Time: 11/12/18  1:45 PM  
Result Value Ref Range WBC 7.2 4.6 - 13.2 K/uL  
 RBC 4.56 (L) 4.70 - 5.50 M/uL  
 HGB 12.2 (L) 13.0 - 16.0 g/dL HCT 36.0 36.0 - 48.0 % MCV 78.9 74.0 - 97.0 FL  
 MCH 26.8 24.0 - 34.0 PG  
 MCHC 33.9 31.0 - 37.0 g/dL  
 RDW 17.2 (H) 11.6 - 14.5 % PLATELET 540 418 - 881 K/uL MPV 9.5 9.2 - 11.8 FL  
 NEUTROPHILS 59 40 - 73 % LYMPHOCYTES 30 21 - 52 % MONOCYTES 6 3 - 10 % EOSINOPHILS 5 0 - 5 % BASOPHILS 0 0 - 2 %  
 ABS. NEUTROPHILS 4.3 1.8 - 8.0 K/UL  
 ABS. LYMPHOCYTES 2.2 0.9 - 3.6 K/UL  
 ABS. MONOCYTES 0.4 0.05 - 1.2 K/UL  
 ABS. EOSINOPHILS 0.4 0.0 - 0.4 K/UL  
 ABS. BASOPHILS 0.0 0.0 - 0.1 K/UL  
 DF AUTOMATED METABOLIC PANEL, COMPREHENSIVE Collection Time: 11/12/18  1:45 PM  
Result Value Ref Range Sodium 136 136 - 145 mmol/L Potassium 5.5 3.5 - 5.5 mmol/L Chloride 104 100 - 108 mmol/L  
 CO2 17 (L) 21 - 32 mmol/L Anion gap 15 3.0 - 18 mmol/L Glucose 181 (H) 74 - 99 mg/dL BUN 57 (H) 7.0 - 18 MG/DL Creatinine 4.97 (H) 0.6 - 1.3 MG/DL  
 BUN/Creatinine ratio 11 (L) 12 - 20 GFR est AA 14 (L) >60 ml/min/1.73m2 GFR est non-AA 11 (L) >60 ml/min/1.73m2 Calcium 8.9 8.5 - 10.1 MG/DL Bilirubin, total 0.2 0.2 - 1.0 MG/DL  
 ALT (SGPT) 21 16 - 61 U/L  
 AST (SGOT) 23 15 - 37 U/L Alk. phosphatase 58 45 - 117 U/L Protein, total 8.3 (H) 6.4 - 8.2 g/dL Albumin 3.4 3.4 - 5.0 g/dL Globulin 4.9 (H) 2.0 - 4.0 g/dL A-G Ratio 0.7 (L) 0.8 - 1.7    
TROPONIN I Collection Time: 11/12/18  1:45 PM  
Result Value Ref Range  Troponin-I, Qt. <0.02 0.00 - 5.27 NG/ML  
METABOLIC PANEL, BASIC  
 Collection Time: 11/12/18  5:35 PM  
Result Value Ref Range Sodium 138 136 - 145 mmol/L Potassium 5.7 (H) 3.5 - 5.5 mmol/L Chloride 109 (H) 100 - 108 mmol/L  
 CO2 19 (L) 21 - 32 mmol/L Anion gap 10 3.0 - 18 mmol/L Glucose 100 (H) 74 - 99 mg/dL BUN 51 (H) 7.0 - 18 MG/DL Creatinine 4.22 (H) 0.6 - 1.3 MG/DL  
 BUN/Creatinine ratio 12 12 - 20 GFR est AA 17 (L) >60 ml/min/1.73m2 GFR est non-AA 14 (L) >60 ml/min/1.73m2 Calcium 7.8 (L) 8.5 - 10.1 MG/DL  
GLUCOSE, POC Collection Time: 11/12/18  9:19 PM  
Result Value Ref Range Glucose (POC) 81 70 - 110 mg/dL HEMOGLOBIN A1C WITH EAG Collection Time: 11/12/18  9:56 PM  
Result Value Ref Range Hemoglobin A1c 7.6 (H) 4.2 - 5.6 % Est. average glucose 171 mg/dL NT-PRO BNP Collection Time: 11/12/18  9:56 PM  
Result Value Ref Range NT pro- 0 - 900 PG/ML  
URINALYSIS W/ RFLX MICROSCOPIC Collection Time: 11/12/18 10:30 PM  
Result Value Ref Range Color YELLOW Appearance CLEAR Specific gravity 1.012 1.005 - 1.030    
 pH (UA) 6.0 5.0 - 8.0 Protein 30 (A) NEG mg/dL Glucose NEGATIVE  NEG mg/dL Ketone NEGATIVE  NEG mg/dL Bilirubin NEGATIVE  NEG Blood TRACE (A) NEG Urobilinogen 0.2 0.2 - 1.0 EU/dL Nitrites NEGATIVE  NEG Leukocyte Esterase MODERATE (A) NEG URINE MICROSCOPIC ONLY Collection Time: 11/12/18 10:30 PM  
Result Value Ref Range WBC 1 to 4 0 - 4 /hpf  
 RBC 1 to 4 0 - 5 /hpf Epithelial cells FEW 0 - 5 /lpf Bacteria 2+ (A) NEG /hpf CREATININE, UR, RANDOM Collection Time: 11/12/18 10:30 PM  
Result Value Ref Range Creatinine, urine 49.30 30 - 125 mg/dL PROTEIN URINE, RANDOM Collection Time: 11/12/18 10:30 PM  
Result Value Ref Range Protein, urine random 57 (H) <11.9 mg/dL SODIUM, UR, RANDOM Collection Time: 11/12/18 10:30 PM  
Result Value Ref Range  Sodium,urine random 104 20 - 592 MMOL/L  
METABOLIC PANEL, BASIC  
 Collection Time: 11/13/18  2:56 AM  
Result Value Ref Range Sodium 142 136 - 145 mmol/L Potassium 6.0 (H) 3.5 - 5.5 mmol/L Chloride 117 (H) 100 - 108 mmol/L  
 CO2 16 (L) 21 - 32 mmol/L Anion gap 9 3.0 - 18 mmol/L Glucose 87 74 - 99 mg/dL BUN 46 (H) 7.0 - 18 MG/DL Creatinine 3.81 (H) 0.6 - 1.3 MG/DL  
 BUN/Creatinine ratio 12 12 - 20 GFR est AA 19 (L) >60 ml/min/1.73m2 GFR est non-AA 16 (L) >60 ml/min/1.73m2 Calcium 8.4 (L) 8.5 - 10.1 MG/DL MAGNESIUM Collection Time: 11/13/18  2:56 AM  
Result Value Ref Range Magnesium 2.6 1.6 - 2.6 mg/dL PHOSPHORUS Collection Time: 11/13/18  2:56 AM  
Result Value Ref Range Phosphorus 4.9 2.5 - 4.9 MG/DL  
CBC WITH AUTOMATED DIFF Collection Time: 11/13/18  2:56 AM  
Result Value Ref Range WBC 6.4 4.6 - 13.2 K/uL  
 RBC 3.71 (L) 4.70 - 5.50 M/uL HGB 9.8 (L) 13.0 - 16.0 g/dL HCT 29.2 (L) 36.0 - 48.0 % MCV 78.7 74.0 - 97.0 FL  
 MCH 26.4 24.0 - 34.0 PG  
 MCHC 33.6 31.0 - 37.0 g/dL  
 RDW 17.0 (H) 11.6 - 14.5 % PLATELET 094 833 - 076 K/uL MPV 9.9 9.2 - 11.8 FL  
 NEUTROPHILS 45 40 - 73 % LYMPHOCYTES 40 21 - 52 % MONOCYTES 7 3 - 10 % EOSINOPHILS 8 (H) 0 - 5 % BASOPHILS 0 0 - 2 %  
 ABS. NEUTROPHILS 2.9 1.8 - 8.0 K/UL  
 ABS. LYMPHOCYTES 2.6 0.9 - 3.6 K/UL  
 ABS. MONOCYTES 0.5 0.05 - 1.2 K/UL  
 ABS. EOSINOPHILS 0.5 (H) 0.0 - 0.4 K/UL  
 ABS. BASOPHILS 0.0 0.0 - 0.1 K/UL  
 DF AUTOMATED    
GLUCOSE, POC Collection Time: 11/13/18  7:19 AM  
Result Value Ref Range Glucose (POC) 67 (L) 70 - 110 mg/dL GLUCOSE, POC Collection Time: 11/13/18  7:40 AM  
Result Value Ref Range Glucose (POC) 98 70 - 110 mg/dL Signed By: Juan Norwood PA-C  November 13, 2018 11:00 AM

## 2018-11-13 NOTE — CONSULTS
Consult Note  Consult requested by: dr Tosha Sanders is a 75 y.o. male [address] who is being seen on consult for renal failure  Chief Complaint   Patient presents with    Decreased Appetite    Cough     Admission diagnosis: Acute on chronic renal failure (HCC)     HPI: pt was admitted with worsening renal function,hyperkalemia. hx of crf stage 3,htn,dm,proteinuria. developed urinary retention 2 weeks ago,gutierrez was placed and changed by urology. was placed on bactrim for uti,no nsaids,no other nephrotoxic meds  Past Medical History:   Diagnosis Date    Cataract     both    Colon polyp 9-2004    Diabetes ([facility] [address]     IDDM    DJD (degenerative joint disease) of knee     ED (erectile dysfunction)     GERD (gastroesophageal reflux disease) 12-01-08    Glaucoma suspect     Gout, unspecified 10-25-06    Hyperlipidemia     Hypertension     Insomnia 7-20-06    Phimosis 6-16-00    Prostate cancer ([facility] [address] 3-3-09    Stroke [facility])       Past Surgical History:   Procedure Laterality Date    HX POLYPECTOMY  9-2004    [place] COLONOSCOPY FLX DX W/COLLJ SPEC WHEN PFRMD  9/1/2004    polyp/Dr Alvarado    OH COLONOSCOPY FLX DX W/COLLJ SPEC WHEN PFRMD  9-2007    incomplete; Dr. Rosalinda Fregoso History     Socioeconomic History    Marital status:      Spouse name: Not on file    Number of children: Not on file    Years of education: Not on file    Highest education level: Not on file   Social Needs    Financial resource strain: Not on file    Food insecurity - worry: Not on file    Food insecurity - inability: Not on file   [employer] needs - medical: Not on file   [employer] needs - non-medical: Not on file   Occupational History    Not on file   Tobacco Use    Smoking status: Former Smoker    Smokeless tobacco: Never Used   Substance and Sexual Activity    Alcohol use: No    Drug use: No    Sexual activity: No   Other Topics Concern    Not on file   Social History Narrative    Not on file       Family History   Problem Relation Age of Onset    Hypertension Mother     Diabetes Father     Cancer Father         prostate    Heart Disease Father 70        CABG    Hypertension Sister     Hypertension Brother     Diabetes Brother      No Known Allergies     Home Medications:     Prior to Admission Medications   Prescriptions Last Dose Informant Patient Reported? Taking? Insulin Needles, Disposable, (BD INSULIN PEN NEEDLE UF MINI) 31 gauge x 3/16\" ndle   No No   Sig: Sig: test blood glucose BID. E11.9   OTHER   No No   Sig: Check CBC, CMP, Mg in 5 days, results to PCP   allopurinol (ZYLOPRIM) 100 mg tablet   No No   Sig: TAKE 1 TABLET BY MOUTH TWICE DAILY   aspirin delayed-release 81 mg tablet   Yes No   Sig: Take 81 mg by mouth daily. carvedilol (COREG) 12.5 mg tablet   No No   Sig: TAKE 1 TABLET BY MOUTH TWICE A DAY   chlorpheniramine-HYDROcodone (TUSSIONEX) 10-8 mg/5 mL suspension   No No   Sig: Take 5 mL by mouth every twelve (12) hours as needed for Cough. Max Daily Amount: 10 mL. cholecalciferol, VITAMIN D3, (VITAMIN D3) 5,000 unit tab tablet   Yes No   Sig: Take  by mouth every seven (7) days. dilTIAZem (TIAZAC) 360 mg ER capsule   No No   Sig: TAKE ONE CAPSULE BY MOUTH EVERY DAY   glucose 4 gram chewable tablet   No No   Sig: Take 4 Tabs by mouth as needed. insulin lispro (HUMALOG KWIKPEN INSULIN) 100 unit/mL kwikpen   No No   Sig: Check FSBS Three times daily with meals,  For sugar between 150 and 200- give 1 units SQ,  For sugar between 201 and 250- give 3 units SQ,   omega-3 fatty acids-vitamin e (FISH OIL) 1,000 mg Cap   Yes No   Sig: Take 1,400 mg by mouth daily.    rosuvastatin (CRESTOR) 20 mg tablet   No No   Sig: TAKE 1 TABLET BY MOUTH NIGHTLY   tamsulosin (FLOMAX) 0.4 mg capsule   No No   Sig: One cap q d pc   trimethoprim-sulfamethoxazole (BACTRIM DS, SEPTRA DS) 160-800 mg per tablet   No No   Sig: Take 1 Tab by mouth two (2) times a day for 10 days. Facility-Administered Medications: None       Current Facility-Administered Medications   Medication Dose Route Frequency    sodium polystyrene (KAYEXALATE) 15 gram/60 mL oral suspension 30 g  30 g Oral Q4H    0.9% sodium chloride infusion  100 mL/hr IntraVENous CONTINUOUS    dilTIAZem CD (CARDIZEM CD) capsule 360 mg  360 mg Oral DAILY    carvedilol (COREG) tablet 12.5 mg  12.5 mg Oral BID WITH MEALS    rosuvastatin (CRESTOR) tablet 20 mg  20 mg Oral QHS    tamsulosin (FLOMAX) capsule 0.4 mg  0.4 mg Oral DAILY    acetaminophen (TYLENOL) tablet 650 mg  650 mg Oral Q6H PRN    oxyCODONE-acetaminophen (PERCOCET) 5-325 mg per tablet 1 Tab  1 Tab Oral Q6H PRN    naloxone (NARCAN) injection 0.4 mg  0.4 mg IntraVENous PRN    ondansetron (ZOFRAN) injection 4 mg  4 mg IntraVENous Q6H PRN    docusate sodium (COLACE) capsule 100 mg  100 mg Oral BID PRN    insulin lispro (HUMALOG) injection   SubCUTAneous AC&HS    glucose chewable tablet 16 g  4 Tab Oral PRN    glucagon (GLUCAGEN) injection 1 mg  1 mg IntraMUSCular PRN    dextrose (D50W) injection syrg 12.5-25 g  25-50 mL IntraVENous PRN    heparin (porcine) injection 5,000 Units  5,000 Units SubCUTAneous Q8H    cefTRIAXone (ROCEPHIN) 2 g in sterile water (preservative free) 20 mL IV syringe  2 g IntraVENous Q24H    dextrose (D50W) injection syrg 25 g  25 g IntraVENous PRN    influenza vaccine 2018-19 (6 mos+)(PF) (FLUARIX QUAD/FLULAVAL QUAD) injection 0.5 mL  0.5 mL IntraMUSCular PRIOR TO DISCHARGE       Review of Systems:   A comprehensive review of systems was negative except for that written in the HPI.   Data Review:    Labs: Results:       Chemistry Recent Labs     11/13/18  0256 11/12/18  1735 11/12/18  1345   GLU 87 100* 181*    138 136   K 6.0* 5.7* 5.5   * 109* 104   CO2 16* 19* 17*   BUN 46* 51* 57*   CREA 3.81* 4.22* 4.97*   CA 8.4* 7.8* 8.9   AGAP 9 10 15   BUCR 12 12 11*   AP  --   --  58   TP  --   --  8.3*   ALB  -- --  3.4   GLOB  --   --  4.9*   AGRAT  --   --  0.7*      PTH  Lab Results   Component Value Date/Time    Calcium 8.4 (L) 11/13/2018 02:56 AM    Phosphorus 4.9 11/13/2018 02:56 AM      CBC w/Diff Recent Labs     11/13/18  0256 11/12/18  1345   WBC 6.4 7.2   RBC 3.71* 4.56*   HGB 9.8* 12.2*   HCT 29.2* 36.0    370   GRANS 45 59   LYMPH 40 30   EOS 8* 5      Coagulation No results for input(s): PTP, INR, APTT in the last 72 hours. No lab exists for component: INREXT    Iron/Ferritin No results for input(s): IRON in the last 72 hours. No lab exists for component: TIBCCALC   BNP No results for input(s): BNPP in the last 72 hours. Cardiac Enzymes No results for input(s): CPK, CKND1, LISA in the last 72 hours. No lab exists for component: CKRMB, TROIP   Liver Enzymes Recent Labs     11/12/18  1345   TP 8.3*   ALB 3.4   AP 58   SGOT 23      Thyroid Studies Lab Results   Component Value Date/Time    TSH 1.15 06/04/2018 02:35 PM        Urinalysis Lab Results   Component Value Date/Time    Color YELLOW 11/12/2018 10:30 PM    Appearance CLEAR 11/12/2018 10:30 PM    Specific gravity 1.012 11/12/2018 10:30 PM    pH (UA) 6.0 11/12/2018 10:30 PM    Protein 30 (A) 11/12/2018 10:30 PM    Glucose NEGATIVE  11/12/2018 10:30 PM    Ketone NEGATIVE  11/12/2018 10:30 PM    Bilirubin NEGATIVE  11/12/2018 10:30 PM    Urobilinogen 0.2 11/12/2018 10:30 PM    Nitrites NEGATIVE  11/12/2018 10:30 PM    Leukocyte Esterase MODERATE (A) 11/12/2018 10:30 PM    Epithelial cells FEW 11/12/2018 10:30 PM    Bacteria 2+ (A) 11/12/2018 10:30 PM    WBC 1 to 4 11/12/2018 10:30 PM    RBC 1 to 4 11/12/2018 10:30 PM         IMAGES:   XR Results (maximum last 3): Results from [place] encounter on 11/12/18   XR ABD (KUB)    Narrative Abdomen supine    History: Constipation    Comparison: None    Description:     Erect and supine AP radiographs of the abdomen are reviewed.       Gas and stool are seen throughout the colon to the level of the rectum. There is  no evidence to suggest obstruction. No intraperitoneal free air. No pathologic  calcification. Imaged lung bases are clear. Osseous structures are unremarkable. Impression Impression:      No evidence of mechanical bowel obstruction or free intraperitoneal air. Thank you for this referral.   XR CHEST PA LAT    Narrative Chest PA and lateral views    CPT CODE: 06765    HISTORY:Cough, chest congestion     COMPARISON: 10/20/18    FINDINGS: The cardiac silhouette is no longer enlarged. The lungs are well  expanded. Mild opacities developed in left lower lung could be mild infiltration  or atelectasis. Small right pleural effusion posterior sulcus is new. The  mediastinum, pulmonary vascularity and bony thorax appear unremarkable. Impression IMPRESSION:    Mild opacities in left lower lung which could be mild infiltration or  atelectasis. Small right pleural fluid. Thank you for your referral.   Results from [place] encounter on 10/20/18   XR ANKLE RT MIN 3 V    Narrative EXAM: Right Ankle X-ray    CLINICAL INDICATION: Fall with swollen foot. Unable to ambulate. TECHNIQUE: 3 views of the right ankle    COMPARISON: None    FINDINGS:   There is an ossicle or small avulsion fracture from the anterior aspect of the  medial malleolus. Correlate with physical tenderness is recommended. No  additional fracture or dislocation identified. The joint spaces are preserved. No evidence of erosions or periostitis. No lytic or blastic focus present. Subcutaneous edema is noted. Impression IMPRESSION:  1. Small ossicle versus small avulsion fracture from the anterior tip of the  medial malleolus. Correlate with physical tenderness. CT Results (maximum last 3): Results from [place] encounter on 10/20/18   CT HEAD WO CONT    Narrative EXAM: CT HEAD WITHOUT CONTRAST.     CLINICAL HISTORY/INDICATION:  Neuro deficit, acute single, stable or partly  resolved , gait instability, patient fell day prior to arrival with difficulty  walking after the fall    COMPARISON: CT head August 19, 2016. TECHNIQUE:  No IV contrast administered. 5 mm thick slices were obtained from skull base to  vertex. Coronal and sagittal reformations obtained. All CT scans at this facility are performed using dose optimization technique as  appropriate to a performed exam, to include automated exposure control,  adjustment of the mA and/or kV according to patient's size (including  appropriate matching for site-specific examinations), or use of iterative  reconstruction technique. FINDINGS:    There are no intra or extra axial fluid collections. There is no hemorrhage. There is mild stable global enlargement of the ventricular system, cortical  sulci, and basilar cisterns. Moderate stable Periventricular low density changes are seen bilaterally. Tiny focal chronic lacunar infarction is demonstrated within the   Basal ganglia and fouzia without change. There are multiple punctate hyperdensities within the anterior scalp dermis  similar to the previous exam.        Impression IMPRESSION:    No acute infarct, mass, nor hemorrhage. Moderate stable Atrophy and Small vessel disease. Chronic lacunar infarcts. Report provided to the emergency department at 1626 hrs. Results from Hospital Encounter encounter on 10/21/16   CT CHEST WO CONT    Narrative CT CHEST WITHOUT ENHANCEMENT    INDICATION: Hypoglycemia and altered mental status with concern for pneumothorax  or pneumomediastinum on same day chest radiograph. TECHNIQUE: Axial images obtained from the thoracic inlet to the level of the  diaphragm without intravenous contrast. Coronal and sagittal reformatted images  were obtained.     All CT scans at this facility are performed using dose optimization technique as  appropriate to a performed exam, to include automated exposure control,  adjustment of the mA and/or kV according to patient size (including appropriate  matching first site-specific examinations), or use of iterative reconstruction  technique. COMPARISON: Chest radiograph 10/21/2016; CT abdomen pelvis 8/19/2016. CHEST FINDINGS:   The evaluation of the mediastinum, hilum and vascular structures is limited in  the absence of intravenous contrast.         Thyroid: Unremarkable in its visualized aspects. Pericardium/ Heart: No pericardial effusion. Coronary artery atherosclerosis. Aorta/ Vessels: Ectasia of ascending aorta up to 3.6 cm. Mild atherosclerosis. Lymph Nodes: Unremarkable. Darletta Reagan No pneumomediastinum. Lungs: There is no pneumothorax. Mild central bronchial wall thickening. Mild  regions of subsegmental atelectasis or scarring at the lung bases. Mild  atelectasis otherwise at the lung bases. Pleura: Trace pleural thickening bilaterally. Upper Abdomen: There is marked gastric distention, new since prior exam. No  abrupt narrowing at the pylorus or second portion duodenum. Pancreas is  atrophic. Moderate stool burden is incompletely visualized. Bones/soft tissues: Mild symmetric bilateral gynecomastia. Degenerative changes  lumbar spine. Impression IMPRESSION:    1. There is no pneumomediastinum or pneumothorax. Radiographic finding likely  reflected tracheal air column with patient rotated. 2.  Ectasia ascending aorta up to 3.6 cm.  3.  Mild bronchitis. 4.  Marked gastric distention, new since prior exam. Correlate clinically for  gastric outlet obstruction. 5.  Moderate stool burden, likely constipation.      Results from [place] encounter on 08/19/16   CT ABD PELV WO CONT    Narrative CT SCAN OF THE ABDOMEN AND PELVIS, WITHOUT CONTRAST     INDICATION: Nausea/vomiting    COMPARISON: CT abdomen/pelvis 8/6/2007    TECHNIQUE: Serial axial CT images through the abdomen and pelvis were obtained  using helical technique, without use of IV contrast.  Additional coronal and  sagittal reformation images were also performed. All CT scans at this facility  are performed using dose optimization technique as appropriate to a performed  exam, to include automated exposure control, adjustment of the mA and/or kV  according to patient's size (including appropriate matching for site-specific  examinations), or use of iterative reconstruction technique. FINDINGS:    Abdomen findings:    Please note that evaluation of the solid abdominal viscera is limited without IV  contrast. Artifact from patient's upper extremity is also limited evaluation. Mosaic groundglass attenuation at the lung bases, possibly subsegmental  atelectasis or mild edema. Coronary artery disease noted. Small hiatal hernia. The liver, spleen, gallbladder, pancreas, kidneys, and adrenal glands appear  unremarkable. The abdominal aorta is nonaneurysmal, with mild to moderate  atherosclerosis. .  No mesenteric or retroperitoneal adenopathy. No free  intraperitoneal air. Trace fluid along the lower right peritoneum at the pelvic  inlet. Pelvis findings: The bowel is nonobstructed. .  The appendix is normal. Large amount of fecal  retention throughout the colon, probably constipated and possibly impacted at  the rectum. The urinary bladder is unremarkable. No pelvic mass or adenopathy. .    Osseous findings: No acute osseous abnormalities identified. Degenerative  changes, most prominent disc disease at L5/S1. Impression Impression:     1. No definite evidence of acute process in the abdomen/pelvis. Trace fluid in  the lower right peritoneum is of uncertain significance or etiology. 2. Large amount of retained fecal material, likely constipated and possibly  impacted at the rectum. 3. Mild mosaic groundglass attenuation at the lung bases, favoring subsegmental  atelectasis or edema. Infiltrate not entirely excluded. 4. Small hiatal hernia. MRI Results (maximum last 3):   Results from Hospital Encounter encounter on 09/28/14   MRI BRAIN WO CONT    Narrative Brain MR Without Contrast    CPT CODE: [id number]    HISTORY: Altered mental status, encephalopathy, hypoglycemia evaluate for  stroke. COMPARISON: MRI 10/9/06, CT 9/28/14. TECHNIQUE: Brain scanned with sagittal and axial T1W scans, axial T2W and FLAIR  scans, axial T2*GRE and axial diffusion weighted images. FINDINGS:     Symmetric signal abnormality in the bilateral middle cerebellar peduncles has  resolved since the 2006 MRI. Extensive chronic pontine lacunar infarcts are new  since previous. Chronic bilateral thalamic and left basal ganglia infarcts with  Wallerian degeneration in the left cerebral peduncle. Moderately advanced T2 and  FLAIR hyperintense periventricular white matter disease, progressed. No  restricted diffusion abnormality to suggest acute infarction. Flow voids are  present in the major arteries at the base of brain suggesting patency. No mass  effect or mass lesion detected.] Brain volumes are within normal limits for age. No disproportionate ventricular enlargement to suggest hydrocephalus. There is  susceptibility artifact associated with the chronic left basal ganglia infarct  suggesting hemosiderin. Visualized paranasal sinuses and mastoid air cells are  without significant mucosal disease. Impression IMPRESSION:    No acute infarct. Progression of moderately advanced chronic small vessel ischemic disease. Extensive chronic pontine infarcts are new since 2006. Chronic bilateral  thalamic and left basal ganglia lacunar infarcts with evidence of Wallerian  degeneration. Nuclear Medicine Results (maximum last 3): No results found for this or any previous visit. US Results (maximum last 3): No results found for this or any previous visit. DEXA Results (maximum last 3): No results found for this or any previous visit. MARY Results (maximum last 3):   No results found for this or any previous visit. IR Results (maximum last 3): No results found for this or any previous visit. VAS/US Results (maximum last 3): No results found for this or any previous visit. PET Results (maximum last 3): No results found for this or any previous visit. No results found for this or any previous visit. @LASTPROCAMB([id number])    CULTURE:   )No results for input(s): SDES, CULT in the last 72 hours. No results for input(s): CULT in the last 72 hours. Physical Assessment:     Visit Vitals  /61 (BP 1 Location: Left arm, BP Patient Position: Supine)   Pulse 65   Temp 98.7 °F (37.1 °C)   Resp 16   Ht 5' 6.5\" (1.689 m)   Wt 70.7 kg (155 lb 12.8 oz)   SpO2 95%   BMI 24.77 kg/m²     Last 3 Recorded Weights in this Encounter    11/12/18 1334 11/13/18 0439   Weight: 71.7 kg (158 lb) 70.7 kg (155 lb 12.8 oz)       Intake/Output Summary (Last 24 hours) at 11/13/2018 1025  Last data filed at 11/13/2018 0439  Gross per 24 hour   Intake 260 ml   Output 1700 ml   Net -1440 ml       Physial Exam:  General appearance: alert, cooperative, no distress, appears stated age  Skin: normal coloration and turgor, no rashes, no suspicious skin lesions noted. HEENT: Head; normocephalic, atraumatic. DIMAS. ENT- ENT exam normal, no neck nodes or sinus tenderness. Lungs: clear to auscultation bilaterally  Heart: regular rate and rhythm, S1, S2 normal, no murmur, click, rub or gallop  Abdomen: soft, non-tender. Bowel sounds normal. No masses,  no organomegaly  Extremities: extremities normal, atraumatic, no cyanosis or edema    PLAN / RECOMMENDATION:    Acute/crf stage 3,related to bactrim,urinary retention ,hx of dm,htn,reviewed medical records and labs,discussed with son. continue gutierrez,ivf,check renal ultrasound,labs  Hyperkalemia related to renal failure,bactrim. give kayexalate,low potassium diet  crf stage 3/proteinuria  is related to dm,htn . check urine protein/creatinine     Thank you for the consultation to participate in patient's care. I have personally discussed my plan with the referring physician.      Armando Dempsey MD  November 13, 2018

## 2018-11-13 NOTE — PROGRESS NOTES
11/13/2018 RE: Devorah Fernández To Whom it May Concern: This is to certify that Devorah Fernández has been hospitalized and under my care since 11/12/18. Please excuse his son, Nicole Morales. Fanta Constable for any absence from work since yesterday as he has been attending to his father in this acute period of illness. Please feel free to contact my office if you have any questions or concerns. Thank you for your assistance in this matter.  
 
Sincerely, 
 
 
 
Jia Mcfarlane MD

## 2018-11-13 NOTE — TELEPHONE ENCOUNTER
Pt's daughter called to let provider know that the pt was hospitalized. Pt was severely dehydrated and had poor kidney function and his potassium was high. Please advise.   [phone]

## 2018-11-13 NOTE — ROUTINE PROCESS
Bedside and Verbal shift change report given to WILFREDO Holloway (oncoming nurse) by Liudmila Carrero (offgoing nurse). Report included the following information SBAR, Kardex, ED Summary and Recent Results. Pt hasn't arrived from [facility] ED at this time

## 2018-11-13 NOTE — PROGRESS NOTES
Consulted for EMIL Agree with hydration and gutierrez catheter. Hold bactrim Monitor urine output and renal function. Full consult to follow. Discussed with Dr. Sourav Bowles.

## 2018-11-13 NOTE — PROGRESS NOTES
Problem: Falls - Risk of 
Goal: *Absence of Falls Document Manuela Griffiths Fall Risk and appropriate interventions in the flowsheet. Outcome: Progressing Towards Goal 
Fall Risk Interventions: 
Mobility Interventions: OT consult for ADLs Mentation Interventions: Door open when patient unattended Medication Interventions: Bed/chair exit alarm, Patient to call before getting OOB, Teach patient to arise slowly Elimination Interventions: Call light in reach History of Falls Interventions: Door open when patient unattended, Evaluate medications/consider consulting pharmacy

## 2018-11-13 NOTE — PROGRESS NOTES
NUTRITION Nursing Referral: [facility] 
  
RECOMMENDATIONS / PLAN:  
 
- Change to soft solid diet with chopped meats. 
- Add supplements: Nepro TID. - Monitor and encourage po intake. - Continue RD inpatient monitoring and evaluation. NUTRITION INTERVENTIONS & DIAGNOSIS:  
 
[x] Meals/snacks: modify composition 
[x] Medical food supplement therapy: initiate Nutrition Diagnosis: Unintended weight loss related to inadequate energy intake, decreased appetite and nausea/vomiting/constipation as evidenced by 10 lb, 6% weight loss x week. Patient meets criteria for Severe Protein Calorie Malnutrition as evidenced by:  
ASPEN Malnutrition Criteria Acute Illness, Chronic Illness, or Social/Enviornmental: Acute illness Energy Intake: Less than/equal to 50% est energy req for greater than/equal to 5 days Weight Loss: Greater than 1-2% x 1 wk ASPEN Malnutrition Score - Acute Illness: 12 Acute Illness - Malnutrition Diagnosis: Severe malnutrition. ASSESSMENT:  
 
Pt admitted with acute on chronic renal failure, hyperkalemia treated with kayexalate today. Poor po intake for the past week with nausea/vomiting and constipation, last BM over a week PTA per pt and no evidence of obstruction or intraperitoneal free air on KUB yesterday. Ate 10% of lunch today. Agreeable to softer foods and supplements. Denies nausea/vomiting or abdominal pain. Average po intake adequate to meet patients estimated nutritional needs:   [] Yes     [x] No   [] Unable to determine at this time Diet: DIET RENAL Regular Food Allergies: NKFA Current Appetite:   [] Good     [] Fair     [x] Poor     [] Other: 
Appetite/meal intake prior to admission:   [] Good     [] Fair     [x] Poor, eating very little for the past week PTA with nausea/vomiting and constipation- eating well before then per family     [] Other: 
Feeding Limitations:  [] Swallowing difficulty    [x] Chewing difficulty: poor dentition, missing teeth    [] Other: 
Current Meal Intake: No data found. BM: PTA (over a week PTA per pt) Skin Integrity: WDL Edema:   [x] No     [] Yes Pertinent Medications: Reviewed: kayexalate, colace, SSI, zofran, NS at 100 mL/hr Recent Labs 11/13/18 
1225 11/13/18 
0256 11/12/18 
1735 11/12/18 
1345 NA  --  142 138 136  
K 5.8* 6.0* 5.7* 5.5  
CL  --  117* 109* 104 CO2  --  16* 19* 17* GLU  --  87 100* 181* BUN  --  46* 51* 57* CREA  --  3.81* 4.22* 4.97* CA  --  8.4* 7.8* 8.9 MG  --  2.6  --   --   
PHOS  --  4.9  --   --   
ALB  --   --   --  3.4 SGOT  --   --   --  23 ALT  --   --   --  21 Intake/Output Summary (Last 24 hours) at 11/13/2018 1421 Last data filed at 11/13/2018 8854 Gross per 24 hour Intake 260 ml Output 1700 ml Net -1440 ml Anthropometrics: 
Ht Readings from Last 1 Encounters:  
11/13/18 5' 6.5\" (1.689 m) Last 3 Recorded Weights in this Encounter 11/12/18 1334 11/13/18 6190 Weight: 71.7 kg (158 lb) 70.7 kg (155 lb 12.8 oz) Body mass index is 24.77 kg/m². Weight History: recent 10 lb (6%) weight loss over the past week PTA per son report Weight Metrics 11/13/2018 11/12/2018 11/9/2018 11/7/2018 11/2/2018 10/26/2018 10/23/2018 Weight 155 lb 12.8 oz - 158 lb 158 lb - 165 lb 165 lb BMI - 24.77 kg/m2 25.5 kg/m2 25.5 kg/m2 26.23 kg/m2 26.23 kg/m2 26.63 kg/m2 Admitting Diagnosis: Acute on chronic renal failure (HCC) Dehydration Poor conditioning Indwelling Sparrow catheter present Failure to thrive in adult Generalized weakness Pertinent PMHx: CVA, dementia, HTN, DM, HLD, GERD, CKD stage III Education Needs:        [x] None identified  [] Identified - Not appropriate at this time  []  Identified and addressed - refer to education log Learning Limitations:   [] None identified  [x] Identified: hx of dementia Cultural, Faith & ethnic food preferences:  [x] None identified    [] Identified and addressed ESTIMATED NUTRITION NEEDS:  
 
Calories: 0934-4089 kcal (25-30 kcal/kg) based on  [x] Actual BW 71 kg     [] IBW Protein: 57-85 gm (0.8-1.2 gm/kg) based on  [x] Actual BW      [] IBW Fluid: 1 mL/kcal 
  
MONITORING & EVALUATION:  
 
Nutrition Goal(s): 1. Po intake of meals will meet >75% of patient estimated nutritional needs within the next 7 days. Outcome:  [] Met/Ongoing    []  Not Met    [x] New/Initial Goal  
 
Monitoring:   [x] Food and beverage intake   [x] Diet order   [x] Nutrition-focused physical findings   [x] Treatment/therapy   [] Weight   [] Enteral nutrition intake Previous Recommendations (for follow-up assessments only):     []   Implemented       []   Not Implemented (RD to address)      [] No Longer Appropriate     [] No Recommendation Made Discharge Planning: cardiac, diabetic, renal diet, consistency as tolerated  
[x] Participated in care planning, discharge planning, & interdisciplinary rounds as appropriate Alessandro Valdez RD, [address]  Pager: [phone]

## 2018-11-13 NOTE — H&P
History & Physical 
Patient: Nikunj Sin MRN: [id number]  CSN: [id number] YOB: 1945  Age: 75 y.o. Sex: male DOA: 11/12/2018 Chief Complaint:  
Chief Complaint Patient presents with  Decreased Appetite  Cough HPI:  
 
Nikunj Sin is a 75 y.o.  male who has PMH of CVA, moderate dementia, unsteady gate and HTN. Pt was recently Dx with symptomatic BPH with urinary retention where a gutierrez cath was placed [facility] 2 weeks ago and was placed on Bactrim for UTI. Pt followed with urology who refilled his Bactrim and continued his gutierrez cath for now till further investigation as he failed his voiding trial by Dr Alexander Mace Pt [facility] with generalized weakness, loss of appetite, N/V and constipation x 1 week as per Pt's son. On admission, Pt has +ve UA, his abdominal exam is benign. States that( they told me I am dehydrated ). Most of HPI was provided by Pt's son and daughter. Family states that Pt also started to have sporadic cough with while sputum but denies SOB. Pt also Denies CP/SOB/fever/abdominal pain and has no cough at time of admission Past Medical History:  
Diagnosis Date  Cataract   
 both  Colon polyp 9-2004  Diabetes ([facility] [address] IDDM  DJD (degenerative joint disease) of knee  ED (erectile dysfunction)  GERD (gastroesophageal reflux disease) 12-01-08  Glaucoma suspect   Gout, unspecified 10-25-06  Hyperlipidemia  Hypertension  Insomnia 7-20-06  Phimosis 6-16-00  Prostate cancer ([address] 3-3-09  Stroke ([facility] [address] Past Surgical History:  
Procedure Laterality Date  HX POLYPECTOMY  [id number]  [place] COLONOSCOPY FLX DX W/COLLJ [facility] INPATIENT REHABILITATION WHEN PFRMD  9/1/2004  
 polyp/Dr Alvarado  [place] COLONOSCOPY FLX DX W/COLLJ SPEC WHEN PFRMD  9-2007  
 incomplete; Dr. Carlos Shoemaker Family History Problem Relation Age of Onset  Hypertension Mother  Diabetes Father  Cancer Father   
     prostate  Heart Disease Father 70 CABG  Hypertension Sister  Hypertension Brother  Diabetes Brother Social History Socioeconomic History  Marital status:  Spouse name: Not on file  Number of children: Not on file  Years of education: Not on file  Highest education level: Not on file Social Needs  Financial resource strain: Not on file  Food insecurity - worry: Not on file  Food insecurity - inability: Not on file  Transportation needs - medical: Not on file  Transportation needs - non-medical: Not on file Occupational History  Not on file Tobacco Use  Smoking status: Former Smoker  Smokeless tobacco: Never Used Substance and Sexual Activity  Alcohol use: No  
 Drug use: No  
 Sexual activity: No  
Other Topics Concern  Not on file Social History Narrative  Not on file Prior to Admission medications Medication Sig Start Date End Date Taking? Authorizing Provider  
trimethoprim-sulfamethoxazole (BACTRIM DS, SEPTRA DS) 160-800 mg per tablet Take 1 Tab by mouth two (2) times a day for 10 days. 11/2/18 11/12/18  Maria Eugenia Craven MD  
tamsulosin [facility]) 0.4 mg capsule One cap q d pc 11/2/18   Maria Eugenia Craven MD  
aspirin delayed-release 81 mg tablet Take 81 mg by mouth daily. Provider, Historical  
cholecalciferol, VITAMIN D3, (VITAMIN D3) 5,000 unit tab tablet Take  by mouth every seven (7) days. Provider, Historical  
carvedilol (COREG) 12.5 mg tablet TAKE 1 TABLET BY MOUTH TWICE A DAY 9/7/18   Sarah Dennis DO  
allopurinol (ZYLOPRIM) 100 mg tablet TAKE 1 TABLET BY MOUTH TWICE DAILY 6/10/18   Sarah Dennis DO  
dilTIAZem (TIAZAC) 360 mg ER capsule TAKE ONE CAPSULE BY MOUTH EVERY DAY 6/4/18   Sarah Dennis DO  
glucose 4 gram chewable tablet Take 4 Tabs by mouth as needed.  6/4/18   Asya Purdy DO  
 rosuvastatin (CRESTOR) 20 mg tablet TAKE 1 TABLET BY MOUTH NIGHTLY 6/4/18   Sarah Dennis DO  
chlorpheniramine-HYDROcodone (TUSSIONEX) 10-8 mg/5 mL suspension Take 5 mL by mouth every twelve (12) hours as needed for Cough. Max Daily Amount: 10 mL. 6/4/18   Sarah Dennis DO  
insulin lispro (HUMALOG KWIKPEN INSULIN) 100 unit/mL kwikpen Check FSBS Three times daily with meals,  For sugar between 150 and 200- give 1 units SQ,  For sugar between 201 and 250- give 3 units SQ, 3/23/18   Annamarie Rivera MD  
Insulin Needles, Disposable, (BD INSULIN PEN NEEDLE UF MINI) 31 gauge x 3/16\" ndle Sig: test blood glucose BID. E11.9 2/15/18   Sarah Dennis,   
OTHER Check CBC, CMP, Mg in 5 days, results to PCP 9/30/14   Carlo Ganser, MD  
omega-3 fatty acids-vitamin e (FISH OIL) 1,000 mg Cap Take 1,400 mg by mouth daily. 5/10/10   Provider, Historical  
 
 
No Known Allergies Review of Systems GENERAL: Patient alert, awake and oriented x 2, able to communicate full sentences and not in distress. HEENT: No change in vision, no earache, tinnitus, sore throat or sinus congestion. NECK: No pain or stiffness. PULMONARY: No shortness of breath, has productive cough with white sputum Cardiovascular: no pnd / orthopnea, no CP GASTROINTESTINAL: No abdominal pain, nausea, vomiting or diarrhea, melena or bright red blood per rectum. Constipated x 1 week . +ve for loss of appetite GENITOURINARY: No urinary frequency, urgency, hesitancy or dysuria. MUSCULOSKELETAL: No joint or muscle pain, no back pain, no recent trauma. Has chronic unsteady gate DERMATOLOGIC: No rash, no itching, no lesions. ENDOCRINE: No polyuria, polydipsia, no heat or cold intolerance. No recent change in weight. HEMATOLOGICAL: No anemia or easy bruising or bleeding. NEUROLOGIC: No headache, seizures, numbness, tingling +ve weakness. Chronic unsteady gate. Uses a walker Physical Exam:  
 
Physical Exam: 
Visit Vitals /59 (BP 1 Location: Left arm, BP Patient Position: At rest) Pulse (!) 56 Temp 97.9 °F (36.6 °C) Resp 18 Ht 5' 6.5\" (1.689 m) Wt 71.7 kg (158 lb) SpO2 96% BMI 25.12 kg/m² O2 Device: Room air Temp (24hrs), Av °F (36.7 °C), Min:97.9 °F (36.6 °C), Max:98.1 °F (36.7 °C) No intake/output data recorded. No intake/output data recorded. General:  Alert, cooperative, no distress, appears stated age. Moderately demented Head: Normocephalic, without obvious abnormality, atraumatic. Eyes:  Conjunctivae/corneas clear. PERRL, EOMs intact. Nose: Nares normal. No drainage or sinus tenderness. Neck: Supple, symmetrical, trachea midline, no adenopathy, thyroid: no enlargement, no carotid bruit and no JVD. Lungs:   Clear to auscultation bilaterally. +ve mild rales Heart:  Regular rate and rhythm, S1, S2 normal.  
  Abdomen: Soft, non-tender. Bowel sounds normal.   
Extremities: Extremities normal, atraumatic, no cyanosis or edema. Pulses: 2+ and symmetric all extremities. Skin:  No rashes or lesions Neurologic: AAOx2, unsteady gate. Labs Reviewed: 
 
Lab results reviewed. For significant abnormal values and values requiring intervention, see assessment and plan. CXR and EKG Procedures/imaging: see electronic medical records for all procedures/Xrays and details which were not copied into this note but were reviewed prior to creation of Plan Assessment/Plan Principal Problem: 
  Acute on chronic renal failure ([facility] [address] (2018) Active Problems: 
  Essential hypertension (2011) Benign prostatic hyperplasia with urinary retention (10/18/2017) Dehydration (2018) Generalized weakness (2018) Failure to thrive in adult (2018) Poor conditioning (2018) Indwelling Sparrow catheter present (2018) Other constipation (2018) Hyperkalemia (2018) Pt is admitted for generalized weakness, failure to thrive, loss of appetite, constipation x 1 week with Nausea and vomiting Acute on Chronic renal failure 2 ry to above and could 2 ry to Bactrim Hyperkalemia Acute UTI despite being on Bactrim x 10 + days Symptomatic BPH and was placed on indwelling gutierrez 10 days ago for urinary retention Mild pleural effusion on CXR with productive cough and white sputum Will start Pt on Ceftriaxone for now and d/c Bactrim Continue IVF and monitor for fluid overload Will for BNP but will hold on echo for now Will repeat Clean Catch UA and get Cx Constipation >> will get KUB and monitor >> abdomen is benign Will add bowel care after KUB Hold ASA and Bactrim Continue Home meds for HTN Continue BB and CCB Hx of CVA 11 years ago >> on ASA only . Pt has unsteady gate and uses a walker >> holding ASA BPH >> symptomatic >> has indwelling gutierrez >> continue Flomax and gutierrez ( changed 2 days ago) Hyperkalemia >> will give Insulin and dextrose and monitor Renal Diet Nephrology consult is called DM >> Mild get SSI once daily >> continue SSI 
 
 
DVT/GI Prophylaxis: Hep SQ Plan of care is discussed in details with Patient/Family at bedside and agreed upon Hero Anaya MD 
11/12/2018 8:43 PM

## 2018-11-13 NOTE — PROGRESS NOTES
Problem: Falls - Risk of 
Goal: *Absence of Falls Document Authur Senters Fall Risk and appropriate interventions in the flowsheet. Outcome: Progressing Towards Goal 
Fall Risk Interventions: 
Mobility Interventions: Assess mobility with egress test, Bed/chair exit alarm, Communicate number of staff needed for ambulation/transfer, OT consult for ADLs, Patient to call before getting OOB, PT Consult for mobility concerns, PT Consult for assist device competence, Strengthening exercises (ROM-active/passive), Utilize walker, cane, or other assistive device Medication Interventions: Bed/chair exit alarm, Patient to call before getting OOB, Teach patient to arise slowly Elimination Interventions: Bed/chair exit alarm, Call light in reach, Elevated toilet seat, Patient to call for help with toileting needs, Toilet paper/wipes in reach, Toileting schedule/hourly rounds History of Falls Interventions: Bed/chair exit alarm, Consult care management for discharge planning, Door open when patient unattended, Investigate reason for fall Problem: Pressure Injury - Risk of 
Goal: *Prevention of pressure injury Document Breezy Scale and appropriate interventions in the flowsheet. Outcome: Progressing Towards Goal 
Pressure Injury Interventions: 
Sensory Interventions: Assess changes in LOC, Assess need for specialty bed, Avoid rigorous massage over bony prominences, Check visual cues for pain, Discuss PT/OT consult with provider, Keep linens dry and wrinkle-free, Maintain/enhance activity level, Minimize linen layers, Pressure redistribution bed/mattress (bed type) Activity Interventions: Increase time out of bed, Pressure redistribution bed/mattress(bed type), PT/OT evaluation Mobility Interventions: Assess need for specialty bed, HOB 30 degrees or less, Pressure redistribution bed/mattress (bed type), PT/OT evaluation Nutrition Interventions: Document food/fluid/supplement intake, Discuss nutritional consult with provider, Offer support with meals,snacks and hydration Friction and Shear Interventions: Apply protective barrier, creams and emollients, Foam dressings/transparent film/skin sealants, HOB 30 degrees or less, Lift sheet, Minimize layers

## 2018-11-13 NOTE — ROUTINE PROCESS
0719 Accu chek blood sugar 67, 120ml grape juice given. 0740 Repeat accu chek 98.  Set up to eat breakfast.

## 2018-11-13 NOTE — ROUTINE PROCESS
Bedside and Verbal shift change report given to Gregoria Milan RN (oncoming nurse) by GERARD Waldron RN (offgoing nurse). Report given with SBAR, Kardex, MAR and Recent Results.

## 2018-11-13 NOTE — PROGRESS NOTES
Reason for Admission:   Acute on chronic renal faliure RRAT Score:      
          
Resources/supports as identified by patient/family:   Prescriptions filled at [facility] 
             
Top Challenges facing patient (as identified by patient/family and CM): Finances/Medication cost?      Not reported Transportation?   son Support system or lack thereof? Son and daughter Living arrangements? Lives with son Cheng Pittman.  
[phone] Self-care/ADLs/Cognition? Pt is alert and forgetful. (hx of dementia Current Advanced Directive/Advance Care Plan:   Pt does not have an advanced care plan 
                       
Plan for utilizing home health: To be determined pending PT/OT eval 
                   
Likelihood of readmission:  high Transition of Care Plan:  Demographics confirmed. Pt lives with his son yousif single story home. ADL's were with some assistance prior to admission. No skilled nursing facility or  home health devices received. No durable medical equipment in the home. Discharge paln is to be determined at this time. Freedom of Choice signed for [facility]. home health list given to pt and son for consideration. Care Management Interventions PCP Verified by Marco A Rivera PCP appointment 6/2018) Mode of Transport at Discharge: (to be determined) Transition of Care Consult (CM Consult): Discharge Planning Physical Therapy Consult: Yes Occupational Therapy Consult: Yes Speech Therapy Consult: No 
Current Support Network: Relative's Home Confirm Follow Up Transport: (to be determined) Plan discussed with Pt/Family/Caregiver: Yes Freedom of Choice Offered: Yes Discharge Location Discharge Placement: (home vs snf) Yoav Ramsey RN, BSN 
[phone]

## 2018-11-14 LAB
ANION GAP SERPL CALC-SCNC: 8 MMOL/L (ref 3–18)
BASOPHILS # BLD: 0 K/UL (ref 0–0.1)
BASOPHILS NFR BLD: 0 % (ref 0–2)
BUN SERPL-MCNC: 32 MG/DL (ref 7–18)
BUN/CREAT SERPL: 10 (ref 12–20)
CALCIUM SERPL-MCNC: 8.3 MG/DL (ref 8.5–10.1)
CHLORIDE SERPL-SCNC: 118 MMOL/L (ref 100–108)
CO2 SERPL-SCNC: 19 MMOL/L (ref 21–32)
CREAT SERPL-MCNC: 3.09 MG/DL (ref 0.6–1.3)
DIFFERENTIAL METHOD BLD: ABNORMAL
EOSINOPHIL # BLD: 0.6 K/UL (ref 0–0.4)
EOSINOPHIL NFR BLD: 9 % (ref 0–5)
ERYTHROCYTE [DISTWIDTH] IN BLOOD BY AUTOMATED COUNT: 17.3 % (ref 11.6–14.5)
GLUCOSE BLD STRIP.AUTO-MCNC: 112 MG/DL (ref 70–110)
GLUCOSE BLD STRIP.AUTO-MCNC: 113 MG/DL (ref 70–110)
GLUCOSE BLD STRIP.AUTO-MCNC: 134 MG/DL (ref 70–110)
GLUCOSE BLD STRIP.AUTO-MCNC: 82 MG/DL (ref 70–110)
GLUCOSE SERPL-MCNC: 85 MG/DL (ref 74–99)
HCT VFR BLD AUTO: 30.4 % (ref 36–48)
HGB BLD-MCNC: 10.3 G/DL (ref 13–16)
LYMPHOCYTES # BLD: 2.8 K/UL (ref 0.9–3.6)
LYMPHOCYTES NFR BLD: 39 % (ref 21–52)
MCH RBC QN AUTO: 26.6 PG (ref 24–34)
MCHC RBC AUTO-ENTMCNC: 33.9 G/DL (ref 31–37)
MCV RBC AUTO: 78.6 FL (ref 74–97)
MONOCYTES # BLD: 0.5 K/UL (ref 0.05–1.2)
MONOCYTES NFR BLD: 7 % (ref 3–10)
NEUTS SEG # BLD: 3.1 K/UL (ref 1.8–8)
NEUTS SEG NFR BLD: 45 % (ref 40–73)
PLATELET # BLD AUTO: 305 K/UL (ref 135–420)
PMV BLD AUTO: 9.7 FL (ref 9.2–11.8)
POTASSIUM SERPL-SCNC: 4.9 MMOL/L (ref 3.5–5.5)
RBC # BLD AUTO: 3.87 M/UL (ref 4.7–5.5)
SODIUM SERPL-SCNC: 145 MMOL/L (ref 136–145)
WBC # BLD AUTO: 7 K/UL (ref 4.6–13.2)

## 2018-11-14 PROCEDURE — 74011000250 HC RX REV CODE- 250: Performed by: INTERNAL MEDICINE

## 2018-11-14 PROCEDURE — 97165 OT EVAL LOW COMPLEX 30 MIN: CPT

## 2018-11-14 PROCEDURE — 65660000000 HC RM CCU STEPDOWN

## 2018-11-14 PROCEDURE — 85025 COMPLETE CBC W/AUTO DIFF WBC: CPT

## 2018-11-14 PROCEDURE — 82962 GLUCOSE BLOOD TEST: CPT

## 2018-11-14 PROCEDURE — 97116 GAIT TRAINING THERAPY: CPT

## 2018-11-14 PROCEDURE — 97161 PT EVAL LOW COMPLEX 20 MIN: CPT

## 2018-11-14 PROCEDURE — 36415 COLL VENOUS BLD VENIPUNCTURE: CPT

## 2018-11-14 PROCEDURE — 74011250636 HC RX REV CODE- 250/636: Performed by: INTERNAL MEDICINE

## 2018-11-14 PROCEDURE — 80048 BASIC METABOLIC PNL TOTAL CA: CPT

## 2018-11-14 PROCEDURE — 74011250637 HC RX REV CODE- 250/637: Performed by: INTERNAL MEDICINE

## 2018-11-14 PROCEDURE — 97530 THERAPEUTIC ACTIVITIES: CPT

## 2018-11-14 RX ADMIN — CEFTRIAXONE SODIUM 2 G: 2 INJECTION, POWDER, FOR SOLUTION INTRAMUSCULAR; INTRAVENOUS at 21:12

## 2018-11-14 RX ADMIN — SODIUM CHLORIDE 100 ML/HR: 900 INJECTION, SOLUTION INTRAVENOUS at 01:22

## 2018-11-14 RX ADMIN — CARVEDILOL 12.5 MG: 12.5 TABLET, FILM COATED ORAL at 08:39

## 2018-11-14 RX ADMIN — DILTIAZEM HYDROCHLORIDE 360 MG: 180 CAPSULE, COATED, EXTENDED RELEASE ORAL at 08:39

## 2018-11-14 RX ADMIN — HEPARIN SODIUM 5000 UNITS: 5000 INJECTION, SOLUTION INTRAVENOUS; SUBCUTANEOUS at 12:57

## 2018-11-14 RX ADMIN — CARVEDILOL 12.5 MG: 12.5 TABLET, FILM COATED ORAL at 16:58

## 2018-11-14 RX ADMIN — HEPARIN SODIUM 5000 UNITS: 5000 INJECTION, SOLUTION INTRAVENOUS; SUBCUTANEOUS at 04:57

## 2018-11-14 RX ADMIN — TAMSULOSIN HYDROCHLORIDE 0.4 MG: 0.4 CAPSULE ORAL at 08:39

## 2018-11-14 RX ADMIN — ROSUVASTATIN CALCIUM 20 MG: 20 TABLET, FILM COATED ORAL at 21:13

## 2018-11-14 RX ADMIN — HEPARIN SODIUM 5000 UNITS: 5000 INJECTION, SOLUTION INTRAVENOUS; SUBCUTANEOUS at 21:13

## 2018-11-14 NOTE — ROUTINE PROCESS
Bedside and Verbal shift change report given to Nate King (oncoming nurse) by GERARD Waldron RN (offgoing nurse). Report given with SBAR, Kardex, MAR and Recent Results.

## 2018-11-14 NOTE — PROGRESS NOTES
RENAL DAILY PROGRESS NOTE Patient: Caryn Patel               Sex: male          DOA: 11/12/2018  1:23 PM  
    
YOB: 1945      Age:  75 y.o.        LOS:  LOS: 2 days Subjective:  
 
Caryn Patel is a 75 y.o.  who presents with Acute on chronic renal failure ([facility] [address] Dehydration Poor conditioning Indwelling Gutierrez catheter present Failure to thrive in adult Generalized weakness. Asked to evaluate for acute/crf. hx of crf stage 3,dm,htn. deveioped urinary retention 2 weeks ago,gutierrez placed . was receiving bactrim for uti Chief complains: Patient denies nausea, vomiting, chest pain, dizziness, shortness of breath or headache. 
- Reviewed last 24 hrs events Current Facility-Administered Medications Medication Dose Route Frequency  0.9% sodium chloride infusion  100 mL/hr IntraVENous CONTINUOUS  
 dilTIAZem CD (CARDIZEM CD) capsule 360 mg  360 mg Oral DAILY  carvedilol (COREG) tablet 12.5 mg  12.5 mg Oral BID WITH MEALS  rosuvastatin (CRESTOR) tablet 20 mg  20 mg Oral QHS  tamsulosin (FLOMAX) capsule 0.4 mg  0.4 mg Oral DAILY  acetaminophen (TYLENOL) tablet 650 mg  650 mg Oral Q6H PRN  
 oxyCODONE-acetaminophen (PERCOCET) 5-325 mg per tablet 1 Tab  1 Tab Oral Q6H PRN  
 naloxone (NARCAN) injection 0.4 mg  0.4 mg IntraVENous PRN  
 ondansetron (ZOFRAN) injection 4 mg  4 mg IntraVENous Q6H PRN  
 docusate sodium (COLACE) capsule 100 mg  100 mg Oral BID PRN  
 insulin lispro (HUMALOG) injection   SubCUTAneous AC&HS  
 glucose chewable tablet 16 g  4 Tab Oral PRN  
 glucagon (GLUCAGEN) injection 1 mg  1 mg IntraMUSCular PRN  
 dextrose (D50W) injection syrg 12.5-25 g  25-50 mL IntraVENous PRN  
 heparin (porcine) injection 5,000 Units  5,000 Units SubCUTAneous Q8H  
 cefTRIAXone (ROCEPHIN) 2 g in sterile water (preservative free) 20 mL IV syringe  2 g IntraVENous Q24H  
 dextrose (D50W) injection syrg 25 g  25 g IntraVENous PRN  
  influenza vaccine 2018-19 (6 mos+)(PF) (FLUARIX QUAD/FLULAVAL QUAD) injection 0.5 mL  0.5 mL IntraMUSCular PRIOR TO DISCHARGE Objective:  
 
Visit Vitals /77 (BP 1 Location: Left arm, BP Patient Position: Supine) Pulse 74 Temp 98.6 °F (37 °C) Resp 15 Ht 5' 6.5\" (1.689 m) Wt 70.7 kg (155 lb 12.8 oz) SpO2 96% BMI 24.77 kg/m² Intake/Output Summary (Last 24 hours) at 11/14/2018 1119 Last data filed at 11/14/2018 0965 Gross per 24 hour Intake 1380 ml Output 3900 ml Net -2520 ml Physical Examination:  
 
GEN: AAO X 3, NAD 
RS: Chest is bilateral equal, no wheezing / rales / crackles CVS: S1-S2 heard, RRR, No S3 / murmur Abdomen: Soft, Non tender, Not distended, Positive bowel sounds, no organomegaly, no CVA / supra pubic tenderness Extremities: No edema, no cyanosis, skin is warm on touch CNS: Awake & follows commands, CN II-XII are grossly intact. HEENT: Head is atraumatic, PERRLA, conjunctiva pink & non icteric. No JVD or carotid bruit Psychiatric: Normal mood, affect, judgement & memory Musculoskeletal: No gross joints or bone deformities Lymph Node: No palpable cervical, axillary or groin lymphadenopathy. Data Review:   
 
Labs:  
 
Hematology:  
Recent Labs 11/14/18 
0327 11/13/18 
0256 11/12/18 
1345 WBC 7.0 6.4 7.2 HGB 10.3* 9.8* 12.2* HCT 30.4* 29.2* 36.0 Chemistry:  
Recent Labs 11/14/18 
0327 11/13/18 
1225 11/13/18 
0256 11/12/18 
1735 11/12/18 
1345 BUN 32*  --  46* 51* 57* CREA 3.09*  --  3.81* 4.22* 4.97* CA 8.3*  --  8.4* 7.8* 8.9 ALB  --   --   --   --  3.4 K 4.9 5.8* 6.0* 5.7* 5.5   --  142 138 136 *  --  117* 109* 104 CO2 19*  --  16* 19* 17* PHOS  --   --  4.9  --   --   
GLU 85  --  87 100* 181* Images: 
 
XR (Most Recent). CXR reviewed by me and compared with previous CXR [place] [facility] [place] XR ABD (KUB) Narrative Abdomen supine History: Constipation Comparison: None Description:  
 
Erect and supine AP radiographs of the abdomen are reviewed. Gas and stool are seen throughout the colon to the level of the rectum. There is 
no evidence to suggest obstruction. No intraperitoneal free air. No pathologic 
calcification. Imaged lung bases are clear. Osseous structures are unremarkable. Impression Impression: No evidence of mechanical bowel obstruction or free intraperitoneal air. Thank you for this referral.  
  
 
CT (Most Recent) Results from Hospital Encounter encounter on 10/20/18 CT HEAD WO CONT Narrative EXAM: CT HEAD WITHOUT CONTRAST. CLINICAL HISTORY/INDICATION:  Neuro deficit, acute single, stable or partly 
resolved , gait instability, patient fell day prior to arrival with difficulty 
walking after the fall COMPARISON: CT head August 19, 2016. TECHNIQUE: 
No IV contrast administered. 5 mm thick slices were obtained from skull base to 
vertex. Coronal and sagittal reformations obtained. All CT scans at this facility are performed using dose optimization technique as 
appropriate to a performed exam, to include automated exposure control, 
adjustment of the mA and/or kV according to patient's size (including 
appropriate matching for site-specific examinations), or use of iterative 
reconstruction technique. FINDINGS: 
 
There are no intra or extra axial fluid collections. There is no hemorrhage. There is mild stable global enlargement of the ventricular system, cortical 
sulci, and basilar cisterns. Moderate stable Periventricular low density changes are seen bilaterally. Tiny focal chronic lacunar infarction is demonstrated within the  
Basal ganglia and fouzia without change. There are multiple punctate hyperdensities within the anterior scalp dermis 
similar to the previous exam. 
 
  
 Impression IMPRESSION: 
 
No acute infarct, mass, nor hemorrhage. Moderate stable Atrophy and Small vessel disease. Chronic lacunar infarcts. Report provided to the emergency department at 1626 hrs. EKG No results found for this or any previous visit. I have personally reviewed the old medical records and patient's previously known baseline  creatinine Plan / Recommendation: 1. Acute/crf stage 3,related to bactrim,urinary retention. improving. decrease ivf 2. crf stage 3 ,proteinuria ,related to dm and htn 3.hyperkalemia,resolved 4.mild metabolic acidosis,add bicarb if worse D/w Dr. Kamila Villanueva MD 
Nephrology 11/14/2018

## 2018-11-14 NOTE — PROGRESS NOTES
Problem: Falls - Risk of 
Goal: *Absence of Falls Document [facility] Fall Risk and appropriate interventions in the flowsheet. Outcome: Progressing Towards Goal 
Fall Risk Interventions: 
Mobility Interventions: Assess mobility with egress test, Bed/chair exit alarm, Communicate number of staff needed for ambulation/transfer, OT consult for ADLs, Patient to call before getting OOB, PT Consult for mobility concerns, PT Consult for assist device competence, Strengthening exercises (ROM-active/passive) Mentation Interventions: Adequate sleep, hydration, pain control, Bed/chair exit alarm, Door open when patient unattended, Family/sitter at bedside, Increase mobility, More frequent rounding, Reorient patient, Toileting rounds Medication Interventions: Bed/chair exit alarm, Patient to call before getting OOB, Teach patient to arise slowly Elimination Interventions: Bed/chair exit alarm, Call light in reach, Patient to call for help with toileting needs, Toilet paper/wipes in reach, Toileting schedule/hourly rounds History of Falls Interventions: Bed/chair exit alarm, Consult care management for discharge planning, Door open when patient unattended, Investigate reason for fall Problem: Pressure Injury - Risk of 
Goal: *Prevention of pressure injury Document Breezy Scale and appropriate interventions in the flowsheet. Outcome: Progressing Towards Goal 
Pressure Injury Interventions: 
Sensory Interventions: Assess changes in LOC, Assess need for specialty bed, Check visual cues for pain, Discuss PT/OT consult with provider, Keep linens dry and wrinkle-free, Maintain/enhance activity level, Minimize linen layers, Pressure redistribution bed/mattress (bed type) Activity Interventions: Increase time out of bed, Pressure redistribution bed/mattress(bed type), PT/OT evaluation Mobility Interventions: HOB 30 degrees or less, Pressure redistribution bed/mattress (bed type), PT/OT evaluation Nutrition Interventions: Document food/fluid/supplement intake, Discuss nutritional consult with provider, Offer support with meals,snacks and hydration Friction and Shear Interventions: Apply protective barrier, creams and emollients, Foam dressings/transparent film/skin sealants, HOB 30 degrees or less, Minimize layers

## 2018-11-14 NOTE — PROGRESS NOTES
[facility] Hospitalist Group Progress Note Patient: Roel Courser Age: 75 y.o. : 1945 MR#: [id number] SSN: [id number] Date: 2018 Subjective:  
 
Pt states he feels well. No complaints. No chest pain, SOB, N/V. D/w pt and son at bedside. Assessment/Plan: 1. Acute on Chronic renal failure stage 3 worsened d/t Bactrim 2. Hypertension 3. Benign prostatic hyperplasia with urinary retention with chronic indwelling gutierrez, follow Dr. Dony [facility] as OP. 4. Dehydration 5. Hyperkalemia 6. Constipation 7. Failure to thrive in adult 8. Hx of CVA 9. Dementia 10. Type 2 DM 11. Possible UTI  
  
Plan; 
Renal consulted, recs to cont gutierrez and IVF. Creatinine improving. received Kaexylate. Cont flomax. Cont SSI. Cont Rocephin for now, and follow urine cx.  
  
Goals of care: full code Disposition:  [x]PT/OT ordered   [x] Case management referral 
  
Case discussed with:  [x]Patient  []Family  [x]Nursing  []Case Management DVT Prophylaxis:  []Lovenox  []Hep SQ  [x]SCDs  []Coumadin   []On Heparin gtt 
  
Objective:  
VS:  
Visit Vitals /77 (BP 1 Location: Left arm, BP Patient Position: Supine) Pulse 74 Temp 98.6 °F (37 °C) Resp 15 Ht 5' 6.5\" (1.689 m) Wt 70.7 kg (155 lb 12.8 oz) SpO2 96% BMI 24.77 kg/m² Tmax/24hrs: Temp (24hrs), Av.4 °F (36.9 °C), Min:97.5 °F (36.4 °C), Max:98.9 °F (37.2 °C) Intake/Output Summary (Last 24 hours) at 2018 1036 Last data filed at 2018 0116 Gross per 24 hour Intake 1380 ml Output 3900 ml Net -2520 ml General:  Awake, alert, NAD Cardiovascular:  RRR Pulmonary: CTA  
GI:  NT, normal BS Extremities:  No edema or cyanosis Neuro: AAOx2 Labs:   
Recent Results (from the past 24 hour(s)) URINALYSIS W/MICROSCOPIC Collection Time: 18 11:15 AM  
Result Value Ref Range Color YELLOW Appearance CLEAR  Specific gravity 1.011 1.005 - 1.030    
 pH (UA) 6.0 5.0 - 8.0 Protein 30 (A) NEG mg/dL Glucose NEGATIVE  NEG mg/dL Ketone NEGATIVE  NEG mg/dL Bilirubin NEGATIVE  NEG Blood TRACE (A) NEG Urobilinogen 0.2 0.2 - 1.0 EU/dL Nitrites NEGATIVE  NEG Leukocyte Esterase MODERATE (A) NEG    
 WBC 4 to 10 0 - 4 /hpf  
 RBC 0 to 2 0 - 5 /hpf Epithelial cells FEW 0 - 5 /lpf Bacteria 1+ (A) NEG /hpf Hyaline cast 0 to 2 0 - 2 /lpf POTASSIUM Collection Time: 11/13/18 12:25 PM  
Result Value Ref Range Potassium 5.8 (H) 3.5 - 5.5 mmol/L  
GLUCOSE, POC Collection Time: 11/13/18  9:46 PM  
Result Value Ref Range Glucose (POC) 107 70 - 110 mg/dL METABOLIC PANEL, BASIC Collection Time: 11/14/18  3:27 AM  
Result Value Ref Range Sodium 145 136 - 145 mmol/L Potassium 4.9 3.5 - 5.5 mmol/L Chloride 118 (H) 100 - 108 mmol/L  
 CO2 19 (L) 21 - 32 mmol/L Anion gap 8 3.0 - 18 mmol/L Glucose 85 74 - 99 mg/dL BUN 32 (H) 7.0 - 18 MG/DL Creatinine 3.09 (H) 0.6 - 1.3 MG/DL  
 BUN/Creatinine ratio 10 (L) 12 - 20 GFR est AA 24 (L) >60 ml/min/1.73m2 GFR est non-AA 20 (L) >60 ml/min/1.73m2 Calcium 8.3 (L) 8.5 - 10.1 MG/DL  
CBC WITH AUTOMATED DIFF Collection Time: 11/14/18  3:27 AM  
Result Value Ref Range WBC 7.0 4.6 - 13.2 K/uL  
 RBC 3.87 (L) 4.70 - 5.50 M/uL  
 HGB 10.3 (L) 13.0 - 16.0 g/dL HCT 30.4 (L) 36.0 - 48.0 % MCV 78.6 74.0 - 97.0 FL  
 MCH 26.6 24.0 - 34.0 PG  
 MCHC 33.9 31.0 - 37.0 g/dL  
 RDW 17.3 (H) 11.6 - 14.5 % PLATELET 649 239 - 618 K/uL MPV 9.7 9.2 - 11.8 FL  
 NEUTROPHILS 45 40 - 73 % LYMPHOCYTES 39 21 - 52 % MONOCYTES 7 3 - 10 % EOSINOPHILS 9 (H) 0 - 5 % BASOPHILS 0 0 - 2 %  
 ABS. NEUTROPHILS 3.1 1.8 - 8.0 K/UL  
 ABS. LYMPHOCYTES 2.8 0.9 - 3.6 K/UL  
 ABS. MONOCYTES 0.5 0.05 - 1.2 K/UL  
 ABS. EOSINOPHILS 0.6 (H) 0.0 - 0.4 K/UL  
 ABS. BASOPHILS 0.0 0.0 - 0.1 K/UL  
 DF AUTOMATED    
GLUCOSE, POC  Collection Time: 11/14/18  6:42 AM  
 Result Value Ref Range Glucose (POC) 82 70 - 110 mg/dL Signed By: Juan Norwood PA-C  November 14, 2018 10:36 AM

## 2018-11-14 NOTE — ROUTINE PROCESS
Bedside and Verbal shift change report given to WILFREDO Holloway (oncoming nurse) by Dajuan Munoz (offgoing nurse). Report included the following information SBAR, Kardex and Recent Results.

## 2018-11-14 NOTE — CDMP QUERY
Please document if you concur with Dietitian's evaluation for: 
 
=>Severe protein Calorie malnutrition =>Other Explanation of clinical findings =>Unable to Determine (no explanation of clinical findings) The medical record reflects the following: 
 
Risk:elderly patient with noted \"Failure to thrive\" Clinical Indicators:H&P notes that patient presented with generalized weakness, loss of appetite, N&V and constipation x 1 week. ++RD noted--Nutrition Diagnosis: Unintended weight loss related to inadequate energy intake, decreased appetite and nausea/vomiting/constipation as evidenced by 10 lb, 6% weight loss x week.  
  
 
Treatment: Nepro TID Please clarify and document your clinical opinion in the progress notes and discharge summary including the definitive and/or presumptive diagnosis, (suspected or probable), related to the above clinical findings. Please include clinical findings supporting your diagnosis. If you DECLINE this query or would like to communicate with [facility], please utilize the \"[facility] message box\" at the TOP of the Progress Note on the right. Thank you, Isabel Oleary RN [facility] 
[phone]

## 2018-11-14 NOTE — TELEPHONE ENCOUNTER
Called patient and informed her that paperwork is ready for  at the . .  I also faxed the paperwork to [employer] per Daughters wishes.

## 2018-11-14 NOTE — PROGRESS NOTES
Patient is not available to be assessed at this time. Chaplain Neville [facility]  
[phone]

## 2018-11-14 NOTE — PROGRESS NOTES
Problem: Self Care Deficits Care Plan (Adult) Goal: *Acute Goals and Plan of Care (Insert Text) Occupational Therapy Goals Initiated 11/14/2018 within 7 day(s). 1.  Patient will perform lower body dressing with independence 2. Patient will perform grooming with modified independence in standing at sink level. 3.  Patient will perform toileting with modified independence. 4.  Patient will perform toilet transfers with modified independence. 5.  Patient will participate in upper extremity therapeutic exercise/activities with supervision/set-up for 12 minutes. Outcome: Progressing Towards Goal 
Occupational Therapy EVALUATION Patient: Caryn Patel (68 y.o. male) Date: 11/14/2018 Primary Diagnosis: Acute on chronic renal failure (HCC) Dehydration Poor conditioning Indwelling Sparrow catheter present Failure to thrive in adult Generalized weakness Precautions: falls ASSESSMENT : 
Mr. Jessica Perez is a 75 yr old male admitted to the hospital 11-12-18 with weakness, loss of appetite and nausea with vomiting. The pt is currently presenting below his baseline level of functioning for ADLs and mobility, as he requires contact guard assist for sit to stand with a RW, min assist for ambulating with a RW, and contact guard assist for lower body dressing, grooming in standing at sink level, and toileting. The pt is limited by noted gait instability and mild deconditioning, which restricts his ADL participation and overall functional independence. Patient will benefit from skilled intervention to address the above impairments. Patients rehabilitation potential is considered to be Good Factors which may influence rehabilitation potential include:  
[]             None noted []             Mental ability/status []             Medical condition [x]             Home/family situation and support systems (pt's son reported there is                                 no one who can provide initial supervision for the pt upon his return                                    home) []             Safety awareness []             Pain tolerance/management 
[]             Other: PLAN : 
Recommendations and Planned Interventions: 
[x]               Self Care Training                  [x]        Therapeutic Activities [x]               Functional Mobility Training    []        Cognitive Retraining 
[x]               Therapeutic Exercises           [x]        Endurance Activities [x]               Balance Training                   []        Neuromuscular Re-Education []               Visual/Perceptual Training     [x]   Home Safety Training 
[x]               Patient Education                 [x]        Family Training/Education []               Other (comment): Frequency/Duration: Patient will be followed by occupational therapy 1-2 times per day/3-4 days per week to address goals. Home Health with initial supervision vs. [facility] Discharge Recommendations: Further Equipment Recommendations for Discharge: To be determined Barriers to Learning/Limitations: None Compensate with: visual, verbal, tactile, kinesthetic cues/model PATIENT COMPLEXITY Eval Complexity: History: LOW Complexity : Brief history review ; Examination: LOW Complexity : 1-3 performance deficits relating to physical, cognitive , or psychosocial skils that result in activity limitations and / or participation restrictions ; Decision Making:MEDIUM Complexity : Patient may present with comorbidities that affect occupational performnce. Miniml to moderate modification of tasks or assistance (eg, physical or verbal ) with assesment(s) is necessary to enable patient to complete evaluation  Assessment: Low Complexity G-CODES:  
 
Self Care  Current  CJ= 20-39%  Goal  CI= 1-19%. The severity rating is based on the Level of Assistance required for Functional Mobility and ADLs. SUBJECTIVE:  
Patient was agreeable to participate in the OT session. OBJECTIVE DATA SUMMARY:  
 
Past Medical History:  
Diagnosis Date  Cataract   
 both  Colon polyp 9-2004  Diabetes ([facility] [address] IDDM  DJD (degenerative joint disease) of knee  ED (erectile dysfunction)  GERD (gastroesophageal reflux disease) 12-01-08  Glaucoma suspect   Gout, unspecified 10-25-06  Hyperlipidemia  Hypertension  Insomnia 7-20-06  Phimosis 6-16-00  Prostate cancer ([facility] [address] 3-3-09  Stroke ([facility] [address] Past Surgical History:  
Procedure Laterality Date  HX POLYPECTOMY  [id number]  WA COLONOSCOPY FLX DX W/COLLJ [facility] INPATIENT REHABILITATION WHEN PFRMD  9/1/2004  
 polyp/Dr Alvarado  WA COLONOSCOPY FLX DX W/COLLJ SPEC WHEN PFRMD  9-2007  
 incomplete; Dr. Amy Sargent Prior Level of Function/Home Situation: Patient was independent with ADLs, however his son managed the household cooking & cleaning. The pt does not drive, and he used quad cane for ambulation at all times. Home Situation Home Environment: Private residence # Steps to Enter: 3 Rails to Enter: Yes Hand Rails : Left One/Two Story Residence: One story Living Alone: No 
Support Systems: Child(edelmira), Family member(s) Patient Expects to be Discharged to[de-identified] Private residence Current DME Used/Available at Home: Freada Geovany, quad, Commode, bedside, Hospital bed, Lift chair, Shower chair, Walker, rolling, Wheelchair Tub or Shower Type: Tub/Shower combination 
[x]  Right hand dominant   []  Left hand dominantCognitive/Behavioral Status: 
Neurologic State: Alert Orientation Level: Disoriented to time;Oriented to person;Oriented to place(Partially oriented to situation) Cognition: Appropriate for age attention/concentration; Follows commands Skin: visible skin appeared intact Edema: none noted Vision/Perceptual:   
       
Acuity: [facility]) Coordination: 
Coordination: Within functional limits(BUE and BLE) Balance: Sitting: (normal) Sitting - Dynamic: (good) Standing: Impaired; With support Standing - Static: (fair+) Standing - Dynamic : (fair+ with RW) Strength: 
Strength: Within functional limits for BUE and BLE; BUE strength grossly 4/5 to 4+/5) Tone & Sensation: 
Tone: Normal(BUE and BLE) Sensation: Pt denied numbness and tingling of BUE and BLE Range of Motion: 
AROM: Within functional limits(BUE and BLE) Functional Mobility and Transfers for ADLs: 
Bed Mobility: 
  
Supine to Sit: Supervision Sit to Supine: Minimum assistance Scooting: Supervision Transfers: 
Sit to Stand: Contact guard assistance Stand to Sit: Contact guard assistance Treatment: OT provided functional transfer training, in order to maximize the pt's functional participation. The pt required contact guard assist with min verbal cues for scooting to the edge of the chair, in order to complete sit to stand with a RW (pt required increased efforts & 3 attempts before achieving standing). The pt ambulated ~20 feet with a RW requiring, contact guard to min assist for balance/steadying. Next, the pt required min assist for LE management, in order to complete sit to supine. ADL Assessment: 
Feeding: Independent Oral Facial Hygiene/Grooming: Contact guard assistance(in standing at sink level) Bathing: Contact guard assistance Upper Body Dressing: Independent Lower Body Dressing: Contact guard assistance Toileting: Contact guard assistance Pain: 
Pt reports 0/10 pain or discomfort prior to treatment.   
Pt reports 0/10 pain or discomfort post treatment. Activity Tolerance:  
good Please refer to the flowsheet for vital signs taken during this treatment. After treatment:  
[] Patient left in no apparent distress sitting up in chair 
[x] Patient left in no apparent distress in bed 
[x] Call bell left within reach 
[] Nursing notified 
[x] Son present [x] Bed alarm activated COMMUNICATION/EDUCATION:  
 [] Home safety education was provided and the patient/caregiver indicated understanding. [x] Patient/family have participated as able in goal setting and plan of care. [x] Patient/family agree to work toward stated goals and plan of care. [] Patient understands intent and goals of therapy, but is neutral about his/her participation. [] Patient is unable to participate in goal setting and plan of care. Thank you for this referral. 
Wolfgang Garcia OT Time Calculation: 24 mins

## 2018-11-14 NOTE — PROGRESS NOTES
1700 Patient is resting comfortably in bed. Patient makes no complaints of pain. Poor appetite noted. VS stable. Will continue to monitor.

## 2018-11-14 NOTE — PROGRESS NOTES
Problem: Mobility Impaired (Adult and Pediatric) Goal: *Acute Goals and Plan of Care (Insert Text) Physical Therapy Goals Initiated 11/14/2018 and to be accomplished within 7 day(s) 1. Patient will move from supine to sit and sit to supine , scoot up and down and roll side to side in bed with modified independence. 2.  Patient will transfer from bed to chair and chair to bed with supervision/set-up using the least restrictive device. 3.  Patient will perform sit to stand with supervision/set-up. 4.  Patient will ambulate with supervision/set-up for >150 feet with the least restrictive device. 5.  Patient will ascend/descend 3 stairs with 1 handrail(s) with supervision/set-up. physical Therapy EVALUATION Patient: Janes Oleary (55 y.o. male) Date: 11/14/2018 Primary Diagnosis: Acute on chronic renal failure (HCC) Dehydration Poor conditioning Indwelling Gutierrez catheter present Failure to thrive in adult Generalized weakness Precautions: Fall ASSESSMENT : 
Patient is 67yo M admitted to hospital for ARF and has gutierrez cath in place; patient alert and agreeable to therapy and was supine in bed upon arrival. Patient transferred to sitting and performed objective assessment. Patient then instructed with demo on sit <> stand transfer and attempted to stand, however patient not scooted close enough to EOB. Educated patient on scooting and body mechanics and patient then stood with Vannessa with posterior LoB, requiring therapist intervention. Patient then ambulated 150ft total with CG with Vannessa x5 occasions due to LoB towards right. Patient returned to room where he transferred to sitting in locked recliner with Vannsesa for controlled descent. Patient and son educated on recommendation for SNF at this time due to safety concerns.  Educated that if they chose not to purse SNF that therapist would recommend 24/7 supervision at home as patient had several LoB with therapist this session. They acknowledged understanding and denied further need for assist at this time. Educated patient on call bell use and not to get up without assist; he acknowledged understanding. Patient will benefit from skilled intervention to address the above impairments. Patients rehabilitation potential is considered to be Good Factors which may influence rehabilitation potential include:  
[]         None noted 
[]         Mental ability/status [x]         Medical condition 
[x]         Home/family situation and support systems 
[x]         Safety awareness 
[]         Pain tolerance/management 
[]         Other: PLAN : 
Recommendations and Planned Interventions: 
[x]           Bed Mobility Training             [x]    Neuromuscular Re-Education 
[x]           Transfer Training                   []    Orthotic/Prosthetic Training 
[x]           Gait Training                          []    Modalities [x]           Therapeutic Exercises          []    Edema Management/Control 
[x]           Therapeutic Activities            [x]    Patient and Family Training/Education 
[]           Other (comment): Frequency/Duration: Patient will be followed by physical therapy 1-2 times per day/4-7 days per week to address goals. Discharge Recommendations: Edenilson Hoffmann Further Equipment Recommendations for Discharge: N/A  
 
G-CODES Mobility [id number] Current  CJ= 20-39%   Goal  CI= 1-19%. The severity rating is based on the Level of Assistance required for Functional Mobility and ADLs. 
 
 
 
G-CODES Eval Complexity: History: MEDIUM  Complexity : 1-2 comorbidities / personal factors will impact the outcome/ POC Exam:LOW Complexity : 1-2 Standardized tests and measures addressing body structure, function, activity limitation and / or participation in recreation  Presentation: LOW Complexity : Stable, uncomplicated  Clinical Decision Making:Low Complexity   Overall Complexity:LOW SUBJECTIVE:  
Patient stated It's been like that.  referring to Right forefoot held in DF in standing; this affects patient's balance and he and son confirm patient did have balance issues PTA. OBJECTIVE DATA SUMMARY:  
 
Past Medical History:  
Diagnosis Date  Cataract   
 both  Colon polyp 9-2004  Diabetes ([facility] [address] IDDM  DJD (degenerative joint disease) of knee  ED (erectile dysfunction)  GERD (gastroesophageal reflux disease) 12-01-08  Glaucoma suspect   Gout, unspecified 10-25-06  Hyperlipidemia  Hypertension  Insomnia 7-20-06  Phimosis 6-16-00  Prostate cancer ([facility] [address] 3-3-09  Stroke ([facility] [address] Past Surgical History:  
Procedure Laterality Date  HX POLYPECTOMY  [id number]  DE COLONOSCOPY FLX DX W/COLLJ [facility] INPATIENT REHABILITATION WHEN PFRMD  9/1/2004  
 polyp/Dr Alvarado  DE COLONOSCOPY FLX DX W/COLLJ SPEC WHEN PFRMD  9-2007  
 incomplete; Dr. Supa Vitale Prior Level of Function/Home Situation: Patient lives with son in 1 story home with 3STE with HR. Patient reports his son works 9a-5p and that his daughter sometimes assists. Patient notes that he is home alone at times however. Patient uses Hurrycane for mobility and has FWW, SC, grab bars, and performs both basin bath and gets in/out of shower at times. Patient was able to perform I/ADL's before feeling sick apprx 1 week PTA. Home Situation Home Environment: (P) Private residence # Steps to Enter: (P) 3 Rails to Enter: (P) Yes Hand Rails : (P) Left One/Two Story Residence: (P) One story Living Alone: (P) No 
Support Systems: (P) Child(edelmira), Family member(s) Patient Expects to be Discharged to[de-identified] Private residence Current DME Used/Available at Home: (P) Cane, quad, Commode, bedside, Hospital bed, Lift chair, Shower chair, Walker, rolling, Wheelchair Tub or Shower Type: (P) Tub/Shower combinationCritical Behavior: 
Neurologic State: (P) Alert &Ox3 Strength:   
Strength: Generally decreased, functional(BLE) Tone & Sensation:  
Tone: Normal(BLE) Sensation: Intact(BLE) Range Of Motion: 
AROM: Within functional limits(BLE) Functional Mobility: 
Bed Mobility: 
 Supine to Sit: Supervision Scooting: Supervision Transfers: 
Sit to Stand: Minimum assistance Stand to Sit: Contact guard assistance;Minimum assistance Balance:  
Sitting: Intact Standing: Impaired; With support Standing - Static: Fair Standing - Dynamic : FairAmbulation/Gait Training: 
Distance (ft): 150 Feet (ft) Assistive Device: Walker, rolling Ambulation - Level of Assistance: Contact guard assistance;Minimal assistance Gait Abnormalities: Decreased step clearance;Trunk sway increased Base of Support: Narrowed Speed/Arpita: Slow Step Length: Left shortened;Right shortened Interventions: Tactile cues; Verbal cues; Visual/Demos Pain: 
Pt reports 0/10 pain or discomfort prior to treatment.   
Pt reports 0/10 pain or discomfort post treatment. Activity Tolerance:  
Patient tolerated activity well and was left resting with call bell by his side; denied dizziness, chest pain, and SOB during session. Please refer to the flowsheet for vital signs taken during this treatment. After treatment:  
[x]         Patient left in no apparent distress sitting up in chair 
[]         Patient left in no apparent distress in bed 
[x]         Call bell left within reach 
[]         Nursing notified 
[x]         Caregiver present 
[]         Bed alarm activated COMMUNICATION/EDUCATION:  
[x]         Fall prevention education was provided and the patient/caregiver indicated understanding. [x]         Patient/family have participated as able in goal setting and plan of care. [x]         Patient/family agree to work toward stated goals and plan of care. []         Patient understands intent and goals of therapy, but is neutral about his/her participation. []         Patient is unable to participate in goal setting and plan of care. Thank you for this referral. 
Juan Jose Zimmerman, PT Time Calculation: 25 mins

## 2018-11-15 VITALS
SYSTOLIC BLOOD PRESSURE: 159 MMHG | TEMPERATURE: 98.5 F | OXYGEN SATURATION: 97 % | RESPIRATION RATE: 18 BRPM | BODY MASS INDEX: 24.45 KG/M2 | HEART RATE: 64 BPM | HEIGHT: 67 IN | WEIGHT: 155.8 LBS | DIASTOLIC BLOOD PRESSURE: 75 MMHG

## 2018-11-15 LAB
ANION GAP SERPL CALC-SCNC: 8 MMOL/L (ref 3–18)
BACTERIA SPEC CULT: ABNORMAL
BASOPHILS # BLD: 0 K/UL (ref 0–0.1)
BASOPHILS NFR BLD: 0 % (ref 0–2)
BUN SERPL-MCNC: 24 MG/DL (ref 7–18)
BUN/CREAT SERPL: 9 (ref 12–20)
CALCIUM SERPL-MCNC: 8.5 MG/DL (ref 8.5–10.1)
CHLORIDE SERPL-SCNC: 115 MMOL/L (ref 100–108)
CO2 SERPL-SCNC: 18 MMOL/L (ref 21–32)
CREAT SERPL-MCNC: 2.53 MG/DL (ref 0.6–1.3)
DIFFERENTIAL METHOD BLD: ABNORMAL
EOSINOPHIL # BLD: 0.4 K/UL (ref 0–0.4)
EOSINOPHIL NFR BLD: 6 % (ref 0–5)
ERYTHROCYTE [DISTWIDTH] IN BLOOD BY AUTOMATED COUNT: 17.2 % (ref 11.6–14.5)
GLUCOSE BLD STRIP.AUTO-MCNC: 131 MG/DL (ref 70–110)
GLUCOSE BLD STRIP.AUTO-MCNC: 82 MG/DL (ref 70–110)
GLUCOSE SERPL-MCNC: 170 MG/DL (ref 74–99)
HCT VFR BLD AUTO: 30.7 % (ref 36–48)
HGB BLD-MCNC: 10 G/DL (ref 13–16)
LYMPHOCYTES # BLD: 2.4 K/UL (ref 0.9–3.6)
LYMPHOCYTES NFR BLD: 36 % (ref 21–52)
MCH RBC QN AUTO: 26 PG (ref 24–34)
MCHC RBC AUTO-ENTMCNC: 32.6 G/DL (ref 31–37)
MCV RBC AUTO: 79.9 FL (ref 74–97)
MONOCYTES # BLD: 0.5 K/UL (ref 0.05–1.2)
MONOCYTES NFR BLD: 8 % (ref 3–10)
NEUTS SEG # BLD: 3.3 K/UL (ref 1.8–8)
NEUTS SEG NFR BLD: 50 % (ref 40–73)
PLATELET # BLD AUTO: 299 K/UL (ref 135–420)
PMV BLD AUTO: 9.5 FL (ref 9.2–11.8)
POTASSIUM SERPL-SCNC: 4.9 MMOL/L (ref 3.5–5.5)
RBC # BLD AUTO: 3.84 M/UL (ref 4.7–5.5)
SERVICE CMNT-IMP: ABNORMAL
SODIUM SERPL-SCNC: 141 MMOL/L (ref 136–145)
WBC # BLD AUTO: 6.7 K/UL (ref 4.6–13.2)

## 2018-11-15 PROCEDURE — 97530 THERAPEUTIC ACTIVITIES: CPT

## 2018-11-15 PROCEDURE — 82962 GLUCOSE BLOOD TEST: CPT

## 2018-11-15 PROCEDURE — 74011250636 HC RX REV CODE- 250/636: Performed by: INTERNAL MEDICINE

## 2018-11-15 PROCEDURE — 90686 IIV4 VACC NO PRSV 0.5 ML IM: CPT | Performed by: INTERNAL MEDICINE

## 2018-11-15 PROCEDURE — 90471 IMMUNIZATION ADMIN: CPT

## 2018-11-15 PROCEDURE — 74011250637 HC RX REV CODE- 250/637: Performed by: PHYSICIAN ASSISTANT

## 2018-11-15 PROCEDURE — 85025 COMPLETE CBC W/AUTO DIFF WBC: CPT

## 2018-11-15 PROCEDURE — 74011250637 HC RX REV CODE- 250/637: Performed by: INTERNAL MEDICINE

## 2018-11-15 PROCEDURE — 97110 THERAPEUTIC EXERCISES: CPT

## 2018-11-15 PROCEDURE — 97116 GAIT TRAINING THERAPY: CPT

## 2018-11-15 PROCEDURE — 36415 COLL VENOUS BLD VENIPUNCTURE: CPT

## 2018-11-15 PROCEDURE — 80048 BASIC METABOLIC PNL TOTAL CA: CPT

## 2018-11-15 RX ORDER — CIPROFLOXACIN 500 MG/1
250 TABLET ORAL EVERY 12 HOURS
Status: DISCONTINUED | OUTPATIENT
Start: 2018-11-15 | End: 2018-11-15

## 2018-11-15 RX ORDER — INSULIN LISPRO 100 [IU]/ML
INJECTION, SOLUTION INTRAVENOUS; SUBCUTANEOUS
Qty: 1 VIAL | Refills: 0 | Status: SHIPPED
Start: 2018-11-15 | End: 2019-10-01 | Stop reason: SDUPTHER

## 2018-11-15 RX ORDER — CIPROFLOXACIN 250 MG/1
250 TABLET, FILM COATED ORAL 2 TIMES DAILY
Qty: 5 TAB | Refills: 0 | Status: SHIPPED | OUTPATIENT
Start: 2018-11-15 | End: 2018-12-07

## 2018-11-15 RX ORDER — CIPROFLOXACIN 500 MG/1
250 TABLET ORAL
Status: DISCONTINUED | OUTPATIENT
Start: 2018-11-15 | End: 2018-11-15 | Stop reason: HOSPADM

## 2018-11-15 RX ADMIN — SODIUM CHLORIDE 75 ML/HR: 900 INJECTION, SOLUTION INTRAVENOUS at 02:31

## 2018-11-15 RX ADMIN — INFLUENZA VIRUS VACCINE 0.5 ML: 15; 15; 15; 15 SUSPENSION INTRAMUSCULAR at 15:03

## 2018-11-15 RX ADMIN — CARVEDILOL 12.5 MG: 12.5 TABLET, FILM COATED ORAL at 11:32

## 2018-11-15 RX ADMIN — TAMSULOSIN HYDROCHLORIDE 0.4 MG: 0.4 CAPSULE ORAL at 11:33

## 2018-11-15 RX ADMIN — CIPROFLOXACIN 250 MG: 500 TABLET, FILM COATED ORAL at 11:37

## 2018-11-15 RX ADMIN — DILTIAZEM HYDROCHLORIDE 360 MG: 180 CAPSULE, COATED, EXTENDED RELEASE ORAL at 11:33

## 2018-11-15 RX ADMIN — HEPARIN SODIUM 5000 UNITS: 5000 INJECTION, SOLUTION INTRAVENOUS; SUBCUTANEOUS at 15:04

## 2018-11-15 RX ADMIN — HEPARIN SODIUM 5000 UNITS: 5000 INJECTION, SOLUTION INTRAVENOUS; SUBCUTANEOUS at 05:42

## 2018-11-15 NOTE — PROGRESS NOTES
Pt discharging today. Referral accepted at [facility]. Pt and son notified and agreeable to transfer. Pt will be transferred via [facility] transport at 2 pm.  D/C summary sent to [facility] via [employer]. Number for nursing to call report on is on pt transport envelope. No additional needs identified. Romulo Huynh RN, BSN 
[phone]

## 2018-11-15 NOTE — PROGRESS NOTES
NUTRITION Nursing Referral: [facility] 
  
RECOMMENDATIONS / PLAN:  
 
- Continue current nutrition interventions - Monitor and encourage po intake. - Continue RD inpatient monitoring and evaluation. NUTRITION INTERVENTIONS & DIAGNOSIS:  
 
[x] Meals/snacks: modify composition 
[x] Medical food supplement therapy: Nepro TID Nutrition Diagnosis: Unintended weight loss related to inadequate energy intake, decreased appetite and nausea/vomiting/constipation as evidenced by 10 lb, 6% weight loss x week. Patient meets criteria for Severe Protein Calorie Malnutrition as evidenced by:  
ASPEN Malnutrition Criteria Acute Illness, Chronic Illness, or Social/Enviornmental: Acute illness Energy Intake: Less than/equal to 50% est energy req for greater than/equal to 5 days Weight Loss: Greater than 1-2% x 1 wk ASPEN Malnutrition Score - Acute Illness: 12 Acute Illness - Malnutrition Diagnosis: Severe malnutrition. ASSESSMENT:  
 
11/15: Pt reported fair/good appetite and meal intake. Ate his breakfast today but poor po intake of lunch. Discussed daily alternatives menu; pt verbalized understanding. Consuming Nepro supplement; 75% intake of supplement. Had few unopened cartons. Hyperkalemia resolved; K 4.9 today; WNL. Kayexalate discontinues 11/13: Pt admitted with acute on chronic renal failure, hyperkalemia treated with kayexalate today. Poor po intake for the past week with nausea/vomiting and constipation, last BM over a week PTA per pt and no evidence of obstruction or intraperitoneal free air on KUB yesterday. Ate 10% of lunch today. Agreeable to softer foods and supplements. Denies nausea/vomiting or abdominal pain. Average po intake adequate to meet patients estimated nutritional needs:   [] Yes     [x] No   [] Unable to determine at this time Diet: DIET NUTRITIONAL SUPPLEMENTS Breakfast, Lunch, Dinner; NEPRO 
DIET RENAL Soft Solids Food Allergies: NKFA Current Appetite:   [x] Good     [x] Fair     [] Poor     [] Other: 
Appetite/meal intake prior to admission:   [] Good     [] Fair     [x] Poor, eating very little for the past week PTA with nausea/vomiting and constipation- eating well before then per family     [] Other: 
Feeding Limitations:  [] Swallowing difficulty    [x] Chewing difficulty: poor dentition, missing teeth    [] Other: 
Current Meal Intake:  
Patient Vitals for the past 100 hrs: 
 % Diet Eaten 11/15/18 0930 40 % 11/14/18 0822 60 % BM: 11/14 Skin Integrity: WDL Edema:   [x] No     [] Yes Pertinent Medications: Reviewed: colace, SSI, zofran, NS at 75 mL/hr Recent Labs 11/15/18 
4125 11/14/18 
0327 11/13/18 
1225 11/13/18 
0256  145  --  142  
K 4.9 4.9 5.8* 6.0*  
* 118*  --  117* CO2 18* 19*  --  16* * 85  --  87 BUN 24* 32*  --  46* CREA 2.53* 3.09*  --  3.81* CA 8.5 8.3*  --  8.4* MG  --   --   --  2.6 PHOS  --   --   --  4.9 Intake/Output Summary (Last 24 hours) at 11/15/2018 1354 Last data filed at 11/15/2018 0930 Gross per 24 hour Intake 1765 ml Output 1350 ml Net 415 ml Anthropometrics: 
Ht Readings from Last 1 Encounters:  
11/13/18 5' 6.5\" (1.689 m) Last 3 Recorded Weights in this Encounter 11/12/18 1334 11/13/18 9843 Weight: 71.7 kg (158 lb) 70.7 kg (155 lb 12.8 oz) Body mass index is 24.77 kg/m². Weight History: recent 10 lb (6%) weight loss over the past week PTA per son report Weight Metrics 11/13/2018 11/12/2018 11/9/2018 11/7/2018 11/2/2018 10/26/2018 10/23/2018 Weight 155 lb 12.8 oz - 158 lb 158 lb - 165 lb 165 lb BMI - 24.77 kg/m2 25.5 kg/m2 25.5 kg/m2 26.23 kg/m2 26.23 kg/m2 26.63 kg/m2 Admitting Diagnosis: Acute on chronic renal failure (HCC) Dehydration Poor conditioning Indwelling Sparrow catheter present Failure to thrive in adult Generalized weakness Pertinent PMHx: CVA, dementia, HTN, DM, HLD, GERD, CKD stage III Education Needs:        [x] None identified  [] Identified - Not appropriate at this time  []  Identified and addressed - refer to education log Learning Limitations:   [] None identified  [x] Identified: hx of dementia Cultural, Jain & ethnic food preferences:  [x] None identified    [] Identified and addressed ESTIMATED NUTRITION NEEDS:  
 
Calories: 1553-0782 kcal (25-30 kcal/kg) based on  [x] Actual BW 71 kg     [] IBW Protein: 57-85 gm (0.8-1.2 gm/kg) based on  [x] Actual BW      [] IBW Fluid: 1 mL/kcal 
  
MONITORING & EVALUATION:  
 
Nutrition Goal(s): 1. Po intake of meals will meet >75% of patient estimated nutritional needs within the next 7 days. Outcome:  [] Met/Ongoing    [x]  Not Met/Progressing   [] New/Initial Goal  
 
Monitoring:   [x] Food and beverage intake   [x] Diet order   [x] Nutrition-focused physical findings   [x] Treatment/therapy   [] Weight   [] Enteral nutrition intake Previous Recommendations (for follow-up assessments only):     [x]   Implemented       []   Not Implemented (RD to address)      [] No Longer Appropriate     [] No Recommendation Made Discharge Planning: cardiac, diabetic, renal diet, consistency as tolerated  
[x] Participated in care planning, discharge planning, & interdisciplinary rounds as appropriate Julia Eckert RD Pager: [phone]

## 2018-11-15 NOTE — PROGRESS NOTES
RENAL DAILY PROGRESS NOTE Patient: Ashwin Patrick               Sex: male          DOA: 11/12/2018  1:23 PM  
    
YOB: 1945      Age:  75 y.o.        LOS:  LOS: 3 days Subjective:  
 
Ashwin Patrick is a 75 y.o.  who presents with Acute on chronic renal failure ([facility] [address] Dehydration Poor conditioning Indwelling Gutierrez catheter present Failure to thrive in adult Generalized weakness. Asked to evaluate for acute/crf. hx of crf stage 3,dm,htn. deveioped urinary retention 2 weeks ago,gutierrez placed . was receiving bactrim for uti Chief complains: Patient denies nausea, vomiting, chest pain, dizziness, shortness of breath or headache. 
- Reviewed last 24 hrs events Current Facility-Administered Medications Medication Dose Route Frequency  ciprofloxacin HCl (CIPRO) tablet 250 mg  250 mg Oral Q18H  
 0.9% sodium chloride infusion  75 mL/hr IntraVENous CONTINUOUS  
 dilTIAZem CD (CARDIZEM CD) capsule 360 mg  360 mg Oral DAILY  carvedilol (COREG) tablet 12.5 mg  12.5 mg Oral BID WITH MEALS  rosuvastatin (CRESTOR) tablet 20 mg  20 mg Oral QHS  tamsulosin (FLOMAX) capsule 0.4 mg  0.4 mg Oral DAILY  acetaminophen (TYLENOL) tablet 650 mg  650 mg Oral Q6H PRN  
 oxyCODONE-acetaminophen (PERCOCET) 5-325 mg per tablet 1 Tab  1 Tab Oral Q6H PRN  
 naloxone (NARCAN) injection 0.4 mg  0.4 mg IntraVENous PRN  
 ondansetron (ZOFRAN) injection 4 mg  4 mg IntraVENous Q6H PRN  
 docusate sodium (COLACE) capsule 100 mg  100 mg Oral BID PRN  
 insulin lispro (HUMALOG) injection   SubCUTAneous AC&HS  
 glucose chewable tablet 16 g  4 Tab Oral PRN  
 glucagon (GLUCAGEN) injection 1 mg  1 mg IntraMUSCular PRN  
 dextrose (D50W) injection syrg 12.5-25 g  25-50 mL IntraVENous PRN  
 heparin (porcine) injection 5,000 Units  5,000 Units SubCUTAneous Q8H  
 influenza vaccine 2018-19 (6 mos+)(PF) (FLUARIX QUAD/FLULAVAL QUAD) injection 0.5 mL  0.5 mL IntraMUSCular PRIOR TO DISCHARGE Objective:  
 
Visit Vitals /75 (BP 1 Location: Left arm, BP Patient Position: Sitting) Pulse 73 Temp 98.4 °F (36.9 °C) Resp 18 Ht 5' 6.5\" (1.689 m) Wt 70.7 kg (155 lb 12.8 oz) SpO2 98% BMI 24.77 kg/m² Intake/Output Summary (Last 24 hours) at 11/15/2018 1110 Last data filed at 11/15/2018 0930 Gross per 24 hour Intake 1765 ml Output 1350 ml Net 415 ml Physical Examination:  
 
GEN: AAO X 3, NAD 
RS: Chest is bilateral equal, no wheezing / rales / crackles CVS: S1-S2 heard, RRR, No S3 / murmur Abdomen: Soft, Non tender, Not distended, Positive bowel sounds, no organomegaly, no CVA / supra pubic tenderness Extremities: No edema, no cyanosis, skin is warm on touch CNS: Awake & follows commands, CN II-XII are grossly intact. HEENT: Head is atraumatic, PERRLA, conjunctiva pink & non icteric. No JVD or carotid bruit Psychiatric: Normal mood, affect, judgement & memory Musculoskeletal: No gross joints or bone deformities Lymph Node: No palpable cervical, axillary or groin lymphadenopathy. Data Review:   
 
Labs:  
 
Hematology:  
Recent Labs 11/15/18 
0054 11/14/18 0327 11/13/18 
0256 11/12/18 
1345 WBC 6.7 7.0 6.4 7.2 HGB 10.0* 10.3* 9.8* 12.2* HCT 30.7* 30.4* 29.2* 36.0 Chemistry:  
Recent Labs 11/15/18 
0054 11/14/18 
0327 11/13/18 
1225 11/13/18 
0256 11/12/18 
1735 11/12/18 
1345 BUN 24* 32*  --  46* 51* 57* CREA 2.53* 3.09*  --  3.81* 4.22* 4.97* CA 8.5 8.3*  --  8.4* 7.8* 8.9 ALB  --   --   --   --   --  3.4 K 4.9 4.9 5.8* 6.0* 5.7* 5.5  145  --  142 138 136 * 118*  --  117* 109* 104 CO2 18* 19*  --  16* 19* 17* PHOS  --   --   --  4.9  --   --   
* 85  --  87 100* 181* Images: 
 
XR (Most Recent). CXR reviewed by me and compared with previous CXR Results from [facility] Encounter encounter on 11/12/18 XR ABD (PRAMOD) Narrative Abdomen supine History: Constipation Comparison: None Description:  
 
Erect and supine AP radiographs of the abdomen are reviewed. Gas and stool are seen throughout the colon to the level of the rectum. There is 
no evidence to suggest obstruction. No intraperitoneal free air. No pathologic 
calcification. Imaged lung bases are clear. Osseous structures are unremarkable. Impression Impression: No evidence of mechanical bowel obstruction or free intraperitoneal air. Thank you for this referral.  
  
 
CT (Most Recent) Results from Hospital Encounter encounter on 10/20/18 CT HEAD WO CONT Narrative EXAM: CT HEAD WITHOUT CONTRAST. CLINICAL HISTORY/INDICATION:  Neuro deficit, acute single, stable or partly 
resolved , gait instability, patient fell day prior to arrival with difficulty 
walking after the fall COMPARISON: CT head August 19, 2016. TECHNIQUE: 
No IV contrast administered. 5 mm thick slices were obtained from skull base to 
vertex. Coronal and sagittal reformations obtained. All CT scans at this facility are performed using dose optimization technique as 
appropriate to a performed exam, to include automated exposure control, 
adjustment of the mA and/or kV according to patient's size (including 
appropriate matching for site-specific examinations), or use of iterative 
reconstruction technique. FINDINGS: 
 
There are no intra or extra axial fluid collections. There is no hemorrhage. There is mild stable global enlargement of the ventricular system, cortical 
sulci, and basilar cisterns. Moderate stable Periventricular low density changes are seen bilaterally. Tiny focal chronic lacunar infarction is demonstrated within the  
Basal ganglia and fouzia without change.   
 
There are multiple punctate hyperdensities within the anterior scalp dermis 
similar to the previous exam. 
 
  
 Impression IMPRESSION: 
 
 No acute infarct, mass, nor hemorrhage. Moderate stable Atrophy and Small vessel disease. Chronic lacunar infarcts. Report provided to the emergency department at 1626 hrs. EKG No results found for this or any previous visit. I have personally reviewed the old medical records and patient's previously known baseline  creatinine Plan / Recommendation: 1. Acute/crf stage 3,related to bactrim,urinary retention. improving. stop ivf. ok for discharge with gutierrez. followup in my office. discussed with pt and his son 2. crf stage 3 ,proteinuria ,related to dm and htn 3.hyperkalemia,resolved 4.mild metabolic acidosis,add bicarb if worse D/w Dr. Alicia Faith MD 
Nephrology 11/15/2018

## 2018-11-15 NOTE — PROGRESS NOTES
Report given to EMS transport team S[facility]. No further questions at this time. Family aware of pt d/c, at bedside.  Pt belongings packed up and given to EMS team.

## 2018-11-15 NOTE — DISCHARGE SUMMARY
[facility] Hospitalist Group  Discharge Summary       Patient: Kirby Koenig Age: 75 y.o. : 1945 MR#: [id number] SSN: [id number]  PCP on record: Romelle Aschoff, DO  Admit date: 2018  Discharge date: 11/15/2018    Disposition:    []Home   []Home with Home Health   [x]SNF/NH   []Rehab   []Home with family   []Alternate Facility:____________________    Admission Diagnoses:  Acute on chronic renal failure ([facility] [address]  Dehydration  Poor conditioning  Indwelling Gutierrez catheter present  Failure to thrive in adult  Generalized weakness    Discharge Diagnoses:                             1. Acute on Chronic renal failure stage 3 worsened d/t Bactrim  2. Hypertension  3. Benign prostatic hyperplasia with urinary retention with chronic indwelling gutierrez, follow Dr. [facility] as OP. 4. Dehydration  5. Hyperkalemia : resolved  6. Constipation  7. Failure to thrive in adult  8. Hx of CVA  9. Dementia  10. Type 2 DM  11. UTI   12. Severe protein Calorie malnutrition     Discharge Medications:     Current Discharge Medication List      START taking these medications    Details   insulin lispro (HUMALOG) 100 unit/mL injection For Blood Sugar (mg/dL) of:     Less than 150=0 units           150 -199=2 units  200 -249=4 units  250 -299=6 units  300 -349=8 units  350 and above=10 units  Check blood glucose 3 times daily and at bedtime and inject insulin per SS. Qty: 1 Vial, Refills: 0      ciprofloxacin HCl (CIPRO) 250 mg tablet Take 1 Tab by mouth two (2) times a day. Next dose tonight 11/15/2018  Qty: 5 Tab, Refills: 0         CONTINUE these medications which have NOT CHANGED    Details   tamsulosin (FLOMAX) 0.4 mg capsule One cap q d pc  Qty: 30 Cap, Refills: 6      aspirin delayed-release 81 mg tablet Take 81 mg by mouth daily. cholecalciferol, VITAMIN D3, (VITAMIN D3) 5,000 unit tab tablet Take  by mouth every seven (7) days.       carvedilol (COREG) 12.5 mg tablet TAKE 1 TABLET BY MOUTH TWICE A DAY  Qty: 180 Tab, Refills: 0    Associated Diagnoses: Essential hypertension      allopurinol (ZYLOPRIM) 100 mg tablet TAKE 1 TABLET BY MOUTH TWICE DAILY  Qty: 180 Tab, Refills: 0    Associated Diagnoses: Idiopathic gout, unspecified chronicity, unspecified site      dilTIAZem (TIAZAC) 360 mg ER capsule TAKE ONE CAPSULE BY MOUTH EVERY DAY  Qty: 90 Cap, Refills: 0    Associated Diagnoses: Essential hypertension      glucose 4 gram chewable tablet Take 4 Tabs by mouth as needed. Qty: 50 Tab, Refills: 0      rosuvastatin (CRESTOR) 20 mg tablet TAKE 1 TABLET BY MOUTH NIGHTLY  Qty: 90 Tab, Refills: 0    Associated Diagnoses: Hypercholesterolemia      Insulin Needles, Disposable, (BD INSULIN PEN NEEDLE UF MINI) 31 gauge x 3/16\" ndle Sig: test blood glucose BID. E11.9  Qty: 100 Pen Needle, Refills: 5    Associated Diagnoses: Diabetes mellitus with no complication (HCC)      OTHER Check CBC, CMP, Mg in 5 days, results to PCP  Qty: 1 each, Refills: 0      omega-3 fatty acids-vitamin e (FISH OIL) 1,000 mg Cap Take 1,400 mg by mouth daily. STOP taking these medications       trimethoprim-sulfamethoxazole (BACTRIM DS, SEPTRA DS) 160-800 mg per tablet Comments:   Reason for Stopping:         chlorpheniramine-HYDROcodone (TUSSIONEX) 10-8 mg/5 mL suspension Comments:   Reason for Stopping:         insulin lispro (HUMALOG KWIKPEN INSULIN) 100 unit/mL kwikpen Comments:   Reason for Stopping:               Consults:  Nephrology. -   Procedures: none  -     Significant Diagnostic Studies:   Abd Xray  Impression:       No evidence of mechanical bowel obstruction or free intraperitoneal air. RENAL US  IMPRESSION  Impression:     Increased renal echogenicity bilaterally, as can be seen in medical renal  disease. Examination is technically limited by patient body habitus. Results for [employer] (MRN [id number]) as of 11/15/2018 10:49   Ref.  Range 11/12/2018 13:45 11/12/2018 17:35 11/12/2018 21:56 11/13/2018 02:56 11/13/2018 12:25 11/14/2018 03:27 11/15/2018 00:54   BUN Latest Ref Range: 7.0 - 18 MG/DL 57 (H) 51 (H)  46 (H)  32 (H) 24 (H)   Creatinine Latest Ref Range: 0.6 - 1.3 MG/DL 4.97 (H) 4.22 (H)  3.81 (H)  3.09 (H) 2.53 (H)     Hospital Course by Problem   HPI per admitting MD  \"Pio Medina is a 75 y.o.  male who has PMH of CVA, moderate dementia, unsteady gate and HTN. Pt was recently Dx with symptomatic BPH with urinary retention where a gutierrez cath was placed in [facility] ER 2 weeks ago and was placed on Bactrim for UTI. Pt followed with urology who refilled his Bactrim and continued his gutierrez cath for now till further investigation as he failed his voiding trial by Dr Fiona Meza   Pt presented to ER in [facility] today with generalized weakness, loss of appetite, N/V and constipation x 1 week as per Pt's son. On admission, Pt has +ve UA, his abdominal exam is benign. States that( they told me I am dehydrated ). Most of HPI was provided by Pt's son and daughter. Family states that Pt also started to have sporadic cough with while sputum but denies SOB. Pt also Denies CP/SOB/fever/abdominal pain and has no cough at time of admission. \"     1. Acute on Chronic renal failure stage 3 worsened d/t Bactrim: admitted due to acute on chronic renal failure. Renal was consulted. Pt was placed on IVF with improvement of renal function. It was 4.97 on admission, and 2.53 at discharge. Renal cleared pt for discharge with follow up as OP. 2. Hypertension: managed with coreg with some elevation. Pt was on IVF. Monitor at rehab and add medication as necessary while off IVF. 3. Benign prostatic hyperplasia with urinary retention with chronic indwelling gutierrez, follow Dr. Fiona Meza [facility]rology as OP. managed with flomax. 4. Dehydration: managed with IVF with improvement. 5. Hyperkalemia : resolved with Kaexylate  6. Constipation: resolved, had BMs while inpt  7. Failure to thrive in adult: nutrition followed pt  8. Hx of CVA  9. Dementia: monitored, no acute issues  10. Type 2 DM: managed with SSI  11. UTI: initially managed on Ceftriaxone. Urine cx showed E. Faecalis changed with Cipro, to continue at OP. 12. Severe protein Calorie malnutrition : followed by nutrition. Today's examination of the patient revealed:     Subjective:   Pt states he feels well. No complaints. No chest pain, SOB, N/V.  D/w pt and son at bedside.      Objective:   VS:   Visit Vitals  /75 (BP 1 Location: Left arm, BP Patient Position: Sitting)   Pulse 73   Temp 98.4 °F (36.9 °C)   Resp 18   Ht 5' 6.5\" (1.689 m)   Wt 70.7 kg (155 lb 12.8 oz)   SpO2 98%   BMI 24.77 kg/m²      Tmax/24hrs: Temp (24hrs), Av.3 °F (36.8 °C), Min:98 °F (36.7 °C), Max:98.6 °F (37 °C)     Input/Output:     Intake/Output Summary (Last 24 hours) at 11/15/2018 1124  Last data filed at 11/15/2018 0930  Gross per 24 hour   Intake 1765 ml   Output 1350 ml   Net 415 ml       General:  Alert, NAD  Cardiovascular:  RRR  Pulmonary:  LSC throughout; respiratory effort WNL  GI:  +BS in all four quadrants, soft, non-tender  Extremities:  No edema;or edema  Neuro: AAOx2      Labs:    Recent Results (from the past 24 hour(s))   GLUCOSE, POC    Collection Time: 18 11:41 AM   Result Value Ref Range    Glucose (POC) 134 (H) 70 - 110 mg/dL   GLUCOSE, POC    Collection Time: 18  3:48 PM   Result Value Ref Range    Glucose (POC) 113 (H) 70 - 110 mg/dL   GLUCOSE, POC    Collection Time: 18  9:12 PM   Result Value Ref Range    Glucose (POC) 112 (H) 70 - 110 mg/dL   CBC WITH AUTOMATED DIFF    Collection Time: 11/15/18 12:54 AM   Result Value Ref Range    WBC 6.7 4.6 - 13.2 K/uL    RBC 3.84 (L) 4.70 - 5.50 M/uL    HGB 10.0 (L) 13.0 - 16.0 g/dL    HCT 30.7 (L) 36.0 - 48.0 %    MCV 79.9 74.0 - 97.0 FL    MCH 26.0 24.0 - 34.0 PG    MCHC 32.6 31.0 - 37.0 g/dL    RDW 17.2 (H) 11.6 - 14.5 %    PLATELET 214 255 - 894 K/uL    MPV 9.5 9.2 - 11.8 FL    NEUTROPHILS 50 40 - 73 % LYMPHOCYTES 36 21 - 52 %    MONOCYTES 8 3 - 10 %    EOSINOPHILS 6 (H) 0 - 5 %    BASOPHILS 0 0 - 2 %    ABS. NEUTROPHILS 3.3 1.8 - 8.0 K/UL    ABS. LYMPHOCYTES 2.4 0.9 - 3.6 K/UL    ABS. MONOCYTES 0.5 0.05 - 1.2 K/UL    ABS. EOSINOPHILS 0.4 0.0 - 0.4 K/UL    ABS. BASOPHILS 0.0 0.0 - 0.1 K/UL    DF AUTOMATED     METABOLIC PANEL, BASIC    Collection Time: 11/15/18 12:54 AM   Result Value Ref Range    Sodium 141 136 - 145 mmol/L    Potassium 4.9 3.5 - 5.5 mmol/L    Chloride 115 (H) 100 - 108 mmol/L    CO2 18 (L) 21 - 32 mmol/L    Anion gap 8 3.0 - 18 mmol/L    Glucose 170 (H) 74 - 99 mg/dL    BUN 24 (H) 7.0 - 18 MG/DL    Creatinine 2.53 (H) 0.6 - 1.3 MG/DL    BUN/Creatinine ratio 9 (L) 12 - 20      GFR est AA 30 (L) >60 ml/min/1.73m2    GFR est non-AA 25 (L) >60 ml/min/1.73m2    Calcium 8.5 8.5 - 10.1 MG/DL   GLUCOSE, POC    Collection Time: 11/15/18  7:09 AM   Result Value Ref Range    Glucose (POC) 82 70 - 110 mg/dL   GLUCOSE, POC    Collection Time: 11/15/18 10:53 AM   Result Value Ref Range    Glucose (POC) 131 (H) 70 - 110 mg/dL     Additional Data Reviewed:  ACTIVITY: as tolerated  Patient needs to be on Fall, aspiration, decubitus precaution. ·    PT/OT consult  ·      Accuchecks  QAC and QHS  ·  Sparrow Care per routine  ·             DVT prophylaxis     FOLLOW UP CARE:  Follow-up with 1. Physician at SNF in 1-2 days with Cbc with diff, bmp, mg.      Condition: Stable  Follow-up Appointments:   1. Your PCP: Romelle Aschoff, DO, within 7-10days  2. Your Nephrologist: Yennifer Villanueva MD within 2 weeks  3.  Your Urologist: Maida Lovelace MD within 2 weeks      >30 minutes spent coordinating this discharge (review instructions/follow-up, prescriptions, preparing report for sign off)    Signed:  Casie Adrian PA-C  11/15/2018  11:24 AM

## 2018-11-15 NOTE — PROGRESS NOTES
Problem: Falls - Risk of 
Goal: *Absence of Falls Document Saran Dailey Fall Risk and appropriate interventions in the flowsheet. Outcome: Progressing Towards Goal 
Fall Risk Interventions: 
Mobility Interventions: Assess mobility with egress test, Bed/chair exit alarm, Communicate number of staff needed for ambulation/transfer, OT consult for ADLs, Patient to call before getting OOB, PT Consult for mobility concerns, PT Consult for assist device competence, Strengthening exercises (ROM-active/passive), Utilize walker, cane, or other assistive device Mentation Interventions: Adequate sleep, hydration, pain control, Bed/chair exit alarm, Door open when patient unattended, Family/sitter at bedside, Increase mobility, More frequent rounding, Reorient patient, Toileting rounds Medication Interventions: Bed/chair exit alarm, Patient to call before getting OOB, Teach patient to arise slowly Elimination Interventions: Bed/chair exit alarm, Call light in reach, Elevated toilet seat, Patient to call for help with toileting needs, Toilet paper/wipes in reach, Toileting schedule/hourly rounds History of Falls Interventions: Bed/chair exit alarm, Consult care management for discharge planning, Door open when patient unattended, Investigate reason for fall Problem: Pressure Injury - Risk of 
Goal: *Prevention of pressure injury Document Breezy Scale and appropriate interventions in the flowsheet. Outcome: Progressing Towards Goal 
Pressure Injury Interventions: 
Sensory Interventions: Assess changes in LOC, Assess need for specialty bed, Check visual cues for pain, Discuss PT/OT consult with provider, Keep linens dry and wrinkle-free, Maintain/enhance activity level, Minimize linen layers, Pressure redistribution bed/mattress (bed type) Moisture Interventions: Absorbent underpads, Assess need for specialty bed, Internal/External urinary devices, Maintain skin hydration (lotion/cream), Minimize layers, Moisture barrier, Offer toileting Q_hr Activity Interventions: Assess need for specialty bed, Increase time out of bed, Pressure redistribution bed/mattress(bed type), PT/OT evaluation Mobility Interventions: Assess need for specialty bed, HOB 30 degrees or less, Pressure redistribution bed/mattress (bed type), PT/OT evaluation Nutrition Interventions: Document food/fluid/supplement intake, Discuss nutritional consult with provider, Offer support with meals,snacks and hydration Friction and Shear Interventions: Apply protective barrier, creams and emollients, Foam dressings/transparent film/skin sealants, HOB 30 degrees or less, Lift sheet, Minimize layers

## 2018-11-15 NOTE — ROUTINE PROCESS
Bedside and Verbal shift change report given to Enedina Henderson RN (oncoming nurse) by GERARD Waldron RN (offgoing nurse). Report given with SBAR, Kardex, MAR and Recent Results.

## 2018-11-15 NOTE — PROGRESS NOTES
Pt notified and Son of pt d/c order to SNF. Per son, \"I already called his daughter to notify. \" Waiting for transport plan arrangement.

## 2018-11-15 NOTE — PROGRESS NOTES
completed follow up visit with patient and offered Pastoral care, see flow sheets for interventions. Patient said he is doing better, but he is having difficulty walking. He said he is not in pain and spoke in positive tones. His son was sitting in a chair in the room. Son said their  from their [employer] visited yesterday. A Muslim devotional was also provided by . Chaplains will continue to follow and will provide pastoral care  as needed or requested. Alex Minor MDiv Board Certified [address] [place] [address] Office [phone]

## 2018-11-15 NOTE — PROGRESS NOTES
Pt noted reviewed and appreciated. Referral submitted [facility] via [employer]. Cm will continue to follow. Alex Wallace RN, BSN 
[phone]

## 2018-11-15 NOTE — PROGRESS NOTES
Problem: Mobility Impaired (Adult and Pediatric) Goal: *Acute Goals and Plan of Care (Insert Text) Physical Therapy Goals Initiated 11/14/2018 and to be accomplished within 7 day(s) 1. Patient will move from supine to sit and sit to supine , scoot up and down and roll side to side in bed with modified independence. 2.  Patient will transfer from bed to chair and chair to bed with supervision/set-up using the least restrictive device. 3.  Patient will perform sit to stand with supervision/set-up. 4.  Patient will ambulate with supervision/set-up for >150 feet with the least restrictive device. 5.  Patient will ascend/descend 3 stairs with 1 handrail(s) with supervision/set-up. Outcome: Progressing Towards Goal 
physical Therapy TREATMENT Patient: Vikash Vasques (89 y.o. male) Date: 11/15/2018 Diagnosis: Acute on chronic renal failure (HCC) Dehydration Poor conditioning Indwelling Sparrow catheter present Failure to thrive in adult Generalized weakness Acute on chronic renal failure (HCC) Precautions:    
Chart, physical therapy assessment, plan of care and goals were reviewed. OBJECTIVE/ ASSESSMENT: 
Patient found supine HOB elevated willing to work with PT. (appreicate student nurse for tx assist to maximize pt safety). Pt able to doff LEs and attempt to upright position SBA, however req min A for hand placement and trunk elevation to obtain upright posture. Once obtained, pt displayed good static balance and performed sit to stand req mod A and cueing for alignment and sequencing. On 1st attempt, pt had post LOB req mod A to correct, however once midline obtained, pt's balance improved to CGA. Pt amb w/ RW and WC for a total of 250' this visit req constant min A for balance correction progressing to CGA during straight amb. Pt's balance deviated the most during turns req cueing to widen WAQAR and take larger turns.  Pt returned to room to chair, rested, and resumed by performing standing balance there-ex (see below). Pt responded well, however w/ more narrow WAQAR, greater instances of LOB pt experienced. Pt req mult cueing for good eccc control during stand to sit w/ good carry over. Once positioned safely on chair alarm, pt left in room w/ all needs met and son a side. Although pt functionally has improved req less assistance levels to assume positions, pt's balance cont to be greatly impaired making him a high fall risk, especially w/o assistance. Therefore, pt is safe to return home as he does not have 24 hr assistance and would greatly benefit from more therapy in a skilled nursing facility to further improve safety and mobility. Education: 
[x]         Bed mobility [x]         Transfers 
[x]         Ambulation / gait [x]         Assistive device management 
[]         Stairs [x]         Body mechanics [x]         Position change  
[x]         Therapeutic exercise [x]         Activity pacing / energy conservation 
[]         Other: 
 
Progression toward goals: 
[x]      Improving appropriately and progressing toward goals 
[]      Improving slowly and progressing toward goals 
[]      Not making progress toward goals and plan of care will be adjusted PLAN: 
Patient continues to benefit from skilled intervention to address the above impairments. Continue treatment per established plan of care. Discharge Recommendations:  Edenilson Hoffmann Further Equipment Recommendations for Discharge:  rolling walker SUBJECTIVE:  
Patient stated I feel fine.  OBJECTIVE DATA SUMMARY:  
Critical Behavior: 
Neurologic State: Alert Orientation Level: Oriented to place, Oriented to person, Disoriented to situation, Disoriented to time Cognition: Follows commands, Impaired decision making Functional Mobility Training: 
Bed Mobility: 
Rolling: Stand-by assistance Supine to Sit: Minimum assistance Transfers: 
Sit to Stand: Moderate assistance Stand to Sit: Contact guard assistance Balance: 
Sitting: Intact Standing: Impaired; With support Standing - Static: Fair Standing - Dynamic : PoorAmbulation/Gait Training: 
Distance (ft): 250 Feet (ft) Assistive Device: Walker, rolling Ambulation - Level of Assistance: Minimal assistance;Contact guard assistance Base of Support: Center of gravity altered;Narrowed Speed/Arpita: Pace decreased (<100 feet/min) Step Length: Left shortened;Right shortened Therapeutic Exercises:  
 
 
EXERCISE Sets Reps Active Active Assist  
Passive Self- assited ROM Comments Ankle Pumps 1 10  [x] [] [] [] Quad Sets   [] [] [] [] Glut Sets   [] [] [] [] Short Arc Quads   [] [] [] [] Heel Slides   [] [] [] [] Straight Leg Raises   [] [] [] [] Hip Abd   [] [] [] [] Long Arc Quads   [] [] [] [] Seated Marching   [] [] [] [] Seated Knee Flexion   [] [] [] []   
Standing Marching   [] [] [] []   
 
Pain: 0/10 Activity Tolerance:  
Good Please refer to the flowsheet for vital signs taken during this treatment. After treatment:  
[x] Patient left in no apparent distress sitting up in chair 
[] Patient left in no apparent distress in bed 
[x] Call bell left within reach [x] Nursing notified 
[x] Caregiver present [x] Chair alarm activated 
[] SCDs applied 
[] Ice applied Edger Said, PTA Time Calculation: 38 mins Mobility [id number] Current  CK= 40-59%. The severity rating is based on the Level of Assistance required for Functional Mobility and ADLs. Mobility   Goal  CI= 1-19%. The severity rating is based on the Level of Assistance required for Functional Mobility and ADLs.

## 2018-11-16 NOTE — TELEPHONE ENCOUNTER
Pt son called Marissa Ball stating she need a letter to go back to work on Nov 19, He stated he has been taking care of his father and now is ready to go back to need work note Monday please advise [phone]

## 2018-11-19 NOTE — TELEPHONE ENCOUNTER
Called patient to inform him that work letter can be picked up at the front desl. No answer left name and call back number.

## 2018-12-07 ENCOUNTER — OFFICE VISIT (OUTPATIENT)
Dept: UROLOGY | Age: 73
End: 2018-12-07

## 2018-12-07 VITALS
DIASTOLIC BLOOD PRESSURE: 70 MMHG | BODY MASS INDEX: 24.77 KG/M2 | OXYGEN SATURATION: 98 % | HEART RATE: 71 BPM | SYSTOLIC BLOOD PRESSURE: 137 MMHG | HEIGHT: 67 IN

## 2018-12-07 DIAGNOSIS — R33.9 URINARY RETENTION: Primary | ICD-10-CM

## 2018-12-07 NOTE — PROGRESS NOTES
Mr. Brian Duarte has a reminder for a \"due or due soon\" health maintenance. I have asked that he contact his primary care provider for follow-up on this health maintenance.

## 2018-12-07 NOTE — PATIENT INSTRUCTIONS
Urinary Retention: Care Instructions  Your Care Instructions    Urinary retention means that you aren't able to urinate. In men, it is often caused by a blockage of the urinary tract from an enlarged prostate gland. In men and women, it can also be caused by an infection or nerve damage. Or it may be a side effect of a medicine. The doctor may have drained the urine from your bladder. If you still have problems passing urine, you may need to use a catheter at home. This is used to empty your bladder until the problem can be fixed. Your doctor may put a catheter in your bladder before you go home. If so, he or she will tell you when to come back to have the catheter removed. The doctor has checked you closely. But problems can develop later. If you notice any problems or new symptoms, get medical treatment right away. Follow-up care is a key part of your treatment and safety. Be sure to make and go to all appointments, and call your doctor if you are having problems. It's also a good idea to know your test results and keep a list of the medicines you take. How can you care for yourself at home? · Take your medicines exactly as prescribed. Call your doctor if you think you are having a problem with your medicine. You will get more details on the specific medicines your doctor prescribes. · Check with your doctor before you use any over-the-counter medicines. Many cold and allergy medicines, for example, can make this problem worse. Make sure your doctor knows all of the medicines, vitamins, supplements, and herbal remedies you take. · Spread out through the day the amount of fluid you drink. Do not drink a lot at bedtime. · Avoid alcohol and caffeine. · If you have been given a catheter, or if one is already in place, follow the instructions you were given. Always wash your hands before and after you handle the catheter. When should you call for help?   Call your doctor now or seek immediate medical care if:    · You cannot urinate at all, or it is getting harder to urinate.     · You have symptoms of a urinary tract infection. These may include:  ? Pain or burning when you urinate. ? A frequent need to urinate without being able to pass much urine. ? Pain in the flank, which is just below the rib cage and above the waist on either side of the back. ? Blood in your urine. ? A fever.    Watch closely for changes in your health, and be sure to contact your doctor if:    · You have any problems with your catheter.     · You do not get better as expected. Where can you learn more? Go to http://theresa-mumtaz.info/. Enter M244 in the search box to learn more about \"Urinary Retention: Care Instructions. \"  Current as of: March 21, 2018  Content Version: 11.8  © 0018-5644 [employer]. Care instructions adapted under license by [employer] (which disclaims liability or warranty for this information). If you have questions about a medical condition or this instruction, always ask your healthcare professional. [place] [address] any warranty or liability for your use of this information.

## 2018-12-07 NOTE — PROGRESS NOTES
Terrance Jiménez    Chief Complaint   Patient presents with    Urinary Retention   [facility] Follow Up       History and Physical    Patient is a 50-year-old -American male who is known to this office with a previous visit here last month. The patient developed urinary retention and has had a Sparrow catheter placed. The patient has a history of cryoablation for prostate cancer in the distant past.  Cystoscopy has shown that there is some lateral lobe encroachment with convergence that could be causing obstruction but I cannot rule out a detrusor hypoactivity the patient was last seen with a urinary tract infection and was given Bactrim and developed dehydration and worsening of his baseline renal failure. The patient was discharged with return to his baseline renal function and was given Cipro.   He comes now for a catheter change in for discussion again about the alternatives    Past Medical History:   Diagnosis Date    Cataract     both    Colon polyp 9-2004    Diabetes ([address]     IDDM    DJD (degenerative joint disease) of knee     ED (erectile dysfunction)     GERD (gastroesophageal reflux disease) 12-01-08    Glaucoma suspect     Gout, unspecified 10-25-06    Hyperlipidemia     Hypertension     Insomnia 7-20-06    Phimosis 6-16-00    Prostate cancer ([address] 3-3-09    Stroke [facility])      Patient Active Problem List   Diagnosis Code    Essential hypertension I10    Type I (juvenile type) diabetes mellitus with renal manifestations, not stated as uncontrolled(250.41) ([address] E10.29    Hypercholesterolemia E78.00    Gout M10.9    GERD (gastroesophageal reflux disease) K21.9    Vitamin D deficiency E55.9    Stroke ([address] I63.9    Prostate cancer ([address] C61    Acute encephalopathy G93.40    Hypoglycemia E16.2    Need for Tdap vaccination Z23    Proteinuria R80.9    CKD (chronic kidney disease) stage 3, GFR 30-59 ml/min ([facility]) N18.3    Diabetes mellitus type 2, controlled ([address] E11.9  Advance care planning Z71.89    Encounter for immunization Z23    Need for pneumococcal vaccine Z23    Encounter for screening colonoscopy Z12.11    Medicare annual wellness visit, subsequent Z00.00    History of CVA (cerebrovascular accident) Z80.78    Pneumonia J18.9   [facility] discharge follow-up Z09    Gastrointestinal hemorrhage associated with duodenitis K29.81    Need for influenza vaccination Z23    Hyperglycemia due to type 2 diabetes mellitus ([facility] E11.65    Cough R05    Benign prostatic hyperplasia with urinary retention N40.1, R33.8    Type 2 diabetes with nephropathy (HCC) E11.21    Dehydration E86.0    Generalized weakness R53.1    Failure to thrive in adult R62.7    Acute on chronic renal failure (HCC) N17.9, N18.9    Poor conditioning Z78.9    Indwelling Sparrow catheter present Z96.0    Other constipation K59.09    Hyperkalemia E87.5     Past Surgical History:   Procedure Laterality Date    HX POLYPECTOMY  9-2004    LA COLONOSCOPY FLX DX W/COLLJ SPEC WHEN PFRMD  9/1/2004    polyp/Dr Alvarado    LA COLONOSCOPY FLX DX W/COLLJ SPEC WHEN PFRMD  9-2007    incomplete; Dr. Raz Hou     Current Outpatient Medications   Medication Sig Dispense Refill    insulin lispro (HUMALOG) 100 unit/mL injection For Blood Sugar (mg/dL) of:     Less than 150=0 units           150 -199=2 units  200 -249=4 units  250 -299=6 units  300 -349=8 units  350 and above=10 units  Check blood glucose 3 times daily and at bedtime and inject insulin per SS. 1 Vial 0    tamsulosin (FLOMAX) 0.4 mg capsule One cap q d pc 30 Cap 6    aspirin delayed-release 81 mg tablet Take 81 mg by mouth daily.  cholecalciferol, VITAMIN D3, (VITAMIN D3) 5,000 unit tab tablet Take  by mouth every seven (7) days.       carvedilol (COREG) 12.5 mg tablet TAKE 1 TABLET BY MOUTH TWICE A  Tab 0    allopurinol (ZYLOPRIM) 100 mg tablet TAKE 1 TABLET BY MOUTH TWICE DAILY 180 Tab 0    dilTIAZem (TIAZAC) 360 mg ER capsule TAKE ONE CAPSULE BY MOUTH EVERY DAY 90 Cap 0    glucose 4 gram chewable tablet Take 4 Tabs by mouth as needed. 50 Tab 0    rosuvastatin (CRESTOR) 20 mg tablet TAKE 1 TABLET BY MOUTH NIGHTLY 90 Tab 0    Insulin Needles, Disposable, (BD INSULIN PEN NEEDLE UF MINI) 31 gauge x 3/16\" ndle Sig: test blood glucose BID. E11.9 100 Pen Needle 5    omega-3 fatty acids-vitamin e (FISH OIL) 1,000 mg Cap Take 1,400 mg by mouth daily.       OTHER Check CBC, CMP, Mg in 5 days, results to PCP 1 each 0     Allergies   Allergen Reactions    Bactrim [Sulfamethoxazole-Trimethoprim] Other (comments)     CAUSED ACUTE RENAL FAILURE    Ciprofloxacin Other (comments)     PATIENT IS NOT ALLERGIC     Social History     Socioeconomic History    Marital status:      Spouse name: Not on file    Number of children: Not on file    Years of education: Not on file    Highest education level: Not on file   Social Needs    Financial resource strain: Not on file    Food insecurity - worry: Not on file    Food insecurity - inability: Not on file   [employer] needs - medical: Not on file   [employer] needs - non-medical: Not on file   Occupational History    Not on file   Tobacco Use    Smoking status: Former Smoker    Smokeless tobacco: Never Used   Substance and Sexual Activity    Alcohol use: No    Drug use: No    Sexual activity: No   Other Topics Concern    Not on file   Social History Narrative    Not on file      Family History   Problem Relation Age of Onset    Hypertension Mother     Diabetes Father     Cancer Father         prostate    Heart Disease Father 70        CABG    Hypertension Sister     Hypertension Brother     Diabetes Brother                  Visit Vitals  /70 (BP 1 Location: Left arm, BP Patient Position: Sitting)   Pulse 71   Ht 5' 6.5\" (1.689 m)   SpO2 98%   BMI 24.77 kg/m²     Physical        Gen: WDWN adult NAD  Head  : normocephalic,  Normal ROM; eyes with normal pupils, EOMs, no masses;  conjunctiva normal  Neck: normal movement,  no evident mass,  No evident adenopathy, trachea midline,  Lungs: no respiratory distress or difficulties  CV:  No evident peripheral swelling  Abd :bowel sounds normal, no masses, tenderness, organomegaly  Flanks     -    Extremities- no edema, arthritis, deformity, swelling  Psych- oriented, no evident anxiety, no cognitive impairment evident    Catheter is changed out and I have placed another 16 Citizen of [place] coudé Sparrow catheter. It should be noted that the patient originally had an Enterobacter infection that was sensitive to Bactrim and that is why the sulfa was given. He subsequently was found to have enterococcus and Cipro was effective against that and the patient was discharged on that antibiotic                  Impression/ PLAN  Urinary retention  Recent urinary tract infection complicated by dehydration and transient worsening of renal failure    [place]:  Another extended discussion with the patient and son. The patient wishes to proceed with suprapubic tube placement and transurethral resection or laser of the obstructing tissue in the hopes that he will be able to restore normal voiding. They are counseled regarding the preparation technique convalescence. They are aware of the risks that include, but are not limited to, infection, bleeding, injury, failure, incontinence, and need for additional procedures            This visit exceeded 25 minutes and >50% was counselling  The patient understands the discussion and plan    PLEASE NOTE:      This document has been produced using voice recognition software.   Unrecognized errors in transcription may be present    Shemar Razo MD

## 2018-12-17 RX ORDER — SODIUM CHLORIDE 9 MG/ML
90 INJECTION, SOLUTION INTRAVENOUS CONTINUOUS
Status: CANCELLED | OUTPATIENT
Start: 2018-12-17

## 2019-01-02 ENCOUNTER — ANESTHESIA (OUTPATIENT)
Dept: SURGERY | Age: 74
End: 2019-01-02
Payer: MEDICARE

## 2019-01-02 ENCOUNTER — HOSPITAL ENCOUNTER (OUTPATIENT)
Age: 74
Setting detail: OUTPATIENT SURGERY
Discharge: HOME | End: 2019-01-02
Attending: UROLOGY | Admitting: UROLOGY
Payer: MEDICARE

## 2019-01-02 ENCOUNTER — ANESTHESIA EVENT (OUTPATIENT)
Dept: SURGERY | Age: 74
End: 2019-01-02
Payer: MEDICARE

## 2019-01-02 VITALS
BODY MASS INDEX: 21.95 KG/M2 | HEART RATE: 60 BPM | SYSTOLIC BLOOD PRESSURE: 122 MMHG | DIASTOLIC BLOOD PRESSURE: 63 MMHG | RESPIRATION RATE: 14 BRPM | OXYGEN SATURATION: 96 % | TEMPERATURE: 97.2 F | HEIGHT: 68 IN | WEIGHT: 144.8 LBS

## 2019-01-02 DIAGNOSIS — N40.1 BENIGN PROSTATIC HYPERPLASIA WITH URINARY RETENTION: Primary | ICD-10-CM

## 2019-01-02 DIAGNOSIS — R33.8 BENIGN PROSTATIC HYPERPLASIA WITH URINARY RETENTION: Primary | ICD-10-CM

## 2019-01-02 LAB
GLUCOSE BLD STRIP.AUTO-MCNC: 114 MG/DL (ref 70–110)
GLUCOSE BLD STRIP.AUTO-MCNC: 130 MG/DL (ref 70–110)

## 2019-01-02 PROCEDURE — 77030008467 HC STPLR SKN COVD -B: Performed by: UROLOGY

## 2019-01-02 PROCEDURE — 77030019605: Performed by: UROLOGY

## 2019-01-02 PROCEDURE — 76210000006 HC OR PH I REC 0.5 TO 1 HR: Performed by: UROLOGY

## 2019-01-02 PROCEDURE — 74011250636 HC RX REV CODE- 250/636

## 2019-01-02 PROCEDURE — 77030031139 HC SUT VCRL2 J&J -A: Performed by: UROLOGY

## 2019-01-02 PROCEDURE — 77030011265 HC ELECTRD BLD HEX COVD -A: Performed by: UROLOGY

## 2019-01-02 PROCEDURE — 76010000161 HC OR TIME 1 TO 1.5 HR INTENSV-TIER 1: Performed by: UROLOGY

## 2019-01-02 PROCEDURE — 77030019927 HC TBNG IRR CYSTO BAXT -A: Performed by: UROLOGY

## 2019-01-02 PROCEDURE — 77030034696 HC CATH URETH FOL 2W BARD -A: Performed by: UROLOGY

## 2019-01-02 PROCEDURE — 77030005206: Performed by: UROLOGY

## 2019-01-02 PROCEDURE — 74011250636 HC RX REV CODE- 250/636: Performed by: UROLOGY

## 2019-01-02 PROCEDURE — 74011000250 HC RX REV CODE- 250: Performed by: UROLOGY

## 2019-01-02 PROCEDURE — 74011250637 HC RX REV CODE- 250/637: Performed by: UROLOGY

## 2019-01-02 PROCEDURE — 76060000033 HC ANESTHESIA 1 TO 1.5 HR: Performed by: UROLOGY

## 2019-01-02 PROCEDURE — 74011000272 HC RX REV CODE- 272: Performed by: UROLOGY

## 2019-01-02 PROCEDURE — 77030020782 HC GWN BAIR PAWS FLX 3M -B: Performed by: UROLOGY

## 2019-01-02 PROCEDURE — 82962 GLUCOSE BLOOD TEST: CPT

## 2019-01-02 PROCEDURE — 76210000021 HC REC RM PH II 0.5 TO 1 HR: Performed by: UROLOGY

## 2019-01-02 PROCEDURE — 77030018836 HC SOL IRR NACL ICUM -A: Performed by: UROLOGY

## 2019-01-02 PROCEDURE — 77030010509 HC AIRWY LMA MSK TELE -A: Performed by: ANESTHESIOLOGY

## 2019-01-02 PROCEDURE — 77030010545: Performed by: UROLOGY

## 2019-01-02 PROCEDURE — 77030032490 HC SLV COMPR SCD KNE COVD -B: Performed by: UROLOGY

## 2019-01-02 RX ORDER — DEXTROSE 50 % IN WATER (D50W) INTRAVENOUS SYRINGE
25-50 AS NEEDED
Status: DISCONTINUED | OUTPATIENT
Start: 2019-01-02 | End: 2019-01-02 | Stop reason: HOSPADM

## 2019-01-02 RX ORDER — MAGNESIUM SULFATE 100 %
4 CRYSTALS MISCELLANEOUS AS NEEDED
Status: DISCONTINUED | OUTPATIENT
Start: 2019-01-02 | End: 2019-01-02 | Stop reason: HOSPADM

## 2019-01-02 RX ORDER — SODIUM CHLORIDE 0.9 % (FLUSH) 0.9 %
5-10 SYRINGE (ML) INJECTION EVERY 8 HOURS
Status: DISCONTINUED | OUTPATIENT
Start: 2019-01-02 | End: 2019-01-02 | Stop reason: HOSPADM

## 2019-01-02 RX ORDER — FENTANYL CITRATE 50 UG/ML
50 INJECTION, SOLUTION INTRAMUSCULAR; INTRAVENOUS
Status: DISCONTINUED | OUTPATIENT
Start: 2019-01-02 | End: 2019-01-02 | Stop reason: HOSPADM

## 2019-01-02 RX ORDER — SODIUM CHLORIDE, SODIUM LACTATE, POTASSIUM CHLORIDE, CALCIUM CHLORIDE 600; 310; 30; 20 MG/100ML; MG/100ML; MG/100ML; MG/100ML
75 INJECTION, SOLUTION INTRAVENOUS CONTINUOUS
Status: DISCONTINUED | OUTPATIENT
Start: 2019-01-02 | End: 2019-01-02 | Stop reason: HOSPADM

## 2019-01-02 RX ORDER — NITROFURANTOIN 25; 75 MG/1; MG/1
100 CAPSULE ORAL 2 TIMES DAILY
Qty: 20 CAP | Refills: 0 | Status: SHIPPED | OUTPATIENT
Start: 2019-01-02 | End: 2019-03-07

## 2019-01-02 RX ORDER — INSULIN LISPRO 100 [IU]/ML
INJECTION, SOLUTION INTRAVENOUS; SUBCUTANEOUS ONCE
Status: DISCONTINUED | OUTPATIENT
Start: 2019-01-02 | End: 2019-01-02 | Stop reason: HOSPADM

## 2019-01-02 RX ORDER — HYDROCODONE BITARTRATE AND ACETAMINOPHEN 5; 325 MG/1; MG/1
1 TABLET ORAL
Qty: 12 TAB | Refills: 0 | Status: SHIPPED | OUTPATIENT
Start: 2019-01-02 | End: 2019-08-01

## 2019-01-02 RX ORDER — CEFAZOLIN SODIUM 2 G/50ML
2 SOLUTION INTRAVENOUS ONCE
Status: COMPLETED | OUTPATIENT
Start: 2019-01-02 | End: 2019-01-02

## 2019-01-02 RX ORDER — SODIUM CHLORIDE 0.9 % (FLUSH) 0.9 %
5-10 SYRINGE (ML) INJECTION AS NEEDED
Status: DISCONTINUED | OUTPATIENT
Start: 2019-01-02 | End: 2019-01-02 | Stop reason: HOSPADM

## 2019-01-02 RX ORDER — PROPOFOL 10 MG/ML
INJECTION, EMULSION INTRAVENOUS AS NEEDED
Status: DISCONTINUED | OUTPATIENT
Start: 2019-01-02 | End: 2019-01-02 | Stop reason: HOSPADM

## 2019-01-02 RX ORDER — FAMOTIDINE 20 MG/1
20 TABLET, FILM COATED ORAL ONCE
Status: COMPLETED | OUTPATIENT
Start: 2019-01-02 | End: 2019-01-02

## 2019-01-02 RX ORDER — LIDOCAINE HYDROCHLORIDE 20 MG/ML
INJECTION, SOLUTION EPIDURAL; INFILTRATION; INTRACAUDAL; PERINEURAL AS NEEDED
Status: DISCONTINUED | OUTPATIENT
Start: 2019-01-02 | End: 2019-01-02 | Stop reason: HOSPADM

## 2019-01-02 RX ORDER — SODIUM CHLORIDE 9 MG/ML
90 INJECTION, SOLUTION INTRAVENOUS CONTINUOUS
Status: DISCONTINUED | OUTPATIENT
Start: 2019-01-02 | End: 2019-01-02 | Stop reason: HOSPADM

## 2019-01-02 RX ORDER — FENTANYL CITRATE 50 UG/ML
INJECTION, SOLUTION INTRAMUSCULAR; INTRAVENOUS AS NEEDED
Status: DISCONTINUED | OUTPATIENT
Start: 2019-01-02 | End: 2019-01-02 | Stop reason: HOSPADM

## 2019-01-02 RX ORDER — NALOXONE HYDROCHLORIDE 0.4 MG/ML
0.2 INJECTION, SOLUTION INTRAMUSCULAR; INTRAVENOUS; SUBCUTANEOUS AS NEEDED
Status: DISCONTINUED | OUTPATIENT
Start: 2019-01-02 | End: 2019-01-02 | Stop reason: HOSPADM

## 2019-01-02 RX ORDER — ONDANSETRON 2 MG/ML
INJECTION INTRAMUSCULAR; INTRAVENOUS AS NEEDED
Status: DISCONTINUED | OUTPATIENT
Start: 2019-01-02 | End: 2019-01-02 | Stop reason: HOSPADM

## 2019-01-02 RX ORDER — ONDANSETRON 2 MG/ML
4 INJECTION INTRAMUSCULAR; INTRAVENOUS ONCE
Status: DISCONTINUED | OUTPATIENT
Start: 2019-01-02 | End: 2019-01-02 | Stop reason: HOSPADM

## 2019-01-02 RX ADMIN — FENTANYL CITRATE 50 MCG: 50 INJECTION, SOLUTION INTRAMUSCULAR; INTRAVENOUS at 07:57

## 2019-01-02 RX ADMIN — SODIUM CHLORIDE 90 ML/HR: 900 INJECTION, SOLUTION INTRAVENOUS at 06:50

## 2019-01-02 RX ADMIN — PROPOFOL 120 MG: 10 INJECTION, EMULSION INTRAVENOUS at 07:35

## 2019-01-02 RX ADMIN — PROPOFOL 40 MG: 10 INJECTION, EMULSION INTRAVENOUS at 07:37

## 2019-01-02 RX ADMIN — ONDANSETRON 4 MG: 2 INJECTION INTRAMUSCULAR; INTRAVENOUS at 07:58

## 2019-01-02 RX ADMIN — LIDOCAINE HYDROCHLORIDE 60 MG: 20 INJECTION, SOLUTION EPIDURAL; INFILTRATION; INTRACAUDAL; PERINEURAL at 07:35

## 2019-01-02 RX ADMIN — LIDOCAINE HYDROCHLORIDE 20 MG: 20 INJECTION, SOLUTION EPIDURAL; INFILTRATION; INTRACAUDAL; PERINEURAL at 07:37

## 2019-01-02 RX ADMIN — CEFAZOLIN SODIUM 2 G: 2 SOLUTION INTRAVENOUS at 07:26

## 2019-01-02 RX ADMIN — FENTANYL CITRATE 50 MCG: 50 INJECTION, SOLUTION INTRAMUSCULAR; INTRAVENOUS at 07:42

## 2019-01-02 RX ADMIN — FAMOTIDINE 20 MG: 20 TABLET ORAL at 06:50

## 2019-01-02 NOTE — DISCHARGE INSTRUCTIONS
Transurethral Resection of the Prostate (TURP): What to Expect at [facility]    Transurethral resection of the prostate (TURP) is surgery to remove prostate tissue. It is done when an overgrown prostate gland is pressing on the urethra and making it hard for a man to urinate. You may need a urinary catheter for a short time. It is a flexible plastic tube used to drain urine from your bladder when you can't urinate on your own. If it is still in place when you go home, your doctor will give you instructions on how to care for your catheter. For several days after surgery, you may feel burning when you urinate. Your urine may be pink for 1 to 3 weeks after surgery. You also may have bladder cramps, or spasms. Your doctor may give you medicine to help control the spasms. You may still feel like you need to urinate often in the weeks after your surgery. It often takes up to 6 weeks for this to get better. After you have healed, you may have less trouble urinating. You may have better control over starting and stopping your urine stream. And you may feel like you get more relief when you urinate. Most men can return to work or many of their usual tasks in 1 to 3 weeks. But for about 6 weeks, try to avoid heavy lifting and strenuous activities that might put extra pressure on your bladder. Most men still can have erections after surgery (if they were able to have them before surgery). But they may not ejaculate when they have an orgasm. Semen may go into the bladder instead of out through the penis. This is called retrograde ejaculation. It does not hurt and is not harmful to your health. This care sheet gives you a general idea about how long it will take for you to recover. But each person recovers at a different pace. Follow the steps below to get better as quickly as possible. How can you care for yourself at home? Activity    · Rest when you feel tired.     · Be active.  Walking is a good choice.     · Allow your body to heal. Don't move quickly or lift anything heavy until you are feeling better.     · Ask your doctor when you can drive again.     · Many people are able to return to work within 1 to 3 weeks after surgery. It depends on the type of work you do and how you feel.     · Do not put anything in your rectum, such as an enema or suppository, for 4 to 6 weeks after the surgery.     · You may shower and take baths when your doctor says it is okay.     · Ask your doctor when it is okay for you to have sex. Diet    · You can eat your normal diet. If your stomach is upset, try bland, low-fat foods like plain rice, broiled chicken, toast, and yogurt.     · If your bowel movements are not regular right after surgery, try to avoid constipation and straining. Drink plenty of water. Your doctor may suggest fiber, a stool softener, or a mild laxative. Medicines    · Your doctor will tell you if and when you can restart your medicines. He or she will also give you instructions about taking any new medicines.     · If you take aspirin or some other blood thinner, be sure to talk to your doctor. He or she will tell you if and when to start taking those medicines again. Make sure that you understand exactly what your doctor wants you to do.     · Be safe with medicines. Read and follow all instructions on the label. ? If the doctor gave you a prescription medicine for pain, take it as prescribed. ? If you are not taking a prescription pain medicine, ask your doctor if you can take an over-the-counter medicine.     · Take your antibiotics as directed. Do not stop taking them just because you feel better. You need to take the full course of antibiotics. Follow-up care is a key part of your treatment and safety. Be sure to make and go to all appointments, and call your doctor if you are having problems. It's also a good idea to know your test results and keep a list of the medicines you take.   When should you call for help? Call 911 anytime you think you may need emergency care. For example, call if:    · You passed out (lost consciousness).     · You have chest pain, are short of breath, or cough up blood.    Call your doctor now or seek immediate medical care if:    · You have pain that does not get better after you take pain medicine.     · You have loose stitches or your incision comes open.     · Bright red blood soaks through the bandage.     · You have signs of infection, such as:  ? Increased pain, swelling, warmth, or redness. ? Red streaks leading from the area. ? Pus draining from the area. ? A fever.     · You cannot urinate.     · You have symptoms of a urinary tract infection. These may include:  ? Pain or burning when you urinate. ? A frequent need to urinate without being able to pass much urine. ? Pain in the flank, which is just below the rib cage and above the waist on either side of the back. ? Blood in your urine. ? A fever.     · You are sick to your stomach and cannot drink fluids.     · You have signs of a blood clot in your leg (called a deep vein thrombosis), such as:  ? Pain in your calf, back of the knee, thigh, or groin. ? Redness or swelling in your leg or groin.    Watch closely for changes in your health, and be sure to contact your doctor if you have any problems. Where can you learn more? Go to http://theresa-mumtaz.info/. Enter A680 in the search box to learn more about \"Transurethral Resection of the Prostate (TURP): What to Expect at Home. \"  Current as of: December 3, 2017  Content Version: 11.8  © 8110-8152 [employer]. Care instructions adapted under license by [employer] (which disclaims liability or warranty for this information).  If you have questions about a medical condition or this instruction, always ask your healthcare professional. [address] any warranty or liability for your use of this information. Suprapubic Catheter Care: Care Instructions  Your Care Instructions  A suprapubic catheter is a thin tube placed into your bladder just above the pubic bone. The tube allows urine to drain out of your bladder. The urine collects in a bag attached to the tube. The bag is usually attached to your leg. Sometimes the catheter tube has a valve that lets you drain the urine into the toilet or other container. You may need a suprapubic catheter if you have nerve damage, a problem with your urinary tract, or a disease that weakens your muscles. Having a catheter for a long time increases the risk of getting a urinary tract infection. So catheter care focuses on preventing infection. Follow-up care is a key part of your treatment and safety. Be sure to make and go to all appointments, and call your doctor if you are having problems. It's also a good idea to know your test results and keep a list of the medicines you take. How can you care for yourself at home? · Wash your hands before you handle the catheter. · Clean the area around the catheter with soap and water at least one time every day. Wash the area with soap and water after every bowel movement. · Keep the drainage bag lower than your bladder to keep urine from backing up. · Clean the bag every day after removing it from the catheter. Use another container while you clean the bag. To clean the bag, fill it with 2 parts vinegar to 3 parts water and let it stand for 20 minutes. Then empty it out, and let it air dry. · Empty the drainage bag when it is full or at least every 8 hours. When should you call for help? Call your doctor now or seek immediate medical care if:    · Your catheter becomes blocked and urine does not collect in the drainage bag.     · Your catheter leaks.     · You have blood or pus in your urine.     · You have pain in your back just below your rib cage.  This is called flank pain.     · You have a fever, chills, or body aches.     · You have groin or belly pain.     · Your urine is cloudy or smells bad.     · You have pain, increasing redness, or bleeding around the catheter.     · You have swelling around the catheter or in your belly.    Watch closely for changes in your health, and be sure to contact your doctor if you have any problems. Where can you learn more? Go to http://theresa-mumtaz.info/. Enter G948 in the search box to learn more about \"Suprapubic Catheter Care: Care Instructions. \"  Current as of: March 21, 2018  Content Version: 11.8  © 7302-1257 [employer]. Care instructions adapted under license by [employer] (which disclaims liability or warranty for this information). If you have questions about a medical condition or this instruction, always ask your healthcare professional. [address] any warranty or liability for your use of this information. Learning About Urinary Catheter Care to Prevent Infection  What is a urinary catheter? A urinary catheter is a flexible plastic tube used to drain urine from your bladder when you can't urinate on your own. The catheter allows urine to drain from the bladder into a bag. Two types of drainage bags may be used with a urinary catheter. · A bedside bag is a large bag that you can hang on the side of your bed or on a chair. You can use it overnight or anytime you will be sitting or lying down for a long time. · A leg bag is a small bag that you can use during the day. It is usually attached to your thigh or calf and hidden under your clothes. Having a urinary catheter increases your risk of getting a urinary tract infection. Germs may get on the catheter and cause an infection in your bladder or kidneys. The longer you have a catheter, the more likely it is that you will get an infection. You can help prevent this problem with good hygiene and careful handling of your catheter and drainage bags.   How can you help prevent infection? Take care to be clean  · Always wash your hands well before and after you handle your catheter. · Clean the skin around the catheter twice a day using soap and water. Dry with a clean towel afterward. You can shower with your catheter and drainage bag in place unless your doctor told you not to. · When you clean around the catheter, check the surrounding skin for signs of infection. Look for things like pus or irritated, swollen, red, or tender skin around the catheter. Be careful with your drainage bag  · Always keep the drainage bag below the level of your bladder. This will help keep urine from flowing back into your bladder. · Check often to see that urine is flowing through the catheter into the drainage bag. · Empty the drainage bag when it is half full. This will keep it from overflowing or backing up. · When you empty the drainage bag, do not let the tubing or drain spout touch anything. Be careful with your catheter  · Do not unhook the catheter from the drain tube. That could let germs get into the tube. · Make sure that the catheter tubing does not get twisted or kinked. · Do not tug or pull on the catheter. And make sure that the drainage bag does not drag or pull on the catheter. · Do not put powder or lotion on the skin around the catheter. · Talk with your doctor about your options for sexual intercourse while wearing a catheter. How do you empty a urine drainage bag? If your doctor has asked you to keep a record, write down the amount of urine in the bag before you empty it. Wash your hands before and after you touch the bag. 1. Remove the drain spout from its sleeve at the bottom of the drainage bag.  2. Open the valve on the drain spout. Let the urine flow out into the toilet or a container. Be careful not to let the tubing or drain spout touch anything. 3. After you empty the bag, wipe off any liquid on the end of the drain spout. Close the valve. Then put the drain spout back into its sleeve at the bottom of the collection bag. How do you add a bedside bag to a leg bag? Wash your hands before and after you handle the bags. 1. Empty the leg bag attached to the catheter. 2. Put a clean towel under the leg bag.  3. Use an alcohol wipe to clean the tip of the bedside bag. Then connect the bedside bag to the leg bag. How can you clean a bedside drainage bag? Many people clean their bedside bag in the morning if they switch to a leg bag. To clean a bedside drainage ba. Remove the bedside bag from the leg bag.  2. Fill the bag with 2 parts vinegar and 3 parts water. Let it stand for 20 minutes. 3. Empty the bag, and let it air dry. When should you call for help? Call your doctor now or seek immediate medical care if:  · You have symptoms of a urinary infection. These may include:  ? Pain or burning when you urinate. ? A frequent need to urinate without being able to pass much urine. ? Pain in the flank, which is just below the rib cage and above the waist on either side of the back. ? Blood in your urine. ? A fever. · Your urine smells bad. · You see large blood clots in your urine. · No urine or very little urine is flowing into the bag for 4 or more hours. Watch closely for changes in your health, and be sure to contact your doctor if:  · The area around the catheter becomes irritated, swollen, red, or tender, or there is pus draining from it. · Urine is leaking from the place where the catheter enters your body. Follow-up care is a key part of your treatment and safety. Be sure to make and go to all appointments, and call your doctor if you are having problems. It's also a good idea to know your test results and keep a list of the medicines you take. Where can you learn more? Go to http://theresa-mumtaz.info/.   Enter U010 in the search box to learn more about \"Learning About Urinary Catheter Care to Prevent Infection. \"  Current as of: March 21, 2018  Content Version: 11.8  © 6980-5101 [employer]. Care instructions adapted under license by [employer] (which disclaims liability or warranty for this information). If you have questions about a medical condition or this instruction, always ask your healthcare professional. [address] any warranty or liability for your use of this information. Constipation: Care Instructions  Your Care Instructions    Constipation means that you have a hard time passing stools (bowel movements). People pass stools from 3 times a day to once every 3 days. What is normal for you may be different. Constipation may occur with pain in the rectum and cramping. The pain may get worse when you try to pass stools. Sometimes there are small amounts of bright red blood on toilet paper or the surface of stools. This is because of enlarged veins near the rectum (hemorrhoids). A few changes in your diet and lifestyle may help you avoid ongoing constipation. Your doctor may also prescribe medicine to help loosen your stool. Some medicines can cause constipation. These include pain medicines and antidepressants. Tell your doctor about all the medicines you take. Your doctor may want to make a medicine change to ease your symptoms. Follow-up care is a key part of your treatment and safety. Be sure to make and go to all appointments, and call your doctor if you are having problems. It's also a good idea to know your test results and keep a list of the medicines you take. How can you care for yourself at home? · Drink plenty of fluids, enough so that your urine is light yellow or clear like water. If you have kidney, heart, or liver disease and have to limit fluids, talk with your doctor before you increase the amount of fluids you drink. · Include high-fiber foods in your diet each day. These include fruits, vegetables, beans, and whole grains.   · Get at least 30 minutes of exercise on most days of the week. Walking is a good choice. You also may want to do other activities, such as running, swimming, cycling, or playing tennis or team sports. · Take a fiber supplement, such as Citrucel or Metamucil, every day. Read and follow all instructions on the label. · Schedule time each day for a bowel movement. A daily routine may help. Take your time having your bowel movement. · Support your feet with a small step stool when you sit on the toilet. This helps flex your hips and places your pelvis in a squatting position. · Your doctor may recommend an over-the-counter laxative to relieve your constipation. Examples are Milk of Magnesia and MiraLax. Read and follow all instructions on the label. Do not use laxatives on a long-term basis. When should you call for help? Call your doctor now or seek immediate medical care if:    · You have new or worse belly pain.     · You have new or worse nausea or vomiting.     · You have blood in your stools.    Watch closely for changes in your health, and be sure to contact your doctor if:    · Your constipation is getting worse.     · You do not get better as expected. Where can you learn more? Go to http://theresa-mumtaz.info/. Enter 21 [id number] in the search box to learn more about \"Constipation: Care Instructions. \"  Current as of: November 20, 2017  Content Version: 11.8  © 4380-6843 [employer]. Care instructions adapted under license by [employer] (which disclaims liability or warranty for this information). If you have questions about a medical condition or this instruction, always ask your healthcare professional. [place] [address] any warranty or liability for your use of this information. Narcotic-Analgesic/Acetaminophen (Percocet, Norco, Lorcet HD, Lortab 10/325) - (By mouth)   Why this medicine is used:   Relieves pain.   Contact a nurse or doctor right away if you have:  · Extreme weakness, shallow breathing, slow heartbeat  · Severe confusion, lightheadedness, dizziness, fainting  · Yellow skin or eyes, dark urine or pale stools  · Severe constipation, severe stomach pain, nausea, vomiting, loss of appetite  · Sweating or cold, clammy skin     Common side effects:  · Mild constipation, nausea, vomiting  · Sleepiness, tiredness  · Itching, rash  © 2017 [facility] Information is for End User's use only and may not be sold, redistributed or otherwise used for commercial purposes. Nitrofurantoin Combination (Macrobid) - (By mouth)   Why this medicine is used:   Treats urinary tract infections caused by bacteria. Contact a nurse or doctor right away if you have:  · Shortness of breath, chest pain, cough, fever, chills, weakness  · Severe or bloody diarrhea  · Yellow skin or eyes  · Dark urine or pale stools, loss of appetite, stomach pain  · Numbness, tingling, or burning pain in your hands, arms, legs, or feet     Common side effects:  · Dizziness, headache, blurred vision  · Nausea, vomiting, brown or yellow urine discoloration  · Hair loss  © 2017 [address] is for End User's use only and may not be sold, redistributed or otherwise used for commercial purposes. DISCHARGE SUMMARY from Nurse    PATIENT INSTRUCTIONS:    After general anesthesia or intravenous sedation, for 24 hours or while taking prescription Narcotics:  · Limit your activities  · Do not drive and operate hazardous machinery  · Do not make important personal or business decisions  · Do  not drink alcoholic beverages  · If you have not urinated within 8 hours after discharge, please contact your surgeon on call.     Report the following to your surgeon:  · Excessive pain, swelling, redness or odor of or around the surgical area  · Temperature over 100.5  · Nausea and vomiting lasting longer than 4 hours or if unable to take medications  · Any signs of decreased circulation or nerve impairment to extremity: change in color, persistent  numbness, tingling, coldness or increase pain  · Any questions  *  Please give a list of your current medications to your Primary Care Provider. *  Please update this list whenever your medications are discontinued, doses are      changed, or new medications (including over-the-counter products) are added. *  Please carry medication information at all times in case of emergency situations. These are general instructions for a healthy lifestyle:    No smoking/ No tobacco products/ Avoid exposure to second hand smoke  Surgeon General's Warning:  Quitting smoking now greatly reduces serious risk to your health. Obesity, smoking, and sedentary lifestyle greatly increases your risk for illness    A healthy diet, regular physical exercise & weight monitoring are important for maintaining a healthy lifestyle    You may be retaining fluid if you have a history of heart failure or if you experience any of the following symptoms:  Weight gain of 3 pounds or more overnight or 5 pounds in a week, increased swelling in our hands or feet or shortness of breath while lying flat in bed. Please call your doctor as soon as you notice any of these symptoms; do not wait until your next office visit. Recognize signs and symptoms of STROKE:    F-face looks uneven    A-arms unable to move or move unevenly    S-speech slurred or non-existent    T-time-call 911 as soon as signs and symptoms begin-DO NOT go       Back to bed or wait to see if you get better-TIME IS BRAIN. Warning Signs of HEART ATTACK     Call 911 if you have these symptoms:   Chest discomfort. Most heart attacks involve discomfort in the center of the chest that lasts more than a few minutes, or that goes away and comes back. It can feel like uncomfortable pressure, squeezing, fullness, or pain.  Discomfort in other areas of the upper body.  Symptoms can include pain or discomfort in one or both arms, the back, neck, jaw, or stomach.  Shortness of breath with or without chest discomfort.  Other signs may include breaking out in a cold sweat, nausea, or lightheadedness. Don't wait more than five minutes to call 911 - MINUTES MATTER! Fast action can save your life. Calling 911 is almost always the fastest way to get lifesaving treatment. Emergency Medical Services staff can begin treatment when they arrive -- up to an hour sooner than if someone gets to the hospital by car. The discharge information has been reviewed with the patient/family. The patient/family verbalized understanding. Discharge medications reviewed with the patient/family and appropriate educational materials and side effects teaching were provided.   ___________________________________________________________________________________________________________________________________

## 2019-01-02 NOTE — BRIEF OP NOTE
BRIEF OPERATIVE NOTE    Date of Procedure: 1/2/2019   Preoperative Diagnosis: N40.1, R33.8 BPH W/RETENTION  Postoperative Diagnosis: N40.1, R33.8 BPH W/RETENTION    Procedure(s):  CYSTOSCOPY WITH SUPRAPUBIC TUBE INSERTION  TRANSURETHRAL RESECTION OF PROSTATE WITH GREENLIGHT LASER  Surgeon(s) and Role:     * Shilo Gardner MD - Primary         Surgical Assistant: none    Surgical Staff:  Circ-1: Pat Patel RN  Circ-2: Sandra Kelly RN  Scrub Tech-1: Amalia Merlin A  Surg Asst-1: Ankita Mascorro  Event Time In Time Out   Incision Start 5178    Incision Close 5501      Anesthesia: General   Estimated Blood Loss:5cc  Specimens: * No specimens in log *   Findings: bph  Complications: none  Implants: * No implants in log *

## 2019-01-02 NOTE — ANESTHESIA POSTPROCEDURE EVALUATION
Procedure(s): 
CYSTOSCOPY WITH SUPRAPUBIC TUBE INSERTION 
TRANSURETHRAL RESECTION OF PROSTATE WITH GREENLIGHT LASER. Anesthesia Post Evaluation Multimodal analgesia: multimodal analgesia used between 6 hours prior to anesthesia start to PACU discharge Patient location during evaluation: bedside Patient participation: complete - patient participated Level of consciousness: awake Pain management: adequate Airway patency: patent Anesthetic complications: no 
Cardiovascular status: stable Respiratory status: acceptable Hydration status: acceptable Post anesthesia nausea and vomiting:  controlled Visit Vitals /53 (BP 1 Location: Left arm, BP Patient Position: At rest) Pulse 61 Temp 36.8 °C (98.3 °F) Resp 18 Ht 5' 8\" (1.727 m) Wt 65.7 kg (144 lb 12.8 oz) SpO2 100% BMI 22.02 kg/m²

## 2019-01-02 NOTE — PERIOP NOTES
Patient discharged without removing armband. Informed of privacy risks if armband lost or stolen   Patient confirmed by two identifiers with discharge instructions prior too being provided to patient and son/daughter.

## 2019-01-02 NOTE — OP NOTES
[facility] REPORT    Name:Daniella STERN  MR#: [id number]  : 1945  ACCOUNT #: [de-identified]   DATE OF SERVICE: 2019    PREOPERATIVE DIAGNOSES:  Urinary retention, BPH. POSTOPERATIVE DIAGNOSES:  Urinary retention, BPH. PROCEDURE PERFORMED:  Suprapubic tube placement followed by laser ablation of the prostate. INDICATION:  A 16-year-old -American male with urinary retention requiring a Sparrow catheter with attendant difficulties. The cystoscopy has shown lateral lobe encroachment with convergence throughout the entirety of a normal length prostatic fossa; however, there are some indications that the detrusor muscle may not be completely competent, so the patient comes now for relief of the obstruction with the use of a laser, but also with placement of a suprapubic tube in the event that there is detrusor failure. The patient and family have been fully counseled regarding preparation technique and convalescence. They are aware of the risks that include, but are not limited to infection, bleeding, failure, injury, possible need for additional procedures. The patient wishes to proceed and giving his informed consent. ANESTHESIA:  General.    SURGEON:  Radha Velazquez MD    ASSISTANT:  None. ESTIMATED BLOOD LOSS:  5 mL. SPECIMENS REMOVED:  None. FINDINGS:  As above. IMPLANTS:  Suprapubic tube and penile Sparrow catheter. COMPLICATIONS:  None. The patient was via the general LMA route. Timeout is accomplished and verified. The patient was placed in the dorsal lithotomy position. The Sparrow catheter is removed. The patient is prepped and draped in sterile fashion. A rigid cystoscope is inserted and confirms the previously noted normal distal male anatomy with obstructing prostatism. I am utilizing  irrigation and the bladder is irrigated multiple times through the scope to clear the debris.   The bladder is then filled and the scope is removed. A curved Lowsley retractor is placed and pushed up against the abdominal wall skin just above the pubic bone. A transverse incision is made with a scalpel and dissected down to where I can feel the tip of the retractor against the scalpel blade. This then allows me to push the retractor up through the incision. The jaws were opened. A 20-[place] African Sparrow catheter tip was placed in the jaws. The jaws are closed and pulled back into the bladder. The balloon was inflated. The jaws of the retractor are opened and the suprapubic tube was pulled back against the bladder wall, irrigation comes out clear. The retractor jaws are closed and the retractor is removed and a cystoscope was again passed confirming good location and absence of any bleeding through the vesicotomy site. The suprapubic tube is then secured to the skin with 2-0 silk sutures. It is placed then to gravity drainage and a continuous flow cystoscope is placed and a GreenLight laser fiber is passed through this. The laser first set at 80 abdalla and I began just at the proximal verumontanum, and I laser a spot from the left anterior 2 o'clock position around to the right anterior 10 o'clock position. This will define the distal extent of the laser treatment. The laser was then increased to 120 abdalla and I go to the bladder neck and described a midline longitudinal 6 o'clock laser division going down to proximal verumontanum. I then go to the left side and laser between the 2 o'clock and 6 o'clock positions beginning at the bladder neck and moving distally, creating a concavity and mid prostatic fossa. An identical procedure was carried out on the right side; however, it is somewhat restricted by the fact that the laser goes into a safety mode because of some large calcifications at about the 7 o'clock position. However, I am still able to create a good concavity and a wide open prostatic fossa.   At the end of the procedure, there is a wide open prostatic fossa where I can see from the verumontanum into the bladder. There is no active bleeding. The scope was advanced again and I see no bleeding at the suprapubic tube site, and the scope is removed and replaced by an 18-Icelandic penile Sparrow catheter, and as I irrigate from the penile Sparrow catheter into the suprapubic tube catheter, the output is completely clear. A dry dressing is applied to the suprapubic tube site and the procedure is terminated. The patient tolerated the procedure well.       MD ROCKY Anderson / ELVIA  D: 01/02/2019 08:51     T: 01/02/2019 09:15  JOB #: [id number]

## 2019-01-02 NOTE — ANESTHESIA PREPROCEDURE EVALUATION
Anesthetic History No history of anesthetic complications Review of Systems / Medical History Patient summary reviewed and pertinent labs reviewed Pulmonary Pneumonia Neuro/Psych CVA Cardiovascular Hypertension CAD and hyperlipidemia Exercise tolerance: <4 METS 
  
GI/Hepatic/Renal 
  
GERD: well controlled Renal disease: CRI Endo/Other Diabetes: well controlled, type 2, using insulin Other Findings Physical Exam 
 
Airway Mallampati: II 
TM Distance: > 6 cm Neck ROM: normal range of motion Mouth opening: Normal 
 
 Cardiovascular Regular rate and rhythm,  S1 and S2 normal,  no murmur, click, rub, or gallop Dental 
 
Dentition: Poor dentition Pulmonary Decreased breath sounds: bibasilar Abdominal 
GI exam deferred Other Findings Anesthetic Plan ASA: 3 Anesthesia type: general 
 
 
Post-op pain plan if not by surgeon: IV PCA Induction: Intravenous Anesthetic plan and risks discussed with: Patient

## 2019-01-04 ENCOUNTER — OFFICE VISIT (OUTPATIENT)
Dept: UROLOGY | Age: 74
End: 2019-01-04

## 2019-01-04 VITALS
BODY MASS INDEX: 22.02 KG/M2 | HEART RATE: 92 BPM | HEIGHT: 68 IN | OXYGEN SATURATION: 95 % | DIASTOLIC BLOOD PRESSURE: 77 MMHG | SYSTOLIC BLOOD PRESSURE: 150 MMHG

## 2019-01-04 DIAGNOSIS — R33.9 URINARY RETENTION: Primary | ICD-10-CM

## 2019-01-04 NOTE — PATIENT INSTRUCTIONS
Suprapubic Catheter Care: Care Instructions  Your Care Instructions  A suprapubic catheter is a thin tube placed into your bladder just above the pubic bone. The tube allows urine to drain out of your bladder. The urine collects in a bag attached to the tube. The bag is usually attached to your leg. Sometimes the catheter tube has a valve that lets you drain the urine into the toilet or other container. You may need a suprapubic catheter if you have nerve damage, a problem with your urinary tract, or a disease that weakens your muscles. Having a catheter for a long time increases the risk of getting a urinary tract infection. So catheter care focuses on preventing infection. Follow-up care is a key part of your treatment and safety. Be sure to make and go to all appointments, and call your doctor if you are having problems. It's also a good idea to know your test results and keep a list of the medicines you take. How can you care for yourself at home? · Wash your hands before you handle the catheter. · Clean the area around the catheter with soap and water at least one time every day. Wash the area with soap and water after every bowel movement. · Keep the drainage bag lower than your bladder to keep urine from backing up. · Clean the bag every day after removing it from the catheter. Use another container while you clean the bag. To clean the bag, fill it with 2 parts vinegar to 3 parts water and let it stand for 20 minutes. Then empty it out, and let it air dry. · Empty the drainage bag when it is full or at least every 8 hours. When should you call for help? Call your doctor now or seek immediate medical care if:    · Your catheter becomes blocked and urine does not collect in the drainage bag.     · Your catheter leaks.     · You have blood or pus in your urine.     · You have pain in your back just below your rib cage.  This is called flank pain.     · You have a fever, chills, or body aches.     · You have groin or belly pain.     · Your urine is cloudy or smells bad.     · You have pain, increasing redness, or bleeding around the catheter.     · You have swelling around the catheter or in your belly.    Watch closely for changes in your health, and be sure to contact your doctor if you have any problems. Where can you learn more? Go to http://theresa-mumtaz.info/. Enter D561 in the search box to learn more about \"Suprapubic Catheter Care: Care Instructions. \"  Current as of: March 21, 2018  Content Version: 11.8  © 8480-6242 [employer]. Care instructions adapted under license by [employer] (which disclaims liability or warranty for this information). If you have questions about a medical condition or this instruction, always ask your healthcare professional. [address] any warranty or liability for your use of this information.

## 2019-01-04 NOTE — PROGRESS NOTES
NARRATIVE:  Patient is a pleasant 62-year-old -American who is known to this office and who underwent suprapubic tube placement with laser of his prostate about 48 hours ago. The patient is having only a mild predictable amount of blood in the urine and both catheters appear to be functioning well. IMPRESSION: Post op suprapubic tube placement and laser ablation of prostate      PLAN: Penile Sparrow is removed and suprapubic tube catheter irrigated to confirm patency. Patient will return in 3 or 4 weeks and we will consider beginning to plug the suprapubic tube to see if the patient can void. Patient and son advised that there might be some leakage from the penis and if it becomes a problem they need to call us      This visit exceeded 10 minutes and greater than 50% was counseling. The patient expresses understanding of the treatment plan and wishes to proceed    This dictation used voice recognition software and there may be mistakes.     Elana Oliva MD

## 2019-01-04 NOTE — PROGRESS NOTES
Mr. Morales Yung has a reminder for a \"due or due soon\" health maintenance. I have asked that he contact his primary care provider for follow-up on this health maintenance. RBV Per Dr. Joe Szymanski Suprapubic tube flushed with saline water, patent without obstruction. Patient tolerated well. RBV Per Dr. Joe Szymanski balloon deflated and gutierrez catheter removed. Patient tolerated well.

## 2019-02-01 ENCOUNTER — OFFICE VISIT (OUTPATIENT)
Dept: UROLOGY | Age: 74
End: 2019-02-01

## 2019-02-01 VITALS
HEART RATE: 60 BPM | BODY MASS INDEX: 22.02 KG/M2 | SYSTOLIC BLOOD PRESSURE: 123 MMHG | DIASTOLIC BLOOD PRESSURE: 69 MMHG | HEIGHT: 68 IN | OXYGEN SATURATION: 97 %

## 2019-02-01 DIAGNOSIS — R33.9 URINARY RETENTION: Primary | ICD-10-CM

## 2019-02-01 DIAGNOSIS — R33.9 RETENTION OF URINE: ICD-10-CM

## 2019-02-01 NOTE — PROGRESS NOTES
Mr. Monty Bay has a reminder for a \"due or due soon\" health maintenance. I have asked that he contact his primary care provider for follow-up on this health maintenance.

## 2019-02-01 NOTE — PATIENT INSTRUCTIONS
Suprapubic Catheter Care: Care Instructions  Your Care Instructions  A suprapubic catheter is a thin tube placed into your bladder just above the pubic bone. The tube allows urine to drain out of your bladder. The urine collects in a bag attached to the tube. The bag is usually attached to your leg. Sometimes the catheter tube has a valve that lets you drain the urine into the toilet or other container. You may need a suprapubic catheter if you have nerve damage, a problem with your urinary tract, or a disease that weakens your muscles. Having a catheter for a long time increases the risk of getting a urinary tract infection. So catheter care focuses on preventing infection. Follow-up care is a key part of your treatment and safety. Be sure to make and go to all appointments, and call your doctor if you are having problems. It's also a good idea to know your test results and keep a list of the medicines you take. How can you care for yourself at home? · Wash your hands before you handle the catheter. · Clean the area around the catheter with soap and water at least one time every day. Wash the area with soap and water after every bowel movement. · Keep the drainage bag lower than your bladder to keep urine from backing up. · Clean the bag every day after removing it from the catheter. Use another container while you clean the bag. To clean the bag, fill it with 2 parts vinegar to 3 parts water and let it stand for 20 minutes. Then empty it out, and let it air dry. · Empty the drainage bag when it is full or at least every 8 hours. When should you call for help? Call your doctor now or seek immediate medical care if:    · Your catheter becomes blocked and urine does not collect in the drainage bag.     · Your catheter leaks.     · You have blood or pus in your urine.     · You have pain in your back just below your rib cage.  This is called flank pain.     · You have a fever, chills, or body aches.     · You have groin or belly pain.     · Your urine is cloudy or smells bad.     · You have pain, increasing redness, or bleeding around the catheter.     · You have swelling around the catheter or in your belly.    Watch closely for changes in your health, and be sure to contact your doctor if you have any problems. Where can you learn more? Go to http://theresa-mumtaz.info/. Enter P364 in the search box to learn more about \"Suprapubic Catheter Care: Care Instructions. \"  Current as of: March 20, 2018  Content Version: 11.9  © 6000-0461 [employer]. Care instructions adapted under license by [employer] (which disclaims liability or warranty for this information). If you have questions about a medical condition or this instruction, always ask your healthcare professional. [address] any warranty or liability for your use of this information.

## 2019-02-01 NOTE — PROGRESS NOTES
NARRATIVE:  The patient is a pleasant 59-year-old -American male who is known to this office with a previous visit about a month ago. The patient had undergone laser ablation of obstructing tissue and placement of a suprapubic tube. The patient has not been experiencing a plugging of his suprapubic tube to try to void. Lab Results   Component Value Date/Time    Prostate Specific Ag <0.1 04/19/2017 09:24 AM    Prostate Specific Ag <0.1 01/13/2016 12:20 PM    Prostate Specific Ag <0.1 09/17/2014 08:22 AM    Prostate Specific Ag 4.2 11/01/2008            IMPRESSION:  Suprapubic tube is replaced and a catheter plug is provided        PLAN: I have discussed with the son the use of the catheter plug to see if the patient is able to void. If he is unable to void he is instructed about how to empty the bladder and either place it to gravity drainage or put the plug back in. If the patient is able to void, the son will check postvoid residual.  They will be returning in 1 month and we will make a decision at that point about whether to remove the suprapubic tube, maintain catheter plug, or put the patient to gravity drainage in the event that the catheter plug results in incontinence      This visit exceeded 10 minutes and greater than 50% was counseling. The patient expresses understanding of the treatment plan and wishes to proceed    This dictation used voice recognition software and there may be mistakes.     Aranza Finn MD

## 2019-02-04 DIAGNOSIS — M10.00 IDIOPATHIC GOUT, UNSPECIFIED CHRONICITY, UNSPECIFIED SITE: ICD-10-CM

## 2019-02-04 DIAGNOSIS — I10 ESSENTIAL HYPERTENSION: ICD-10-CM

## 2019-02-04 NOTE — TELEPHONE ENCOUNTER
Requested Prescriptions     Pending Prescriptions Disp Refills    allopurinol (ZYLOPRIM) 100 mg tablet 180 Tab 0    carvedilol (COREG) 12.5 mg tablet 180 Tab 0

## 2019-02-05 RX ORDER — ALLOPURINOL 100 MG/1
TABLET ORAL
Qty: 180 TAB | Refills: 0 | Status: SHIPPED | OUTPATIENT
Start: 2019-02-05 | End: 2019-05-03 | Stop reason: SDUPTHER

## 2019-02-05 RX ORDER — CARVEDILOL 12.5 MG/1
TABLET ORAL
Qty: 180 TAB | Refills: 0 | Status: SHIPPED | OUTPATIENT
Start: 2019-02-05 | End: 2019-05-03 | Stop reason: SDUPTHER

## 2019-03-07 ENCOUNTER — OFFICE VISIT (OUTPATIENT)
Dept: UROLOGY | Age: 74
End: 2019-03-07

## 2019-03-07 VITALS
BODY MASS INDEX: 22.02 KG/M2 | DIASTOLIC BLOOD PRESSURE: 58 MMHG | HEIGHT: 68 IN | OXYGEN SATURATION: 98 % | HEART RATE: 54 BPM | SYSTOLIC BLOOD PRESSURE: 124 MMHG

## 2019-03-07 DIAGNOSIS — R33.9 URINARY RETENTION: Primary | ICD-10-CM

## 2019-03-07 NOTE — PATIENT INSTRUCTIONS
Suprapubic Catheter Care: Care Instructions  Your Care Instructions  A suprapubic catheter is a thin tube placed into your bladder just above the pubic bone. The tube allows urine to drain out of your bladder. The urine collects in a bag attached to the tube. The bag is usually attached to your leg. Sometimes the catheter tube has a valve that lets you drain the urine into the toilet or other container. You may need a suprapubic catheter if you have nerve damage, a problem with your urinary tract, or a disease that weakens your muscles. Having a catheter for a long time increases the risk of getting a urinary tract infection. So catheter care focuses on preventing infection. Follow-up care is a key part of your treatment and safety. Be sure to make and go to all appointments, and call your doctor if you are having problems. It's also a good idea to know your test results and keep a list of the medicines you take. How can you care for yourself at home? · Wash your hands before you handle the catheter. · Clean the area around the catheter with soap and water at least one time every day. Wash the area with soap and water after every bowel movement. · Keep the drainage bag lower than your bladder to keep urine from backing up. · Clean the bag every day after removing it from the catheter. Use another container while you clean the bag. To clean the bag, fill it with 2 parts vinegar to 3 parts water and let it stand for 20 minutes. Then empty it out, and let it air dry. · Empty the drainage bag when it is full or at least every 8 hours. When should you call for help? Call your doctor now or seek immediate medical care if:    · Your catheter becomes blocked and urine does not collect in the drainage bag.     · Your catheter leaks.     · You have blood or pus in your urine.     · You have pain in your back just below your rib cage.  This is called flank pain.     · You have a fever, chills, or body aches.     · You have groin or belly pain.     · Your urine is cloudy or smells bad.     · You have pain, increasing redness, or bleeding around the catheter.     · You have swelling around the catheter or in your belly.    Watch closely for changes in your health, and be sure to contact your doctor if you have any problems. Where can you learn more? Go to http://theresa-mumtaz.info/. Enter L495 in the search box to learn more about \"Suprapubic Catheter Care: Care Instructions. \"  Current as of: March 20, 2018  Content Version: 11.9  © 7218-8737 [employer]. Care instructions adapted under license by [employer] (which disclaims liability or warranty for this information). If you have questions about a medical condition or this instruction, always ask your healthcare professional. [address] any warranty or liability for your use of this information.

## 2019-03-07 NOTE — PROGRESS NOTES
Lab Results   Component Value Date/Time    Prostate Specific Ag <0.1 04/19/2017 09:24 AM    Prostate Specific Ag <0.1 01/13/2016 12:20 PM    Prostate Specific Ag <0.1 09/17/2014 08:22 AM    Prostate Specific Ag 4.2 11/01/2008        NARRATIVE:      Patient has had urinary retention and a bladder neck incision and suprapubic tube were placed. We plug the suprapubic tube and the son reports that the patient is unable to void during the day except for small amounts and does some obvious straining. However, at night, when the tube is clamped, the patient will void into a diaper. The patient's 20 Moroccan suprapubic tube is changed out in routine fashion and the patient will return in 1 month for another change. We will do a PSA at that time. I note that the patient's PSA was undetectable but that was 2 years ago                  IMPRESSION: Urinary retention and detrusor hypoactivity        PLAN: As above      This visit exceeded *15  The patient expresses understanding of the treatment plan and wishes to proceed    This dictation used voice recognition software and there may be mistakes.     Gilberto Johnson MD

## 2019-03-07 NOTE — PROGRESS NOTES
Mr. Cortes Smoke has a reminder for a \"due or due soon\" health maintenance. I have asked that he contact his primary care provider for follow-up on this health maintenance.

## 2019-04-12 ENCOUNTER — OFFICE VISIT (OUTPATIENT)
Dept: UROLOGY | Age: 74
End: 2019-04-12

## 2019-04-12 ENCOUNTER — HOSPITAL ENCOUNTER (OUTPATIENT)
Dept: LAB | Age: 74
Discharge: HOME | End: 2019-04-12
Payer: MEDICARE

## 2019-04-12 VITALS
DIASTOLIC BLOOD PRESSURE: 79 MMHG | HEART RATE: 67 BPM | HEIGHT: 68 IN | OXYGEN SATURATION: 98 % | BODY MASS INDEX: 22.02 KG/M2 | SYSTOLIC BLOOD PRESSURE: 159 MMHG

## 2019-04-12 DIAGNOSIS — R33.9 URINARY RETENTION: Primary | ICD-10-CM

## 2019-04-12 DIAGNOSIS — C61 CANCER OF PROSTATE (HCC): ICD-10-CM

## 2019-04-12 LAB — PSA SERPL-MCNC: 0.1 NG/ML (ref 0–4)

## 2019-04-12 PROCEDURE — 84153 ASSAY OF PSA TOTAL: CPT

## 2019-04-12 NOTE — PROGRESS NOTES
Lab Results   Component Value Date/Time    Prostate Specific Ag <0.1 04/19/2017 09:24 AM    Prostate Specific Ag <0.1 01/13/2016 12:20 PM    Prostate Specific Ag <0.1 09/17/2014 08:22 AM    Prostate Specific Ag 4.2 11/01/2008    NARRATIVE:  Patient is a 78-year-old -American male with issues with urinary retention having undergone bladder neck incision and suprapubic tube placement. The patient has had prostate cancer in the past and underwent cryotherapy around 2008. Last PSA was about 2 years ago and was undetectable    The patient has had urinary retention and comes now for his monthly suprapubic tube change    The 20 Mongolian suprapubic tube is changed in routine fashion and placed to gravity drainage. The patient will return in 1 month we will get a PSA at that time                      IMPRESSION: Urinary retention        PLAN: Suprapubic tube change on monthly basis  PSA with next visit      This visit exceeded 10 minutes and greater than 50% was counseling. The patient expresses understanding of the treatment plan and wishes to proceed    This dictation used voice recognition software and there may be mistakes.     Lionel Lutz MD

## 2019-04-12 NOTE — PATIENT INSTRUCTIONS
Learning About Urinary Catheter Care to Prevent Infection  What is a urinary catheter? A urinary catheter is a flexible plastic tube used to drain urine from your bladder when you can't urinate on your own. The catheter allows urine to drain from the bladder into a bag. Two types of drainage bags may be used with a urinary catheter. · A bedside bag is a large bag that you can hang on the side of your bed or on a chair. You can use it overnight or anytime you will be sitting or lying down for a long time. · A leg bag is a small bag that you can use during the day. It is usually attached to your thigh or calf and hidden under your clothes. Having a urinary catheter increases your risk of getting a urinary tract infection. Germs may get on the catheter and cause an infection in your bladder or kidneys. The longer you have a catheter, the more likely it is that you will get an infection. You can help prevent this problem with good hygiene and careful handling of your catheter and drainage bags. How can you help prevent infection? Take care to be clean  · Always wash your hands well before and after you handle your catheter. · Clean the skin around the catheter twice a day using soap and water. Dry with a clean towel afterward. You can shower with your catheter and drainage bag in place unless your doctor told you not to. · When you clean around the catheter, check the surrounding skin for signs of infection. Look for things like pus or irritated, swollen, red, or tender skin around the catheter. Be careful with your drainage bag  · Always keep the drainage bag below the level of your bladder. This will help keep urine from flowing back into your bladder. · Check often to see that urine is flowing through the catheter into the drainage bag. · Empty the drainage bag when it is half full. This will keep it from overflowing or backing up.   · When you empty the drainage bag, do not let the tubing or drain spout touch anything. Be careful with your catheter  · Do not unhook the catheter from the drain tube. That could let germs get into the tube. · Make sure that the catheter tubing does not get twisted or kinked. · Do not tug or pull on the catheter. And make sure that the drainage bag does not drag or pull on the catheter. · Do not put powder or lotion on the skin around the catheter. · Talk with your doctor about your options for sexual intercourse while wearing a catheter. How do you empty a urine drainage bag? If your doctor has asked you to keep a record, write down the amount of urine in the bag before you empty it. Wash your hands before and after you touch the bag. 1. Remove the drain spout from its sleeve at the bottom of the drainage bag.  2. Open the valve on the drain spout. Let the urine flow out into the toilet or a container. Be careful not to let the tubing or drain spout touch anything. 3. After you empty the bag, close the valve. Then put the drain spout back into its sleeve at the bottom of the collection bag. How do you add a bedside bag to a leg bag? Wash your hands before and after you handle the bags. 1. Empty the leg bag attached to the catheter. 2. Put a clean towel under the leg bag.  3. Use an alcohol wipe to clean the tip of the bedside bag. Then connect the bedside bag to the leg bag. How can you clean a bedside drainage bag? Many people clean their bedside bag in the morning if they switch to a leg bag. To clean a bedside drainage ba. Remove the bedside bag from the leg bag.  2. Fill the bag with 2 parts vinegar and 3 parts water. Let it stand for 20 minutes. 3. Empty the bag, and let it air dry. When should you call for help? Call your doctor now or seek immediate medical care if:  · You have symptoms of a urinary infection. These may include:  ? Pain or burning when you urinate. ? A frequent need to urinate without being able to pass much urine.   ? Pain in the flank, which is just below the rib cage and above the waist on either side of the back. ? Blood in your urine. ? A fever. · Your urine smells bad. · You see large blood clots in your urine. · No urine or very little urine is flowing into the bag for 4 or more hours. Watch closely for changes in your health, and be sure to contact your doctor if:  · The area around the catheter becomes irritated, swollen, red, or tender, or there is pus draining from it. · Urine is leaking from the place where the catheter enters your body. Follow-up care is a key part of your treatment and safety. Be sure to make and go to all appointments, and call your doctor if you are having problems. It's also a good idea to know your test results and keep a list of the medicines you take. Where can you learn more? Go to http://theresa-mumtaz.info/. Enter U010 in the search box to learn more about \"Learning About Urinary Catheter Care to Prevent Infection. \"  Current as of: March 20, 2018  Content Version: 11.9  © 1430-2280 [employer]. Care instructions adapted under license by [employer] (which disclaims liability or warranty for this information). If you have questions about a medical condition or this instruction, always ask your healthcare professional. [address] any warranty or liability for your use of this information.

## 2019-04-12 NOTE — PROGRESS NOTES
Mr. Kaylin Renee has a reminder for a \"due or due soon\" health maintenance. I have asked that he contact his primary care provider for follow-up on this health maintenance.

## 2019-04-30 RX ORDER — TAMSULOSIN HYDROCHLORIDE 0.4 MG/1
CAPSULE ORAL
Qty: 30 CAP | Refills: 4 | Status: SHIPPED | OUTPATIENT
Start: 2019-04-30 | End: 2019-08-05 | Stop reason: SDUPTHER

## 2019-05-03 DIAGNOSIS — I10 ESSENTIAL HYPERTENSION: ICD-10-CM

## 2019-05-03 DIAGNOSIS — M10.00 IDIOPATHIC GOUT, UNSPECIFIED CHRONICITY, UNSPECIFIED SITE: ICD-10-CM

## 2019-05-03 RX ORDER — CARVEDILOL 12.5 MG/1
TABLET ORAL
Qty: 60 TAB | Refills: 0 | Status: SHIPPED | OUTPATIENT
Start: 2019-05-03 | End: 2019-05-15 | Stop reason: SDUPTHER

## 2019-05-03 RX ORDER — ALLOPURINOL 100 MG/1
TABLET ORAL
Qty: 60 TAB | Refills: 0 | Status: SHIPPED | OUTPATIENT
Start: 2019-05-03 | End: 2019-05-15 | Stop reason: SDUPTHER

## 2019-05-03 NOTE — TELEPHONE ENCOUNTER
Last OV  6/11/2018  Next OV : no appointment scheduled  Last refill 2/5/2019      Please call patient to schedule an appointment.

## 2019-05-15 ENCOUNTER — OFFICE VISIT (OUTPATIENT)
Dept: FAMILY MEDICINE CLINIC | Age: 74
End: 2019-05-15

## 2019-05-15 VITALS
RESPIRATION RATE: 18 BRPM | BODY MASS INDEX: 20.46 KG/M2 | HEIGHT: 68 IN | DIASTOLIC BLOOD PRESSURE: 82 MMHG | OXYGEN SATURATION: 98 % | WEIGHT: 135 LBS | TEMPERATURE: 98.1 F | SYSTOLIC BLOOD PRESSURE: 148 MMHG | HEART RATE: 64 BPM

## 2019-05-15 DIAGNOSIS — E78.00 HYPERCHOLESTEROLEMIA: ICD-10-CM

## 2019-05-15 DIAGNOSIS — M10.00 IDIOPATHIC GOUT, UNSPECIFIED CHRONICITY, UNSPECIFIED SITE: ICD-10-CM

## 2019-05-15 DIAGNOSIS — I10 ESSENTIAL HYPERTENSION: ICD-10-CM

## 2019-05-15 RX ORDER — CHOLECALCIFEROL TAB 125 MCG (5000 UNIT) 125 MCG
5000 TAB ORAL
Qty: 4 TAB | Refills: 0 | Status: SHIPPED | OUTPATIENT
Start: 2019-05-15 | End: 2019-06-06 | Stop reason: SDUPTHER

## 2019-05-15 RX ORDER — CARVEDILOL 12.5 MG/1
TABLET ORAL
Qty: 60 TAB | Refills: 0 | Status: SHIPPED | OUTPATIENT
Start: 2019-05-15 | End: 2019-06-13 | Stop reason: SDUPTHER

## 2019-05-15 RX ORDER — DILTIAZEM HYDROCHLORIDE 360 MG/1
CAPSULE, EXTENDED RELEASE ORAL
Qty: 30 CAP | Refills: 0 | Status: SHIPPED | OUTPATIENT
Start: 2019-05-15 | End: 2019-06-10 | Stop reason: SDUPTHER

## 2019-05-15 RX ORDER — ROSUVASTATIN CALCIUM 20 MG/1
TABLET, COATED ORAL
Qty: 30 TAB | Refills: 0 | Status: SHIPPED | OUTPATIENT
Start: 2019-05-15 | End: 2019-06-11 | Stop reason: SDUPTHER

## 2019-05-15 RX ORDER — ALLOPURINOL 100 MG/1
TABLET ORAL
Qty: 60 TAB | Refills: 0 | Status: SHIPPED | OUTPATIENT
Start: 2019-05-15 | End: 2019-06-13 | Stop reason: SDUPTHER

## 2019-05-15 NOTE — PROGRESS NOTES
Chief Complaint   Patient presents with    Medication Refill     1. Have you been to the ER, urgent care clinic since your last visit? Hospitalized since your last visit? No    2. Have you seen or consulted any other health care providers outside of the [address] [place] [address] since your last visit? Include any pap smears or colon screening.  No

## 2019-05-15 NOTE — PATIENT INSTRUCTIONS
Gout: Care Instructions  Your Care Instructions    Gout is a form of arthritis caused by a buildup of uric acid crystals in a joint. It causes sudden attacks of pain, swelling, redness, and stiffness, usually in one joint, especially the big toe. Gout usually comes on without a cause. But it can be brought on by drinking alcohol (especially beer) or eating seafood and red meat. Taking certain medicines, such as diuretics or aspirin, also can bring on an attack of gout. Taking your medicines as prescribed and following up with your doctor regularly can help you avoid gout attacks in the future. Follow-up care is a key part of your treatment and safety. Be sure to make and go to all appointments, and call your doctor if you are having problems. It's also a good idea to know your test results and keep a list of the medicines you take. How can you care for yourself at home? · If the joint is swollen, put ice or a cold pack on the area for 10 to 20 minutes at a time. Put a thin cloth between the ice and your skin. · Prop up the sore limb on a pillow when you ice it or anytime you sit or lie down during the next 3 days. Try to keep it above the level of your heart. This will help reduce swelling. · Rest sore joints. Avoid activities that put weight or strain on the joints for a few days. Take short rest breaks from your regular activities during the day. · Take your medicines exactly as prescribed. Call your doctor if you think you are having a problem with your medicine. · Take pain medicines exactly as directed. ? If the doctor gave you a prescription medicine for pain, take it as prescribed. ? If you are not taking a prescription pain medicine, ask your doctor if you can take an over-the-counter medicine. · Eat less seafood and red meat. · Check with your doctor before drinking alcohol. · Losing weight, if you are overweight, may help reduce attacks of gout. But do not go on a [employer]. \" Losing a lot of weight in a short amount of time can cause a gout attack. When should you call for help? Call your doctor now or seek immediate medical care if:    · You have a fever.     · The joint is so painful you cannot use it.     · You have sudden, unexplained swelling, redness, warmth, or severe pain in one or more joints.    Watch closely for changes in your health, and be sure to contact your doctor if:    · You have joint pain.     · Your symptoms get worse or are not improving after 2 or 3 days. Where can you learn more? Go to http://theresa-mumtaz.info/. Enter E869 in the search box to learn more about \"Gout: Care Instructions. \"  Current as of: Enedina 10, 2018  Content Version: 11.9  © 5454-4771 [employer]. Care instructions adapted under license by [employer] (which disclaims liability or warranty for this information). If you have questions about a medical condition or this instruction, always ask your healthcare professional. [place] [address] any warranty or liability for your use of this information. High Cholesterol: Care Instructions  Your Care Instructions    Cholesterol is a type of fat in your blood. It is needed for many body functions, such as making new cells. Cholesterol is made by your body. It also comes from food you eat. High cholesterol means that you have too much of the fat in your blood. This raises your risk of a heart attack and stroke. LDL and HDL are part of your total cholesterol. LDL is the \"bad\" cholesterol. High LDL can raise your risk for heart disease, heart attack, and stroke. HDL is the \"good\" cholesterol. It helps clear bad cholesterol from the body. High HDL is linked with a lower risk of heart disease, heart attack, and stroke. Your cholesterol levels help your doctor find out your risk for having a heart attack or stroke.  You and your doctor can talk about whether you need to lower your risk and what treatment is best for you. A heart-healthy lifestyle along with medicines can help lower your cholesterol and your risk. The way you choose to lower your risk will depend on how high your risk is for heart attack and stroke. It will also depend on how you feel about taking medicines. Follow-up care is a key part of your treatment and safety. Be sure to make and go to all appointments, and call your doctor if you are having problems. It's also a good idea to know your test results and keep a list of the medicines you take. How can you care for yourself at home? · Eat a variety of foods every day. Good choices include fruits, vegetables, whole grains (like oatmeal), dried beans and peas, nuts and seeds, soy products (like tofu), and fat-free or low-fat dairy products. · Replace butter, margarine, and hydrogenated or partially hydrogenated oils with olive and canola oils. (Canola oil margarine without trans fat is fine.)  · Replace red meat with fish, poultry, and soy protein (like tofu). · Limit processed and packaged foods like chips, crackers, and cookies. · Bake, broil, or steam foods. Don't bhatti them. · Be physically active. Get at least 30 minutes of exercise on most days of the week. Walking is a good choice. You also may want to do other activities, such as running, swimming, cycling, or playing tennis or team sports. · Stay at a healthy weight or lose weight by making the changes in eating and physical activity listed above. Losing just a small amount of weight, even 5 to 10 pounds, can reduce your risk for having a heart attack or stroke. · Do not smoke. When should you call for help? Watch closely for changes in your health, and be sure to contact your doctor if:    · You need help making lifestyle changes.     · You have questions about your medicine. Where can you learn more? Go to http://theresa-mumtaz.info/.   Enter M049 in the search box to learn more about [facility] Cholesterol: Care Instructions. \"  Current as of: July 22, 2018  Content Version: 11.9  © 3298-7133 [employer]. Care instructions adapted under license by [employer] (which disclaims liability or warranty for this information). If you have questions about a medical condition or this instruction, always ask your healthcare professional. [place] [address] any warranty or liability for your use of this information.

## 2019-05-15 NOTE — PROGRESS NOTES
HPI  Kenny Holm is a 75 y.o. male, accompanied by adult son, who presents today for medication refills. Pt is out of medication and in the process of transferring care to Internal Medicine office. Pt is ambulating via wheelchair and has a indwelling catheter with leg bag that son states will be changed on Friday 5/17/19. Pt denies any acute medical concerns today. Current Outpatient Medications   Medication Sig Dispense Refill    allopurinol (ZYLOPRIM) 100 mg tablet TAKE 1 TABLET BY MOUTH TWICE DAILY 60 Tab 0    carvedilol (COREG) 12.5 mg tablet TAKE 1 TABLET BY MOUTH TWICE A DAY 60 Tab 0    cholecalciferol, VITAMIN D3, (VITAMIN D3) 5,000 unit tab tablet Take 1 Tab by mouth every seven (7) days. 4 Tab 0    dilTIAZem (TIAZAC) 360 mg ER capsule TAKE ONE CAPSULE BY MOUTH EVERY DAY 30 Cap 0    rosuvastatin (CRESTOR) 20 mg tablet TAKE 1 TABLET BY MOUTH NIGHTLY 30 Tab 0    tamsulosin (FLOMAX) 0.4 mg capsule TAKE ONE CAPSULE BY MOUTH EVERY DAY AFTER MEALS 30 Cap 4    Insulin Needles, Disposable, (BD ULTRA-FINE MINI PEN NEEDLE) 31 gauge x 3/16\" ndle Inject TID before meals  E11.9 100 Pen Needle 5    insulin lispro (HUMALOG) 100 unit/mL injection For Blood Sugar (mg/dL) of:     Less than 150=0 units           150 -199=2 units  200 -249=4 units  250 -299=6 units  300 -349=8 units  350 and above=10 units  Check blood glucose 3 times daily and at bedtime and inject insulin per SS. 1 Vial 0    aspirin delayed-release 81 mg tablet Take 81 mg by mouth daily.  glucose 4 gram chewable tablet Take 4 Tabs by mouth as needed. 50 Tab 0    OTHER Check CBC, CMP, Mg in 5 days, results to PCP 1 each 0    omega-3 fatty acids-vitamin e (FISH OIL) 1,000 mg Cap Take 1,400 mg by mouth daily.  HYDROcodone-acetaminophen (NORCO) 5-325 mg per tablet Take 1 Tab by mouth every six (6) hours as needed for Pain. Max Daily Amount: 4 Tabs.  12 Tab 0      Allergies   Allergen Reactions    Bactrim [Sulfamethoxazole-Trimethoprim] Other (comments)     CAUSED ACUTE RENAL FAILURE    Ciprofloxacin Other (comments)     PATIENT IS NOT ALLERGIC       SUBJECTIVE:  Review of Systems   Constitutional: Negative for chills, fever and malaise/fatigue. Eyes: Negative for blurred vision. Respiratory: Negative for shortness of breath. Cardiovascular: Negative for chest pain, palpitations and leg swelling. Gastrointestinal: Negative for abdominal pain, diarrhea, nausea and vomiting. Genitourinary: Negative for dysuria, frequency and urgency. Neurological: Positive for weakness. Negative for dizziness, tingling, sensory change and headaches. OBJECTIVE:  Visit Vitals  /82 (BP 1 Location: Right arm, BP Patient Position: Sitting)   Pulse 64   Temp 98.1 °F (36.7 °C) (Oral)   Resp 18   Ht 5' 8\" (1.727 m)   Wt 135 lb (61.2 kg)   SpO2 98%   BMI 20.53 kg/m²      Physical Exam   Constitutional: He is oriented to person, place, and time and well-developed, well-nourished, and in no distress. HENT:   Head: Normocephalic. Eyes: Pupils are equal, round, and reactive to light. Conjunctivae and EOM are normal.   Neck: Normal range of motion. Neck supple. No thyromegaly present. Cardiovascular: Normal rate, regular rhythm and normal heart sounds. Pulmonary/Chest: Effort normal and breath sounds normal. He has no wheezes. He has no rales. He exhibits no tenderness. Abdominal: Soft. Bowel sounds are normal. He exhibits no distension. There is no tenderness. There is no guarding. Musculoskeletal: He exhibits no edema. Neurological: He is alert and oriented to person, place, and time. Clear diminished breath sounds   Skin: Skin is warm and dry. No erythema. Psychiatric: Mood and affect normal.   Nursing note and vitals reviewed. ASSESSMENT:  Diagnoses and all orders for this visit:    1.  Idiopathic gout, unspecified chronicity, unspecified site  -     allopurinol (ZYLOPRIM) 100 mg tablet; TAKE 1 TABLET BY MOUTH TWICE DAILY    2. Essential hypertension  -     carvedilol (COREG) 12.5 mg tablet; TAKE 1 TABLET BY MOUTH TWICE A DAY  -     dilTIAZem (TIAZAC) 360 mg ER capsule; TAKE ONE CAPSULE BY MOUTH EVERY DAY    3. Hypercholesterolemia  -     rosuvastatin (CRESTOR) 20 mg tablet; TAKE 1 TABLET BY MOUTH NIGHTLY    Other orders  -     cholecalciferol, VITAMIN D3, (VITAMIN D3) 5,000 unit tab tablet; Take 1 Tab by mouth every seven (7) days. PLAN:  Medication refills today  Continue current medication regimen  Encouraged patient to schedule appointment with new provider for continuity of care    I have discussed the diagnosis with the patient and the intended plan as seen in the above orders. The patient has received an after-visit summary and questions were answered concerning future plans. I have discussed medication side effects and warnings with the patient as well. Patient will call for further questions. Follow-up and Dispositions    · Return if symptoms worsen or fail to improve.        Jordan Das NP

## 2019-06-10 DIAGNOSIS — I10 ESSENTIAL HYPERTENSION: ICD-10-CM

## 2019-06-10 RX ORDER — CHOLECALCIFEROL TAB 125 MCG (5000 UNIT) 125 MCG
5000 TAB ORAL
Qty: 30 TAB | Refills: 0 | Status: SHIPPED | OUTPATIENT
Start: 2019-06-10 | End: 2019-08-09 | Stop reason: SDUPTHER

## 2019-06-10 RX ORDER — DILTIAZEM HYDROCHLORIDE 360 MG/1
CAPSULE, EXTENDED RELEASE ORAL
Qty: 30 CAP | Refills: 0 | Status: SHIPPED | OUTPATIENT
Start: 2019-06-10 | End: 2019-06-13 | Stop reason: SDUPTHER

## 2019-06-11 DIAGNOSIS — E78.00 HYPERCHOLESTEROLEMIA: ICD-10-CM

## 2019-06-12 RX ORDER — ROSUVASTATIN CALCIUM 20 MG/1
TABLET, COATED ORAL
Qty: 30 TAB | Refills: 0 | Status: SHIPPED | OUTPATIENT
Start: 2019-06-12 | End: 2019-07-17 | Stop reason: SDUPTHER

## 2019-06-13 DIAGNOSIS — M10.00 IDIOPATHIC GOUT, UNSPECIFIED CHRONICITY, UNSPECIFIED SITE: ICD-10-CM

## 2019-06-13 DIAGNOSIS — I10 ESSENTIAL HYPERTENSION: ICD-10-CM

## 2019-06-14 RX ORDER — ALLOPURINOL 100 MG/1
TABLET ORAL
Qty: 60 TAB | Refills: 0 | Status: SHIPPED | OUTPATIENT
Start: 2019-06-14 | End: 2019-07-22 | Stop reason: SDUPTHER

## 2019-06-14 RX ORDER — CARVEDILOL 12.5 MG/1
TABLET ORAL
Qty: 60 TAB | Refills: 0 | Status: SHIPPED | OUTPATIENT
Start: 2019-06-14 | End: 2019-07-22 | Stop reason: SDUPTHER

## 2019-06-14 RX ORDER — DILTIAZEM HYDROCHLORIDE 360 MG/1
CAPSULE, EXTENDED RELEASE ORAL
Qty: 30 CAP | Refills: 0 | Status: SHIPPED | OUTPATIENT
Start: 2019-06-14 | End: 2019-07-08 | Stop reason: SDUPTHER

## 2019-07-08 DIAGNOSIS — I10 ESSENTIAL HYPERTENSION: ICD-10-CM

## 2019-07-11 RX ORDER — DILTIAZEM HYDROCHLORIDE 360 MG/1
CAPSULE, EXTENDED RELEASE ORAL
Qty: 30 CAP | Refills: 0 | Status: SHIPPED | OUTPATIENT
Start: 2019-07-11 | End: 2019-07-17 | Stop reason: SDUPTHER

## 2019-07-17 DIAGNOSIS — E78.00 HYPERCHOLESTEROLEMIA: ICD-10-CM

## 2019-07-17 DIAGNOSIS — M10.00 IDIOPATHIC GOUT, UNSPECIFIED CHRONICITY, UNSPECIFIED SITE: ICD-10-CM

## 2019-07-17 DIAGNOSIS — I10 ESSENTIAL HYPERTENSION: ICD-10-CM

## 2019-07-22 DIAGNOSIS — I10 ESSENTIAL HYPERTENSION: ICD-10-CM

## 2019-07-22 DIAGNOSIS — M10.00 IDIOPATHIC GOUT, UNSPECIFIED CHRONICITY, UNSPECIFIED SITE: ICD-10-CM

## 2019-07-22 RX ORDER — CARVEDILOL 12.5 MG/1
TABLET ORAL
Qty: 180 TAB | Refills: 1 | Status: CANCELLED | OUTPATIENT
Start: 2019-07-22

## 2019-07-22 RX ORDER — ALLOPURINOL 100 MG/1
TABLET ORAL
Qty: 180 TAB | Refills: 1 | Status: CANCELLED | OUTPATIENT
Start: 2019-07-22

## 2019-07-23 RX ORDER — ROSUVASTATIN CALCIUM 20 MG/1
TABLET, COATED ORAL
Qty: 90 TAB | Refills: 0 | Status: SHIPPED | OUTPATIENT
Start: 2019-07-23 | End: 2019-12-20 | Stop reason: SDUPTHER

## 2019-07-23 RX ORDER — DILTIAZEM HYDROCHLORIDE 360 MG/1
CAPSULE, EXTENDED RELEASE ORAL
Qty: 90 CAP | Refills: 0 | Status: SHIPPED | OUTPATIENT
Start: 2019-07-23 | End: 2019-08-05

## 2019-07-26 RX ORDER — CARVEDILOL 12.5 MG/1
TABLET ORAL
Qty: 60 TAB | Refills: 0 | Status: SHIPPED | OUTPATIENT
Start: 2019-07-26 | End: 2019-08-27 | Stop reason: SDUPTHER

## 2019-07-26 RX ORDER — ALLOPURINOL 100 MG/1
TABLET ORAL
Qty: 60 TAB | Refills: 0 | Status: SHIPPED | OUTPATIENT
Start: 2019-07-26 | End: 2019-08-27 | Stop reason: SDUPTHER

## 2019-08-01 ENCOUNTER — APPOINTMENT (OUTPATIENT)
Dept: MRI IMAGING | Age: 74
DRG: 698 | End: 2019-08-01
Attending: INTERNAL MEDICINE
Payer: MEDICARE

## 2019-08-01 ENCOUNTER — HOSPITAL ENCOUNTER (INPATIENT)
Age: 74
LOS: 4 days | Discharge: HOME HEALTH | DRG: 698 | End: 2019-08-05
Attending: EMERGENCY MEDICINE | Admitting: INTERNAL MEDICINE
Payer: MEDICARE

## 2019-08-01 ENCOUNTER — APPOINTMENT (OUTPATIENT)
Dept: GENERAL RADIOLOGY | Age: 74
DRG: 698 | End: 2019-08-01
Attending: EMERGENCY MEDICINE
Payer: MEDICARE

## 2019-08-01 ENCOUNTER — APPOINTMENT (OUTPATIENT)
Dept: CT IMAGING | Age: 74
DRG: 698 | End: 2019-08-01
Attending: EMERGENCY MEDICINE
Payer: MEDICARE

## 2019-08-01 DIAGNOSIS — R65.10 SIRS (SYSTEMIC INFLAMMATORY RESPONSE SYNDROME) (HCC): Primary | ICD-10-CM

## 2019-08-01 DIAGNOSIS — R53.1 WEAKNESS: ICD-10-CM

## 2019-08-01 DIAGNOSIS — N39.0 URINARY TRACT INFECTION ASSOCIATED WITH INDWELLING URETHRAL CATHETER, INITIAL ENCOUNTER (HCC): ICD-10-CM

## 2019-08-01 DIAGNOSIS — T83.511A URINARY TRACT INFECTION ASSOCIATED WITH INDWELLING URETHRAL CATHETER, INITIAL ENCOUNTER (HCC): ICD-10-CM

## 2019-08-01 DIAGNOSIS — E86.0 DEHYDRATION: ICD-10-CM

## 2019-08-01 PROBLEM — A41.9 SEPSIS (HCC): Status: ACTIVE | Noted: 2019-08-01

## 2019-08-01 LAB
ALBUMIN SERPL-MCNC: 3.3 G/DL (ref 3.4–5)
ALBUMIN/GLOB SERPL: 0.7 {RATIO} (ref 0.8–1.7)
ALP SERPL-CCNC: 61 U/L (ref 45–117)
ALT SERPL-CCNC: 9 U/L (ref 16–61)
ANION GAP SERPL CALC-SCNC: 11 MMOL/L (ref 3–18)
APPEARANCE UR: ABNORMAL
AST SERPL-CCNC: 5 U/L (ref 10–38)
ATRIAL RATE: 92 BPM
BACTERIA URNS QL MICRO: ABNORMAL /HPF
BASOPHILS # BLD: 0 K/UL (ref 0–0.06)
BASOPHILS NFR BLD: 0 % (ref 0–3)
BILIRUB SERPL-MCNC: 0.8 MG/DL (ref 0.2–1)
BILIRUB UR QL: NEGATIVE
BUN SERPL-MCNC: 40 MG/DL (ref 7–18)
BUN/CREAT SERPL: 16 (ref 12–20)
CALCIUM SERPL-MCNC: 9.7 MG/DL (ref 8.5–10.1)
CALCULATED P AXIS, ECG09: 60 DEGREES
CALCULATED R AXIS, ECG10: 6 DEGREES
CALCULATED T AXIS, ECG11: 57 DEGREES
CHLORIDE SERPL-SCNC: 104 MMOL/L (ref 100–111)
CO2 SERPL-SCNC: 24 MMOL/L (ref 21–32)
COLOR UR: YELLOW
CREAT SERPL-MCNC: 2.51 MG/DL (ref 0.6–1.3)
DIAGNOSIS, 93000: NORMAL
DIFFERENTIAL METHOD BLD: ABNORMAL
EOSINOPHIL # BLD: 0.2 K/UL (ref 0–0.4)
EOSINOPHIL NFR BLD: 1 % (ref 0–5)
EPITH CASTS URNS QL MICRO: ABNORMAL /LPF (ref 0–5)
ERYTHROCYTE [DISTWIDTH] IN BLOOD BY AUTOMATED COUNT: 18.4 % (ref 11.6–14.5)
GLOBULIN SER CALC-MCNC: 5 G/DL (ref 2–4)
GLUCOSE BLD STRIP.AUTO-MCNC: 131 MG/DL (ref 70–110)
GLUCOSE BLD STRIP.AUTO-MCNC: 206 MG/DL (ref 70–110)
GLUCOSE SERPL-MCNC: 203 MG/DL (ref 74–99)
GLUCOSE UR STRIP.AUTO-MCNC: NEGATIVE MG/DL
HCT VFR BLD AUTO: 34.2 % (ref 36–48)
HGB BLD-MCNC: 11.5 G/DL (ref 13–16)
HGB UR QL STRIP: ABNORMAL
KETONES UR QL STRIP.AUTO: ABNORMAL MG/DL
LACTATE BLD-SCNC: 1.2 MMOL/L (ref 0.4–2)
LEUKOCYTE ESTERASE UR QL STRIP.AUTO: ABNORMAL
LYMPHOCYTES # BLD: 1.7 K/UL (ref 0.8–3.5)
LYMPHOCYTES NFR BLD: 8 % (ref 20–51)
MAGNESIUM SERPL-MCNC: 1.9 MG/DL (ref 1.6–2.6)
MCH RBC QN AUTO: 25.4 PG (ref 24–34)
MCHC RBC AUTO-ENTMCNC: 33.6 G/DL (ref 31–37)
MCV RBC AUTO: 75.7 FL (ref 74–97)
MONOCYTES # BLD: 1.2 K/UL (ref 0–1)
MONOCYTES NFR BLD: 6 % (ref 2–9)
NEUTS SEG # BLD: 17.7 K/UL (ref 1.8–8)
NEUTS SEG NFR BLD: 85 % (ref 42–75)
NITRITE UR QL STRIP.AUTO: NEGATIVE
P-R INTERVAL, ECG05: 148 MS
PH UR STRIP: 8.5 [PH] (ref 5–8)
PLATELET # BLD AUTO: 234 K/UL (ref 135–420)
PMV BLD AUTO: 9.8 FL (ref 9.2–11.8)
POTASSIUM SERPL-SCNC: 3.9 MMOL/L (ref 3.5–5.5)
PROT SERPL-MCNC: 8.3 G/DL (ref 6.4–8.2)
PROT UR STRIP-MCNC: >1000 MG/DL
Q-T INTERVAL, ECG07: 340 MS
QRS DURATION, ECG06: 84 MS
QTC CALCULATION (BEZET), ECG08: 420 MS
RBC # BLD AUTO: 4.52 M/UL (ref 4.7–5.5)
RBC #/AREA URNS HPF: ABNORMAL /HPF (ref 0–5)
RBC MORPH BLD: ABNORMAL
SODIUM SERPL-SCNC: 139 MMOL/L (ref 136–145)
SP GR UR REFRACTOMETRY: 1.01 (ref 1–1.03)
TROPONIN I SERPL-MCNC: 0.03 NG/ML (ref 0–0.04)
UROBILINOGEN UR QL STRIP.AUTO: 0.2 EU/DL (ref 0.2–1)
VENTRICULAR RATE, ECG03: 92 BPM
WBC # BLD AUTO: 20.8 K/UL (ref 4.6–13.2)
WBC URNS QL MICRO: ABNORMAL /HPF (ref 0–4)

## 2019-08-01 PROCEDURE — 85025 COMPLETE CBC W/AUTO DIFF WBC: CPT

## 2019-08-01 PROCEDURE — 71045 X-RAY EXAM CHEST 1 VIEW: CPT

## 2019-08-01 PROCEDURE — 70450 CT HEAD/BRAIN W/O DYE: CPT

## 2019-08-01 PROCEDURE — 83605 ASSAY OF LACTIC ACID: CPT

## 2019-08-01 PROCEDURE — 74011250636 HC RX REV CODE- 250/636: Performed by: INTERNAL MEDICINE

## 2019-08-01 PROCEDURE — 83735 ASSAY OF MAGNESIUM: CPT

## 2019-08-01 PROCEDURE — 82962 GLUCOSE BLOOD TEST: CPT

## 2019-08-01 PROCEDURE — 93005 ELECTROCARDIOGRAM TRACING: CPT

## 2019-08-01 PROCEDURE — 87077 CULTURE AEROBIC IDENTIFY: CPT

## 2019-08-01 PROCEDURE — 81001 URINALYSIS AUTO W/SCOPE: CPT

## 2019-08-01 PROCEDURE — 87186 SC STD MICRODIL/AGAR DIL: CPT

## 2019-08-01 PROCEDURE — 84484 ASSAY OF TROPONIN QUANT: CPT

## 2019-08-01 PROCEDURE — 96361 HYDRATE IV INFUSION ADD-ON: CPT

## 2019-08-01 PROCEDURE — 99285 EMERGENCY DEPT VISIT HI MDM: CPT

## 2019-08-01 PROCEDURE — 87086 URINE CULTURE/COLONY COUNT: CPT

## 2019-08-01 PROCEDURE — 96374 THER/PROPH/DIAG INJ IV PUSH: CPT

## 2019-08-01 PROCEDURE — 65660000000 HC RM CCU STEPDOWN

## 2019-08-01 PROCEDURE — 80053 COMPREHEN METABOLIC PANEL: CPT

## 2019-08-01 PROCEDURE — 74011250636 HC RX REV CODE- 250/636: Performed by: EMERGENCY MEDICINE

## 2019-08-01 PROCEDURE — 74011000250 HC RX REV CODE- 250: Performed by: INTERNAL MEDICINE

## 2019-08-01 PROCEDURE — 70551 MRI BRAIN STEM W/O DYE: CPT

## 2019-08-01 PROCEDURE — 74011000250 HC RX REV CODE- 250: Performed by: EMERGENCY MEDICINE

## 2019-08-01 PROCEDURE — 87040 BLOOD CULTURE FOR BACTERIA: CPT

## 2019-08-01 RX ORDER — ASPIRIN 81 MG/1
81 TABLET ORAL DAILY
Status: DISCONTINUED | OUTPATIENT
Start: 2019-08-02 | End: 2019-08-05 | Stop reason: HOSPADM

## 2019-08-01 RX ORDER — SODIUM CHLORIDE 0.9 % (FLUSH) 0.9 %
5-40 SYRINGE (ML) INJECTION EVERY 8 HOURS
Status: DISCONTINUED | OUTPATIENT
Start: 2019-08-01 | End: 2019-08-05 | Stop reason: HOSPADM

## 2019-08-01 RX ORDER — CHOLECALCIFEROL (VITAMIN D3) 125 MCG
5000 CAPSULE ORAL
Status: DISCONTINUED | OUTPATIENT
Start: 2019-08-01 | End: 2019-08-05 | Stop reason: HOSPADM

## 2019-08-01 RX ORDER — MAGNESIUM SULFATE 100 %
16 CRYSTALS MISCELLANEOUS AS NEEDED
Status: DISCONTINUED | OUTPATIENT
Start: 2019-08-01 | End: 2019-08-05 | Stop reason: HOSPADM

## 2019-08-01 RX ORDER — TAMSULOSIN HYDROCHLORIDE 0.4 MG/1
0.4 CAPSULE ORAL DAILY
Status: DISCONTINUED | OUTPATIENT
Start: 2019-08-02 | End: 2019-08-05 | Stop reason: HOSPADM

## 2019-08-01 RX ORDER — SODIUM CHLORIDE 0.9 % (FLUSH) 0.9 %
5-10 SYRINGE (ML) INJECTION AS NEEDED
Status: DISCONTINUED | OUTPATIENT
Start: 2019-08-01 | End: 2019-08-01

## 2019-08-01 RX ORDER — DILTIAZEM HYDROCHLORIDE 180 MG/1
360 CAPSULE, COATED, EXTENDED RELEASE ORAL DAILY
Status: DISCONTINUED | OUTPATIENT
Start: 2019-08-02 | End: 2019-08-05

## 2019-08-01 RX ORDER — ALLOPURINOL 100 MG/1
100 TABLET ORAL 2 TIMES DAILY
Status: DISCONTINUED | OUTPATIENT
Start: 2019-08-01 | End: 2019-08-05 | Stop reason: HOSPADM

## 2019-08-01 RX ORDER — HEPARIN SODIUM 5000 [USP'U]/ML
5000 INJECTION, SOLUTION INTRAVENOUS; SUBCUTANEOUS EVERY 8 HOURS
Status: DISCONTINUED | OUTPATIENT
Start: 2019-08-01 | End: 2019-08-05 | Stop reason: HOSPADM

## 2019-08-01 RX ORDER — ROSUVASTATIN CALCIUM 20 MG/1
20 TABLET, COATED ORAL
Status: DISCONTINUED | OUTPATIENT
Start: 2019-08-01 | End: 2019-08-05 | Stop reason: HOSPADM

## 2019-08-01 RX ORDER — GLUCOSAM/CHONDRO/HERB 149/HYAL 750-100 MG
1 TABLET ORAL DAILY
Status: DISCONTINUED | OUTPATIENT
Start: 2019-08-02 | End: 2019-08-05 | Stop reason: HOSPADM

## 2019-08-01 RX ORDER — CARVEDILOL 12.5 MG/1
12.5 TABLET ORAL 2 TIMES DAILY WITH MEALS
Status: DISCONTINUED | OUTPATIENT
Start: 2019-08-02 | End: 2019-08-05 | Stop reason: HOSPADM

## 2019-08-01 RX ORDER — SODIUM CHLORIDE 0.9 % (FLUSH) 0.9 %
5-40 SYRINGE (ML) INJECTION AS NEEDED
Status: DISCONTINUED | OUTPATIENT
Start: 2019-08-01 | End: 2019-08-05 | Stop reason: HOSPADM

## 2019-08-01 RX ADMIN — CEFEPIME 2 G: 2 INJECTION, POWDER, FOR SOLUTION INTRAVENOUS at 16:17

## 2019-08-01 RX ADMIN — HEPARIN SODIUM 5000 UNITS: 5000 INJECTION, SOLUTION INTRAVENOUS; SUBCUTANEOUS at 23:44

## 2019-08-01 RX ADMIN — SODIUM CHLORIDE 1000 ML: 900 INJECTION, SOLUTION INTRAVENOUS at 16:00

## 2019-08-01 RX ADMIN — CEFEPIME 2 G: 2 INJECTION, POWDER, FOR SOLUTION INTRAVENOUS at 23:48

## 2019-08-01 RX ADMIN — Medication 10 ML: at 23:44

## 2019-08-01 RX ADMIN — SODIUM CHLORIDE 836 ML: 900 INJECTION, SOLUTION INTRAVENOUS at 16:00

## 2019-08-01 NOTE — ROUTINE PROCESS
TRANSFER - OUT REPORT: 
 
Verbal report given to (name) on Lauren Lowe  being transferred to [facility] room 404 (unit) for routine progression of care Report consisted of patients Situation, Background, Assessment and  
Recommendations(SBAR). Information from the following report(s) ED Summary was reviewed with the receiving nurse. Lines:  
Peripheral IV 08/01/19 Right Antecubital (Active) Site Assessment Clean, dry, & intact 8/1/2019  3:40 PM  
Phlebitis Assessment 0 8/1/2019  3:40 PM  
Infiltration Assessment 0 8/1/2019  3:40 PM  
Dressing Status Clean, dry, & intact 8/1/2019  3:40 PM  
Dressing Type Transparent;Tape 8/1/2019  3:40 PM  
Hub Color/Line Status Flushed 8/1/2019  3:40 PM  
Action Taken Blood drawn 8/1/2019  3:40 PM  
  
 
Opportunity for questions and clarification was provided. Patient transported with: 
 Monitor

## 2019-08-01 NOTE — ED NOTES
Attempted to call report. Was on hold for 10 minutes. Nurse stated Dr. Pee Foster wanted patient to have MRI prior to transport. I told her that was incorrect. Dr. Pee Foster then called on the phone for Dr. Juan Luis Henry. It was agreed for patient to go without MRI.

## 2019-08-01 NOTE — ED TRIAGE NOTES
Patient son report that he seems to have altered mental status. He states he has been very quiet today. He also states he seems to have increased weakness and was dragging his left leg when walking. He has history of stroke 10 years ago with no residual deficit. He also states he has had decreased appetite. He stated he was constipated a few days ago and he had to disimpact him at home.

## 2019-08-01 NOTE — ROUTINE PROCESS
Assumed care of patient in room 404, telebox 27 in place -- daughter at bed side Patient to mri no issues Son at bedside Stroke Education provided to patient and relative(s) and the following topics were discussed 1. Patients personal risk factors for stroke are diabetes mellitus, elevated HgA1C and prior stroke 2. Warning signs of Stroke: * Sudden numbness or weakness of the face, arm or leg, especially on one side of The body * Sudden confusion, trouble speaking or understanding * Sudden trouble seeing in one or both eyes * Sudden trouble walking, dizziness, loss of balance or coordination * Sudden severe headache with no known cause 3. Importance of activation Emergency Medical Services ( 9-1-1 ) immediately if experience any warning signs of stroke. 4. Be sure and schedule a follow-up appointment with your primary care doctor or any specialists as instructed. 5. You must take medicine every day to treat your risk factors for stroke. Be sure to take your medicines exactly as your doctor tells you: no more, no less. Know what your medicines are for , what they do. Anti-thrombotics /anticoagulants can help prevent strokes. You are taking the following medicine(s)  coreg, cardizem 6. Smoking and second-hand smoke greatly increase your risk of stroke, cardiovascular disease and death. Smoking history 7. Information provided was BS Stroke Education [employer] 8. Documentation of teaching completed in Patient Education Activity and on Care Plan with teaching response noted? Yes Bedside and Verbal shift change report given to Ti Bullock RN (oncoming nurse) by Sonia Monahan RN (offgoing nurse). Report given with SBAR, Kardex, Intake/Output, MAR, Accordion and Recent Results.

## 2019-08-01 NOTE — ED PROVIDER NOTES
EMERGENCY DEPARTMENT HISTORY AND PHYSICAL EXAM    3:30 PM      Date: 8/1/2019  Patient Name: Gabi Hitchcock    History of Presenting Illness     Chief Complaint   Patient presents with    Altered mental status    Extremity Weakness         History Provided By: Patient  Location/Duration/Severity/Modifying factors   Patient is a 69-year-old male with a history of prostate disease, superpubic catheter, diabetes, phimosis, hypertension, stroke with right-sided weakness however no residual according to son glaucoma the presents emergency department complaint of increasing right-sided weakness for the last 48 hours. Patient lives with his son and son noticed that 2 days ago he was weaker than his baseline that was related to constipation so he gave him a laxative and also had to do a manual disimpaction. Patient since then has been declining and not eating with weight loss and today was trying to get him up and noticed that he was dragging his right leg which is unusual.  Patient notes he feels weak but otherwise denies complaint. Patient denies any change to his catheter is not a smoker drinker and used to work at the [employer]. Patient and son deny any aggravating alleviating factors. Patient denies any trauma or recent falls.           PCP: Shun Naranjo NP    Current Facility-Administered Medications   Medication Dose Route Frequency Provider Last Rate Last Dose    sodium chloride (NS) flush 5-10 mL  5-10 mL IntraVENous PRN Lily Mancera MD        cefepime (MAXIPIME) 2 g in sterile water (preservative free) 10 mL IV syringe  2 g IntraVENous Q8H Lily Mancera MD   2 g at 08/01/19 1617     Current Outpatient Medications   Medication Sig Dispense Refill    carvedilol (COREG) 12.5 mg tablet TAKE 1 TABLET BY MOUTH TWICE A DAY 60 Tab 0    allopurinol (ZYLOPRIM) 100 mg tablet TAKE 1 TABLET BY MOUTH TWICE DAILY 60 Tab 0    dilTIAZem (TIAZAC) 360 mg ER capsule TAKE ONE CAPSULE BY MOUTH EVERY DAY 90 Cap 0    rosuvastatin (CRESTOR) 20 mg tablet TAKE 1 TABLET BY MOUTH NIGHTLY 90 Tab 0    cholecalciferol, VITAMIN D3, (VITAMIN D3) 5,000 unit tab tablet Take 1 Tab by mouth every seven (7) days. 30 Tab 0    tamsulosin (FLOMAX) 0.4 mg capsule TAKE ONE CAPSULE BY MOUTH EVERY DAY AFTER MEALS 30 Cap 4    Insulin Needles, Disposable, (BD ULTRA-FINE MINI PEN NEEDLE) 31 gauge x 3/16\" ndle Inject TID before meals  E11.9 100 Pen Needle 5    insulin lispro (HUMALOG) 100 unit/mL injection For Blood Sugar (mg/dL) of:     Less than 150=0 units           150 -199=2 units  200 -249=4 units  250 -299=6 units  300 -349=8 units  350 and above=10 units  Check blood glucose 3 times daily and at bedtime and inject insulin per SS. 1 Vial 0    aspirin delayed-release 81 mg tablet Take 81 mg by mouth daily.  glucose 4 gram chewable tablet Take 4 Tabs by mouth as needed. 50 Tab 0    OTHER Check CBC, CMP, Mg in 5 days, results to PCP 1 each 0    omega-3 fatty acids-vitamin e (FISH OIL) 1,000 mg Cap Take 1,400 mg by mouth daily.          Past History     Past Medical History:  Past Medical History:   Diagnosis Date    Cataract     both    Colon polyp 9-2004    Diabetes ([facility] [address]     IDDM    DJD (degenerative joint disease) of knee     ED (erectile dysfunction)     Sparrow catheter in place     GERD (gastroesophageal reflux disease) 12-01-08    Glaucoma suspect     Gout, unspecified 10-25-06    Hyperlipidemia     Hypertension     Insomnia 7-20-06    Phimosis 6-16-00    Prostate cancer ([address] 3-3-09    Stroke ([address] 2007    weakness right side       Past Surgical History:  Past Surgical History:   Procedure Laterality Date    HX POLYPECTOMY  9-2004    TX COLONOSCOPY FLX DX W/COLLJ [facility] WHEN PFRMD  9/1/2004    polyp/Dr Alvarado    TX COLONOSCOPY FLX DX W/COLLJ SPEC WHEN PFRMD  9-2007    incomplete; Dr. Maria Esther Laurent       Family History:  Family History   Problem Relation Age of Onset    Hypertension Mother     Diabetes Father     Cancer Father         prostate    Heart Disease Father 70        CABG    Hypertension Sister     Hypertension Brother     Diabetes Brother        Social History:  Social History     Tobacco Use    Smoking status: Former Smoker    Smokeless tobacco: Never Used   Substance Use Topics    Alcohol use: No    Drug use: No       Allergies: Allergies   Allergen Reactions    Bactrim [Sulfamethoxazole-Trimethoprim] Other (comments)     CAUSED ACUTE RENAL FAILURE    Ciprofloxacin Other (comments)     PATIENT IS NOT ALLERGIC         Review of Systems       Review of Systems   Constitutional: Positive for activity change, appetite change, fatigue and unexpected weight change. Negative for fever. HENT: Negative for congestion and rhinorrhea. Eyes: Negative for visual disturbance. Respiratory: Negative for shortness of breath. Cardiovascular: Negative for chest pain and palpitations. Gastrointestinal: Positive for constipation. Negative for abdominal pain, diarrhea, nausea and vomiting. Genitourinary: Negative for dysuria and hematuria. Musculoskeletal: Negative for back pain. Skin: Negative for rash. Allergic/Immunologic: Negative for immunocompromised state. Neurological: Positive for speech difficulty, weakness and light-headedness. Negative for dizziness. Physical Exam     Visit Vitals  /60   Pulse 88   Temp 99 °F (37.2 °C)   Resp 26   Ht 5' 7.99\" (1.727 m)   Wt 61.2 kg (135 lb)   SpO2 96%   BMI 20.53 kg/m²         Physical Exam   Constitutional: He appears well-developed. No distress. Thin, smells of urine   HENT:   Head: Normocephalic and atraumatic. Right Ear: External ear normal.   Left Ear: External ear normal.   Nose: Nose normal.   Mouth/Throat: Oropharynx is clear and moist.   Eyes: Pupils are equal, round, and reactive to light. Conjunctivae and EOM are normal. No scleral icterus. Neck: Normal range of motion. Neck supple. No JVD present.  No tracheal deviation present. No thyromegaly present. Cardiovascular: Normal rate, regular rhythm, normal heart sounds and intact distal pulses. Exam reveals no gallop and no friction rub. No murmur heard. Pulmonary/Chest: Effort normal and breath sounds normal. He exhibits no tenderness. Abdominal: Soft. Bowel sounds are normal. He exhibits no distension. There is no tenderness. There is no rebound and no guarding. Suprapubic catheter noted with drainage, leg bag to the right leg scant amount of urine   Musculoskeletal: He exhibits no edema or tenderness. Lymphadenopathy:     He has no cervical adenopathy. Neurological: He is alert. No cranial nerve deficit. Coordination normal.   Slurred speech, answers with one-word, mild contracture R arm, right arm pronator drift, right leg drift, full strength left side   Skin: Skin is warm and dry. Psychiatric:   Supportive son at the bedside   Nursing note and vitals reviewed. Diagnostic Study Results     Labs -  Recent Results (from the past 12 hour(s))   GLUCOSE, POC    Collection Time: 08/01/19  3:26 PM   Result Value Ref Range    Glucose (POC) 206 (H) 70 - 110 mg/dL   CBC WITH AUTOMATED DIFF    Collection Time: 08/01/19  3:40 PM   Result Value Ref Range    WBC 20.8 (H) 4.6 - 13.2 K/uL    RBC 4.52 (L) 4.70 - 5.50 M/uL    HGB 11.5 (L) 13.0 - 16.0 g/dL    HCT 34.2 (L) 36.0 - 48.0 %    MCV 75.7 74.0 - 97.0 FL    MCH 25.4 24.0 - 34.0 PG    MCHC 33.6 31.0 - 37.0 g/dL    RDW 18.4 (H) 11.6 - 14.5 %    PLATELET 171 112 - 288 K/uL    MPV 9.8 9.2 - 11.8 FL    NEUTROPHILS 85 (H) 42 - 75 %    LYMPHOCYTES 8 (L) 20 - 51 %    MONOCYTES 6 2 - 9 %    EOSINOPHILS 1 0 - 5 %    BASOPHILS 0 0 - 3 %    ABS. NEUTROPHILS 17.7 (H) 1.8 - 8.0 K/UL    ABS. LYMPHOCYTES 1.7 0.8 - 3.5 K/UL    ABS. MONOCYTES 1.2 (H) 0 - 1.0 K/UL    ABS. EOSINOPHILS 0.2 0.0 - 0.4 K/UL    ABS.  BASOPHILS 0.0 0.0 - 0.06 K/UL    DF MANUAL      RBC COMMENTS ANISOCYTOSIS  1+       METABOLIC PANEL, COMPREHENSIVE Collection Time: 08/01/19  3:40 PM   Result Value Ref Range    Sodium 139 136 - 145 mmol/L    Potassium 3.9 3.5 - 5.5 mmol/L    Chloride 104 100 - 111 mmol/L    CO2 24 21 - 32 mmol/L    Anion gap 11 3.0 - 18 mmol/L    Glucose 203 (H) 74 - 99 mg/dL    BUN 40 (H) 7.0 - 18 MG/DL    Creatinine 2.51 (H) 0.6 - 1.3 MG/DL    BUN/Creatinine ratio 16 12 - 20      GFR est AA 31 (L) >60 ml/min/1.73m2    GFR est non-AA 25 (L) >60 ml/min/1.73m2    Calcium 9.7 8.5 - 10.1 MG/DL    Bilirubin, total 0.8 0.2 - 1.0 MG/DL    ALT (SGPT) 9 (L) 16 - 61 U/L    AST (SGOT) 5 (L) 10 - 38 U/L    Alk.  phosphatase 61 45 - 117 U/L    Protein, total 8.3 (H) 6.4 - 8.2 g/dL    Albumin 3.3 (L) 3.4 - 5.0 g/dL    Globulin 5.0 (H) 2.0 - 4.0 g/dL    A-G Ratio 0.7 (L) 0.8 - 1.7     MAGNESIUM    Collection Time: 08/01/19  3:40 PM   Result Value Ref Range    Magnesium 1.9 1.6 - 2.6 mg/dL   TROPONIN I    Collection Time: 08/01/19  3:40 PM   Result Value Ref Range    Troponin-I, QT 0.03 0.0 - 0.045 NG/ML   URINALYSIS W/ RFLX MICROSCOPIC    Collection Time: 08/01/19  3:40 PM   Result Value Ref Range    Color YELLOW      Appearance TURBID      Specific gravity 1.014 1.005 - 1.030      pH (UA) 8.5 (H) 5.0 - 8.0      Protein >1,000 (A) NEG mg/dL    Glucose NEGATIVE  NEG mg/dL    Ketone TRACE (A) NEG mg/dL    Bilirubin NEGATIVE  NEG      Blood MODERATE (A) NEG      Urobilinogen 0.2 0.2 - 1.0 EU/dL    Nitrites NEGATIVE  NEG      Leukocyte Esterase LARGE (A) NEG     URINE MICROSCOPIC ONLY    Collection Time: 08/01/19  3:40 PM   Result Value Ref Range    WBC 11 to 20 0 - 4 /hpf    RBC 0 to 3 0 - 5 /hpf    Epithelial cells FEW 0 - 5 /lpf    Bacteria 4+ (A) NEG /hpf   POC LACTIC ACID    Collection Time: 08/01/19  3:46 PM   Result Value Ref Range    Lactic Acid (POC) 1.20 0.40 - 2.00 mmol/L   EKG, 12 LEAD, INITIAL    Collection Time: 08/01/19  3:51 PM   Result Value Ref Range    Ventricular Rate 92 BPM    Atrial Rate 92 BPM    P-R Interval 148 ms    QRS Duration 84 ms Q-T Interval 340 ms    QTC Calculation (Bezet) 420 ms    Calculated P Axis 60 degrees    Calculated R Axis 6 degrees    Calculated T Axis 57 degrees    Diagnosis       Sinus rhythm with occasional premature ventricular complexes  Otherwise normal ECG  When compared with ECG of 12-NOV-2018 13:32,  premature ventricular complexes are now present         Radiologic Studies -   XR CHEST SNGL V   Final Result    IMPRESSION:      1. No acute cardiopulmonary process. CT HEAD WO CONT   Final Result   IMPRESSION:      1. No acute intracranial hemorrhage, mass effect or midline structural shift. No   change. The above findings were discussed with Dr. Janes Hunter by me over the phone at   the time and date of the dictation with read back. Follow-up with MRI is recommended if acute ischemia is suspected given   limitations of CT. Medical Decision Making   I am the first provider for this patient. I reviewed the vital signs, available nursing notes, past medical history, past surgical history, family history and social history. Vital Signs-Reviewed the patient's vital signs. EKG: Sinus rhythm at 92 PVCs no STEMI read by Devorah Crump    Records Reviewed: Nursing Notes and Old Medical Records (Time of Review: 3:30 PM)    ED Course: Progress Notes, Reevaluation, and Consults:     Patient was evaluated on arrival and Code S was called at [phone]. Discussed case with tele-neurology and will evaluate the patient. CT report called by Dr. Anais Alex and no hemorrhage noted old left basal ganglia infarct was seen. Devorah Crump DO 3:41 PM    Discussed case with tele-neurology and recommends admission to the hospital for MRI as well as infectious management. MRI is reassuring there is patient does not need any further stroke work-up but if it does show new stroke will need further stroke work-up. He suspects this is a reemergence of old symptoms from acute infection.   Decision to make to admit was made at 4:50 PM.          Provider Notes (Medical Decision Making):   MDM  Number of Diagnoses or Management Options  Diagnosis management comments: Patient is a 65-year-old male with a history of CVA in 2007 without residual deficit according to son, prostate disease now with suprapubic catheter, hypertension, diabetes the presents emergency department with 2 days of fatigue and then right-sided weakness noted today as the patient was not able to walk his baseline. Patient symptoms started with constipation however that is now resolved. Patient appears globally weak is mildly tachycardic and suspect an underlying metabolic process that will be evaluated. In addition is possible patient has a acute to subacute stroke so Code S called and will proceed with CT of the head and tele-neurology evaluation. Will start antibiotics empirically as patient may have an infection from the catheter while hydrating, cardiac labs, glucose, and reevaluate. Procedures    Critical Care Time: Critical Care Time:  The services I provided to this patient were to treat and/or prevent clinically significant deterioration that could result in the failure of one or more body systems and/or organ systems due to stroke with TPA consideration. Services included the following:  -reviewing nursing notes and old charts  -vital sign assessments  -direct patient care  -medication orders and management  -interpreting and reviewing diagnostic studies/labs  -re-evaluations  -documentation time    Aggregate critical care time was 45 minutes, which includes only time during which I was engaged in work directly related to the patient's care as described above, whether I was at bedside or elsewhere in the Emergency Department. It did not include time spent performing other reported procedures or the services of residents, students, nurses, or advance practice providers.      Fadia Tyler, DO 5:15 PM          Diagnosis     Clinical Impression:   1. SIRS (systemic inflammatory response syndrome) (HCC)    2. Weakness    3. Urinary tract infection associated with indwelling urethral catheter, initial encounter ([address]    4. Dehydration        Disposition: Admit     Follow-up Information    None          Patient's Medications   Start Taking    No medications on file   Continue Taking    ALLOPURINOL (ZYLOPRIM) 100 MG TABLET    TAKE 1 TABLET BY MOUTH TWICE DAILY    ASPIRIN DELAYED-RELEASE 81 MG TABLET    Take 81 mg by mouth daily. CARVEDILOL (COREG) 12.5 MG TABLET    TAKE 1 TABLET BY MOUTH TWICE A DAY    CHOLECALCIFEROL, VITAMIN D3, (VITAMIN D3) 5,000 UNIT TAB TABLET    Take 1 Tab by mouth every seven (7) days. DILTIAZEM (TIAZAC) 360 MG ER CAPSULE    TAKE ONE CAPSULE BY MOUTH EVERY DAY    GLUCOSE 4 GRAM CHEWABLE TABLET    Take 4 Tabs by mouth as needed. INSULIN LISPRO (HUMALOG) 100 UNIT/ML INJECTION    For Blood Sugar (mg/dL) of:     Less than 150=0 units           150 -199=2 units  200 -249=4 units  250 -299=6 units  300 -349=8 units  350 and above=10 units  Check blood glucose 3 times daily and at bedtime and inject insulin per SS. INSULIN NEEDLES, DISPOSABLE, (BD ULTRA-FINE MINI PEN NEEDLE) 31 GAUGE X 3/16\" NDLE    Inject TID before meals  E11.9    OMEGA-3 FATTY ACIDS-VITAMIN E (FISH OIL) 1,000 MG CAP    Take 1,400 mg by mouth daily. OTHER    Check CBC, CMP, Mg in 5 days, results to PCP    ROSUVASTATIN (CRESTOR) 20 MG TABLET    TAKE 1 TABLET BY MOUTH NIGHTLY    TAMSULOSIN (FLOMAX) 0.4 MG CAPSULE    TAKE ONE CAPSULE BY MOUTH EVERY DAY AFTER MEALS   These Medications have changed    No medications on file   Stop Taking    HYDROCODONE-ACETAMINOPHEN (NORCO) 5-325 MG PER TABLET    Take 1 Tab by mouth every six (6) hours as needed for Pain. Max Daily Amount: 4 Tabs. Disclaimer: Sections of this note are dictated using utilizing voice recognition software. Minor typographical errors may be present.  If questions arise, please do not hesitate to contact me or call our department.

## 2019-08-02 ENCOUNTER — APPOINTMENT (OUTPATIENT)
Dept: GENERAL RADIOLOGY | Age: 74
DRG: 698 | End: 2019-08-02
Attending: HOSPITALIST
Payer: MEDICARE

## 2019-08-02 LAB
CHOLEST SERPL-MCNC: 93 MG/DL
EST. AVERAGE GLUCOSE BLD GHB EST-MCNC: 140 MG/DL
GLUCOSE BLD STRIP.AUTO-MCNC: 102 MG/DL (ref 70–110)
GLUCOSE BLD STRIP.AUTO-MCNC: 105 MG/DL (ref 70–110)
GLUCOSE BLD STRIP.AUTO-MCNC: 125 MG/DL (ref 70–110)
HBA1C MFR BLD: 6.5 % (ref 4.2–5.6)
HDLC SERPL-MCNC: 46 MG/DL (ref 40–60)
HDLC SERPL: 2 {RATIO} (ref 0–5)
LDLC SERPL CALC-MCNC: 33.6 MG/DL (ref 0–100)
LIPID PROFILE,FLP: NORMAL
TRIGL SERPL-MCNC: 67 MG/DL (ref ?–150)
VLDLC SERPL CALC-MCNC: 13.4 MG/DL

## 2019-08-02 PROCEDURE — 74011250636 HC RX REV CODE- 250/636: Performed by: HOSPITALIST

## 2019-08-02 PROCEDURE — 92526 ORAL FUNCTION THERAPY: CPT

## 2019-08-02 PROCEDURE — 97161 PT EVAL LOW COMPLEX 20 MIN: CPT

## 2019-08-02 PROCEDURE — 82962 GLUCOSE BLOOD TEST: CPT

## 2019-08-02 PROCEDURE — 74011000250 HC RX REV CODE- 250: Performed by: HOSPITALIST

## 2019-08-02 PROCEDURE — 77030010545

## 2019-08-02 PROCEDURE — 74011000255 HC RX REV CODE- 255: Performed by: HOSPITALIST

## 2019-08-02 PROCEDURE — 74011250636 HC RX REV CODE- 250/636: Performed by: INTERNAL MEDICINE

## 2019-08-02 PROCEDURE — 97166 OT EVAL MOD COMPLEX 45 MIN: CPT

## 2019-08-02 PROCEDURE — 97530 THERAPEUTIC ACTIVITIES: CPT

## 2019-08-02 PROCEDURE — 74011250637 HC RX REV CODE- 250/637: Performed by: INTERNAL MEDICINE

## 2019-08-02 PROCEDURE — 65660000000 HC RM CCU STEPDOWN

## 2019-08-02 PROCEDURE — 74230 X-RAY XM SWLNG FUNCJ C+: CPT

## 2019-08-02 PROCEDURE — 92611 MOTION FLUOROSCOPY/SWALLOW: CPT

## 2019-08-02 PROCEDURE — 97535 SELF CARE MNGMENT TRAINING: CPT

## 2019-08-02 PROCEDURE — 36415 COLL VENOUS BLD VENIPUNCTURE: CPT

## 2019-08-02 PROCEDURE — 83036 HEMOGLOBIN GLYCOSYLATED A1C: CPT

## 2019-08-02 PROCEDURE — 92610 EVALUATE SWALLOWING FUNCTION: CPT

## 2019-08-02 PROCEDURE — 80061 LIPID PANEL: CPT

## 2019-08-02 RX ADMIN — Medication 10 ML: at 05:48

## 2019-08-02 RX ADMIN — HEPARIN SODIUM 5000 UNITS: 5000 INJECTION, SOLUTION INTRAVENOUS; SUBCUTANEOUS at 21:58

## 2019-08-02 RX ADMIN — CEFEPIME HYDROCHLORIDE 2 G: 2 INJECTION, POWDER, FOR SOLUTION INTRAVENOUS at 21:57

## 2019-08-02 RX ADMIN — CARVEDILOL 12.5 MG: 12.5 TABLET, FILM COATED ORAL at 18:24

## 2019-08-02 RX ADMIN — BARIUM SULFATE 700 MG: 700 TABLET ORAL at 13:00

## 2019-08-02 RX ADMIN — ALLOPURINOL 100 MG: 100 TABLET ORAL at 18:24

## 2019-08-02 RX ADMIN — ROSUVASTATIN CALCIUM 20 MG: 20 TABLET, FILM COATED ORAL at 21:57

## 2019-08-02 RX ADMIN — BARIUM SULFATE 15 ML: 400 SUSPENSION ORAL at 13:00

## 2019-08-02 RX ADMIN — BARIUM SULFATE 30 ML: 400 PASTE ORAL at 13:00

## 2019-08-02 RX ADMIN — Medication 10 ML: at 22:07

## 2019-08-02 RX ADMIN — BARIUM SULFATE 60 ML: 400 SUSPENSION ORAL at 13:00

## 2019-08-02 NOTE — PROGRESS NOTES
Problem: Dysphagia (Adult)  Goal: *Acute Goals and Plan of Care (Insert Text)  Description  Patient will:  1. Tolerate PO trials with 0 s/s overt distress in 4/5 trials  2. Utilize compensatory swallow strategies/maneuvers (decrease bite/sip, size/rate, alt. liq/sol) with min cues in 4/5 trials  3. Perform oral-motor/laryngeal exercises to increase oropharyngeal swallow function with min cues  4. Complete an objective swallow study (i.e., MBSS) to assess swallow integrity, r/o aspiration, and determine of safest LRD, min A - met 8/2/19    Rec:     Mech-soft diet with pudding-thick liquids  Aspiration precautions  HOB >45 during po intake, remain >30 for 30-45 minutes after po   Small bites/sips; alternate liquid/solid with slow feeding rate   Oral care TID  Meds in puree  OP SLP services upon DC from this facility          8/2/2019 1503 by Ronni SHEEHAN  Outcome: Progressing Towards Goal    SPEECH LANGUAGE PATHOLOGY DYSPHAGIA TREATMENT    Patient: Verner Robinson (15 y.o. male)  Date: 8/2/2019  Diagnosis: Stroke ([facility] [address] [I63.9]  Sepsis ([facility] [address] [A41.9] <principal problem not specified>       Precautions: aspiration    PLOF: As per H&P      ASSESSMENT:  Pt was seen at bedside for follow up dysphagia management and education with family per pt request. Pt and family educated on results of MBS, diet recommendations, anatomy/physiology of oropharyngeal swallow, safe swallowing techniques/strategies, and diagnosis/prognosis of dysphagia. Further reviewed thickening agent and provided with handout. Emphasized importance of pudding-thick liquids and high aspiration risk without compliance of diet recs. Pt and family verbalized understanding. He will benefit from OP SLP services upon DC from this facility. ST will continue to follow while in house. Progression toward goals:  ?         Improving appropriately and progressing toward goals  ? Improving slowly and progressing toward goals  ?          Not making progress toward goals and plan of care will be adjusted     PLAN:  Recommendations and Planned Interventions: See above  Patient continues to benefit from skilled intervention to address the above impairments. Continue treatment per established plan of care. Discharge Recommendations:  [address] and To Be Determined     SUBJECTIVE:   Patient stated I don't have any questions. OBJECTIVE:   Cognitive and Communication Status:  Neurologic State: Alert  Orientation Level: Oriented to person, Oriented to place  Cognition: Decreased command following  Perception: Appears intact  Perseveration: No perseveration noted  Safety/Judgement: Fall prevention    Dysphagia Treatment: see above    PAIN:  Start of Tx: 0  End of Tx: 0     After treatment:   ?              Patient left in no apparent distress sitting up in chair  ? Patient left in no apparent distress in bed  ? Call bell left within reach  ? Nursing notified  ? Family present  ? Caregiver present  ? Bed alarm activated    COMMUNICATION/EDUCATION:   ? Aspiration precautions; swallow safety; compensatory techniques  ?     Patient/family able to participate in training and education     Thank you for this referral.    Liss Rodriguez M.S. CCC-SLP/L  Speech-Language Pathologist

## 2019-08-02 NOTE — ROUTINE PROCESS
Per Dr. Catia Thomas, the NIHSS q4 hours is discontinued as the patient has not had an acute CVA. However, with any acute neurological changes, the nurse should do an NIHSS, contact the doctor, and resume floor protocol for CVA/TIA patients.

## 2019-08-02 NOTE — PROGRESS NOTES
MBS completed with recs of [facility] soft and pudding-thick liquids, meds in puree. Full report to follow.      Thank you for this referral.    Chana Blum M.S. CCC-SLP/L  Speech-Language Pathologist

## 2019-08-02 NOTE — H&P
Date of Admission: 8/1/2019      Assessment:   Urosepsis:  + UA, WBC 20  Complicated UTI with chronic indwelling gutierrez cath  RLE weakness:  Now resolved; CT head negative for acute CVA; admitted for MRI to complete stroke eval.   HTN;  Currently controlled  DM type 2:  On home insulin  CKD stage 3: at baseline  Anemia of chronic disease:  stable    Plan:   F/u blood and urine cultures  CBC, BMP, HBA1 c  Tight glucose control, SSI, accuchecks  Continue Cefepime for now- will adjust as needed  MRI w/o contrast to evaluate for CVA as per protocol.   - discussed with neurology- will see in am  Continue home medications- already on ASA and Crestor  PT/OT      Vasu Uriostegui D.O. Internal Medicine and Infectious Diseases      Subjective:    Patient is a 75 y.o.male who is being evaluated forright LE weakness. Mr. Sabi Oleary is a 76 yo AAM with hx of HTN, DM ytpe 2, ED, prostate cancer with chronic indwelling gutierrez cath, GERD, gout and prior CVA with no residual right sided weakness who is presenting with new onset lethargy and right LE weakness. He resides with his son and has a full time caregiver. His son noted that the patient had been more drowsy this morning and that his urine was dark and cloudy. He normally used a walker to get around the house, but this am his right leg was not moving and weak. Thus, his family brought him to the ED for further evaluation. No reported fevers or chills. No nausea. + diarrhea and vomiting with poor appetite.        Past Medical History:   Diagnosis Date    Cataract     both    Colon polyp 9-2004    Diabetes ([facility] [address]     IDDM    DJD (degenerative joint disease) of knee     ED (erectile dysfunction)     Gutierrez catheter in place     GERD (gastroesophageal reflux disease) 12-01-08    Glaucoma suspect     Gout, unspecified 10-25-06    Hyperlipidemia     Hypertension     Insomnia 7-20-06    Phimosis 6-16-00    Prostate cancer ([facility] [address] 3-3-09    Stroke ([facility] [address] 2007 weakness right side     Past Surgical History:   Procedure Laterality Date    HX POLYPECTOMY  9-2004    CT COLONOSCOPY FLX DX W/COLLJ [facility] INPATIENT REH[facility]ILITATION WHEN PFRMD  9/1/2004    polyp/Dr Alvarado    CT COLONOSCOPY FLX DX W/COLLJ SPEC WHEN PFRMD  9-2007    incomplete; Dr. Zack Gary     Family History   Problem Relation Age of Onset    Hypertension Mother     Diabetes Father     Cancer Father         prostate    Heart Disease Father 70        CABG    Hypertension Sister     Hypertension Brother     Diabetes Brother      Medications reviewed as below:   Current Facility-Administered Medications   Medication Dose Route Frequency Provider Last Rate Last Dose    sodium chloride (NS) flush 5-10 mL  5-10 mL IntraVENous PRN Obinna Clifton MD        cefepime (MAXIPIME) 2 g in sterile water (preservative free) 10 mL IV syringe  2 g IntraVENous Q8H Obinna Clifton MD   2 g at 08/01/19 1617     Allergies   Allergen Reactions    Bactrim [Sulfamethoxazole-Trimethoprim] Other (comments)     CAUSED ACUTE RENAL FAILURE    Ciprofloxacin Other (comments)     PATIENT IS NOT ALLERGIC     Social History     Socioeconomic History    Marital status:      Spouse name: Not on file    Number of children: Not on file    Years of education: Not on file    Highest education level: Not on file   Occupational History    Not on file   Social Needs    Financial resource strain: Not on file    Food insecurity:     Worry: Not on file     Inability: Not on file    Transportation needs:     Medical: Not on file     Non-medical: Not on file   Tobacco Use    Smoking status: Former Smoker    Smokeless tobacco: Never Used   Substance and Sexual Activity    Alcohol use: No    Drug use: No    Sexual activity: Never   Lifestyle    Physical activity:     Days per week: Not on file     Minutes per session: Not on file    Stress: Not on file   Relationships    Social connections:     Talks on phone: Not on file     Gets together: Not on file     Attends [employer] service: Not on file     Active member of club or organization: Not on file     Attends meetings of clubs or organizations: Not on file     Relationship status: Not on file    Intimate partner violence:     Fear of current or ex partner: Not on file     Emotionally abused: Not on file     Physically abused: Not on file     Forced sexual activity: Not on file   Other Topics Concern    Not on file   Social History Narrative    Not on file        Review of Systems    Negative Unless BOLDED    General: fevers, chills, myalgias, arthralgias, unexplained weight loss, malaise, fatigue. HEENT:  headaches,sinus pain or presure, recent URI, recent dental procedures;  tinnitus, hearing loss , visual changes, catarats, dizziness or blurred vision  PUlMONARY:  cough , shortness of breath, sputum production, hx of asthma or COPD. previous treatement for TB or PPD. Cardiovascular: chest pain, previous CAD/MI, vavlular heart disease,  murmurs  GI:   nausea, vomiting, diarrhea, abdominal pain, prior C.diff  :  urinary frequency, dysuria, hematuria, bladder incontinence.    Neurologic:  seizures, syncope or prior CVA/TIA, confusion, memory impairment, neuropathy  Musculoskeletal:  myalgias arthralgias, joint pain/ swelling,  back pain  Skin:  Purities,  recurrent cellulitis,  chronic stasis ulcer, diabetic foot ulcers  Endocrine: polyuria, polydipsia, hair loss, weight gain  Psych: Denies depression or treatment by a psychiatrist/psycologist  Heme-Onc: prior DVT, easy bruising, fatigue, malignancy        Objective:        Visit Vitals  /65 (BP 1 Location: Right arm, BP Patient Position: At rest)   Pulse 86   Temp (!) 100.6 °F (38.1 °C)   Resp 22   Ht 5' 7.99\" (1.727 m)   Wt 61.2 kg (135 lb)   SpO2 96%   BMI 20.53 kg/m²     Temp (24hrs), Av.8 °F (37.7 °C), Min:99 °F (37.2 °C), Max:100.6 °F (38.1 °C)        General:   awake alert and oriented   Skin:   no rashes or skin lesions noted on limited exam   HEENT:  Normocephalic, atraumatic, PERRL, EOMI, no scleral icterus or pallor; no conjunctival hemmohage;  nasal and oral mucous are moist and without evidence of lesions. No thrush. Dentition fair. Neck supple, no bruits. Lymph Nodes:   no cervical, axillary or inguinal adenopathy   Lungs:   non-labored, bilaterally clear to aspiration- no crackles wheezes rales or rhonchi   Heart:  RRR, s1 and s2; + murmurs; no rubs or gallops, no edema, + pedal pulses   Abdomen:  soft, non-distended, active bowel sounds, no hepatomegaly, no splenomegaly. Non-tender   Genitourinary:  supra-pubic cath in place- dark, cloudy urine   Extremities:  Decreased muscle mass; Neurologic:  No gross focal sensory abnormalities; 5/5 muscle strength to upper and lower extremities. Did not ambulate. Speech appropirate. Cranial nerves intact   Psychiatric:   appropriate and interactive, calm. Labs: Results:   Chemistry Recent Labs     08/01/19  1540   *      K 3.9      CO2 24   BUN 40*   CREA 2.51*   CA 9.7   AGAP 11   BUCR 16   AP 61   TP 8.3*   ALB 3.3*   GLOB 5.0*   AGRAT 0.7*      CBC w/Diff Recent Labs     08/01/19  1540   WBC 20.8*   RBC 4.52*   HGB 11.5*   HCT 34.2*      GRANS 85*   LYMPH 8*   EOS 1            No results found for: [facility] Lab Results   Component Value Date/Time    Culture result: >100,000 COLONIES/mL ENTEROCOCCUS FAECALIS GROUP D (A) 11/12/2018 10:30 PM    Culture result: >100,000 COLONIES/mL ENTEROBACTER CLOACAE (A) 10/26/2018 10:05 AM    Culture result: NO GROWTH 6 DAYS 08/19/2016 10:45 PM    Culture result: NO GROWTH 6 DAYS 08/19/2016 10:25 PM          Imaging:      All imaging reviewed from Admission to present as per radiology interpretation in [facility]

## 2019-08-02 NOTE — PROGRESS NOTES
Problem: Dysphagia (Adult)  Goal: *Acute Goals and Plan of Care (Insert Text)  Description  Patient will:  1. Tolerate PO trials with 0 s/s overt distress in 4/5 trials  2. Utilize compensatory swallow strategies/maneuvers (decrease bite/sip, size/rate, alt. liq/sol) with min cues in 4/5 trials  3. Perform oral-motor/laryngeal exercises to increase oropharyngeal swallow function with min cues  4. Complete an objective swallow study (i.e., MBSS) to assess swallow integrity, r/o aspiration, and determine of safest LRD, min A - met 8/2/19    Rec:     Mech-soft diet with pudding-thick liquids  Aspiration precautions  HOB >45 during po intake, remain >30 for 30-45 minutes after po   Small bites/sips; alternate liquid/solid with slow feeding rate   Oral care TID  Meds in puree           8/2/2019 1417 by Lesly Prime A  Outcome: Progressing Towards Goal  8/2/2019 1141 by Lesly Prime A  Outcome: Progressing Towards Goal    SPEECH PATHOLOGY MODIFIED BARIUM SWALLOW STUDY & TREATMENT    Patient: Golden Sotelo (89 y.o. male)  Date: 8/2/2019  Primary Diagnosis: Stroke ([facility] [address] [I63.9]  Sepsis ([facility] [address] [A41.9]        Precautions: aspiration       ASSESSMENT :  Based on the objective data described below, the patient presents with mild oral and mod-sev pharyngeal dysphagia c/b SILENT mallika aspiration of nectar-thick and honey-thick liquids. Pt tolerated reg solid, puree, and 13 mm Ba pill in puree without aspiration/penetration events; however, continued to note premature spillage with swallow delay across all trials. Deficits include decreased bolus manipulation, impaired laryngeal elevation, poor epiglottic inversion, and significantly impaired pharyngeal motility/sensation. Rec mech soft diet with pudding-thick liquids, aspiration precautions, oral care TID, and meds in puree. He may benefit from OP SLP services upon DC from this facility. ST will continue to follow during this admission.      TREATMENT :  Treatment provided post diagnostic testing including oropharyngeal anatomy/physiology, importance of thickened liquids, MBS results, diet recommendations, and compensatory strategies/positioning. Pt able to verbalize understanding and asked SLP to follow up with family this PM.  Will continue to follow. Patient will benefit from skilled intervention to address the above impairments. Patient's rehabilitation potential is considered to be Good  Factors which may influence rehabilitation potential include:   ?              Mental ability/status  ? Medical condition     PLAN :  Recommendations and Planned Interventions: See above  Frequency/Duration: Patient will be followed by speech-language pathology 1-2 times per day/3-5 days per week to address goals. Discharge Recommendations: Home Health and To Be Determined     SUBJECTIVE:   Patient stated I don't think this is a huge problem.     OBJECTIVE:     Past Medical History:   Diagnosis Date    Cataract     both    Colon polyp 9-2004    Diabetes ([facility] [address]     IDDM    DJD (degenerative joint disease) of knee     ED (erectile dysfunction)     Sparrow catheter in place     GERD (gastroesophageal reflux disease) 12-01-08    Glaucoma suspect     Gout, unspecified 10-25-06    Hyperlipidemia     Hypertension     Insomnia 7-20-06    Phimosis 6-16-00    Prostate cancer ([facility] [address] 3-3-09    Stroke [facility]) 2007    weakness right side     Past Surgical History:   Procedure Laterality Date    HX POLYPECTOMY  9-2004    [facility] COLONOSCOPY FLX DX W/COLLJ SPEC WHEN PFRMD  9/1/2004    polyp/Dr Alvarado    [facility] COLONOSCOPY FLX DX W/COLLJ SPEC WHEN PFRMD  9-2007    incomplete; Dr. Shady Eli     Prior Level of Function/Home Situation: see below  Home Situation  Home Environment: Private residence  # Steps to Enter: 0  One/Two Story Residence: One story  Living Alone: No  Support Systems: Child(edelmira)  Patient Expects to be Discharged to[de-identified] Private residence  Current DME Used/Available at Home: Elvie Lovett Cane, quad, Shower chair  Tub or Shower Type: (Pt has been doing a basin bath)  Diet prior to admission: NPO per previous SLP recs  Current Diet:  mech soft with pudding-thick liquids   Radiologist:    Film Views: Lateral;Fluoro  Patient Position: 90 in fluoro chair    Trial 1: Trial 2:   Consistency Presented: Nectar thick liquid;Honey thick liquid Consistency Presented: Puree; Solid;Pill/Tablet   How Presented: SLP-fed/presented;Self-fed/presented;Spoon;Cup/sip How Presented: Self-fed/presented;SLP-fed/presented;Cup/sip;Spoon         Bolus Acceptance: No impairment Bolus Acceptance: No impairment   Bolus Formation/Control: Impaired: Delayed;Premature spillage;Poor Bolus Formation/Control: Impaired: Delayed;Poor; Incomplete;Mastication   Propulsion: Delayed (# of seconds); Discoordination Propulsion: Delayed (# of seconds); Discoordination   Oral Residue: None Oral Residue: None   Initiation of Swallow: Triggered at pyriform sinus(es);Triggered at vallecula Initiation of Swallow: Triggered at base of tongue   Timing: No impairment Timing: No impairment   Penetration: None Penetration: None   Aspiration/Timing: Silent ;During Aspiration/Timing: No evidence of aspiration   Pharyngeal Clearance: No residue Pharyngeal Clearance: No residue   Attempted Modifications: Small sips and bites; Chin tuck;Effortful swallow;Spoon Attempted Modifications: Small sips and bites; Alternate liquids/solids   Effective Modifications: None Effective Modifications: (small bites/sips)   Cues for Modifications: Moderate Cues for Modifications: Moderate         Decreased Tongue Base Retraction?: Yes  Laryngeal Elevation: Reduced excursion with laryngeal vestibule gap;Minimal movement of larynx/epiglottis; Incomplete laryngeal closure; Inadequate epiglottic inversion  Aspiration/Penetration Score: 8 (Aspiration-Contrast passes cords/glottis with no effort to eject, ie/silent aspiration)  Pharyngeal Symmetry: Not assessed  Pharyngeal-Esophageal Segment: No impairment  Pharyngeal Dysfunction: Decreased strength;Decreased tongue base retraction;Decreased elevation/closure;Decreased pharyngeal wall constriction    Oral Phase Severity: Mild  Pharyngeal Phase Severity: Moderately severe    8-point Penetration-Aspiration Scale: Score 8    PAIN:  Pt reports 0/10 pain or discomfort prior to MBS. Pt reports 0/10 pain or discomfort post MBS. COMMUNICATION/EDUCATION:   ?  Patient educated regarding MBS results and diet recommendations. ?  Patient/family have participated as able in goal setting and plan of care.     Thank you for this referral.    Kristi Sebastian M.S. CCC-SLP/L  Speech-Language Pathologist

## 2019-08-02 NOTE — PROGRESS NOTES
Problem: Self Care Deficits Care Plan (Adult)  Goal: *Acute Goals and Plan of Care (Insert Text)  Description  Occupational Therapy Goals  Initiated 8/2/2019 within 7 day(s). 1.  Patient will perform upper body dressing with supervision/set-up. 2.  Patient will perform lower body dressing with minimal assistance/contact guard assist, using AE PRN. 3.  Patient will perform toileting with minimal assistance/contact guard assist.  4.  Patient will perform toilet transfers with minimal assistance/contact guard assist.  5.  Patient will perform a functional activity/ADL sitting with G balance for 3-5 minutes. 6.  Patient will participate in upper extremity therapeutic exercise/activities with supervision/set-up for 8 minutes. Prior Level of Function: Pt reports he lives with a son in a [facility] with 3STE. He was Mod (I) with basic self-care/ADLs, performing basin baths and using pull-ups. Pt's son reports a CNA comes to the home (11 am - 4 pm). Pt has a RW, cane (quad), shower chair, grab bars, BSC, reacher, and sock aid in home envrionemtn. Outcome: Progressing Towards Goal   OCCUPATIONAL THERAPY EVALUATION    Patient: Trisah Loya (30 y.o. male)  Date: 8/2/2019  Primary Diagnosis: Stroke ([facility] [address] [I63.9]  Sepsis ([facility] [address] [A41.9]        Precautions: Falls       ASSESSMENT :  Pt cleared to participate in OT evaluation by RN. Upon entering room, pt supine with HOB elevated, alert, and agreeable to therapy session with son present. Joined PT session to increase pt participation and to maximize safety of patient and staff members. Based on the objective data described below, the patient presents with decreased strength, decreased endurance, decreased functional balance, and decreased functional mobility, affecting his performance in basic self-care/ADL tasks. Noted RUE weaker than LUE. Pt performed bed mobility with max assist to participate in self-care.  While sitting EOB, pt unable to perform LB dressing task, requiring max assist to don B socks; pt reports he has and uses a sock aid and reacher at home. Pt would often lean towards R side requiring constant support from therapists. Gait belt donned, with pt requiring mod assist x2 for sit<>stand with verbal cues to push up from bed before placing hands on Rw. As pt presented with poor static standing balance, did not initiate functional transfer to [facility] at this time for safety. Pt was safely returned to bed. Educated pt on the role of OT, evaluation process,walker management, and goals for therapy with pt demonstrating fair understanding. The pt will benefit from further OT services, in order to maximize his ADL performance and decrease the risk for complications associated with decreased functional activity. At the end of the session, pt left resting comfortably in bed, call-bell in reach, with all needs met. Patient will benefit from skilled intervention to address the above impairments. Patient's rehabilitation potential is considered to be Good  Factors which may influence rehabilitation potential include:   ? None noted  ? Mental ability/status  ? Medical condition  ? Home/family situation and support systems  ? Safety awareness  ? Pain tolerance/management  ? Other:      PLAN :  Recommendations and Planned Interventions:   ?               Self Care Training                  ? Therapeutic Activities  ? Functional Mobility Training   ? Cognitive Retraining  ? Therapeutic Exercises           ? Endurance Activities  ? Balance Training                    ? Neuromuscular Re-Education  ? Visual/Perceptual Training     ? Home Safety Training  ? Patient Education                   ? Family Training/Education  ?                Other (comment):    Frequency/Duration: Patient will be followed by occupational therapy 1-2 times per day/4-7 days per week to address goals. Discharge Recommendations: Inpatient Rehab  Further Equipment Recommendations for Discharge: [facility]     SUBJECTIVE:   Patient stated right handed    OBJECTIVE DATA SUMMARY:     Past Medical History:   Diagnosis Date    Cataract     both    Colon polyp 9-2004    Diabetes ([facility] [address]     IDDM    DJD (degenerative joint disease) of knee     ED (erectile dysfunction)     Sparrow catheter in place     GERD (gastroesophageal reflux disease) 12-01-08    Glaucoma suspect     Gout, unspecified 10-25-06    Hyperlipidemia     Hypertension     Insomnia 7-20-06    Phimosis 6-16-00    Prostate cancer ([facility] [address] 3-3-09    [facility] 2007    weakness right side     Past Surgical History:   Procedure Laterality Date    HX POLYPECTOMY  9-2004    [facility] COLONOSCOPY FLX DX W/COLLJ SPEC WHEN PFRMD  9/1/2004    polyp/Dr Alvarado    DE COLONOSCOPY FLX DX W/COLLJ SPEC WHEN PFRMD  9-2007    incomplete; Dr. Lazara Lozada     Barriers to Learning/Limitations: None  Compensate with: visual, verbal, tactile, kinesthetic cues/model    Home Situation:   Home Situation  Home Environment: Private residence  # Steps to Enter: 0  One/Two Story Residence: One story  Living Alone: No  Support Systems: Child(edelmria)  Patient Expects to be Discharged to[de-identified] Private residence  Current DME Used/Available at Home: Suzan Reynolds, [employer], quad, [employer] chair  Tub or Shower Type: (Pt has been doing a basin bath)  ? Right hand dominant   ? Left hand dominant    Cognitive/Behavioral Status:  Neurologic State: Alert  Orientation Level: Oriented to person  Cognition: Follows commands  Safety/Judgement: Fall prevention    Skin: Visible skin appeared intact. Edema: None noted    Coordination: BUE  Coordination: Generally decreased, functional  Fine Motor Skills-Upper: Left Intact; Right Intact    Gross Motor Skills-Upper: Left Intact; Right Intact    Balance:  Sitting: Impaired; With support  Sitting - Static: Fair (occasional); Poor (constant support)  Sitting - Dynamic: Poor (constant support)  Standing: Impaired; With support  Standing - Static: Poor  Standing - Dynamic : Not tested for safety as pt presented with poor static standing balance    Strength: BUE  Strength: Generally decreased, functional(RUE weaker than LUE)    Tone & Sensation: BUE  Tone: Normal  Sensation: Impaired      Range of Motion: BUE  AROM: Generally decreased, functional    Functional Mobility and Transfers for ADLs:  Bed Mobility:  Supine to Sit: Maximum assistance(from right sidelying to sitting)  Sit to Supine: Moderate assistance;Assist x2  Scooting: Moderate assistance;Assist x2  Transfers:  Sit to Stand: Moderate assistance;Assist x2  Stand to Sit: Moderate assistance;Assist x2      ADL Assessment:   Feeding: Supervision;Setup    Oral Facial Hygiene/Grooming: Supervision;Setup    Bathing: Moderate assistance;Maximum assistance    Upper Body Dressing: Minimum assistance    Lower Body Dressing: Maximum assistance    Toileting: Moderate assistance;Assist x2      ADL Intervention:  Lower Body Dressing Assistance  Dressing Assistance: Maximum assistance  Socks: Maximum assistance  Position Performed: Seated edge of bed    Cognitive Retraining  Safety/Judgement: Fall prevention      Pain:  Pain level pre-treatment: 0/10   Pain level post-treatment: 0/10   Pain Intervention(s): Medication (see MAR); Rest, Ice, Repositioning   Response to intervention: Nurse notified, See doc flow    Activity Tolerance:   Fair    Please refer to the flowsheet for vital signs taken during this treatment. After treatment:   ? Patient left in no apparent distress sitting up in chair  ? Patient left in no apparent distress in bed  ? Call bell left within reach  ? Nursing notified  ? Son present  ? Bed alarm activated    COMMUNICATION/EDUCATION:   ? Role of Occupational Therapy in the acute care setting  ?  Home safety education was provided and the patient/caregiver indicated understanding. ? Patient/family have participated as able in goal setting and plan of care. ? Patient/family agree to work toward stated goals and plan of care. ? Patient understands intent and goals of therapy, but is neutral about his/her participation. ? Patient is unable to participate in goal setting and plan of care. Thank you for this referral.  Abraham Salter MS, OTR/L  Time Calculation: 23 mins    Eval Complexity: History: MEDIUM Complexity : Expanded review of history including physical, cognitive and psychosocial  history ; Examination: MEDIUM Complexity : 3-5 performance deficits relating to physical, cognitive , or psychosocial skils that result in activity limitations and / or participation restrictions; Decision Making:MEDIUM Complexity : Patient may present with comorbidities that affect occupational performnce.  Miniml to moderate modification of tasks or assistance (eg, physical or verbal ) with assesment(s) is necessary to enable patient to complete evaluation

## 2019-08-02 NOTE — PROGRESS NOTES
ARU/IPR REFERRAL CONTACT NOTE  [address] [place] [address] for Physical Rehabilitation    RE: Nita Wong    Referral received to review this patient's case for admission to [address] [place] [address] for Physical Rehabilitation. Current status reviewed.  When appropriate, will need PT/OT/ST evaluation/treatment on this patient to complete the pre-admission evaluation.  Will continue to follow. Thank you for this referral.  Should you have any questions please do not hesitate to call. Sincerely,  Danielle Llanos.  [address] [place] [address] Physical [facility]  [phone]

## 2019-08-02 NOTE — INTERDISCIPLINARY ROUNDS
Interdisciplinary STROKE Rounds Recommendations:  
 
Recommendations are as follows: 1. Continue to keep NPO at this time as pt has history of dysphagia and has failed 2 dysphagia screens conducted by our nursing staff. **confrimed with SLP that pt is on roster to see and she will see him this morning. 2. Continue education about stroke risk factors, stroke recognition and medication compliance. 3. IP consult to Diabetes management placed 2/2 A1C of 6.5 4. Dr. River Pratt will see the patient today Stroke Meds currently prescribed: ASA 81 mg,Crestor 20 mg 
DVT prophylaxis: heparin sq Discharge Plan: TBD, Patient admitted for right sided weakness, but chart also states left sided weakness Advanced Imaging of MRI done  8/1 shows There is a tiny focus of acute/early subacute infarct at the posterior 
medial right cerebellar peduncle.  
  
 
 
Discipline Participation: Interdisciplinary rounds were conducted by: 
Dr. Britney Holliday, Neuroscience Medical director,  
Teri Mitchell RN, stroke coordinator Moni Bradshaw, PT Whit VILLALOBOS, the patient's nurse. Discussion included patient's current condition, any tests and patient's expected discharge outcome.

## 2019-08-02 NOTE — PROGRESS NOTES
Reason for Admission:   Stroke [facility]) [I63.9]  Sepsis ([facility] [address] [A41.9]               RRAT Score:     36             Resources/supports as identified by patient/family:       Top Challenges facing patient (as identified by patient/family and CM): Finances/Medication cost?     no  Transportation      family  Support system or lack thereof? Family support, caregiver  Living arrangements? Pt's son lives with him   Self-care/ADLs/Cognition? Pt is dependent and has a caregiver        Current Advanced Directive/Advance Care Plan:   no                          Plan for utilizing home health: To be determined                      Likelihood of readmission:   HIGH    Transition of Care Plan:                    Initial assessment completed with pt's son Priti Del Rio. Cognitive status of patient: disoriented. Face sheet information confirmed:  yes. The patient designates his son Priti Del Rio and daughter Kody Reyes to participate in his discharge plan and to receive any needed information. This patient lives in a single family home with son. Patient is not able to navigate steps as needed. Prior to hospitalization, patient was considered to be independent with ADLs/IADLS : no . If not independent,  patient needs assist with : dressing, bathing, food preparation, cooking, toileting and grooming    Patient has a current ACP document on file: no  The son will be available to transport patient home upon discharge. The patient already has [employer] [facility], medical equipment available in the home. Patient is not currently active with home health. Patient has stayed in a skilled nursing facility or rehab. Was  stay within last 60 days : no.  Pt was at rehab at [facility] last year    This patient is on dialysis :no     Currently, the discharge plan is to be determined. Pt has pt/ot consult. Per pt's son Diana Peñaloza, if they recommend SNF, he would prefer [address] [facility].     The patient's family and caregiver administers his medications to him    Patient's current insurance is [employer]. Per pt's son, pt has a caregiver Monday through Friday for 5 hours/day and family member with pt when caregiver is not there. Care Management Interventions  PCP Verified by CM: Yes(per pt's son pt saw his pcp a while ago)  Palliative Care Criteria Met (RRAT>21 & CHF Dx)?: No  Mode of Transport at Discharge:  Other (see comment)  Transition of Care Consult (CM Consult): Discharge Planning  Physical Therapy Consult: Yes  Occupational Therapy Consult: Yes  Current Support Network: Own Home, Other(pt's son Randa Vazquez lives with him)  Confirm Follow Up Transport: Family  Plan discussed with Pt/Family/Caregiver: Yes  Discharge Location  Discharge Placement: Unable to determine at this time        ETELVINA Dietz RN  Care Management  Pager: [phone]

## 2019-08-02 NOTE — PROGRESS NOTES
Problem: Mobility Impaired (Adult and Pediatric)  Goal: *Acute Goals and Plan of Care (Insert Text)  Description  Physical Therapy Goals  Initiated 8/2/2019 and to be accomplished within 7 day(s)  1. Patient will roll side to side in bed with minimal assistance/contact guard assist.     2. Patient will move from supine to sit and sit to supine  and scoot up and down in bed with moderate assistance . 3. Patient will demo fair seated balance with ability to maintain midline for 2mins sitting EOB. 4. Patient will transfer from bed to chair and chair to bed with moderate assistance  using the least restrictive device. 5. Patient will perform sit to stand with moderate assistance . 6. Patient will ambulate with moderate assistance for 20 feet with the least restrictive device. PLOF: Patient lives with son in 1 story home with 3STE with HR and reports he uses a cane or walker to walk. Patient has [employer], RW, SC, BSC, WC, QC at home. Patient's son works and reports patient is home usually about 2hrs by himself as they have a home care aid from 11a-4p. Patient uses bathroom and performs sink bath. Outcome: Progressing Towards Goal   PHYSICAL THERAPY EVALUATION    Patient: Ella Gilford (19 y.o. male)  Date: 8/2/2019  Primary Diagnosis: Stroke [facility]) [I63.9]  Sepsis ([email or web address] [A41.9]  Precautions: Fall    ASSESSMENT :  Patient is 67yo M admitted to hospital with right sided weakness and presents today alert and agreeable to therapy, supine in bed upon arrival. Patient transferred to sitting EOB for objective assessment and demos poor balance both statically and dynamically with trunk lean towards right. Patient demos poor righting reaction and required constant support while in sitting with ability to maintain balance unsupported no longer than apprx 5-10sec. Patient required cues for motor planning and increased support due to trunk lean.  Patient stood with Mod[facility] at [facility] and in standing continues to demo lean towards right. Patient unable to attain fully upright posture. Patient transferred to supine at conclusion of session with call bell by his side, positioned comfortably. Patient and son educated on role and goals of therapy and denied further need for assist at this time. Patient will benefit from skilled intervention to address the above impairments. Patient's rehabilitation potential is considered to be Good  Factors which may influence rehabilitation potential include:   ? None noted  ? Mental ability/status  ? Medical condition  ? Home/family situation and support systems  ? Safety awareness  ? Pain tolerance/management  ? Other:      PLAN :  Recommendations and Planned Interventions:   ?           Bed Mobility Training             ? Neuromuscular Re-Education  ? Transfer Training                   ? Orthotic/Prosthetic Training  ? Gait Training                          ? Modalities  ? Therapeutic Exercises           ? Edema Management/Control  ? Therapeutic Activities            ? Family Training/Education  ? Patient Education  ? Other (comment):    Frequency/Duration: Patient will be followed by physical therapy 1-2 times per day/4-7 days per week to address goals. Discharge Recommendations: Inpatient Rehab  Further Equipment Recommendations for Discharge: N/A     SUBJECTIVE:   Patient stated I'm okay.     OBJECTIVE DATA SUMMARY:     Past Medical History:   Diagnosis Date    Cataract     both    Colon polyp 9-2004    Diabetes ([facility] [address]     IDDM    DJD (degenerative joint disease) of knee     ED (erectile dysfunction)     Sparrow catheter in place     GERD (gastroesophageal reflux disease) 12-01-08    Glaucoma suspect     Gout, unspecified 10-25-06    Hyperlipidemia     Hypertension     Insomnia 7-20-06    Phimosis 6-16-00    Prostate cancer ([facility] [address] 3-3-09    Stroke [facility]) 2007 weakness right side     Past Surgical History:   Procedure Laterality Date    HX POLYPECTOMY  9-2004    HI COLONOSCOPY FLX DX W/COLLJ SPEC WHEN PFRMD  9/1/2004    polyp/Dr Alvarado    HI COLONOSCOPY FLX DX W/COLLJ SPEC WHEN PFRMD  9-2007    incomplete; Dr. Josseline Wilson     Barriers to Learning/Limitations: None  Compensate with: N/A  Home Situation:  Home Situation  Home Environment: Private residence  # Steps to Enter: 0  One/Two Story Residence: One story  Living Alone: No  Support Systems: Child(edelmira)  Patient Expects to be Discharged to[de-identified] Private residence  Current DME Used/Available at Home: [address], Clarita Memos, quad, Shower chair  Critical Behavior:    A&Ox4  Strength:    Strength: Generally decreased, functional(R knee flex/ext 3/5, DF 3+/5, DF 4-/5, LLE 4+/5)   Tone & Sensation:   Tone: Normal(BLE)   Sensation: Impaired(RLE to LT)   Range Of Motion:  AROM: Generally decreased, functional(RLE)   PROM: Within functional limits(BLE)   Functional Mobility:  Bed Mobility:   Supine to Sit: Maximum assistance(from right sidelying to sitting)  Sit to Supine: Moderate assistance;Assist x2  Scooting: Moderate assistance;Assist x2  Transfers:  Sit to Stand: Moderate assistance;Assist x2  Stand to Sit: Moderate assistance;Assist x2    Balance:   Sitting: Impaired; With support  Sitting - Static: Fair (occasional); Poor (constant support)  Sitting - Dynamic: Poor (constant support)  Standing: Impaired; With support  Standing - Static: Poor  Standing - Dynamic : Poor    Pain:  Pain level pre-treatment: 0/10   Pain level post-treatment: 0/10     Activity Tolerance:   Patient tolerated activity well; denied dizziness, chest pain, or SOB during session. Please refer to the flowsheet for vital signs taken during this treatment. After treatment:   ?         Patient left in no apparent distress sitting up in chair  ? Patient left in no apparent distress in bed  ? Call bell left within reach  ? Nursing notified  ? Caregiver present  ? Bed alarm activated  ? SCDs applied    COMMUNICATION/EDUCATION:   ?         Role of Physical Therapy in the acute care setting. ?         Fall prevention education was provided and the patient/caregiver indicated understanding. ? Patient/family have participated as able in goal setting and plan of care. ?         Patient/family agree to work toward stated goals and plan of care. ?         Patient understands intent and goals of therapy, but is neutral about his/her participation. ? Patient is unable to participate in goal setting/plan of care: ongoing with therapy staff. ?         Other:     Thank you for this referral.  Willian Paige, PT   Time Calculation: 24 mins      Eval Complexity: History: MEDIUM  Complexity : 1-2 comorbidities / personal factors will impact the outcome/ POC Exam:LOW Complexity : 1-2 Standardized tests and measures addressing body structure, function, activity limitation and / or participation in recreation  Presentation: LOW Complexity : Stable, uncomplicated  Clinical Decision Making:Low Complexity    Overall Complexity:LOW

## 2019-08-02 NOTE — DIABETES MGMT
GLYCEMIC CONTROL PLAN OF CARE     Assessment/Recommendations:  Pt is a 75year old male with a past medical history significant for hypertension, type 2 diabetes, prostate cancer, GERD, and CVA. Recommend monitoring blood glucose 4 times daily ACHS. Monitor need for correctional Lispro insulin coverage. Most recent blood glucose values:  8/1/2019 15:26 8/1/2019 21:57 8/2/2019 06:04   206 (H) 131 (H) 105     Current A1C of 6.5% is equivalent to average blood glucose of 140 mg/dl over the past 2-3 months.     Current hospital diabetes medications:   None noted     Home diabetes medications:  Lispro insulin per sliding scale     Diet:  Diabetic consistent carbohydrate 5945-0167 Kcal    Education:  ____Refer to Diabetes Education Record             __x__Education not indicated at this time      Nikki Julian RD, CDE

## 2019-08-02 NOTE — CDMP QUERY
1. Please clarify the specific condition indicated by the term 'urosepsis'. Per [facility] Medical Dictionary, urosepsis is considered an imprecise term. Urosepsis cannot be reported in ICD-10-CM. » Bacteriuria/bacteruria only without infection » Infection in urinary tract (please specify infection type and site) » Sepsis due to urinary tract infection » Other (please specify)________________ » Clinically unable to determine » Unknown 2. Please clarify if condition is secondary to indwelling suprapubic cath Thank you,    Wero Walker RN    CCDS   x [phone]

## 2019-08-02 NOTE — PROGRESS NOTES
Dysphagia eval/tx completed with recs of NPO until completion of MBS to rule out aspiration. Full report to follow.      Thank you for this referral.    Dilcia Velázquez M.S. CCC-SLP/L  Speech-Language Pathologist

## 2019-08-02 NOTE — CONSULTS
NEUROLOGY CONSULT NOTE    Patient ID:  Beth Booth  [id number]  24 y.o.  1945    Date of Consultation:  August 2, 2019    Referring Physician: Tavo Collins MD    Reason for Consultation:  Possible stroke        Subjective:       History of Present Illness:     Mr. Mandi Steel is a 76 yo AAM with hx of HTN, DM ytpe 2, ED, prostate cancer with chronic indwelling gutierrez cath, GERD, gout and prior CVA left corona radiata, with residual right lower extremity and right upper extremity weakness who presented to ER on 1st Aug 2018 with new onset lethargy and right LE weakness. He resides with his son and has a full time caregiver. His son noted that the patient had been more drowsy in the  morning on 1st Aug and that his urine was dark and cloudy. He normally used a walker to get around the house, but yesterday  his right leg was not moving and it felt weaker. Thus, his family brought him to the ED for further evaluation. No reported fevers or chills. No nausea. + diarrhea and vomiting with poor appetite. This am the pt states he feels fine his actually only complaints are of symptoms of a cold - he has a cough, productive and cannot really expectorate . He denies fever or chills.  He doesn't feel weaker on the right than he was before       Patient Active Problem List    Diagnosis Date Noted    Sepsis ([address] 08/01/2019    Dehydration 11/12/2018    Generalized weakness 11/12/2018    Failure to thrive in adult 11/12/2018    Acute on chronic renal failure ([address] 11/12/2018    Poor conditioning 11/12/2018    Indwelling Gutierrez catheter present 11/12/2018    Other constipation 11/12/2018    Hyperkalemia 11/12/2018    Type 2 diabetes with nephropathy ([address] 06/04/2018    Benign prostatic hyperplasia with urinary retention 10/18/2017    Cough 04/26/2017    Hyperglycemia due to type 2 diabetes mellitus ([address] 01/19/2017   [facility] discharge follow-up 09/14/2016    Gastrointestinal hemorrhage associated with duodenitis 09/14/2016    Need for influenza vaccination 09/14/2016    Pneumonia 08/20/2016    History of CVA (cerebrovascular accident) 04/13/2016    Diabetes mellitus type 2, controlled ([facility] 01/13/2016    Advance care planning 01/13/2016    Encounter for immunization 01/13/2016    Need for pneumococcal vaccine 01/13/2016    Encounter for screening colonoscopy 01/13/2016    Medicare annual wellness visit, subsequent 01/13/2016    CKD (chronic kidney disease) stage 3, GFR 30-59 ml/min ([address] 09/16/2015    Proteinuria 06/10/2015    Need for Tdap vaccination 03/11/2015    Acute encephalopathy 09/29/2014    Hypoglycemia 09/29/2014    Stroke ([facility] 08/30/2011    Essential hypertension 01/12/2011    Type I (juvenile type) diabetes mellitus with renal manifestations, not stated as uncontrolled(250.41) ([facility] 01/12/2011    Hypercholesterolemia 01/12/2011    Gout 01/12/2011    GERD (gastroesophageal reflux disease) 01/12/2011    Vitamin D deficiency 01/12/2011    Prostate cancer ([facility] 03/03/2009     Past Medical History:   Diagnosis Date    Cataract     both    Colon polyp 9-2004    Diabetes (HCC)     IDDM    DJD (degenerative joint disease) of knee     ED (erectile dysfunction)     Sparrow catheter in place     GERD (gastroesophageal reflux disease) 12-01-08    Glaucoma suspect     Gout, unspecified 10-25-06    Hyperlipidemia     Hypertension     Insomnia 7-20-06    Phimosis 6-16-00    Prostate cancer ([facility] 3-3-09    Stroke ([facility] 2007    weakness right side      Past Surgical History:   Procedure Laterality Date    HX POLYPECTOMY  9-2004    RI COLONOSCOPY FLX DX W/COLLJ [facility] WHEN PFRMD  9/1/2004    polyp/Dr Alvarado    RI COLONOSCOPY FLX DX W/COLLJ SPEC WHEN PFRMD  9-2007    incomplete; Dr. Leslie Reagan      Prior to Admission medications    Medication Sig Start Date End Date Taking?  Authorizing Provider   carvedilol (COREG) 12.5 mg tablet TAKE 1 TABLET BY MOUTH TWICE A DAY 7/26/19  Yes Elisha Loza NP   allopurinol (ZYLOPRIM) 100 mg tablet TAKE 1 TABLET BY MOUTH TWICE DAILY 7/26/19  Yes Elisha Loza NP   dilTIAZem (TIAZAC) 360 mg ER capsule TAKE ONE CAPSULE BY MOUTH EVERY DAY 7/23/19  Yes Elisha Loza NP   rosuvastatin (CRESTOR) 20 mg tablet TAKE 1 TABLET BY MOUTH NIGHTLY 7/23/19  Yes Elisha Loza NP   cholecalciferol, VITAMIN D3, (VITAMIN D3) 5,000 unit tab tablet Take 1 Tab by mouth every seven (7) days. 6/10/19  Yes Elisha Loza NP   tamsulosin (FLOMAX) 0.4 mg capsule TAKE ONE CAPSULE BY MOUTH EVERY DAY AFTER MEALS 4/30/19  Yes Jeff Farmer MD   Insulin Needles, Disposable, (BD ULTRA-FINE MINI PEN NEEDLE) 31 gauge x 3/16\" ndle Inject TID before meals  E11.9 3/28/19  Yes Geetha Rivera MD   insulin lispro (HUMALOG) 100 unit/mL injection For Blood Sugar (mg/dL) of:     Less than 150=0 units           150 -199=2 units  200 -249=4 units  250 -299=6 units  300 -349=8 units  350 and above=10 units  Check blood glucose 3 times daily and at bedtime and inject insulin per SS. 11/15/18  Yes Brennan Chew PA-C   aspirin delayed-release 81 mg tablet Take 81 mg by mouth daily. Yes Provider, Historical   glucose 4 gram chewable tablet Take 4 Tabs by mouth as needed. 6/4/18  Yes Arlene Saleem, DO   OTHER Check CBC, CMP, Mg in 5 days, results to PCP 9/30/14  Yes Kody Conway MD   omega-3 fatty acids-vitamin e (FISH OIL) 1,000 mg Cap Take 1,400 mg by mouth daily.  5/10/10  Yes Provider, Historical     Allergies   Allergen Reactions    Bactrim [Sulfamethoxazole-Trimethoprim] Other (comments)     CAUSED ACUTE RENAL FAILURE    Ciprofloxacin Other (comments)     PATIENT IS NOT ALLERGIC      Social History     Tobacco Use    Smoking status: Former Smoker    Smokeless tobacco: Never Used   Substance Use Topics    Alcohol use: No      Family History   Problem Relation Age of Onset    Hypertension Mother     Diabetes Father     Cancer Father         prostate    Heart Disease Father 70 CABG    Hypertension Sister     Hypertension Brother     Diabetes Brother         Review of Systems    Pertinent items are noted in HPI. Constitutional: Positive for activity change, appetite change, fatigue and unexpected weight change. Negative for fever. HENT: Negative for congestion and rhinorrhea. Eyes: Negative for visual disturbance. Respiratory: Negative for shortness of breath. reports cough   Cardiovascular: Negative for chest pain and palpitations. Gastrointestinal: Positive for constipation. Negative for abdominal pain, diarrhea, nausea and vomiting. Genitourinary: Negative for dysuria and hematuria. Musculoskeletal: Negative for back pain. Skin: Negative for rash. Allergic/Immunologic: Negative for immunocompromised state. Neurological: Positive for speech difficulty, weakness all chronic since the left hemisphere stroke stated. Negative for dizziness.         Objective:     Patient Vitals for the past 8 hrs:   BP Temp Pulse Resp SpO2 Height Weight   08/02/19 0751 137/65 98.3 °F (36.8 °C) 67 22 96 %     08/02/19 0400 129/61 98.2 °F (36.8 °C) 70 23 97 % 5' 7\" (1.702 m) 61.4 kg (135 lb 6.4 oz)       General Exam  No acute distress, cachectic  body habitus    HEENT: Normocephalic, atraumatic, Sclera anicteric, normal conjunctiva  Mucous membranes: dry. Poor dentition    CV: No carotid bruits,   Heart: regular to rate and rhythm. No murmurs     Neurologic Exam:    Mental status:  Alert, oriented to person, place, time and circumstance    Language: slow answering, diminished verbal output, speech is dysarthric intact comprehension. Cranial nerves: PERRL, Extraocular movements intact and full, face sensation intact bilat V1-V3,  face with subtle asymmetry on the right, Tongue midline with normal strength, palat symmetric    Motor:  Tone increased on the right UE, strength left UE and LE 5/5, right deltoid 5-/5, elbow flexion 5-/5, elbow extension 5-/5, wrist flexion/extension 5/5, right LE: hip flexion 5-/5, knee flexion 5/5, knee extansion5-/5, foot dorsiflexion 5-/5 and plantar flexion 5-/5   No abnormal movements. Right pronator drift, mild    Coordination: no dysmetria      DTRs (R/L)  Biceps: (2/2)  Brachorad (2/2)  Triceps: (2/2)   Patellar (2/2)  Ankles (2/2)      Sensation: Intact and symmetric to light touch, temperature pinprick    Gait: not tested      Data Review:    Recent Results (from the past 24 hour(s))   GLUCOSE, POC    Collection Time: 08/01/19  3:26 PM   Result Value Ref Range    Glucose (POC) 206 (H) 70 - 110 mg/dL   CBC WITH AUTOMATED DIFF    Collection Time: 08/01/19  3:40 PM   Result Value Ref Range    WBC 20.8 (H) 4.6 - 13.2 K/uL    RBC 4.52 (L) 4.70 - 5.50 M/uL    HGB 11.5 (L) 13.0 - 16.0 g/dL    HCT 34.2 (L) 36.0 - 48.0 %    MCV 75.7 74.0 - 97.0 FL    MCH 25.4 24.0 - 34.0 PG    MCHC 33.6 31.0 - 37.0 g/dL    RDW 18.4 (H) 11.6 - 14.5 %    PLATELET 368 025 - 760 K/uL    MPV 9.8 9.2 - 11.8 FL    NEUTROPHILS 85 (H) 42 - 75 %    LYMPHOCYTES 8 (L) 20 - 51 %    MONOCYTES 6 2 - 9 %    EOSINOPHILS 1 0 - 5 %    BASOPHILS 0 0 - 3 %    ABS. NEUTROPHILS 17.7 (H) 1.8 - 8.0 K/UL    ABS. LYMPHOCYTES 1.7 0.8 - 3.5 K/UL    ABS. MONOCYTES 1.2 (H) 0 - 1.0 K/UL    ABS. EOSINOPHILS 0.2 0.0 - 0.4 K/UL    ABS.  BASOPHILS 0.0 0.0 - 0.06 K/UL    DF MANUAL      RBC COMMENTS ANISOCYTOSIS  1+       METABOLIC PANEL, COMPREHENSIVE    Collection Time: 08/01/19  3:40 PM   Result Value Ref Range    Sodium 139 136 - 145 mmol/L    Potassium 3.9 3.5 - 5.5 mmol/L    Chloride 104 100 - 111 mmol/L    CO2 24 21 - 32 mmol/L    Anion gap 11 3.0 - 18 mmol/L    Glucose 203 (H) 74 - 99 mg/dL    BUN 40 (H) 7.0 - 18 MG/DL    Creatinine 2.51 (H) 0.6 - 1.3 MG/DL    BUN/Creatinine ratio 16 12 - 20      GFR est AA 31 (L) >60 ml/min/1.73m2    GFR est non-AA 25 (L) >60 ml/min/1.73m2    Calcium 9.7 8.5 - 10.1 MG/DL    Bilirubin, total 0.8 0.2 - 1.0 MG/DL    ALT (SGPT) 9 (L) 16 - 61 U/L    AST (SGOT) 5 (L) 10 - 38 U/L    Alk.  phosphatase 61 45 - 117 U/L    Protein, total 8.3 (H) 6.4 - 8.2 g/dL    Albumin 3.3 (L) 3.4 - 5.0 g/dL    Globulin 5.0 (H) 2.0 - 4.0 g/dL    A-G Ratio 0.7 (L) 0.8 - 1.7     MAGNESIUM    Collection Time: 08/01/19  3:40 PM   Result Value Ref Range    Magnesium 1.9 1.6 - 2.6 mg/dL   TROPONIN I    Collection Time: 08/01/19  3:40 PM   Result Value Ref Range    Troponin-I, QT 0.03 0.0 - 0.045 NG/ML   URINALYSIS W/ RFLX MICROSCOPIC    Collection Time: 08/01/19  3:40 PM   Result Value Ref Range    Color YELLOW      Appearance TURBID      Specific gravity 1.014 1.005 - 1.030      pH (UA) 8.5 (H) 5.0 - 8.0      Protein >1,000 (A) NEG mg/dL    Glucose NEGATIVE  NEG mg/dL    Ketone TRACE (A) NEG mg/dL    Bilirubin NEGATIVE  NEG      Blood MODERATE (A) NEG      Urobilinogen 0.2 0.2 - 1.0 EU/dL    Nitrites NEGATIVE  NEG      Leukocyte Esterase LARGE (A) NEG     CULTURE, BLOOD    Collection Time: 08/01/19  3:40 PM   Result Value Ref Range    Special Requests: NO SPECIAL REQUESTS      Culture result: NO GROWTH AFTER 14 HOURS     URINE MICROSCOPIC ONLY    Collection Time: 08/01/19  3:40 PM   Result Value Ref Range    WBC 11 to 20 0 - 4 /hpf    RBC 0 to 3 0 - 5 /hpf    Epithelial cells FEW 0 - 5 /lpf    Bacteria 4+ (A) NEG /hpf   POC LACTIC ACID    Collection Time: 08/01/19  3:46 PM   Result Value Ref Range    Lactic Acid (POC) 1.20 0.40 - 2.00 mmol/L   EKG, 12 LEAD, INITIAL    Collection Time: 08/01/19  3:51 PM   Result Value Ref Range    Ventricular Rate 92 BPM    Atrial Rate 92 BPM    P-R Interval 148 ms    QRS Duration 84 ms    Q-T Interval 340 ms    QTC Calculation (Bezet) 420 ms    Calculated P Axis 60 degrees    Calculated R Axis 6 degrees    Calculated T Axis 57 degrees    Diagnosis       Sinus rhythm with occasional premature ventricular complexes  Otherwise normal ECG  When compared with ECG of 12-NOV-2018 13:32,  premature ventricular complexes are now present  Confirmed by Shakeel Martinez MD, Robbi Bautista ([id number]) on 8/1/2019 5:16:42 PM     CULTURE, BLOOD    Collection Time: 08/01/19  3:55 PM   Result Value Ref Range    Special Requests: NO SPECIAL REQUESTS      Culture result: NO GROWTH AFTER 14 HOURS     GLUCOSE, POC    Collection Time: 08/01/19  9:57 PM   Result Value Ref Range    Glucose (POC) 131 (H) 70 - 110 mg/dL   LIPID PANEL    Collection Time: 08/02/19  2:21 AM   Result Value Ref Range    LIPID PROFILE          Cholesterol, total 93 <200 MG/DL    Triglyceride 67 <150 MG/DL    HDL Cholesterol 46 40 - 60 MG/DL    LDL, calculated 33.6 0 - 100 MG/DL    VLDL, calculated 13.4 MG/DL    CHOL/HDL Ratio 2.0 0 - 5.0     HEMOGLOBIN A1C WITH EAG    Collection Time: 08/02/19  2:21 AM   Result Value Ref Range    Hemoglobin A1c 6.5 (H) 4.2 - 5.6 %    Est. average glucose 140 mg/dL   GLUCOSE, POC    Collection Time: 08/02/19  6:04 AM   Result Value Ref Range    Glucose (POC) 105 70 - 110 mg/dL         Radiology studies:   Brain MRI: 1st Aug 2019:   IMPRESSION:     1. There is a tiny focus of acute/early subacute infarct at the posterior  medial right cerebellar peduncle.      2. Background of advanced atrophy, progressed, and moderately pronounced chronic  small vessel ischemic findings.     2. No hemorrhage or herniation. Assessment: This is a 76 yo man with hx of HTN, Dm, HLD, prostate cancer with suprapubic catheter , hx of CVA with residual right sided weakness admitted with worsening right sided weakness in the setting of urinary infection, sepsis. The pt neuro exam is negative for findings concerning for a new stroke. His symptoms localizes to his prior stroke. He had an exacerbation orf the weakness due to infection. The tiny, punctate DWI changes on the MRI is an incidental findings, and is asymptomatic. Active Problems:    Stroke [facility]) (8/30/2011)      Sepsis ([facility] (8/1/2019)        Plan:     - continue ASA, BP and DM control  - treat the underlying infection.   - consider eval for pneumonia due to his cough   - no need for further neuro follow up.  Ok to stop following with frequent Neuro check   - will sign off today , can be discharged when appropriate from IM standpoint           Zara Dowell MD  Adult Neurologist  8/2/2019

## 2019-08-02 NOTE — PROGRESS NOTES
[facility]ist [facility]  Progress Note    Patient: Linnea Main Age: 75 y.o. : 1945 MR#: [id number] SSN: [id number]  Date/Time: 2019     Subjective:     Pt seen & evaluated - admitted for Urosepsis , complicated UTI with indwelling gutierrez         Assessment/Plan:     1. Urosepsis with complicated UTI & indwelling gutierrez - continue IV abx - cefepime   2. CXR - negative for acute cardiopulm process   3. Swallow eval + for aspiration - will discuss with family for comfort feeding   4. R LE weakness - discussed with Neuro - no further neuro work up needed at this time   5. T2DM -  controlled with diet    6. CKD 3 - stable - monitor creatinine    7. AOCD - H&H - monitor H&H             Case discussed with:  []Patient  [x]Family  []Nursing  []Case Management  DVT Prophylaxis:  []Lovenox  [x]Hep SQ  []SCDs  []Coumadin   []On Heparin gtt    Objective:   VS:   Visit Vitals  /78 (BP 1 Location: Right arm, BP Patient Position: At rest)   Pulse 77   Temp 99.2 °F (37.3 °C)   Resp 21   Ht 5' 7\" (1.702 m)   Wt 61.4 kg (135 lb 6.4 oz)   SpO2 100%   BMI 21.21 kg/m²      Tmax/24hrs: Temp (24hrs), Av.2 °F (37.3 °C), Min:98.2 °F (36.8 °C), Max:100.6 °F (38.1 °C)  IOBRIEF    Intake/Output Summary (Last 24 hours) at 2019 1604  Last data filed at 2019 1545  Gross per 24 hour   Intake 240 ml   Output 400 ml   Net -160 ml       General:  Alert, cooperative, no acute distress    HEENT: PERRLA, anicteric sclerae. Pulmonary:  CTA Bilaterally. No Wheezing/Rhonchi/Rales. Cardiovascular: Regular rate and Rhythm. GI:  Soft, Non distended, Non tender. + Bowel sounds. Extremities:  No edema, cyanosis, clubbing. No calf tenderness. Neurologic: Alert and oriented X 3. No acute neuro deficits.   Additional:    Medications:   Current Facility-Administered Medications   Medication Dose Route Frequency    cefepime (MAXIPIME) 2 g in sterile water (preservative free) 10 mL IV syringe  2 g IntraVENous Q24H    allopurinol (ZYLOPRIM) tablet 100 mg  100 mg Oral BID    aspirin delayed-release tablet 81 mg  81 mg Oral DAILY    carvedilol (COREG) tablet 12.5 mg  12.5 mg Oral BID WITH MEALS    cholecalciferol (VITAMIN D3) capsule 5,000 Units  5,000 Units Oral Q7D    dilTIAZem CD (CARDIZEM CD) capsule 360 mg  360 mg Oral DAILY    glucose chewable tablet 16 g  16 g Oral PRN    omega 3-DHA-EPA-fish oil 1,000 mg (120 mg-180 mg) capsule 1 Cap  1 Cap Oral DAILY    tamsulosin (FLOMAX) capsule 0.4 mg  0.4 mg Oral DAILY    rosuvastatin (CRESTOR) tablet 20 mg  20 mg Oral QHS    sodium chloride (NS) flush 5-40 mL  5-40 mL IntraVENous Q8H    sodium chloride (NS) flush 5-40 mL  5-40 mL IntraVENous PRN    heparin (porcine) injection 5,000 Units  5,000 Units SubCUTAneous Q8H       Labs:    Recent Results (from the past 24 hour(s))   GLUCOSE, POC    Collection Time: 08/01/19  9:57 PM   Result Value Ref Range    Glucose (POC) 131 (H) 70 - 110 mg/dL   LIPID PANEL    Collection Time: 08/02/19  2:21 AM   Result Value Ref Range    LIPID PROFILE          Cholesterol, total 93 <200 MG/DL    Triglyceride 67 <150 MG/DL    HDL Cholesterol 46 40 - 60 MG/DL    LDL, calculated 33.6 0 - 100 MG/DL    VLDL, calculated 13.4 MG/DL    CHOL/HDL Ratio 2.0 0 - 5.0     HEMOGLOBIN A1C WITH EAG    Collection Time: 08/02/19  2:21 AM   Result Value Ref Range    Hemoglobin A1c 6.5 (H) 4.2 - 5.6 %    Est. average glucose 140 mg/dL   GLUCOSE, POC    Collection Time: 08/02/19  6:04 AM   Result Value Ref Range    Glucose (POC) 105 70 - 110 mg/dL   GLUCOSE, POC    Collection Time: 08/02/19 11:16 AM   Result Value Ref Range    Glucose (POC) 102 70 - 110 mg/dL       Signed By: Reynold Wong MD     August 2, 2019

## 2019-08-03 LAB
ALBUMIN SERPL-MCNC: 2.8 G/DL (ref 3.4–5)
ALBUMIN/GLOB SERPL: 0.6 {RATIO} (ref 0.8–1.7)
ALP SERPL-CCNC: 65 U/L (ref 45–117)
ALT SERPL-CCNC: 11 U/L (ref 16–61)
ANION GAP SERPL CALC-SCNC: 8 MMOL/L (ref 3–18)
AST SERPL-CCNC: 9 U/L (ref 10–38)
BASOPHILS # BLD: 0 K/UL (ref 0–0.1)
BASOPHILS NFR BLD: 0 % (ref 0–2)
BILIRUB SERPL-MCNC: 0.9 MG/DL (ref 0.2–1)
BUN SERPL-MCNC: 44 MG/DL (ref 7–18)
BUN/CREAT SERPL: 19 (ref 12–20)
CALCIUM SERPL-MCNC: 9.2 MG/DL (ref 8.5–10.1)
CHLORIDE SERPL-SCNC: 111 MMOL/L (ref 100–111)
CO2 SERPL-SCNC: 24 MMOL/L (ref 21–32)
CREAT SERPL-MCNC: 2.28 MG/DL (ref 0.6–1.3)
DIFFERENTIAL METHOD BLD: ABNORMAL
EOSINOPHIL # BLD: 0.2 K/UL (ref 0–0.4)
EOSINOPHIL NFR BLD: 1 % (ref 0–5)
ERYTHROCYTE [DISTWIDTH] IN BLOOD BY AUTOMATED COUNT: 18.3 % (ref 11.6–14.5)
GLOBULIN SER CALC-MCNC: 4.4 G/DL (ref 2–4)
GLUCOSE BLD STRIP.AUTO-MCNC: 127 MG/DL (ref 70–110)
GLUCOSE BLD STRIP.AUTO-MCNC: 129 MG/DL (ref 70–110)
GLUCOSE SERPL-MCNC: 115 MG/DL (ref 74–99)
HCT VFR BLD AUTO: 32.4 % (ref 36–48)
HGB BLD-MCNC: 10.9 G/DL (ref 13–16)
LYMPHOCYTES # BLD: 1.8 K/UL (ref 0.9–3.6)
LYMPHOCYTES NFR BLD: 11 % (ref 21–52)
MCH RBC QN AUTO: 24.9 PG (ref 24–34)
MCHC RBC AUTO-ENTMCNC: 33.6 G/DL (ref 31–37)
MCV RBC AUTO: 74.1 FL (ref 74–97)
MONOCYTES # BLD: 1 K/UL (ref 0.05–1.2)
MONOCYTES NFR BLD: 6 % (ref 3–10)
NEUTS SEG # BLD: 13.4 K/UL (ref 1.8–8)
NEUTS SEG NFR BLD: 82 % (ref 40–73)
PLATELET # BLD AUTO: 225 K/UL (ref 135–420)
PLATELET COMMENTS,PCOM: ABNORMAL
POTASSIUM SERPL-SCNC: 4 MMOL/L (ref 3.5–5.5)
PROT SERPL-MCNC: 7.2 G/DL (ref 6.4–8.2)
RBC # BLD AUTO: 4.37 M/UL (ref 4.7–5.5)
RBC MORPH BLD: ABNORMAL
SODIUM SERPL-SCNC: 143 MMOL/L (ref 136–145)
WBC # BLD AUTO: 16.4 K/UL (ref 4.6–13.2)

## 2019-08-03 PROCEDURE — 36415 COLL VENOUS BLD VENIPUNCTURE: CPT

## 2019-08-03 PROCEDURE — 97110 THERAPEUTIC EXERCISES: CPT

## 2019-08-03 PROCEDURE — 85025 COMPLETE CBC W/AUTO DIFF WBC: CPT

## 2019-08-03 PROCEDURE — 97535 SELF CARE MNGMENT TRAINING: CPT

## 2019-08-03 PROCEDURE — 80053 COMPREHEN METABOLIC PANEL: CPT

## 2019-08-03 PROCEDURE — 65660000000 HC RM CCU STEPDOWN

## 2019-08-03 PROCEDURE — 74011250636 HC RX REV CODE- 250/636: Performed by: INTERNAL MEDICINE

## 2019-08-03 PROCEDURE — 74011000250 HC RX REV CODE- 250: Performed by: HOSPITALIST

## 2019-08-03 PROCEDURE — 82962 GLUCOSE BLOOD TEST: CPT

## 2019-08-03 PROCEDURE — 74011250637 HC RX REV CODE- 250/637: Performed by: INTERNAL MEDICINE

## 2019-08-03 PROCEDURE — 97116 GAIT TRAINING THERAPY: CPT

## 2019-08-03 PROCEDURE — 74011250636 HC RX REV CODE- 250/636: Performed by: HOSPITALIST

## 2019-08-03 RX ADMIN — CARVEDILOL 12.5 MG: 12.5 TABLET, FILM COATED ORAL at 17:09

## 2019-08-03 RX ADMIN — HEPARIN SODIUM 5000 UNITS: 5000 INJECTION, SOLUTION INTRAVENOUS; SUBCUTANEOUS at 22:40

## 2019-08-03 RX ADMIN — ROSUVASTATIN CALCIUM 20 MG: 20 TABLET, FILM COATED ORAL at 22:40

## 2019-08-03 RX ADMIN — OMEGA-3 FATTY ACIDS CAP 1000 MG 1 CAPSULE: 1000 CAP at 09:09

## 2019-08-03 RX ADMIN — DILTIAZEM HYDROCHLORIDE 360 MG: 180 CAPSULE, COATED, EXTENDED RELEASE ORAL at 09:09

## 2019-08-03 RX ADMIN — Medication 10 ML: at 17:11

## 2019-08-03 RX ADMIN — Medication 10 ML: at 06:15

## 2019-08-03 RX ADMIN — ALLOPURINOL 100 MG: 100 TABLET ORAL at 09:08

## 2019-08-03 RX ADMIN — ASPIRIN 81 MG: 81 TABLET, COATED ORAL at 09:10

## 2019-08-03 RX ADMIN — HEPARIN SODIUM 5000 UNITS: 5000 INJECTION, SOLUTION INTRAVENOUS; SUBCUTANEOUS at 13:23

## 2019-08-03 RX ADMIN — TAMSULOSIN HYDROCHLORIDE 0.4 MG: 0.4 CAPSULE ORAL at 09:08

## 2019-08-03 RX ADMIN — Medication 10 ML: at 22:42

## 2019-08-03 RX ADMIN — ALLOPURINOL 100 MG: 100 TABLET ORAL at 17:09

## 2019-08-03 RX ADMIN — HEPARIN SODIUM 5000 UNITS: 5000 INJECTION, SOLUTION INTRAVENOUS; SUBCUTANEOUS at 06:15

## 2019-08-03 RX ADMIN — CEFEPIME HYDROCHLORIDE 2 G: 2 INJECTION, POWDER, FOR SOLUTION INTRAVENOUS at 22:40

## 2019-08-03 NOTE — PROGRESS NOTES
Problem: Falls - Risk of  Goal: *Absence of Falls  Description  Document Saurabh Kahn Fall Risk and appropriate interventions in the flowsheet.   Outcome: Progressing Towards Goal  Note:   Fall Risk Interventions:  Mobility Interventions: OT consult for ADLs, Patient to call before getting OOB, PT Consult for mobility concerns, Utilize walker, cane, or other assistive device         Medication Interventions: Bed/chair exit alarm, Patient to call before getting OOB    Elimination Interventions: Call light in reach, Patient to call for help with toileting needs    History of Falls Interventions: Bed/chair exit alarm, Room close to nurse's station, Vital signs minimum Q4HRs X 24 hrs (comment for end date), Door open when patient unattended         Problem: Patient Education: Go to Patient Education Activity  Goal: Patient/Family Education  Outcome: Progressing Towards Goal     Problem: Pain  Goal: *Control of Pain  Outcome: Progressing Towards Goal  Goal: *PALLIATIVE CARE:  Alleviation of Pain  Outcome: Progressing Towards Goal     Problem: Patient Education: Go to Patient Education Activity  Goal: Patient/Family Education  Outcome: Progressing Towards Goal     Problem: Injury - Risk of, Adverse Drug Event  Goal: *Absence of adverse drug events  Outcome: Progressing Towards Goal  Goal: *Absence of medication errors  Outcome: Progressing Towards Goal  Goal: *Knowledge of prescribed medications  Outcome: Progressing Towards Goal     Problem: Patient Education: Go to Patient Education Activity  Goal: Patient/Family Education  Outcome: Progressing Towards Goal     Problem: Patient Education: Go to Patient Education Activity  Goal: Patient/Family Education  Outcome: Progressing Towards Goal     Problem: TIA/CVA Stroke: 0-24 hours  Goal: Off Pathway (Use only if patient is Off Pathway)  Outcome: Progressing Towards Goal  Goal: Activity/Safety  Outcome: Progressing Towards Goal  Goal: Consults, if ordered  Outcome: Progressing Towards Goal  Goal: Diagnostic Test/Procedures  Outcome: Progressing Towards Goal  Goal: Nutrition/Diet  Outcome: Progressing Towards Goal  Goal: Discharge Planning  Outcome: Progressing Towards Goal  Goal: Medications  Outcome: Progressing Towards Goal  Goal: Respiratory  Outcome: Progressing Towards Goal  Goal: Treatments/Interventions/Procedures  Outcome: Progressing Towards Goal  Goal: Minimize risk of bleeding post-thrombolytic infusion  Outcome: Progressing Towards Goal  Goal: Monitor for complications post-thrombolytic infusion  Outcome: Progressing Towards Goal  Goal: Psychosocial  Outcome: Progressing Towards Goal  Goal: *Hemodynamically stable  Outcome: Progressing Towards Goal  Goal: *Neurologically stable  Description  Absence of additional neurological deficits    Outcome: Progressing Towards Goal  Goal: *Verbalizes anxiety and depression are reduced or absent  Outcome: Progressing Towards Goal  Goal: *Absence of Signs of Aspiration on Current Diet  Outcome: Progressing Towards Goal  Goal: *Absence of deep venous thrombosis signs and symptoms(Stroke Metric)  Outcome: Progressing Towards Goal  Goal: *Ability to perform ADLs and demonstrates progressive mobility and function  Outcome: Progressing Towards Goal  Goal: *Stroke education started(Stroke Metric)  Outcome: Progressing Towards Goal  Goal: *Dysphagia screen performed(Stroke Metric)  Outcome: Progressing Towards Goal  Goal: *Rehab consulted(Stroke Metric)  Outcome: Progressing Towards Goal     Problem: TIA/CVA Stroke: Day 2 Until Discharge  Goal: Off Pathway (Use only if patient is Off Pathway)  Outcome: Progressing Towards Goal  Goal: Activity/Safety  Outcome: Progressing Towards Goal  Goal: Diagnostic Test/Procedures  Outcome: Progressing Towards Goal  Goal: Nutrition/Diet  Outcome: Progressing Towards Goal  Goal: Discharge Planning  Outcome: Progressing Towards Goal  Goal: Medications  Outcome: Progressing Towards Goal  Goal: Respiratory  Outcome: Progressing Towards Goal  Goal: Treatments/Interventions/Procedures  Outcome: Progressing Towards Goal  Goal: Psychosocial  Outcome: Progressing Towards Goal  Goal: *Verbalizes anxiety and depression are reduced or absent  Outcome: Progressing Towards Goal  Goal: *Absence of aspiration  Outcome: Progressing Towards Goal  Goal: *Absence of deep venous thrombosis signs and symptoms(Stroke Metric)  Outcome: Progressing Towards Goal  Goal: *Optimal pain control at patient's stated goal  Outcome: Progressing Towards Goal  Goal: *Tolerating diet  Outcome: Progressing Towards Goal  Goal: *Ability to perform ADLs and demonstrates progressive mobility and function  Outcome: Progressing Towards Goal  Goal: *Stroke education continued(Stroke Metric)  Outcome: Progressing Towards Goal     Problem: Ischemic Stroke: Discharge Outcomes  Goal: *Verbalizes anxiety and depression are reduced or absent  Outcome: Progressing Towards Goal  Goal: *Verbalize understanding of risk factor modification(Stroke Metric)  Outcome: Progressing Towards Goal  Goal: *Hemodynamically stable  Outcome: Progressing Towards Goal  Goal: *Absence of aspiration pneumonia  Outcome: Progressing Towards Goal  Goal: *Aware of needed dietary changes  Outcome: Progressing Towards Goal  Goal: *Verbalize understanding of prescribed medications including anti-coagulants, anti-lipid, and/or anti-platelets(Stroke Metric)  Outcome: Progressing Towards Goal  Goal: *Tolerating diet  Outcome: Progressing Towards Goal  Goal: *Aware of follow-up diagnostics related to anticoagulants  Outcome: Progressing Towards Goal  Goal: *Ability to perform ADLs and demonstrates progressive mobility and function  Outcome: Progressing Towards Goal  Goal: *Absence of DVT(Stroke Metric)  Outcome: Progressing Towards Goal  Goal: *Absence of aspiration  Outcome: Progressing Towards Goal  Goal: *Optimal pain control at patient's stated goal  Outcome: Progressing Towards Goal  Goal: *Home safety concerns addressed  Outcome: Progressing Towards Goal  Goal: *Describes available resources and support systems  Outcome: Progressing Towards Goal  Goal: *Verbalizes understanding of activation of EMS(911) for stroke symptoms(Stroke Metric)  Outcome: Progressing Towards Goal  Goal: *Understands and describes signs and symptoms to report to providers(Stroke Metric)  Outcome: Progressing Towards Goal  Goal: *Neurolgocially stable (absence of additional neurological deficits)  Outcome: Progressing Towards Goal  Goal: *Verbalizes importance of follow-up with primary care physician(Stroke Metric)  Outcome: Progressing Towards Goal  Goal: *Smoking cessation discussed,if applicable(Stroke Metric)  Outcome: Progressing Towards Goal  Goal: *Depression screening completed(Stroke Metric)  Outcome: Progressing Towards Goal

## 2019-08-03 NOTE — PROGRESS NOTES
conducted an initial consultation and Spiritual Assessment for Jaiden Cantu, who is a 75 y.o.,male. Patients Primary Language is: [place].    According to the patients EMR Episcopal Affiliation is: [facility].     The reason the Patient came to the hospital is:   Patient Active Problem List    Diagnosis Date Noted    Sepsis ([address] 08/01/2019    Dehydration 11/12/2018    Generalized weakness 11/12/2018    Failure to thrive in adult 11/12/2018    Acute on chronic renal failure ([address] 11/12/2018    Poor conditioning 11/12/2018    Indwelling Sparrow catheter present 11/12/2018    Other constipation 11/12/2018    Hyperkalemia 11/12/2018    Type 2 diabetes with nephropathy ([address] 06/04/2018    Benign prostatic hyperplasia with urinary retention 10/18/2017    Cough 04/26/2017    Hyperglycemia due to type 2 diabetes mellitus ([address] 01/19/2017   [facility] discharge follow-up 09/14/2016    Gastrointestinal hemorrhage associated with duodenitis 09/14/2016    Need for influenza vaccination 09/14/2016    Pneumonia 08/20/2016    History of CVA (cerebrovascular accident) 04/13/2016    Diabetes mellitus type 2, controlled ([address] 01/13/2016    Advance care planning 01/13/2016    Encounter for immunization 01/13/2016    Need for pneumococcal vaccine 01/13/2016    Encounter for screening colonoscopy 01/13/2016    Medicare annual wellness visit, subsequent 01/13/2016    CKD (chronic kidney disease) stage 3, GFR 30-59 ml/min ([facility]) 09/16/2015    Proteinuria 06/10/2015    Need for Tdap vaccination 03/11/2015    Acute encephalopathy 09/29/2014    Hypoglycemia 09/29/2014    Stroke ([address] 08/30/2011    Essential hypertension 01/12/2011    Type I (juvenile type) diabetes mellitus with renal manifestations, not stated as uncontrolled(250.41) ([address] 01/12/2011    Hypercholesterolemia 01/12/2011    Gout 01/12/2011    GERD (gastroesophageal reflux disease) 01/12/2011    Vitamin D deficiency 01/12/2011    Prostate cancer ([facility] 03/03/2009        The  provided the following Interventions:  Initiated a relationship of care and support. Listened empathically. Provided chaplaincy education. Provided information about Spiritual Care Services. Offered assurance of continued prayers on patient's behalf. Chart reviewed. The following outcomes where achieved:    Assessment:  Patient does not have any Anabaptist/cultural needs that will affect patients preferences in health care. There are no spiritual or Anabaptist issues which require intervention at this time. Plan:  Chaplains will continue to follow and will provide pastoral care on an as needed/requested basis.  recommends bedside caregivers page  on duty if patient shows signs of acute spiritual or emotional distress.         [id number] [facility].   [phone]

## 2019-08-03 NOTE — ROUTINE PROCESS
Bedside and Verbal shift change report given to Rick Payan RN (oncoming nurse) by Kaushik Presley RN (offgoing nurse). Report included the following information SBAR, Kardex and Recent Results.

## 2019-08-03 NOTE — PROGRESS NOTES
Bedside and Verbal shift change report given to Chris Phoenix (oncoming nurse) by Codie Marquez RN   (offgoing nurse). Report given with SBAR, Mathew, MAR and Recent Results.

## 2019-08-03 NOTE — PROGRESS NOTES
Paged Dr Hansel Mc regarding pt low heart rate 51-53 on telemetry verbal order to hold coreg will continue to monitor rate and rhythm on tele

## 2019-08-03 NOTE — PROGRESS NOTES
Problem: Falls - Risk of  Goal: *Absence of Falls  Description  Document Mckayla Olmos Fall Risk and appropriate interventions in the flowsheet.   Outcome: Progressing Towards Goal  Note:   Fall Risk Interventions:  Mobility Interventions: Assess mobility with egress test, Bed/chair exit alarm, OT consult for ADLs, Patient to call before getting OOB, PT Consult for mobility concerns, PT Consult for assist device competence, Strengthening exercises (ROM-active/passive)         Medication Interventions: Bed/chair exit alarm, Evaluate medications/consider consulting pharmacy, Patient to call before getting OOB, Teach patient to arise slowly    Elimination Interventions: Bed/chair exit alarm, Call light in reach, Patient to call for help with toileting needs    History of Falls Interventions: Bed/chair exit alarm, Consult care management for discharge planning, Room close to nurse's station         Problem: Pain  Goal: *Control of Pain  Outcome: Progressing Towards Goal     Problem: Injury - Risk of, Adverse Drug Event  Goal: *Absence of adverse drug events  Outcome: Progressing Towards Goal

## 2019-08-03 NOTE — PROGRESS NOTES
Problem: Self Care Deficits Care Plan (Adult)  Goal: *Acute Goals and Plan of Care (Insert Text)  Description  Occupational Therapy Goals  Initiated 8/2/2019 within 7 day(s). 1.  Patient will perform upper body dressing with supervision/set-up. 2.  Patient will perform lower body dressing with minimal assistance/contact guard assist, using AE PRN. 3.  Patient will perform toileting with minimal assistance/contact guard assist.  4.  Patient will perform toilet transfers with minimal assistance/contact guard assist.  5.  Patient will perform a functional activity/ADL sitting with G balance for 3-5 minutes. 6.  Patient will participate in upper extremity therapeutic exercise/activities with supervision/set-up for 8 minutes. Prior Level of Function: Pt reports he lives with a son in a [facility] with 3STE. He was Mod (I) with basic self-care/ADLs, performing basin baths and using pull-ups. Pt's son reports a CNA comes to the home (11 am - 4 pm). Pt has a RW, cane (quad), shower chair, grab bars, BSC, reacher, and sock aid in home envrionemtn. Outcome: Progressing Towards Goal   OCCUPATIONAL THERAPY TREATMENT    Patient: Jenny Garcia (8 y.o. male)  Date: 8/3/2019  Diagnosis: Stroke ([facility] [I63.9]  Sepsis ([facility] [A41.9] <principal problem not specified>       Precautions:    Chart, occupational therapy assessment, plan of care, and goals were reviewed. ASSESSMENT:  Pt co-treated w/PT to maximize safety w/EOB activity and functional transfer training. Generalized weakness requires assist w/UE Therex at bed level. Increase time and assist required to maneuver to EOB. Pt e/R lateral lean requiring close guarding w/static sitting EOB. Pt requires 2 person assist w/functional transfer to standing/chair w/RW. Pt performs LLE TherEx and ADL grooming tasks at chair level w/increase time. Pt appears much brighter at end of session.   Progression toward goals:  ?          Improving appropriately and progressing toward goals  ? Improving slowly and progressing toward goals  ? Not making progress toward goals and plan of care will be adjusted     PLAN:  Patient continues to benefit from skilled intervention to address the above impairments. Continue treatment per established plan of care. Discharge Recommendations:  Skilled Nursing Facility  Further Equipment Recommendations for Discharge:  N/A, son reports they have DME     SUBJECTIVE:   Patient stated hospital.    OBJECTIVE DATA SUMMARY:   Cognitive/Behavioral Status:  Neurologic State: Alert  Orientation Level: Oriented to person, Oriented to place  Cognition: Follows commands  Safety/Judgement: Fall prevention    Functional Mobility and Transfers for ADLs:   Bed Mobility:  Supine to Sit: Maximum assistance  Scooting: Minimum assistance   Transfers:  Sit to Stand: Minimum assistance;Assist x2 w/RW  Bed to Chair: Minimum assistance;Assist x2 w/RW  Balance:  Sitting: Impaired  Sitting - Static: Fair (occasional)  Sitting - Dynamic: Fair (occasional)  Standing: Impaired; With support  Standing - Static: Fair  Standing - Dynamic : Fair    ADL Intervention:  Grooming  Washing Face: Stand-by assistance  Washing Hands: Stand-by assistance  Brushing Teeth: Stand-by assistance    Neuro Re-Education:  Weight bearing RUE, seated EOB    UE Therapeutic Exercises:   AAROM BUJANET shoulder flexion, at bed level  AAROM BUE elbow flexion/extension    Pain:  Pain level pre-treatment: 0/10   Pain level post-treatment: 0/10    Activity Tolerance:    Good    Please refer to the flowsheet for vital signs taken during this treatment. After treatment:   ?  Patient left in no apparent distress sitting up in chair  ? Patient left in no apparent distress in bed  ? Call bell left within reach  ? Nursing notified  ? Son, Kayla Armendariz, present  ? Bed alarm activated    COMMUNICATION/EDUCATION:   ? Role of Occupational Therapy in the acute care setting  ?  Home safety education was provided and the patient/caregiver indicated understanding. ? Patient/family have participated as able in working towards goals and plan of care. ? Patient/family agree to work toward stated goals and plan of care. ? Patient understands intent and goals of therapy, but is neutral about his/her participation. ? Patient is unable to participate in goal setting and plan of care.       Thank you for this referral.  JULIET Orr  Time Calculation: 38 mins

## 2019-08-03 NOTE — ROUTINE PROCESS
Bedside and Verbal shift change report given to [facility] RN (oncoming nurse) by Dyana River RN (offgoing nurse). Report given with SBAR, Kardex, Intake/Output, MAR, Accordion and Recent Results.

## 2019-08-03 NOTE — PROGRESS NOTES
Problem: Mobility Impaired (Adult and Pediatric)  Goal: *Acute Goals and Plan of Care (Insert Text)  Description  Physical Therapy Goals  Initiated 8/2/2019 and to be accomplished within 7 day(s)  1. Patient will roll side to side in bed with minimal assistance/contact guard assist.     2. Patient will move from supine to sit and sit to supine  and scoot up and down in bed with moderate assistance . 3. Patient will demo fair seated balance with ability to maintain midline for 2mins sitting EOB. 4. Patient will transfer from bed to chair and chair to bed with moderate assistance  using the least restrictive device. 5. Patient will perform sit to stand with moderate assistance . 6. Patient will ambulate with moderate assistance for 20 feet with the least restrictive device. PLOF: Patient lives with son in 1 story home with 3STE with HR and reports he uses a cane or walker to walk. Patient has [employer], RW, SC, BSC, WC, QC at home. Patient's son works and reports patient is home usually about 2hrs by himself as they have a home care aid from 11a-4p. Patient uses bathroom and performs sink bath. Outcome: Progressing Towards Goal     PHYSICAL THERAPY TREATMENT    Patient: Beth Booth (50 y.o. male)  Date: 8/3/2019  Diagnosis: Stroke ([facility] [address] [I63.9]  Sepsis ([facility] [address] [A41.9]  Precautions:  Chart, physical therapy assessment, plan of care and goals were reviewed. OBJECTIVE/ ASSESSMENT:  Patient found supine in bed willing to work with PT. Patient seen with OT to maximize safety of patient and staff. Pt requires less assistance overall this tx. Pt sat at EOB with noticeable lean to right, requiring manual assistance to reach midline. Pt the stood to RW and ambulated to b/s chair. Pt requires min A throughout transfer until he is aligned with chair.  Pt then requires mod to max A to control descent down into chair, pt nearly hitting his right elbow on arm rest. Pt performed LAQ x 10 bilaterally, seated marches x 10 bilaterally and forward trunk leans with UEs supported by this LPTA x 10. Pt requires cues to stay on task with right LE during exercises. Pt able to focus better when performing sets of therex on one leg at a time vs back and fourth. Pt left sitting with CHUNG and son in room. Progression toward goals:  ?      Improving appropriately and progressing toward goals  ? Improving slowly and progressing toward goals  ? Not making progress toward goals and plan of care will be adjusted     PLAN:  Patient continues to benefit from skilled intervention to address the above impairments. Continue treatment per established plan of care. Discharge Recommendations:  [facility]  Further Equipment Recommendations for Discharge:  rolling walker     SUBJECTIVE:   Patient stated Tracey Orozco.     OBJECTIVE DATA SUMMARY:   Critical Behavior:  Neurologic State: Alert  Orientation Level: Oriented to person, Oriented to place  Cognition: Follows commands  Safety/Judgement: Fall prevention  Functional Mobility Training:  Bed Mobility:  Supine to Sit: Maximum assistance  Scooting: Minimum assistance  Transfers:  Sit to Stand: Minimum assistance;Assist x2  Stand to Sit: Moderate assistance;Maximum assistance  Bed to Chair: Minimum assistance;Assist x2  Balance:  Sitting: Impaired  Sitting - Static: Fair (occasional)  Sitting - Dynamic: Fair (occasional)  Standing: Impaired; With support  Standing - Static: Fair  Standing - Dynamic : Fair  Ambulation/Gait Training:  Distance (ft): 5 Feet (ft)  Assistive Device: Walker, rolling  Ambulation - Level of Assistance: Minimal assistance; Moderate assistance  Gait Abnormalities: Decreased step clearance  Base of Support: Narrowed  Stance: Weight shift  Speed/Arpita: Slow  Step Length: Left shortened;Right shortened  Swing Pattern: Left asymmetrical;Right asymmetrical    Therapeutic Exercises:   Per above    Pain:  Pain level pre-treatment: Not rated  Pain level post-treatment: Not rated    Activity Tolerance:   Fair  Please refer to the flowsheet for vital signs taken during this treatment. After treatment:   ? Patient left in no apparent distress sitting up in chair  ? Patient left in no apparent distress in bed  ? Call bell left within reach  ? Nursing notified  ? Caregiver present  ? Bed alarm activated  ? SCDs applied    COMMUNICATION/EDUCATION:   ?         Role of Physical Therapy  ? Fall prevention  ? Bed mobility  ? Transfers  ? Ambulation / gait  ? Assistive device management  ? Stairs  ? Body mechanics  ? Position change   ? Therapeutic exercise  ? Activity pacing / energy conservation  ?          Other:      Keara Johnson PTA   Time Calculation: 27 mins

## 2019-08-04 LAB
BACTERIA SPEC CULT: ABNORMAL
BACTERIA SPEC CULT: ABNORMAL
ERYTHROCYTE [DISTWIDTH] IN BLOOD BY AUTOMATED COUNT: 18.2 % (ref 11.6–14.5)
GLUCOSE BLD STRIP.AUTO-MCNC: 145 MG/DL (ref 70–110)
GLUCOSE BLD STRIP.AUTO-MCNC: 207 MG/DL (ref 70–110)
HCT VFR BLD AUTO: 29.3 % (ref 36–48)
HGB BLD-MCNC: 9.9 G/DL (ref 13–16)
MCH RBC QN AUTO: 25.1 PG (ref 24–34)
MCHC RBC AUTO-ENTMCNC: 33.8 G/DL (ref 31–37)
MCV RBC AUTO: 74.2 FL (ref 74–97)
PLATELET # BLD AUTO: 233 K/UL (ref 135–420)
PMV BLD AUTO: 9.9 FL (ref 9.2–11.8)
RBC # BLD AUTO: 3.95 M/UL (ref 4.7–5.5)
SERVICE CMNT-IMP: ABNORMAL
WBC # BLD AUTO: 11.8 K/UL (ref 4.6–13.2)

## 2019-08-04 PROCEDURE — 36415 COLL VENOUS BLD VENIPUNCTURE: CPT

## 2019-08-04 PROCEDURE — 85027 COMPLETE CBC AUTOMATED: CPT

## 2019-08-04 PROCEDURE — 82962 GLUCOSE BLOOD TEST: CPT

## 2019-08-04 PROCEDURE — 74011250637 HC RX REV CODE- 250/637: Performed by: INTERNAL MEDICINE

## 2019-08-04 PROCEDURE — 74011250636 HC RX REV CODE- 250/636: Performed by: INTERNAL MEDICINE

## 2019-08-04 PROCEDURE — 74011250636 HC RX REV CODE- 250/636: Performed by: HOSPITALIST

## 2019-08-04 PROCEDURE — 74011000250 HC RX REV CODE- 250: Performed by: HOSPITALIST

## 2019-08-04 PROCEDURE — 65660000000 HC RM CCU STEPDOWN

## 2019-08-04 RX ADMIN — Medication 10 ML: at 06:15

## 2019-08-04 RX ADMIN — HEPARIN SODIUM 5000 UNITS: 5000 INJECTION, SOLUTION INTRAVENOUS; SUBCUTANEOUS at 22:44

## 2019-08-04 RX ADMIN — HEPARIN SODIUM 5000 UNITS: 5000 INJECTION, SOLUTION INTRAVENOUS; SUBCUTANEOUS at 06:15

## 2019-08-04 RX ADMIN — Medication 10 ML: at 22:55

## 2019-08-04 RX ADMIN — OMEGA-3 FATTY ACIDS CAP 1000 MG 1 CAPSULE: 1000 CAP at 08:37

## 2019-08-04 RX ADMIN — DILTIAZEM HYDROCHLORIDE 360 MG: 180 CAPSULE, COATED, EXTENDED RELEASE ORAL at 08:38

## 2019-08-04 RX ADMIN — Medication 10 ML: at 18:53

## 2019-08-04 RX ADMIN — HEPARIN SODIUM 5000 UNITS: 5000 INJECTION, SOLUTION INTRAVENOUS; SUBCUTANEOUS at 15:33

## 2019-08-04 RX ADMIN — ROSUVASTATIN CALCIUM 20 MG: 20 TABLET, FILM COATED ORAL at 22:43

## 2019-08-04 RX ADMIN — ASPIRIN 81 MG: 81 TABLET, COATED ORAL at 08:36

## 2019-08-04 RX ADMIN — CEFEPIME HYDROCHLORIDE 2 G: 2 INJECTION, POWDER, FOR SOLUTION INTRAVENOUS at 22:43

## 2019-08-04 RX ADMIN — ALLOPURINOL 100 MG: 100 TABLET ORAL at 18:46

## 2019-08-04 RX ADMIN — TAMSULOSIN HYDROCHLORIDE 0.4 MG: 0.4 CAPSULE ORAL at 08:36

## 2019-08-04 RX ADMIN — ALLOPURINOL 100 MG: 100 TABLET ORAL at 08:36

## 2019-08-04 NOTE — PROGRESS NOTES
New PT orders received with pt on caseload. Will follow up as appropriate. Acknowledged new orders.      Thank you,   Lauren Bernal, PT, DPT

## 2019-08-04 NOTE — PROGRESS NOTES
Orders received, chart reviewed and Occupational Therapy is currently following this patient. Will acknowledge orders and follow his plan of care.  Whitney Jessica, OTR/L

## 2019-08-04 NOTE — ROUTINE PROCESS
Bedside and Verbal shift change report given to [facility] RN (oncoming nurse) by Alyssa Barajas RN (offgoing nurse). Report given with SBAR, Kardex, Intake/Output, MAR, Accordion and Recent Results.

## 2019-08-04 NOTE — PROGRESS NOTES
[facility] Hospitalist Group  Progress Note    Patient: Bessy Shanks Age: 75 y.o. : 1945 MR#: [id number] SSN: [id number]  Date/Time: 8/3/2019     Subjective:     Pt seen & evaluated - admitted for Urosepsis , complicated UTI with indwelling gutierrez         Assessment/Plan:     1. Sepsis 2y to complicated UTI & indwelling gutierrez - continue IV abx - cefepime -- Urine cx - Proteus - sensitive to Cipro & bactrim but pt is allergic - will continue Iv Abx for now , switch to PO on discharge   2. Leukocytosis 2y to #1 - improving   3. CXR - negative for acute cardiopulm process   4. Swallow eval + for aspiration - pt on mechanical soft   5. R LE weakness - discussed with Neuro - no further neuro work up needed at this time   6. T2DM -  controlled with diet    7. CKD 3 - stable - monitor creatinine    8. AOCD - H&H - monitor H&H   9. PT & OT eval             Case discussed with:  [x]Patient  [x]Family  []Nursing  []Case Management  DVT Prophylaxis:  []Lovenox  [x]Hep SQ  []SCDs  []Coumadin   []On Heparin gtt    Objective:   VS:   Visit Vitals  /75 (BP 1 Location: Right arm, BP Patient Position: Sitting)   Pulse (!) 53   Temp 98.8 °F (37.1 °C)   Resp 17   Ht 5' 7\" (1.702 m)   Wt 61.4 kg (135 lb 6.4 oz)   SpO2 99%   BMI 21.21 kg/m²      Tmax/24hrs: Temp (24hrs), Av.2 °F (36.8 °C), Min:97 °F (36.1 °C), Max:98.8 °F (37.1 °C)  IOBRIEF    Intake/Output Summary (Last 24 hours) at 8/3/2019 2039  Last data filed at 8/3/2019 1935  Gross per 24 hour   Intake 10 ml   Output 700 ml   Net -690 ml       General:  Alert, cooperative, no acute distress    HEENT: PERRLA, anicteric sclerae. Pulmonary:  CTA Bilaterally. No Wheezing/Rhonchi/Rales. Cardiovascular: Regular rate and Rhythm. GI:  Soft, Non distended, Non tender. + Bowel sounds. Extremities:  No edema, cyanosis, clubbing. No calf tenderness. Neurologic: Alert and oriented X 3. No acute neuro deficits.   Additional:    Medications: Current Facility-Administered Medications   Medication Dose Route Frequency    cefepime (MAXIPIME) 2 g in sterile water (preservative free) 10 mL IV syringe  2 g IntraVENous Q24H    allopurinol (ZYLOPRIM) tablet 100 mg  100 mg Oral BID    aspirin delayed-release tablet 81 mg  81 mg Oral DAILY    carvedilol (COREG) tablet 12.5 mg  12.5 mg Oral BID WITH MEALS    cholecalciferol (VITAMIN D3) capsule 5,000 Units  5,000 Units Oral Q7D    dilTIAZem CD (CARDIZEM CD) capsule 360 mg  360 mg Oral DAILY    glucose chewable tablet 16 g  16 g Oral PRN    omega 3-DHA-EPA-fish oil 1,000 mg (120 mg-180 mg) capsule 1 Cap  1 Cap Oral DAILY    tamsulosin (FLOMAX) capsule 0.4 mg  0.4 mg Oral DAILY    rosuvastatin (CRESTOR) tablet 20 mg  20 mg Oral QHS    sodium chloride (NS) flush 5-40 mL  5-40 mL IntraVENous Q8H    sodium chloride (NS) flush 5-40 mL  5-40 mL IntraVENous PRN    heparin (porcine) injection 5,000 Units  5,000 Units SubCUTAneous Q8H       Labs:    Recent Results (from the past 24 hour(s))   GLUCOSE, POC    Collection Time: 08/03/19 12:25 PM   Result Value Ref Range    Glucose (POC) 129 (H) 70 - 110 mg/dL   CBC WITH AUTOMATED DIFF    Collection Time: 08/03/19  1:30 PM   Result Value Ref Range    WBC 16.4 (H) 4.6 - 13.2 K/uL    RBC 4.37 (L) 4.70 - 5.50 M/uL    HGB 10.9 (L) 13.0 - 16.0 g/dL    HCT 32.4 (L) 36.0 - 48.0 %    MCV 74.1 74.0 - 97.0 FL    MCH 24.9 24.0 - 34.0 PG    MCHC 33.6 31.0 - 37.0 g/dL    RDW 18.3 (H) 11.6 - 14.5 %    PLATELET 578 386 - 036 K/uL    NEUTROPHILS 82 (H) 40 - 73 %    LYMPHOCYTES 11 (L) 21 - 52 %    MONOCYTES 6 3 - 10 %    EOSINOPHILS 1 0 - 5 %    BASOPHILS 0 0 - 2 %    ABS. NEUTROPHILS 13.4 (H) 1.8 - 8.0 K/UL    ABS. LYMPHOCYTES 1.8 0.9 - 3.6 K/UL    ABS. MONOCYTES 1.0 0.05 - 1.2 K/UL    ABS. EOSINOPHILS 0.2 0.0 - 0.4 K/UL    ABS.  BASOPHILS 0.0 0.0 - 0.1 K/UL    DF AUTOMATED      PLATELET COMMENTS ADEQUATE PLATELETS      RBC COMMENTS ANISOCYTOSIS  1+        RBC COMMENTS MICROCYTOSIS  1+        RBC COMMENTS POLYCHROMASIA  FEW  POIKILOCYTOSIS  1+       METABOLIC PANEL, COMPREHENSIVE    Collection Time: 08/03/19  1:30 PM   Result Value Ref Range    Sodium 143 136 - 145 mmol/L    Potassium 4.0 3.5 - 5.5 mmol/L    Chloride 111 100 - 111 mmol/L    CO2 24 21 - 32 mmol/L    Anion gap 8 3.0 - 18 mmol/L    Glucose 115 (H) 74 - 99 mg/dL    BUN 44 (H) 7.0 - 18 MG/DL    Creatinine 2.28 (H) 0.6 - 1.3 MG/DL    BUN/Creatinine ratio 19 12 - 20      GFR est AA 34 (L) >60 ml/min/1.73m2    GFR est non-AA 28 (L) >60 ml/min/1.73m2    Calcium 9.2 8.5 - 10.1 MG/DL    Bilirubin, total 0.9 0.2 - 1.0 MG/DL    ALT (SGPT) 11 (L) 16 - 61 U/L    AST (SGOT) 9 (L) 10 - 38 U/L    Alk.  phosphatase 65 45 - 117 U/L    Protein, total 7.2 6.4 - 8.2 g/dL    Albumin 2.8 (L) 3.4 - 5.0 g/dL    Globulin 4.4 (H) 2.0 - 4.0 g/dL    A-G Ratio 0.6 (L) 0.8 - 1.7     GLUCOSE, POC    Collection Time: 08/03/19  4:17 PM   Result Value Ref Range    Glucose (POC) 127 (H) 70 - 110 mg/dL       Signed By: Yareli Dangelo MD     August 3, 2019

## 2019-08-04 NOTE — PROGRESS NOTES
Problem: Falls - Risk of  Goal: *Absence of Falls  Description  Document Margrett Bernheim Fall Risk and appropriate interventions in the flowsheet.   Outcome: Progressing Towards Goal  Note:   Fall Risk Interventions:  Mobility Interventions: Bed/chair exit alarm         Medication Interventions: Bed/chair exit alarm, Patient to call before getting OOB    Elimination Interventions: Bed/chair exit alarm, Call light in reach, Patient to call for help with toileting needs    History of Falls Interventions: Bed/chair exit alarm, Door open when patient unattended, Room close to nurse's station         Problem: Patient Education: Go to Patient Education Activity  Goal: Patient/Family Education  Outcome: Progressing Towards Goal     Problem: Pain  Goal: *Control of Pain  Outcome: Progressing Towards Goal  Goal: *PALLIATIVE CARE:  Alleviation of Pain  Outcome: Progressing Towards Goal     Problem: Patient Education: Go to Patient Education Activity  Goal: Patient/Family Education  Outcome: Progressing Towards Goal     Problem: Injury - Risk of, Adverse Drug Event  Goal: *Absence of adverse drug events  Outcome: Progressing Towards Goal  Goal: *Absence of medication errors  Outcome: Progressing Towards Goal  Goal: *Knowledge of prescribed medications  Outcome: Progressing Towards Goal     Problem: Patient Education: Go to Patient Education Activity  Goal: Patient/Family Education  Outcome: Progressing Towards Goal     Problem: Patient Education: Go to Patient Education Activity  Goal: Patient/Family Education  Outcome: Progressing Towards Goal     Problem: TIA/CVA Stroke: 0-24 hours  Goal: Off Pathway (Use only if patient is Off Pathway)  Outcome: Progressing Towards Goal  Goal: Activity/Safety  Outcome: Progressing Towards Goal  Goal: Consults, if ordered  Outcome: Progressing Towards Goal  Goal: Diagnostic Test/Procedures  Outcome: Progressing Towards Goal  Goal: Nutrition/Diet  Outcome: Progressing Towards Goal  Goal: Discharge Planning  Outcome: Progressing Towards Goal  Goal: Medications  Outcome: Progressing Towards Goal  Goal: Respiratory  Outcome: Progressing Towards Goal  Goal: Treatments/Interventions/Procedures  Outcome: Progressing Towards Goal  Goal: Minimize risk of bleeding post-thrombolytic infusion  Outcome: Progressing Towards Goal  Goal: Monitor for complications post-thrombolytic infusion  Outcome: Progressing Towards Goal  Goal: Psychosocial  Outcome: Progressing Towards Goal  Goal: *Hemodynamically stable  Outcome: Progressing Towards Goal  Goal: *Neurologically stable  Description  Absence of additional neurological deficits    Outcome: Progressing Towards Goal  Goal: *Verbalizes anxiety and depression are reduced or absent  Outcome: Progressing Towards Goal  Goal: *Absence of Signs of Aspiration on Current Diet  Outcome: Progressing Towards Goal  Goal: *Absence of deep venous thrombosis signs and symptoms(Stroke Metric)  Outcome: Progressing Towards Goal  Goal: *Ability to perform ADLs and demonstrates progressive mobility and function  Outcome: Progressing Towards Goal  Goal: *Stroke education started(Stroke Metric)  Outcome: Progressing Towards Goal  Goal: *Dysphagia screen performed(Stroke Metric)  Outcome: Progressing Towards Goal  Goal: *Rehab consulted(Stroke Metric)  Outcome: Progressing Towards Goal     Problem: TIA/CVA Stroke: Day 2 Until Discharge  Goal: Off Pathway (Use only if patient is Off Pathway)  Outcome: Progressing Towards Goal  Goal: Activity/Safety  Outcome: Progressing Towards Goal  Goal: Diagnostic Test/Procedures  Outcome: Progressing Towards Goal  Goal: Nutrition/Diet  Outcome: Progressing Towards Goal  Goal: Discharge Planning  Outcome: Progressing Towards Goal  Goal: Medications  Outcome: Progressing Towards Goal  Goal: Respiratory  Outcome: Progressing Towards Goal  Goal: Treatments/Interventions/Procedures  Outcome: Progressing Towards Goal  Goal: Psychosocial  Outcome: Progressing Towards Goal  Goal: *Verbalizes anxiety and depression are reduced or absent  Outcome: Progressing Towards Goal  Goal: *Absence of aspiration  Outcome: Progressing Towards Goal  Goal: *Absence of deep venous thrombosis signs and symptoms(Stroke Metric)  Outcome: Progressing Towards Goal  Goal: *Optimal pain control at patient's stated goal  Outcome: Progressing Towards Goal  Goal: *Tolerating diet  Outcome: Progressing Towards Goal  Goal: *Ability to perform ADLs and demonstrates progressive mobility and function  Outcome: Progressing Towards Goal  Goal: *Stroke education continued(Stroke Metric)  Outcome: Progressing Towards Goal     Problem: Ischemic Stroke: Discharge Outcomes  Goal: *Verbalizes anxiety and depression are reduced or absent  Outcome: Progressing Towards Goal  Goal: *Verbalize understanding of risk factor modification(Stroke Metric)  Outcome: Progressing Towards Goal  Goal: *Hemodynamically stable  Outcome: Progressing Towards Goal  Goal: *Absence of aspiration pneumonia  Outcome: Progressing Towards Goal  Goal: *Aware of needed dietary changes  Outcome: Progressing Towards Goal  Goal: *Verbalize understanding of prescribed medications including anti-coagulants, anti-lipid, and/or anti-platelets(Stroke Metric)  Outcome: Progressing Towards Goal  Goal: *Tolerating diet  Outcome: Progressing Towards Goal  Goal: *Aware of follow-up diagnostics related to anticoagulants  Outcome: Progressing Towards Goal  Goal: *Ability to perform ADLs and demonstrates progressive mobility and function  Outcome: Progressing Towards Goal  Goal: *Absence of DVT(Stroke Metric)  Outcome: Progressing Towards Goal  Goal: *Absence of aspiration  Outcome: Progressing Towards Goal  Goal: *Optimal pain control at patient's stated goal  Outcome: Progressing Towards Goal  Goal: *Home safety concerns addressed  Outcome: Progressing Towards Goal  Goal: *Describes available resources and support systems  Outcome: Progressing Towards Goal  Goal: *Verbalizes understanding of activation of EMS(911) for stroke symptoms(Stroke Metric)  Outcome: Progressing Towards Goal  Goal: *Understands and describes signs and symptoms to report to providers(Stroke Metric)  Outcome: Progressing Towards Goal  Goal: *Neurolgocially stable (absence of additional neurological deficits)  Outcome: Progressing Towards Goal  Goal: *Verbalizes importance of follow-up with primary care physician(Stroke Metric)  Outcome: Progressing Towards Goal  Goal: *Smoking cessation discussed,if applicable(Stroke Metric)  Outcome: Progressing Towards Goal  Goal: *Depression screening completed(Stroke Metric)  Outcome: Progressing Towards Goal

## 2019-08-04 NOTE — PROGRESS NOTES
[facility] Hospitalist Group  Progress Note    Patient: Sharyle Cue Age: 75 y.o. : 1945 MR#: [id number] SSN: [id number]  Date/Time: 2019     Subjective:     Pt seen w/ nurse at bedside, per nurse, keeps food and pills in mouth for prolonged periods. Assessment/Plan:     1. Sepsis 2y to complicated UTI & indwelling gutierrez - continue IV abx - cefepime -- Urine cx - Proteus - sensitive to Cipro & bactrim but pt is allergic - will continue Iv Abx for now , switch to PO on discharge   2. Leukocytosis 2y to #1 - resolved  3. CXR - negative for acute cardiopulm process   4. Swallow eval + for aspiration - pt on mechanical soft   5. R LE weakness - discussed with Neuro - no further neuro work up needed at this time   6. T2DM -  controlled with diet    7. CKD 3 - stable - monitor creatinine    8. AOCD - H&H - monitor H&H   9. PT & OT eval   10. Bradycardia-general trending down of HR, otw asymptomatic and bp stable. Is on high dose cardizem and also on bb. Given general trend downwards, will hold coreg for now. Case discussed with:  [x]Patient  []Family  [x]Nursing  []Case Management  DVT Prophylaxis:  []Lovenox  [x]Hep SQ  []SCDs  []Coumadin   []On Heparin gtt    Objective:   VS:   Visit Vitals  /65 (BP 1 Location: Left arm, BP Patient Position: Sitting)   Pulse (!) 52   Temp 98.1 °F (36.7 °C)   Resp 17   Ht 5' 7\" (1.702 m)   Wt 61.4 kg (135 lb 6.4 oz)   SpO2 96%   BMI 21.21 kg/m²      Tmax/24hrs: Temp (24hrs), Av.9 °F (36.6 °C), Min:96.9 °F (36.1 °C), Max:98.8 °F (37.1 °C)  IOBRIEF    Intake/Output Summary (Last 24 hours) at 2019 1427  Last data filed at 2019 2097  Gross per 24 hour   Intake 10 ml   Output 1125 ml   Net -1115 ml       General:  Alert, cooperative, no acute distress    HEENT: PERRLA, anicteric sclerae. Pulmonary:  CTA Bilaterally. No Wheezing/Rhonchi/Rales. Cardiovascular: Regular rate and Rhythm.   GI:  Soft, Non distended, Non tender. + Bowel sounds. Extremities:  No edema, cyanosis, clubbing. No calf tenderness.    Neurologic: Alert   Additional:    Medications:   Current Facility-Administered Medications   Medication Dose Route Frequency    cefepime (MAXIPIME) 2 g in sterile water (preservative free) 10 mL IV syringe  2 g IntraVENous Q24H    allopurinol (ZYLOPRIM) tablet 100 mg  100 mg Oral BID    aspirin delayed-release tablet 81 mg  81 mg Oral DAILY    carvedilol (COREG) tablet 12.5 mg  12.5 mg Oral BID WITH MEALS    cholecalciferol (VITAMIN D3) capsule 5,000 Units  5,000 Units Oral Q7D    dilTIAZem CD (CARDIZEM CD) capsule 360 mg  360 mg Oral DAILY    glucose chewable tablet 16 g  16 g Oral PRN    omega 3-DHA-EPA-fish oil 1,000 mg (120 mg-180 mg) capsule 1 Cap  1 Cap Oral DAILY    tamsulosin (FLOMAX) capsule 0.4 mg  0.4 mg Oral DAILY    rosuvastatin (CRESTOR) tablet 20 mg  20 mg Oral QHS    sodium chloride (NS) flush 5-40 mL  5-40 mL IntraVENous Q8H    sodium chloride (NS) flush 5-40 mL  5-40 mL IntraVENous PRN    heparin (porcine) injection 5,000 Units  5,000 Units SubCUTAneous Q8H       Labs:    Recent Results (from the past 24 hour(s))   GLUCOSE, POC    Collection Time: 08/03/19  4:17 PM   Result Value Ref Range    Glucose (POC) 127 (H) 70 - 110 mg/dL   CBC W/O DIFF    Collection Time: 08/04/19  1:51 AM   Result Value Ref Range    WBC 11.8 4.6 - 13.2 K/uL    RBC 3.95 (L) 4.70 - 5.50 M/uL    HGB 9.9 (L) 13.0 - 16.0 g/dL    HCT 29.3 (L) 36.0 - 48.0 %    MCV 74.2 74.0 - 97.0 FL    MCH 25.1 24.0 - 34.0 PG    MCHC 33.8 31.0 - 37.0 g/dL    RDW 18.2 (H) 11.6 - 14.5 %    PLATELET 922 585 - 375 K/uL    MPV 9.9 9.2 - 11.8 FL   GLUCOSE, POC    Collection Time: 08/04/19 11:24 AM   Result Value Ref Range    Glucose (POC) 145 (H) 70 - 110 mg/dL       Signed By: Artem Domínguez MD     August 4, 2019

## 2019-08-05 VITALS
RESPIRATION RATE: 18 BRPM | BODY MASS INDEX: 21.25 KG/M2 | SYSTOLIC BLOOD PRESSURE: 162 MMHG | WEIGHT: 135.4 LBS | TEMPERATURE: 97.8 F | DIASTOLIC BLOOD PRESSURE: 83 MMHG | HEART RATE: 75 BPM | HEIGHT: 67 IN | OXYGEN SATURATION: 100 %

## 2019-08-05 PROCEDURE — 74011250637 HC RX REV CODE- 250/637: Performed by: INTERNAL MEDICINE

## 2019-08-05 PROCEDURE — 92526 ORAL FUNCTION THERAPY: CPT

## 2019-08-05 PROCEDURE — 74011250636 HC RX REV CODE- 250/636: Performed by: INTERNAL MEDICINE

## 2019-08-05 PROCEDURE — 77030010545

## 2019-08-05 PROCEDURE — 97535 SELF CARE MNGMENT TRAINING: CPT

## 2019-08-05 PROCEDURE — 74011250637 HC RX REV CODE- 250/637: Performed by: HOSPITALIST

## 2019-08-05 PROCEDURE — 97116 GAIT TRAINING THERAPY: CPT

## 2019-08-05 RX ORDER — CIPROFLOXACIN 500 MG/1
750 TABLET ORAL
Status: DISCONTINUED | OUTPATIENT
Start: 2019-08-05 | End: 2019-08-05 | Stop reason: DRUGHIGH

## 2019-08-05 RX ORDER — CIPROFLOXACIN 500 MG/1
500 TABLET ORAL EVERY 24 HOURS
Status: DISCONTINUED | OUTPATIENT
Start: 2019-08-05 | End: 2019-08-05 | Stop reason: HOSPADM

## 2019-08-05 RX ORDER — CIPROFLOXACIN 500 MG/1
500 TABLET ORAL EVERY 24 HOURS
Qty: 10 TAB | Refills: 0 | Status: SHIPPED | OUTPATIENT
Start: 2019-08-06 | End: 2019-10-01

## 2019-08-05 RX ADMIN — HEPARIN SODIUM 5000 UNITS: 5000 INJECTION, SOLUTION INTRAVENOUS; SUBCUTANEOUS at 13:41

## 2019-08-05 RX ADMIN — HEPARIN SODIUM 5000 UNITS: 5000 INJECTION, SOLUTION INTRAVENOUS; SUBCUTANEOUS at 06:43

## 2019-08-05 RX ADMIN — ALLOPURINOL 100 MG: 100 TABLET ORAL at 11:02

## 2019-08-05 RX ADMIN — CIPROFLOXACIN HYDROCHLORIDE 500 MG: 500 TABLET, FILM COATED ORAL at 13:41

## 2019-08-05 RX ADMIN — TAMSULOSIN HYDROCHLORIDE 0.4 MG: 0.4 CAPSULE ORAL at 11:02

## 2019-08-05 RX ADMIN — Medication 10 ML: at 06:44

## 2019-08-05 RX ADMIN — ASPIRIN 81 MG: 81 TABLET, COATED ORAL at 11:02

## 2019-08-05 NOTE — PROGRESS NOTES
Bedside and Verbal shift change report given to Corrinne Jabs (oncoming nurse) by Katelyn Wilson RN   (offgoing nurse). Report given with SBAR, Mathew, MAR and Recent Results.

## 2019-08-05 NOTE — DISCHARGE SUMMARY
Discharge Summary    Patient: Bessy Shanks MRN: [id number]  CSN: [id number]    YOB: 1945  Age: 75 y.o. Sex: male    DOA: 8/1/2019 LOS:  LOS: 4 days   Discharge Date:      Admission Diagnoses: Stroke ([facility] [I63.9]  Sepsis ([facility] [A41.9]    Discharge Diagnoses:   Sepsis 2y to complicated UTI   Subacute Infarct Posterior medial R cerebellar peduncle   HTN   T2DM   HLPD   H/o prostate cancer with chronic indwelling gutierrez   H/o CVA with R LE weakness   CKD 3  H/o BPH       Discharge Condition: Stable    Consults: Neurology    PHYSICAL EXAM  Visit Vitals  /85 (BP 1 Location: Left arm, BP Patient Position: At rest)   Pulse 61   Temp 98.3 °F (36.8 °C)   Resp 18   Ht 5' 7\" (1.702 m)   Wt 61.4 kg (135 lb 6.4 oz)   SpO2 98%   BMI 21.21 kg/m²       General: Alert, cooperative, no acute distress    HEENT: PERRLA, EOMI. Anicteric sclerae. Lungs:  CTA Bilaterally. No Wheezing/Rhonchi/Rales. Heart:  Regular rate and Rhythm. Abdomen: Soft, Non distended, Non tender. + Bowel sounds. Extremities: No edema/ cyanosis/ clubbing  Psych:   Good insight. Not anxious or agitated. Neurologic:  AA oriented X 3. Moves all extremities. HPI:  Patient is a 75 y.o.male who is being evaluated forright LE weakness.     Mr. Arvind Quiroga is a 76 yo AAM with hx of HTN, DM ytpe 2, ED, prostate cancer with chronic indwelling gutierrez cath, GERD, gout and prior CVA with no residual right sided weakness who is presenting with new onset lethargy and right LE weakness. He resides with his son and has a full time caregiver. His son noted that the patient had been more drowsy this morning and that his urine was dark and cloudy. He normally used a walker to get around the house, but this am his right leg was not moving and weak. Thus, his family brought him to the ED for further evaluation. No reported fevers or chills. No nausea. + diarrhea and vomiting with poor appetite.      Hospital Course:   Pt was admitted to the hosp with concern for sepsis - UA was dirty on admission - pt has a chronic indwelling gutierrez catheter     He was also thought to be aspirating - was seen by Speech & swallow - normal   UA grew out Pseudomonas & proteus - sensitive to cipr - pt placed on Po Abx & is being discharged home   He was seen by PT & OT - recommended HH - discussed with son at bedside who mentions that they have a good family support system   Will discharge pt home     Imaging:  CT head   IMPRESSION:     1. No acute intracranial hemorrhage, mass effect or midline structural shift. No  change.     The above findings were discussed with Dr. Yrn Florez by me over the phone at  the time and date of the dictation with read back.     Follow-up with MRI is recommended if acute ischemia is suspected given  limitations of CT.    MRI Brain   IMPRESSION:     1. There is a tiny focus of acute/early subacute infarct at the posterior  medial right cerebellar peduncle.      2. Background of advanced atrophy, progressed, and moderately pronounced chronic  small vessel ischemic findings.     2. No hemorrhage or herniation.     Thank you for enabling us to participate in the care of this patient.           Discharge Medications:     Current Discharge Medication List      START taking these medications    Details   ciprofloxacin HCl (CIPRO) 500 mg tablet Take 1 Tab by mouth every twenty-four (24) hours. Qty: 10 Tab, Refills: 0         CONTINUE these medications which have NOT CHANGED    Details   carvedilol (COREG) 12.5 mg tablet TAKE 1 TABLET BY MOUTH TWICE A DAY  Qty: 60 Tab, Refills: 0    Comments: Please have patient call the office. Unable to fill for 90 day supply until patient calls the office. Associated Diagnoses: Essential hypertension      allopurinol (ZYLOPRIM) 100 mg tablet TAKE 1 TABLET BY MOUTH TWICE DAILY  Qty: 60 Tab, Refills: 0    Comments: Please have patient call the office.  Unable to fill for 90 day supply until patient calls the office. Associated Diagnoses: Idiopathic gout, unspecified chronicity, unspecified site      rosuvastatin (CRESTOR) 20 mg tablet TAKE 1 TABLET BY MOUTH NIGHTLY  Qty: 90 Tab, Refills: 0    Associated Diagnoses: Hypercholesterolemia      cholecalciferol, VITAMIN D3, (VITAMIN D3) 5,000 unit tab tablet Take 1 Tab by mouth every seven (7) days. Qty: 30 Tab, Refills: 0      tamsulosin (FLOMAX) 0.4 mg capsule TAKE ONE CAPSULE BY MOUTH EVERY DAY AFTER MEALS  Qty: 30 Cap, Refills: 4      Insulin Needles, Disposable, (BD ULTRA-FINE MINI PEN NEEDLE) 31 gauge x 3/16\" ndle Inject TID before meals  E11.9  Qty: 100 Pen Needle, Refills: 5    Associated Diagnoses: Diabetes mellitus with no complication (HCC)      insulin lispro (HUMALOG) 100 unit/mL injection For Blood Sugar (mg/dL) of:     Less than 150=0 units           150 -199=2 units  200 -249=4 units  250 -299=6 units  300 -349=8 units  350 and above=10 units  Check blood glucose 3 times daily and at bedtime and inject insulin per SS. Qty: 1 Vial, Refills: 0      aspirin delayed-release 81 mg tablet Take 81 mg by mouth daily. glucose 4 gram chewable tablet Take 4 Tabs by mouth as needed. Qty: 50 Tab, Refills: 0      OTHER Check CBC, CMP, Mg in 5 days, results to PCP  Qty: 1 each, Refills: 0      omega-3 fatty acids-vitamin e (FISH OIL) 1,000 mg Cap Take 1,400 mg by mouth daily.          STOP taking these medications       dilTIAZem (TIAZAC) 360 mg ER capsule Comments:   Reason for Stopping:               Current Facility-Administered Medications:     ciprofloxacin HCl (CIPRO) tablet 500 mg, 500 mg, Oral, Q24H, Chetna Mathews MD, 500 mg at 08/05/19 1341    allopurinol (ZYLOPRIM) tablet 100 mg, 100 mg, Oral, BID, Simon Ibarra MD, 100 mg at 08/05/19 1102    aspirin delayed-release tablet 81 mg, 81 mg, Oral, DAILY, Simon Ibarra MD, 81 mg at 08/05/19 1102    [Held by provider] carvedilol (COREG) tablet 12.5 mg, 12.5 mg, Oral, BID WITH MEALS, Laura Combs, Sally Costello MD, Stopped at 08/04/19 0800    cholecalciferol (VITAMIN D3) capsule 5,000 Units, 5,000 Units, Oral, Q7D, Simon Ibarra MD, Stopped at 08/01/19 2100    glucose chewable tablet 16 g, 16 g, Oral, PRN, Simon Ibarra MD    omega 3-DHA-EPA-fish oil 1,000 mg (120 mg-180 mg) capsule 1 Cap, 1 Cap, Oral, DAILY, Simon Ibarra MD, Stopped at 08/05/19 1102    tamsulosin (FLOMAX) capsule 0.4 mg, 0.4 mg, Oral, DAILY, Simon Ibarra MD, 0.4 mg at 08/05/19 1102    rosuvastatin (CRESTOR) tablet 20 mg, 20 mg, Oral, QHS, Krystian Cruz MD, 20 mg at 08/04/19 2243    sodium chloride (NS) flush 5-40 mL, 5-40 mL, IntraVENous, Q8H, Krystian Cruz MD, 10 mL at 08/05/19 0644    sodium chloride (NS) flush 5-40 mL, 5-40 mL, IntraVENous, PRN, Simon Ibarra MD    heparin (porcine) injection 5,000 Units, 5,000 Units, SubCUTAneous, Q8H, Simon Ibarra MD, 5,000 Units at 08/05/19 1341  · It is important that you take the medication exactly as they are prescribed. · Keep your medication in the bottles provided by the pharmacist and keep a list of the medication names, dosages, and times to be taken in your wallet. · Do not take other medications without consulting your doctor.          Minutes spent on discharge: 43 minutes spent coordinating this discharge (review instructions/follow-up, prescriptions, preparing report for sign off)    Prema Rubio MD  8/5/2019 4:50 PM

## 2019-08-05 NOTE — PROGRESS NOTES
Problem: Dysphagia (Adult)  Goal: *Acute Goals and Plan of Care (Insert Text)  Description  Patient will:  1. Tolerate PO trials with 0 s/s overt distress in 4/5 trials  2. Utilize compensatory swallow strategies/maneuvers (decrease bite/sip, size/rate, alt. liq/sol) with min cues in 4/5 trials  3. Perform oral-motor/laryngeal exercises to increase oropharyngeal swallow function with min cues  4. Complete an objective swallow study (i.e., MBSS) to assess swallow integrity, r/o aspiration, and determine of safest LRD, min A - met 8/2/19    Rec:     Mech-soft diet with pudding-thick liquids  Ice chips ok between meals following strict oral care   Verbal/tactile cues for swallow initiation   Aspiration precautions  HOB >45 during po intake, remain >30 for 30-45 minutes after po   Small bites/sips; alternate liquid/solid with slow feeding rate   Oral care TID  Meds in pudding  SLP upon discharge     Outcome: Progressing Towards Goal    SPEECH LANGUAGE PATHOLOGY DYSPHAGIA TREATMENT    Patient: Salina Winkler (3 y.o. male)  Date: 8/5/2019  Diagnosis: Stroke ([facility] [I63.9]  Sepsis ([facility] [A41.9]   Precautions: Aspiration    PLOF: Regular diet with thin liquids per son    ASSESSMENT:  Pt seen for follow up dysphagia tx with son present at bedside and RN present for med pass. Per RN, pt demo significant delay with swallowing pills crushed in pudding. Pt alert, tolerating meds crushed in pudding with no overt s/sx aspiration; however, demo significantly delayed/effortful swallow with pudding that improved with verbal/tactile stim and cues. Per son, pt with very limited intake of current diet. Re-educated with regard to results of MBS, aspiration risk and introduced Motribe protocol. Recommend addition of ice chips between meals following strict oral care for improved hydration and compliance with pudding thick liquid recommendations.   Provided pt's son with handout regarding diet recs, thickening agent information and swallow/aspiration precautions. Pt's son able to verbalize understanding. Will follow for further dysphagia management as indicated. Progression toward goals:  ?         Improving appropriately and progressing toward goals  ? Improving slowly and progressing toward goals  ? Not making progress toward goals and plan of care will be adjusted     PLAN:  Recommendations and Planned Interventions:  Patient continues to benefit from skilled intervention to address the above impairments. Continue treatment per established plan of care. Discharge Recommendations: To Be Determined     SUBJECTIVE:   Patient shook head no    OBJECTIVE:   Cognitive and Communication Status:  Neurologic State: Alert  Orientation Level: Oriented to person, Oriented to place  Cognition: Follows commands  Perception: Appears intact  Perseveration: No perseveration noted  Safety/Judgement: Fall prevention  Dysphagia Treatment:  Oral Assessment:  Oral Assessment  Labial: No impairment  Dentition: Natural, Limited  Oral Hygiene: adequate  Lingual: Decreased rate, Decreased strength  Velum: No impairment  Mandible: No impairment  P.O. Trials:   Patient Position: [facility] at [facility]   Vocal quality prior to P.O.: No impairment   Consistency Presented: Pudding(Meds crushed in pudding)   How Presented: SLP-fed/presented, Spoon   Bolus Acceptance: No impairment   Bolus Formation/Control: Impaired   Type of Impairment: Delayed, Poor, Posterior   Propulsion: Delayed (# of seconds), Discoordination   Oral Residue: Lingual, Less than 10% of bolus   Initiation of Swallow: Delayed (# of seconds)   Laryngeal Elevation: Decreased, Weak   Aspiration Signs/Symptoms: None   Pharyngeal Phase Characteristics: Poor endurance, Double swallow, Easily fatigued , Effortful swallow   Effective Modifications: Small sips and bites(Tactile stim)   Cues for Modifications:  Moderate   Oral Phase Severity:  Moderate    Pharyngeal Phase Severity : Moderate-severe     PAIN:  No pain reported prior to or following tx     After treatment:   ?              Patient left in no apparent distress sitting up in chair  ? Patient left in no apparent distress in bed  ? Call bell left within reach  ? Nursing notified  ? Family present  ? Caregiver present  ? Bed alarm activated      COMMUNICATION/EDUCATION:   ? Aspiration precautions; swallow safety; compensatory techniques  ? Patient/family able to participate in training and education   ? Patient unable to participate in training and education, education ongoing with staff   ?  Patient understands goals and intent of therapy; neutral about participation     Nel Rae M.S., [id number] [facility]  Speech-Language Pathologist

## 2019-08-05 NOTE — PROGRESS NOTES
Problem: Mobility Impaired (Adult and Pediatric)  Goal: *Acute Goals and Plan of Care (Insert Text)  Description  Physical Therapy Goals  Initiated 8/2/2019 and to be accomplished within 7 day(s)  1. Patient will roll side to side in bed with minimal assistance/contact guard assist.     2. Patient will move from supine to sit and sit to supine  and scoot up and down in bed with moderate assistance . 3. Patient will demo fair seated balance with ability to maintain midline for 2mins sitting EOB. 4. Patient will transfer from bed to chair and chair to bed with moderate assistance  using the least restrictive device. 5. Patient will perform sit to stand with moderate assistance . 6. Patient will ambulate with moderate assistance for 20 feet with the least restrictive device. PLOF: Patient lives with son in 1 story home with 3STE with HR and reports he uses a cane or walker to walk. Patient has [employer], RW, SC, BSC, WC, QC at home. Patient's son works and reports patient is home usually about 2hrs by himself as they have a home care aid from 11a-4p. Patient uses bathroom and performs sink bath. Outcome: Progressing Towards Goal    PHYSICAL THERAPY TREATMENT    Patient: Cyn Caputo (55 y.o. male)  Date: 8/5/2019  Diagnosis: Stroke ([facility] [I63.9]  Sepsis ([facility] [A41.9] <principal problem not specified>       Precautions:    PLOF: see above    ASSESSMENT:  Pt cleared for PT treatment session per nursing and co-treatment performed with OT to maximize pt safety and participation throughout functional mobility. Pt received in supine with HOB elevated, son at bedside, agreeable to therapy. Pt required min A with increased HOB height and EOB height and additional time. Pt required min A x2 for sit>stand from EOB and raised toilet seat and mod A x1 to control descent for stand>sit with VC/TC for hand placement. Pt ambulated 20 ft in room to bathroom with min/mod A, VCs for RW management and posture.  Pt continues to demo R trunk lean in sitting with max VC/TC to correct to midline. Pt tolerated standing at sink within RW for ~1 min for hand hygiene, pt required constant CGA/min A during task. Pt returned to bedside chair and reviewed TE of LAQ, HR/TR, and seated marches to promote general mobility and increase strength for future transfers and ambulation. Pt left comfortably sitting up in bedside chair, son at bedside, and all needs met/within reach. Post session, son verbalized that the mobility and assistance level from today's session was the pt's baseline. Pt would be safe to return home with 24/7 supervision/assistance once medically cleared. Progression toward goals:   ?      Improving appropriately and progressing toward goals  ? Improving slowly and progressing toward goals  ? Not making progress toward goals and plan of care will be adjusted     PLAN:  Patient continues to benefit from skilled intervention to address the above impairments. Continue treatment per established plan of care. Discharge Recommendations:  Home Health with 24/7 supervision  Further Equipment Recommendations for Discharge:  N/A, pt owns DME     SUBJECTIVE:   Patient stated Mmm.  Pt minimally verbal this session. OBJECTIVE DATA SUMMARY:   Critical Behavior:  Neurologic State: Alert  Orientation Level: Oriented to person, Oriented to place  Cognition: Follows commands  Safety/Judgement: Fall prevention  Functional Mobility Training:  Bed Mobility:  Supine to Sit: Minimum assistance; Additional time;Bed Modified    Transfers:  Sit to Stand: Minimum assistance;Assist x2  Stand to Sit: Moderate assistance  Bed to Chair: Moderate assistance(w/RW)    Balance:  Sitting: Impaired  Sitting - Static: Fair (occasional)  Sitting - Dynamic: Fair (occasional)  Standing: Impaired; With support  Standing - Static: Fair  Standing - Dynamic : Poor    Ambulation/Gait Training:  Distance (ft): 20 Feet (ft)  Assistive Device: Julee Layton rolling  Ambulation - Level of Assistance: Minimal assistance; Moderate assistance  Gait Abnormalities: Decreased step clearance  Base of Support: Narrowed  Speed/Arpita: Slow    Therapeutic Exercises:   Reviewed LAQ, HR/TR, and seated marches to promote general mobility and increase strength for future transfers and ambulation. Pain:  Pain level pre-treatment: 0/10  Pain level post-treatment: 0/10     Activity Tolerance:   Fair+  Please refer to the flowsheet for vital signs taken during this treatment. After treatment:   ? Patient left in no apparent distress sitting up in chair  ? Patient left in no apparent distress in bed  ? Call bell left within reach  ? Nursing notified  ? Caregiver present: son at bedside  ? Chair alarm activated  ? SCDs applied      COMMUNICATION/EDUCATION:   ?         Role of Physical Therapy in the acute care setting. ?         Fall prevention education was provided and the patient/caregiver indicated understanding. ? Patient/family have participated as able in working toward goals and plan of care. ?         Patient/family agree to work toward stated goals and plan of care. ?         Patient understands intent and goals of therapy, but is neutral about his/her participation. ? Patient is unable to participate in stated goals/plan of care: ongoing with therapy staff.   ?         Other:        Dana Mean, PTA   Time Calculation: 27 mins

## 2019-08-05 NOTE — DIABETES MGMT
GLYCEMIC CONTROL SCREENING INITIATED:    Lab Results   Component Value Date/Time    Hemoglobin A1c 6.5 (H) 08/02/2019 02:21 AM      Lab Results   Component Value Date/Time    Glucose 115 (H) 08/03/2019 01:30 PM         [x]  Patient meets criteria for diabetes HbA1c ? 6.5%      [x]  Recommend corrective insulin per IP Insulin order set.       [x]  Standard insulin scale []  Very insuln resistant scale     Collins López RD, CDE

## 2019-08-05 NOTE — DISCHARGE INSTRUCTIONS
Patient Education        Dehydration: Care Instructions  Your Care Instructions  Dehydration happens when your body loses too much fluid. This might happen when you do not drink enough water or you lose large amounts of fluids from your body because of diarrhea, vomiting, or sweating. Severe dehydration can be life-threatening. Water and minerals called electrolytes help put your body fluids back in balance. Learn the early signs of fluid loss, and drink more fluids to prevent dehydration. Follow-up care is a key part of your treatment and safety. Be sure to make and go to all appointments, and call your doctor if you are having problems. It's also a good idea to know your test results and keep a list of the medicines you take. How can you care for yourself at home? · To prevent dehydration, drink plenty of fluids, enough so that your urine is light yellow or clear like water. Choose water and other caffeine-free clear liquids until you feel better. If you have kidney, heart, or liver disease and have to limit fluids, talk with your doctor before you increase the amount of fluids you drink. · If you do not feel like eating or drinking, try taking small sips of water, sports drinks, or other rehydration drinks. · Get plenty of rest.  To prevent dehydration  · Add more fluids to your diet and daily routine, unless your doctor has told you not to. · During hot weather, drink more fluids. Drink even more fluids if you exercise a lot. Stay away from drinks with alcohol or caffeine. · Watch for the symptoms of dehydration. These include:  ? A dry, sticky mouth. ? Dark yellow urine, and not much of it. ? Dry and sunken eyes. ? Feeling very tired. · Learn what problems can lead to dehydration. These include:  ? Diarrhea, fever, and vomiting. ? Any illness with a fever, such as pneumonia or the flu. ?  Activities that cause heavy sweating, such as endurance races and heavy outdoor work in hot or humid weather. ? Alcohol or drug use or problems caused by quitting their use (withdrawal). ? Certain medicines, such as cold and allergy pills (antihistamines), diet pills (diuretics), and laxatives. ? Certain diseases, such as diabetes, cancer, and heart or kidney disease. When should you call for help? Call 911 anytime you think you may need emergency care. For example, call if:    · You passed out (lost consciousness).    Call your doctor now or seek immediate medical care if:    · You are confused and cannot think clearly.     · You are dizzy or lightheaded, or you feel like you may faint.     · You have signs of needing more fluids. You have sunken eyes and a dry mouth, and you pass only a little dark urine.     · You cannot keep fluids down.    Watch closely for changes in your health, and be sure to contact your doctor if:    · You are not making tears.     · Your skin is very dry and sags slowly back into place after you pinch it.     · Your mouth and eyes are very dry. Where can you learn more? Go to http://theresa-mumtaz.info/. Enter J676 in the search box to learn more about \"Dehydration: Care Instructions. \"  Current as of: September 23, 2018  Content Version: 12.1  © 2470-5148 [employer]. Care instructions adapted under license by [employer] (which disclaims liability or warranty for this information). If you have questions about a medical condition or this instruction, always ask your healthcare professional. [place] [address] any warranty or liability for your use of this information. Patient armband removed and shredded  MyChart Activation    Thank you for requesting access to [employer]. Please follow the instructions below to securely access and download your online medical record. [employer] allows you to send messages to your doctor, view your test results, renew your prescriptions, schedule appointments, and more.     How Do I Sign Up?    1. In your internet browser, go to [email or web address]  2. Click on the First Time User? Click Here link in the Sign In box. You will be redirect to the New Member Sign Up page. 3. Enter your [employer] Access Code exactly as it appears below. You will not need to use this code after youve completed the sign-up process. If you do not sign up before the expiration date, you must request a new code. [employer] Access Code: [email or web address]  Expires: 9/15/2019  3:20 PM (This is the date your [employer] access code will )    4. Enter the last four digits of your Social Security Number (xxxx) and Date of Birth (mm/dd/yyyy) as indicated and click Submit. You will be taken to the next sign-up page. 5. Create a [employer] ID. This will be your [employer] login ID and cannot be changed, so think of one that is secure and easy to remember. 6. Create a [employer] password. You can change your password at any time. 7. Enter your Password Reset Question and Answer. This can be used at a later time if you forget your password. 8. Enter your e-mail address. You will receive e-mail notification when new information is available in [address] 9. Click Sign Up. You can now view and download portions of your medical record. 10. Click the Download Summary menu link to download a portable copy of your medical information. Additional Information    If you have questions, please visit the Frequently Asked Questions section of the [employer] website at [email or web address] Remember, [employer] is NOT to be used for urgent needs. For medical emergencies, dial 911. As part of the discharge instructions, medications already given today were discussed with the patient. The next dose due of all ordered meds was highlighted as part of the medication discharge instructions.  Discussed with the patient the importance of taking medications as directed, as well as the side effects and adverse reactions to medications ordered. DISCHARGE SUMMARY from Nurse    PATIENT INSTRUCTIONS:    After general anesthesia or intravenous sedation, for 24 hours or while taking prescription Narcotics:  · Limit your activities  · Do not drive and operate hazardous machinery  · Do not make important personal or business decisions  · Do  not drink alcoholic beverages  · If you have not urinated within 8 hours after discharge, please contact your surgeon on call. Report the following to your surgeon:  · Excessive pain, swelling, redness or odor of or around the surgical area  · Temperature over 100.5  · Nausea and vomiting lasting longer than 4 hours or if unable to take medications  · Any signs of decreased circulation or nerve impairment to extremity: change in color, persistent  numbness, tingling, coldness or increase pain  · Any questions    What to do at Home:  Recommended activity: Activity as tolerated,     If you experience any of the following symptoms shortness of breath, chest pain, signs of urinary infection- cloudy ,bloody, or foul smelling urine, please follow up with Emergency Dept or Primary Md. *  Please give a list of your current medications to your Primary Care Provider. *  Please update this list whenever your medications are discontinued, doses are      changed, or new medications (including over-the-counter products) are added. *  Please carry medication information at all times in case of emergency situations. These are general instructions for a healthy lifestyle:    No smoking/ No tobacco products/ Avoid exposure to second hand smoke  Surgeon General's Warning:  Quitting smoking now greatly reduces serious risk to your health.     Obesity, smoking, and sedentary lifestyle greatly increases your risk for illness    A healthy diet, regular physical exercise & weight monitoring are important for maintaining a healthy lifestyle    You may be retaining fluid if you have a history of heart failure or if you experience any of the following symptoms:  Weight gain of 3 pounds or more overnight or 5 pounds in a week, increased swelling in our hands or feet or shortness of breath while lying flat in bed. Please call your doctor as soon as you notice any of these symptoms; do not wait until your next office visit. The discharge information has been reviewed with the patient. The patient verbalized understanding. Discharge medications reviewed with the patient and appropriate educational materials and side effects teaching were provided.   ___________________________________________________________________________________________________________________________________

## 2019-08-05 NOTE — PROGRESS NOTES
Problem: Self Care Deficits Care Plan (Adult)  Goal: *Acute Goals and Plan of Care (Insert Text)  Description  Occupational Therapy Goals  Initiated 8/2/2019 within 7 day(s). 1.  Patient will perform upper body dressing with supervision/set-up. 2.  Patient will perform lower body dressing with minimal assistance/contact guard assist, using AE PRN. 3.  Patient will perform toileting with minimal assistance/contact guard assist.  4.  Patient will perform toilet transfers with minimal assistance/contact guard assist.  5.  Patient will perform a functional activity/ADL sitting with G balance for 3-5 minutes. 6.  Patient will participate in upper extremity therapeutic exercise/activities with supervision/set-up for 8 minutes. Prior Level of Function: Pt reports he lives with a son in a [facility] with 3STE. He was Mod (I) with basic self-care/ADLs, performing basin baths and using pull-ups. Pt's son reports a CNA comes to the home (11 am - 4 pm). Pt has a RW, cane (quad), shower chair, grab bars, BSC, reacher, and sock aid in home envrionemtn. Outcome: Progressing Towards Goal   OCCUPATIONAL THERAPY TREATMENT    Patient: Cyn Caputo (68 y.o. male)  Date: 8/5/2019  Diagnosis: Stroke ([facility] [I63.9]  Sepsis ([facility] [A41.9] <principal problem not specified>    Chart, occupational therapy assessment, plan of care, and goals were reviewed. ASSESSMENT:  Pt co-treated w/PT to maximize safety w/functional mobility/transfer training. Pt demonstrating improved bed mobility maneuvering to EOB w/increase time and Min Assist. Generalized weakness requires 2 person assist w/functional transfer to standing w/RW. Max vc's for RW mgt w/functional mobility to bathroom and vc's for cervical extension and BUE shoulder retraction. Pt tolerates standing sinkside performing hand hygiene w/constant support and assist w/soap mgt 2/2 poor dynamic standing balance. Verbal cues for hand placement w/functional transfer to chair.  Pt coughing large amount of mucous once seated and w/1 episode of emesis. Alerted SLP, Becka Poe. Progression toward goals:  ?          Improving appropriately and progressing toward goals  ? Improving slowly and progressing toward goals  ? Not making progress toward goals and plan of care will be adjusted     PLAN:  Patient continues to benefit from skilled intervention to address the above impairments. Continue treatment per established plan of care. Discharge Recommendations:  Home Health w/24 hr care vs SNF  Further Equipment Recommendations for Discharge:  N/A, son reports they have DME     SUBJECTIVE:   Patient stated that's a lot better.     OBJECTIVE DATA SUMMARY:   Cognitive/Behavioral Status:  Neurologic State: Alert  Orientation Level: Oriented to person, Oriented to place  Cognition: Follows commands  Safety/Judgement: Fall prevention    Functional Mobility and Transfers for ADLs:   Transfers:  Bed to Chair: Moderate assistance(w/RW)   Toilet Transfer : Moderate assistance(w/grab bar)   Bathroom Mobility: Moderate assistance(w/RW)  ADL Intervention:  Grooming  Washing Hands: Minimum assistance(standing sinkside)  Brushing Teeth: Contact guard assistance(mouthwash rinse)  Pt requires hand over hand assist w/container mgt     Toileting  Toileting Assistance: Maximum assistance  Bowel Hygiene: Maximum assistance  Clothing Management: Maximum assistance    Pain:  Pain level pre-treatment: 0/10   Pain level post-treatment: 0/10    Activity Tolerance:    Fair    Please refer to the flowsheet for vital signs taken during this treatment. After treatment:   ?  Patient left in no apparent distress sitting up in chair  ? Patient left in no apparent distress in bed  ? Call bell left within reach  ? Nursing notified  ? Caregiver, son, Lottie Tam, present  ? Bed alarm activated    COMMUNICATION/EDUCATION:   ? Role of Occupational Therapy in the acute care setting  ?  Home safety education was provided and the patient/caregiver indicated understanding. ? Patient/family have participated as able in working towards goals and plan of care. ? Patient/family agree to work toward stated goals and plan of care. ? Patient understands intent and goals of therapy, but is neutral about his/her participation. ? Patient is unable to participate in goal setting and plan of care.       Thank you for this referral.  [facility] JULIET Hess  Time Calculation: 26 mins

## 2019-08-05 NOTE — PROGRESS NOTES
Problem: Falls - Risk of  Goal: *Absence of Falls  Description  Document Cameron Fells Fall Risk and appropriate interventions in the flowsheet.   Outcome: Progressing Towards Goal  Note:   Fall Risk Interventions:  Mobility Interventions: Assess mobility with egress test, Bed/chair exit alarm, OT consult for ADLs, Patient to call before getting OOB, PT Consult for mobility concerns, PT Consult for assist device competence         Medication Interventions: Bed/chair exit alarm, Evaluate medications/consider consulting pharmacy, Patient to call before getting OOB, Teach patient to arise slowly    Elimination Interventions: Bed/chair exit alarm, Call light in reach    History of Falls Interventions: Bed/chair exit alarm, Consult care management for discharge planning, Room close to nurse's station         Problem: Pain  Goal: *Control of Pain  Outcome: Progressing Towards Goal     Problem: Injury - Risk of, Adverse Drug Event  Goal: *Absence of adverse drug events  Outcome: Progressing Towards Goal

## 2019-08-05 NOTE — PROGRESS NOTES
Low heart rate Dr Becka Farrell made aware held coreg will continue to monitor rate and rhythm on tele pt is asymptomatic

## 2019-08-05 NOTE — ROUTINE PROCESS
Bedside and Verbal shift change report given to Terrance Villar (oncoming nurse) by Veronica Patel RN (offgoing nurse). Report given with SBAR, Kardex, Intake/Output, MAR, Accordion and Recent Results.

## 2019-08-05 NOTE — PROGRESS NOTES
Spoke with son at [address] with starting Cipro - he is not allergic   PT & OT eval pending   Will follow

## 2019-08-05 NOTE — PROGRESS NOTES
This writer spoke with pt's son regarding this pt returning home with pt's family today. Pt's son states that this pt has a caregiver in the home to assist pt. Son states he does not believe requires any home health services'  Pt's son states he will transport this pt home when discharged to home.     [facility]

## 2019-08-05 NOTE — PROGRESS NOTES
ARU/IPR REFERRAL CONTACT NOTE  [id number] [place] [address] for Physical Rehabilitation    RE: Tiwari Coretta   Thank you for the opportunity to review this patient's case for admission to [id number] [place] [address] for Physical Rehabilitation. Based on our pre-admission screening:     [x ] Our Team/Medical Director is following this case. Comments: Spoke to patient and his son re: ARU, but they declined it. They would prefer that he goes home with homecare. I notified CHARLY Tejada re: patients request.    Again, Thank you for this referral. Should you have any questions please do not hesitate to call. Sincerely,  Cornell Oconnell. Suzette Cartagena, [address]   Suzette Cartagena, RN  Admissions [facility] for Physical Rehabilitation  [phone]

## 2019-08-07 LAB
BACTERIA SPEC CULT: NORMAL
BACTERIA SPEC CULT: NORMAL
SERVICE CMNT-IMP: NORMAL
SERVICE CMNT-IMP: NORMAL

## 2019-08-09 ENCOUNTER — OFFICE VISIT (OUTPATIENT)
Dept: FAMILY MEDICINE CLINIC | Age: 74
End: 2019-08-09

## 2019-08-09 VITALS
TEMPERATURE: 98.2 F | DIASTOLIC BLOOD PRESSURE: 77 MMHG | BODY MASS INDEX: 21.21 KG/M2 | HEIGHT: 67 IN | SYSTOLIC BLOOD PRESSURE: 132 MMHG | RESPIRATION RATE: 18 BRPM | HEART RATE: 75 BPM | OXYGEN SATURATION: 98 %

## 2019-08-09 DIAGNOSIS — Z13.31 SCREENING FOR DEPRESSION: ICD-10-CM

## 2019-08-09 DIAGNOSIS — Z13.39 SCREENING FOR ALCOHOLISM: ICD-10-CM

## 2019-08-09 DIAGNOSIS — A41.9 SEPSIS DUE TO URINARY TRACT INFECTION (HCC): ICD-10-CM

## 2019-08-09 DIAGNOSIS — Z09 HOSPITAL DISCHARGE FOLLOW-UP: Primary | ICD-10-CM

## 2019-08-09 DIAGNOSIS — Z00.00 MEDICARE ANNUAL WELLNESS VISIT, SUBSEQUENT: ICD-10-CM

## 2019-08-09 DIAGNOSIS — N39.0 SEPSIS DUE TO URINARY TRACT INFECTION (HCC): ICD-10-CM

## 2019-08-09 RX ORDER — CHOLECALCIFEROL TAB 125 MCG (5000 UNIT) 125 MCG
5000 TAB ORAL
Qty: 30 TAB | Refills: 2 | Status: SHIPPED | OUTPATIENT
Start: 2019-08-09 | End: 2019-10-01 | Stop reason: SDUPTHER

## 2019-08-09 NOTE — PROGRESS NOTES
Chief Complaint   Patient presents with   [facility] Follow Up     Patient  was released on 08/08/19     1. Have you been to the ER, urgent care clinic since your last visit? Hospitalized since your last visit? No    2. Have you seen or consulted any other health care providers outside of the [facility] since your last visit? Include any pap smears or colon screening.  No

## 2019-08-09 NOTE — PATIENT INSTRUCTIONS
Medicare Wellness Visit, Male The best way to live healthy is to have a lifestyle where you eat a well-balanced diet, exercise regularly, limit alcohol use, and quit all forms of tobacco/nicotine, if applicable. Regular preventive services are another way to keep healthy. Preventive services (vaccines, screening tests, monitoring & exams) can help personalize your care plan, which helps you manage your own care. Screening tests can find health problems at the earliest stages, when they are easiest to treat. [facility] follows the current, evidence-based guidelines published by the [facility] ([facility]) when recommending preventive services for our patients. Because we follow these guidelines, sometimes recommendations change over time as research supports it. (For example, a prostate screening blood test is no longer routinely recommended for men with no symptoms.) Of course, you and your doctor may decide to screen more often for some diseases, based on your risk and co-morbidities (chronic disease you are already diagnosed with). Preventive services for you include: - Medicare offers their members a free annual wellness visit, which is time for you and your primary care provider to discuss and plan for your preventive service needs. Take advantage of this benefit every year! 
-All adults over age 72 should receive the recommended pneumonia vaccines. Current USPSTF guidelines recommend a series of two vaccines for the best pneumonia protection.  
-All adults should have a flu vaccine yearly and an ECG.  All adults age 61 and older should receive a shingles vaccine once in their lifetime.   
-All adults age 38-68 who are overweight should have a diabetes screening test once every three years.  
-Other screening tests & preventive services for persons with diabetes include: an eye exam to screen for diabetic retinopathy, a kidney function test, a foot exam, and stricter control over your cholesterol.  
-Cardiovascular screening for adults with routine risk involves an electrocardiogram (ECG) at intervals determined by the provider.  
-Colorectal cancer screening should be done for adults age 54-65 with no increased risk factors for colorectal cancer. There are a number of acceptable methods of screening for this type of cancer. Each test has its own benefits and drawbacks. Discuss with your provider what is most appropriate for you during your annual wellness visit. The different tests include: colonoscopy (considered the best screening method), a fecal occult blood test, a fecal DNA test, and sigmoidoscopy. 
-All adults born between [facility] should be screened once for Hepatitis C. 
-An Abdominal Aortic Aneurysm (AAA) Screening is recommended for men age 73-68 who has ever smoked in their lifetime. Here is a list of your current Health Maintenance items (your personalized list of preventive services) with a due date: 
Health Maintenance Due Topic Date Due  Shingles Vaccine (1 of 2) 08/31/1994  
 DTaP/Tdap/Td  (1 - Tdap) 08/16/2013  Colonoscopy  09/01/2017  Glaucoma Screening   04/28/2018 [facility] Eye Exam  04/28/2018 [facility] Diabetic Foot Care  10/18/2018  Albumin Urine Test  06/04/2019 [facility] Annual Well Visit  06/05/2019 Learning About Sepsis What is sepsis? Sepsis is an intense reaction to an infection. It can cause deadly damage to the body and lead to dangerously low blood pressure. You may have inflammation across large areas of your body. It can damage tissue and even go deep into your organs. Sepsis can develop very quickly. It requires immediate care in a hospital. 
Infections that can lead to sepsis include: · A skin infection such as from a cut. · A lung infection like pneumonia. · A kidney infection. · A gut infection such as E. coli.  
Symptoms can include low blood pressure, breathing problems, fast heartbeat, and confusion. Other symptoms include fever or low body temperature, chills, cool clammy skin, skin rashes, and shaking. Sepsis can cause problems in many organs. People with sepsis might need to be treated in an intensive care unit (ICU) for several days or weeks. An ICU is a part of the hospital where very sick people get care. Septic shock is sepsis that causes extremely low blood pressure, which limits blood flow to the body. It can cause organ failure and death. How is sepsis treated? Doctors will treat the person with antibiotics. They will try to find the infection that led to sepsis. Machines will track the person's vital signs, including temperature, blood pressure, breathing rate, and pulse rate. The person will get fluids through an IV. He or she may also get strong medicine. This can help raise the blood pressure. If a person with sepsis is very sick, equipment in the ICU can support many body systems. That includes breathing, circulation, fluids, and help for organs like the kidneys and heart. If the person needs help breathing, a ventilator may be used. What can you expect when someone has sepsis? The person may start new treatments while still in the hospital. Different doctors may help with different symptoms. If a person needs to be treated in the intensive care unit (ICU), the ICU staff will do everything they can to treat all of the problems sepsis causes, including the infection. The ICU can be scary and confusing for patients and their families, friends, and supporters. But it's designed to keep your loved one comfortable and safe and to provide the best medical care. Expect a long recovery after the person leaves the ICU. If you need it, ask for support from friends and family. Where can you learn more? Go to http://theresa-mumtaz.info/. Enter E103 in the search box to learn more about \"Learning About Sepsis. \" Current as of: September 23, 2018 Content Version: 12.1 © 1124-7342 [employer]. Care instructions adapted under license by [employer] (which disclaims liability or warranty for this information). If you have questions about a medical condition or this instruction, always ask your healthcare professional. [address] any warranty or liability for your use of this information.

## 2019-08-09 NOTE — PROGRESS NOTES
JOY Booth is a 75 y.o. male who presents today for hospital follow up. Pt was admitted to [facility] on August 1st and discharged on August 5, 2019 with diagnoses sepsis from UTI and stroke like symptoms. Pt is accompanied by son whom is his caretaker. Pt has been receiving home health and has been doing home exercises to rebuild strength. Pt has a follow up with urologist next friday and in process of scheduling appointment with nephrologist.  Pt uses a leg bag through out the day for indwelling suprapubic catheter and uses a gutierrez bag at night. Pt gets catheter changed once a month with urology. Pt's son notes urine is clear, yellow and no sediments. Pt denies lower abdominal pressure and lower back pain. Pertinent Labs/Imaging from admission:  CULTURE, BLOOD [[id number]] Collected: 08/01/19 1540   Order Status: Completed Specimen: Blood Updated: 08/07/19 0714    Special Requests: NO SPECIAL REQUESTS       Culture result: NO GROWTH 6 DAYS      CULTURE, BLOOD [[id number]] Collected: 08/01/19 1555   Order Status: Completed Specimen: Blood Updated: 08/07/19 0714    Special Requests: NO SPECIAL REQUESTS       Culture result: NO GROWTH 6 DAYS      SCANNED CARDIAC RHYTHM STRIP [[id number]] Collected: 08/06/19 1326   Order Status: Completed Updated: 08/06/19 1341   SCANNED CARDIAC RHYTHM STRIP [[id number]] Collected: 08/05/19 0933   Order Status: Completed Updated: 08/05/19 1128   GLUCOSE, POC [[id number]] (Abnormal) Collected: 08/04/19 1647   Order Status: Completed Specimen: Whole Blood Updated: 08/04/19 1649    Glucose (POC) 207High  70 - 110 mg/dL     Comment: (NOTE)   The FDA has indicated that no capillary point of care blood glucose   monitoring systems are approved for use in \"critically ill\" patients,   however they have not defined this population.  The College of   American Pathologists has recommended that these devices should not   be used in cases such as severe hypotension, dehydration, shock, and hyperglycemic-hyperosmolar state, amongst others. Pan Hurl or arterial   collection is the recommended specimen for testing these patients. SCANNED RADIOLOGY RESULTS [[id number]] Collected: 08/04/19 0932   Order Status: Completed Updated: 08/04/19 1408   GLUCOSE, POC [[id number]] (Abnormal) Collected: 08/04/19 1124   Order Status: Completed Specimen: Whole Blood Updated: 08/04/19 1126    Glucose (POC) 145High  70 - 110 mg/dL     Comment: (NOTE)   The FDA has indicated that no capillary point of care blood glucose   monitoring systems are approved for use in \"critically ill\" patients,   however they have not defined this population. The College of   American Pathologists has recommended that these devices should not   be used in cases such as severe hypotension, dehydration, shock, and   hyperglycemic-hyperosmolar state, amongst others. [place] Hurl or arterial   collection is the recommended specimen for testing these patients.        CULTURE, URINE [[id number]] (Abnormal)  Collected: 08/01/19 1540   Order Status: Completed Specimen: Clean catch Updated: 08/04/19 0721    Special Requests: NO SPECIAL REQUESTS       Culture result:      >100,000 COLONIES/mL PSEUDOMONAS AERUGINOSAAbnormal     Ref Range:            >100,000 COLONIES/mL PROTEUS MIRABILISAbnormal     Ref Range:     Susceptibility      Pseudomonas aeruginosa Proteus mirabilis     ARUN ARUN     Ampicillin ($)  Susceptible     Ampicillin/sulbactam ($)  Susceptible     Cefazolin ($)  Susceptible     Cefepime ($$) Susceptible Susceptible     Ceftazidime ($) Susceptible Susceptible     Ceftriaxone ($)  Susceptible     Ciprofloxacin ($) Susceptible Susceptible     Gentamicin ($) Susceptible Susceptible     Imipenem Susceptible Intermediate     Levofloxacin ($) Susceptible Susceptible     Nitrofurantoin  Resistant     Piperacillin/Tazobac ($) Susceptible Susceptible     Tobramycin ($) Susceptible Susceptible     Trimeth-Sulfamethoxa  Susceptible              Linear View         CBC W/O DIFF [[id number]] (Abnormal) Collected: 08/04/19 0151   Order Status: Completed Specimen: Whole Blood Updated: 08/04/19 0246    WBC 11.8 4.6 - 13.2 K/uL     RBC 3.95Low  4.70 - 5.50 M/uL     HGB 9.9Low  13.0 - 16.0 g/dL     HCT 29.3Low  36.0 - 48.0 %     MCV 74.2 74.0 - 97.0 FL     MCH 25.1 24.0 - 34.0 PG     MCHC 33.8 31.0 - 37.0 g/dL     RDW 18.2High  11.6 - 14.5 %     PLATELET 266 238 - 770 K/uL     MPV 9.9 9.2 - 11.8 FL    GLUCOSE, POC [[id number]] (Abnormal) Collected: 08/03/19 1617   Order Status: Completed Specimen: Whole Blood Updated: 08/03/19 1619    Glucose (POC) 127High  70 - 110 mg/dL     Comment: (NOTE)   The FDA has indicated that no capillary point of care blood glucose   monitoring systems are approved for use in \"critically ill\" patients,   however they have not defined this population. The College of   American Pathologists has recommended that these devices should not   be used in cases such as severe hypotension, dehydration, shock, and   hyperglycemic-hyperosmolar state, amongst others. Jerene Foster or arterial   collection is the recommended specimen for testing these patients. CBC WITH AUTOMATED DIFF [[id number]] (Abnormal) Collected: 08/03/19 1330   Order Status: Completed Specimen: Whole Blood Updated: 08/03/19 1547    WBC 16. 4High  4.6 - 13.2 K/uL     RBC 4.37Low  4.70 - 5.50 M/uL     HGB 10.9Low  13.0 - 16.0 g/dL     HCT 32.4Low  36.0 - 48.0 %     MCV 74.1 74.0 - 97.0 FL     MCH 24.9 24.0 - 34.0 PG     MCHC 33.6 31.0 - 37.0 g/dL     RDW 18.3High  11.6 - 14.5 %     PLATELET 041 437 - 672 K/uL     NEUTROPHILS 82High  40 - 73 %     LYMPHOCYTES 11Low  21 - 52 %     MONOCYTES 6 3 - 10 %     EOSINOPHILS 1 0 - 5 %     BASOPHILS 0 0 - 2 %     ABS. NEUTROPHILS 13.4High  1.8 - 8.0 K/UL     ABS. LYMPHOCYTES 1.8 0.9 - 3.6 K/UL     ABS. MONOCYTES 1.0 0.05 - 1.2 K/UL     ABS. EOSINOPHILS 0.2 0.0 - 0.4 K/UL     ABS.  BASOPHILS 0.0 0.0 - 0.1 K/UL     DF AUTOMATED       PLATELET COMMENTS ADEQUATE PLATELETS       RBC COMMENTS --       ANISOCYTOSIS   1+     RBC COMMENTS --       MICROCYTOSIS   1+     RBC COMMENTS --       POLYCHROMASIA   FEW   POIKILOCYTOSIS   1+    METABOLIC PANEL, COMPREHENSIVE [[id number]] (Abnormal) Collected: 08/03/19 1330   Order Status: Completed Specimen: Serum or Plasma Updated: 08/03/19 1508    Sodium 143 136 - 145 mmol/L     Potassium 4.0 3.5 - 5.5 mmol/L     Chloride 111 100 - 111 mmol/L     Comment: PLEASE NOTE NEW REFERENCE RANGE       CO2 24 21 - 32 mmol/L     Anion gap 8 3.0 - 18 mmol/L     Glucose 115High  74 - 99 mg/dL     BUN 44High  7.0 - 18 MG/DL     Creatinine 2. 28High  0.6 - 1.3 MG/DL     BUN/Creatinine ratio 19 12 - 20       GFR est AA 34Low  >60 ml/min/1.73m2     GFR est non-AA 28Low  >60 ml/min/1.73m2     Comment: (NOTE)   Estimated GFR is calculated using the Modification of Diet in Renal   Disease (MDRD) Study equation, reported for both  Americans   (GFRAA) and non- Americans (GFRNA), and normalized to 1.73m2   body surface area. The physician must decide which value applies to   the patient. The MDRD study equation should only be used in   individuals age 25 or older. It has not been validated for the   following: pregnant women, patients with serious comorbid conditions,   or on certain medications, or persons with extremes of body size,   muscle mass, or nutritional status. Calcium 9.2 8.5 - 10.1 MG/DL     Bilirubin, total 0.9 0.2 - 1.0 MG/DL     ALT (SGPT) 11Low  16 - 61 U/L     AST (SGOT) 9Low  10 - 38 U/L     Comment: PLEASE NOTE NEW REFERENCE RANGE       Alk.  phosphatase 65 45 - 117 U/L     Protein, total 7.2 6.4 - 8.2 g/dL     Albumin 2.8Low  3.4 - 5.0 g/dL     Globulin 4.4High  2.0 - 4.0 g/dL     A-G Ratio 0.6Low  0.8 - 1.7      SCANNED CARDIAC RHYTHM STRIP [[id number]] Collected: 08/03/19 1212   Order Status: Completed Updated: 08/03/19 1403   GLUCOSE, POC [[id number]] (Abnormal) Collected: 08/03/19 1225   Order Status: Completed Specimen: Whole Blood Updated: 08/03/19 1227    Glucose (POC) 129High  70 - 110 mg/dL     Comment: (NOTE)   The FDA has indicated that no capillary point of care blood glucose   monitoring systems are approved for use in \"critically ill\" patients,   however they have not defined this population. The College of   American Pathologists has recommended that these devices should not   be used in cases such as severe hypotension, dehydration, shock, and   hyperglycemic-hyperosmolar state, amongst others. Rudell Garcia or arterial   collection is the recommended specimen for testing these patients. GLUCOSE, POC [[id number]] (Abnormal) Collected: 08/02/19 1619   Order Status: Completed Specimen: Whole Blood Updated: 08/02/19 1622    Glucose (POC) 125High  70 - 110 mg/dL     Comment: (NOTE)   The FDA has indicated that no capillary point of care blood glucose   monitoring systems are approved for use in \"critically ill\" patients,   however they have not defined this population. The College of   American Pathologists has recommended that these devices should not   be used in cases such as severe hypotension, dehydration, shock, and   hyperglycemic-hyperosmolar state, amongst others. Rudell Garcia or arterial   collection is the recommended specimen for testing these patients. DWIGHT Jazmine Wong [facility] VIDEO [[id number]] Collected: 08/02/19 1417   Order Status: Completed Updated: 08/02/19 1524   Narrative:     EXAM: SWALLOWING FUNCTION: VIDEO (SPEECH)  CPT CODE: 03273    CLINICAL INDICATION/HISTORY: Dysphagia. Feeding difficulties following prior  CVA. Bedside exam with moderate oropharyngeal dysphagia with wet congested cough  post PO presentations.  Full MBS felt to be needed by the bedside exam.    COMPARISON: None. TECHNIQUE: A videoradiographic swallowing function study was performed in  conjunction with the speech therapist. Ileen Alpers patient was given barium-coated  meals of various textures.      FINDINGS:     Silent aspiration was seen with nectar and honey consistencies.  Pudding and  solid consistencies were swallowed without mallika penetration or aspiration.  A  barium tablet was also swallowed safely.  Oral and pharyngeal phase delay and  premature spillage was seen with all administered consistencies. Fluoroscopy time was 2.4 min. Number of fluorographic images was 0. Impression:     IMPRESSION:    Silent aspiration of nectar and honey consistencies.  Other tested consistencies  were swallowed safely. Please refer to the speech therapist report for more detail. GLUCOSE, POC [[id number]] Collected: 08/02/19 1116   Order Status: Completed Specimen: Whole Blood Updated: 08/02/19 1117    Glucose (POC) 102 70 - 110 mg/dL     Comment: (NOTE)   The FDA has indicated that no capillary point of care blood glucose   monitoring systems are approved for use in \"critically ill\" patients,   however they have not defined this population. The College of   American Pathologists has recommended that these devices should not   be used in cases such as severe hypotension, dehydration, shock, and   hyperglycemic-hyperosmolar state, amongst others. Sherra Cocking or arterial   collection is the recommended specimen for testing these patients. GLUCOSE, POC [[id number]] Collected: 08/02/19 0604   Order Status: Completed Specimen: Whole Blood Updated: 08/02/19 0607    Glucose (POC) 105 70 - 110 mg/dL     Comment: (NOTE)   The FDA has indicated that no capillary point of care blood glucose   monitoring systems are approved for use in \"critically ill\" patients,   however they have not defined this population. The College of   American Pathologists has recommended that these devices should not   be used in cases such as severe hypotension, dehydration, shock, and   hyperglycemic-hyperosmolar state, amongst others. Sherra Cocking or arterial   collection is the recommended specimen for testing these patients.        HEMOGLOBIN A1C [[id number]] (Abnormal) Collected: 08/02/19 0221   Order Status: Completed Specimen: Whole Blood Updated: 08/02/19 0342    Hemoglobin A1c 6.5High  4.2 - 5.6 %     Comment: (NOTE)   HbA1C Interpretive Ranges   <5.7              Normal   5.7 - 6.4         Consider Prediabetes   >6.5              Consider Diabetes        Est. average glucose 140 mg/dL     Comment: (NOTE)   The eAG should be interpreted with patient characteristics in mind   since ethnicity, interindividual differences, red cell lifespan,   variation in rates of glycation, etc. may affect the validity of the   calculation. LIPID PANEL [[id number]] Collected: 08/02/19 0221   Order Status: Completed Specimen: Serum or Plasma Updated: 08/02/19 0310    LIPID PROFILE           Cholesterol, total 93 <200 MG/DL     Triglyceride 67 <150 MG/DL     Comment: The drugs N-acetylcysteine (NAC) and   Metamiszole have been found to cause falsely   low results in this chemical assay. Please   be sure to submit blood samples obtained   BEFORE administration of either of these   drugs to assure correct results. HDL Cholesterol 46 40 - 60 MG/DL     LDL, calculated 33.6 0 - 100 MG/DL     VLDL, calculated 13.4 MG/DL     CHOL/HDL Ratio 2.0 0 - 5.0      MRI BRAIN WO CONT [[id number]] Collected: 08/01/19 2308   Order Status: Completed Updated: 08/01/19 2316   Narrative:     Brain MR without contrast    History: Presented with altered mental status, left-sided weakness; prior  history of stroke    Comparison: Current and prior head CT; prior MRI September 2014    Technique: Multisequence multiplanar MR imaging acquired through the brain. Includes diffusion imaging and heme sensitive imaging. Contrast used: None    Findings:     Parenchyma: Moderately pronounced atrophy, progressed since 2014 given sulcal  prominence. Moderately pronounced amount of chronic periventricular white matter  disease.  Remote lacunar infarcts at left basal ganglia, more cephalad bilateral  cerebral hemispheres, and within the fouzia. Superimposed upon this is the  possibility of a punctate focus of diffusion restriction at the medial margin of  the right cerebellar peduncle adjacent of the ventricle. Reference axial  diffusion images 9-10. There is suggestion of signal reduction on associated ADC  map. No acute hemorrhage. No mass lesion. CSF spaces: Ventricles and cisterns remain midline in position    IAC regions: Unremarkable    Parasellar region: Unremarkable    Vasculature: Appropriate flow voids within the major skull base vasculature. Cervicomedullary junction: Patent    Orbits: Unremarkable    Paranasal sinuses: Clear        Impression:     IMPRESSION:    1.  There is a tiny focus of acute/early subacute infarct at the posterior  medial right cerebellar peduncle. 2. Background of advanced atrophy, progressed, and moderately pronounced chronic  small vessel ischemic findings. 2. No hemorrhage or herniation. Thank you for enabling us to participate in the care of this patient. GLUCOSE, POC [[id number]] (Abnormal) Collected: 08/01/19 2157   Order Status: Completed Specimen: Whole Blood Updated: 08/01/19 2159    Glucose (POC) 131High  70 - 110 mg/dL     Comment: (NOTE)   The FDA has indicated that no capillary point of care blood glucose   monitoring systems are approved for use in \"critically ill\" patients,   however they have not defined this population. The College of   American Pathologists has recommended that these devices should not   be used in cases such as severe hypotension, dehydration, shock, and   hyperglycemic-hyperosmolar state, amongst others. Gerardine Daub or arterial   collection is the recommended specimen for testing these patients.        EKG, 12 LEAD, INITIAL [[id number]] Collected: 08/01/19 1551   Order Status: Completed Updated: 08/01/19 1716    Ventricular Rate 92 BPM     Atrial Rate 92 BPM     P-R Interval 148 ms     QRS Duration 84 ms     Q-T Interval 340 ms     QTC Calculation (Bezet) 420 ms     Calculated P Axis 60 degrees     Calculated R Axis 6 degrees     Calculated T Axis 57 degrees     Diagnosis --    Sinus rhythm with occasional premature ventricular complexes   Otherwise normal ECG   When compared with ECG of 12-NOV-2018 13:32,   premature ventricular complexes are now present   Confirmed by Nallely Baptiste MD, [place] ([id number]) on 8/1/2019 5:16:42 PM    CBC WITH AUTOMATED DIFF [[id number]] (Abnormal) Collected: 08/01/19 1540   Order Status: Completed Specimen: Whole Blood Updated: 08/01/19 1626    WBC 20.8High  4.6 - 13.2 K/uL     RBC 4. 52Low  4.70 - 5.50 M/uL     HGB 11.5Low  13.0 - 16.0 g/dL     HCT 34.2Low  36.0 - 48.0 %     MCV 75.7 74.0 - 97.0 FL     MCH 25.4 24.0 - 34.0 PG     MCHC 33.6 31.0 - 37.0 g/dL     RDW 18.4High  11.6 - 14.5 %     PLATELET 680 418 - 225 K/uL     MPV 9.8 9.2 - 11.8 FL     NEUTROPHILS 85High  42 - 75 %     LYMPHOCYTES 8Low  20 - 51 %     MONOCYTES 6 2 - 9 %     EOSINOPHILS 1 0 - 5 %     BASOPHILS 0 0 - 3 %     ABS. NEUTROPHILS 17.7High  1.8 - 8.0 K/UL     ABS. LYMPHOCYTES 1.7 0.8 - 3.5 K/UL     ABS. MONOCYTES 1.2High  0 - 1.0 K/UL     ABS. EOSINOPHILS 0.2 0.0 - 0.4 K/UL     ABS. BASOPHILS 0.0 0.0 - 0.06 K/UL     DF MANUAL       RBC COMMENTS --       ANISOCYTOSIS   1+    METABOLIC PANEL, COMPREHENSIVE [[id number]] (Abnormal) Collected: 08/01/19 1540   Order Status: Completed Specimen: Serum or Plasma Updated: 08/01/19 1619    Sodium 139 136 - 145 mmol/L     Potassium 3.9 3.5 - 5.5 mmol/L     Chloride 104 100 - 111 mmol/L     Comment: PLEASE NOTE NEW REFERENCE RANGE       CO2 24 21 - 32 mmol/L     Anion gap 11 3.0 - 18 mmol/L     Glucose 203High  74 - 99 mg/dL     BUN 40High  7.0 - 18 MG/DL     Creatinine 2. 51High  0.6 - 1.3 MG/DL     BUN/Creatinine ratio 16 12 - 20       GFR est AA 31Low  >60 ml/min/1.73m2     GFR est non-AA 25Low  >60 ml/min/1.73m2     Comment: (NOTE)   Estimated GFR is calculated using the Modification of Diet in Renal   Disease (MDRD) Study equation, reported for both  Americans   (GFRAA) and non- Americans (GFRNA), and normalized to 1.73m2   body surface area. The physician must decide which value applies to   the patient. The MDRD study equation should only be used in   individuals age 25 or older. It has not been validated for the   following: pregnant women, patients with serious comorbid conditions,   or on certain medications, or persons with extremes of body size,   muscle mass, or nutritional status. Calcium 9.7 8.5 - 10.1 MG/DL     Bilirubin, total 0.8 0.2 - 1.0 MG/DL     ALT (SGPT) 9Low  16 - 61 U/L     AST (SGOT) 5Low  10 - 38 U/L     Comment: PLEASE NOTE NEW REFERENCE RANGE       Alk. phosphatase 61 45 - 117 U/L     Protein, total 8. 3High  6.4 - 8.2 g/dL     Albumin 3.3Low  3.4 - 5.0 g/dL     Globulin 5.0High  2.0 - 4.0 g/dL     A-G Ratio 0.7Low  0.8 - 1.7      TROPONIN I [[id number]] Collected: 08/01/19 1540   Order Status: Completed Specimen: Serum or Plasma Updated: 08/01/19 1619    Troponin-I, QT 0.03 0.0 - 0.045 NG/ML     Comment: The presence of detectable troponin above the reference range indicates myocardial injury which may be due to ischemia, myocarditis, trauma, etc.   Clinical correlation is necessary to establish the significance of this finding. Sequential testing is recommended to determine if the typical rise and fall of cTnI is demonstrated. Note:  Cardiac troponin I has a relatively long half life and may be present well after the CK MB has returned to baseline. The reference range is based on the 99th percentile of the referent population.        MAGNESIUM [[id number]] Collected: 08/01/19 1540   Order Status: Completed Specimen: Serum or Plasma Updated: 08/01/19 1616    Magnesium 1.9 1.6 - 2.6 mg/dL    URINALYSIS W/ RFLX MICROSCOPIC [[id number]] (Abnormal) Collected: 08/01/19 1540   Order Status: Completed Specimen: Urine Updated: 08/01/19 1616    Color YELLOW       Appearance TURBID   Specific gravity 1.014 1.005 - 1.030       pH (UA) 8.5High  5.0 - 8.0       Protein >1,000Abnormal  NEG mg/dL     Glucose NEGATIVE  NEG mg/dL     Ketone TRACEAbnormal  NEG mg/dL     Bilirubin NEGATIVE  NEG       Blood MODERATEAbnormal  NEG       Urobilinogen 0.2 0.2 - 1.0 EU/dL     Nitrites NEGATIVE  NEG       Leukocyte Esterase LARGEAbnormal  NEG      URINE MICROSCOPIC ONLY [[id number]] (Abnormal) Collected: 08/01/19 1540   Order Status: Completed Specimen: Urine Updated: 08/01/19 1616    WBC 11 to 20 0 - 4 /hpf     RBC 0 to 3 0 - 5 /hpf     Epithelial cells FEW 0 - 5 /lpf     Bacteria 4+Abnormal  NEG /hpf    POC LACTIC ACID [[id number]] Collected: 08/01/19 1546   Order Status: Completed Specimen: Whole Blood Updated: 08/01/19 1550    Lactic Acid (POC) 1.20 0.40 - 2.00 mmol/L    XR CHEST SNGL V [[id number]] Collected: 08/01/19 1544   Order Status: Completed Updated: 08/01/19 1546   Narrative:     HISTORY: Altered mental status. EXAM: Chest.    TECHNIQUE: Single view AP portable upright chest.     COMPARISON: 11/12/2018. FINDINGS: There is no pneumothorax, pneumonia or pleural effusions. Heart and  mediastinal structures are unremarkable. . Visualized bony thorax and soft  tissues are within normal limits. Impression:      IMPRESSION:    1. No acute cardiopulmonary process. CT HEAD WO CONT [[id number]] Collected: 08/01/19 1538   Order Status: Completed Updated: 08/01/19 1543   Narrative:     HISTORY: CVA acute deficit. Alexa Tom EXAM: CT head. TECHNIQUE: Unenhanced spiral images of the head were performed from skullbase to  vertex with coronal and sagittal reconstruction. Compared to 10/20/2018.     All CT scans at this facility are performed using dose optimization technique as  appropriate to a performed exam, to include automated exposure control,  adjustment of the mA and/or kV according to patient size (including appropriate  matching for site-specific examinations), or use of iterative reconstruction  technique. FINDINGS: No acute intracranial hemorrhage, mass effect or midline structural  shift is demonstrated. Ventricles and basal cisterns are unremarkable. Moderate  burden of small vessel disease. Old basal ganglia infarcts. No extra-axial fluid  collection is seen. Visualized skull base and calvarium demonstrate no  abnormality. Impression:     IMPRESSION:    1. No acute intracranial hemorrhage, mass effect or midline structural shift. No  change. The above findings were discussed with Dr. Claudene Hawk by me over the phone at  the time and date of the dictation with read back. Follow-up with MRI is recommended if acute ischemia is suspected given  limitations of CT. GLUCOSE, POC [[id number]] (Abnormal) Collected: 08/01/19 1526   Order Status: Completed Specimen: Whole Blood Updated: 08/01/19 1535    Glucose (POC) 206High  70 - 110 mg/dL     Comment: (NOTE)   The FDA has indicated that no capillary point of care blood glucose   monitoring systems are approved for use in \"critically ill\" patients,   however they have not defined this population. The College of   American Pathologists has recommended that these devices should not   be used in cases such as severe hypotension, dehydration, shock, and   hyperglycemic-hyperosmolar state, amongst others. Susana Raisin or arterial   collection is the recommended specimen for testing these patients. Current Outpatient Medications   Medication Sig Dispense Refill    ciprofloxacin HCl (CIPRO) 500 mg tablet Take 1 Tab by mouth every twenty-four (24) hours. 10 Tab 0    carvedilol (COREG) 12.5 mg tablet TAKE 1 TABLET BY MOUTH TWICE A DAY  60 Tab 0    allopurinol (ZYLOPRIM) 100 mg tablet TAKE 1 TABLET BY MOUTH TWICE DAILY 60 Tab 0    rosuvastatin (CRESTOR) 20 mg tablet TAKE 1 TABLET BY MOUTH NIGHTLY 90 Tab 0    cholecalciferol, VITAMIN D3, (VITAMIN D3) 5,000 unit tab tablet Take 1 Tab by mouth every seven (7) days.  30 Tab 0    tamsulosin (FLOMAX) 0.4 mg capsule TAKE ONE CAPSULE BY MOUTH EVERY DAY AFTER MEALS 30 Cap 4    Insulin Needles, Disposable, (BD ULTRA-FINE MINI PEN NEEDLE) 31 gauge x 3/16\" ndle Inject TID before meals  E11.9 100 Pen Needle 5    insulin lispro (HUMALOG) 100 unit/mL injection For Blood Sugar (mg/dL) of:     Less than 150=0 units           150 -199=2 units  200 -249=4 units  250 -299=6 units  300 -349=8 units  350 and above=10 units  Check blood glucose 3 times daily and at bedtime and inject insulin per SS. 1 Vial 0    aspirin delayed-release 81 mg tablet Take 81 mg by mouth daily.  glucose 4 gram chewable tablet Take 4 Tabs by mouth as needed. 50 Tab 0    omega-3 fatty acids-vitamin e (FISH OIL) 1,000 mg Cap Take 1,400 mg by mouth daily.  OTHER Check CBC, CMP, Mg in 5 days, results to PCP 1 each 0      Allergies   Allergen Reactions    Bactrim [Sulfamethoxazole-Trimethoprim] Other (comments)     CAUSED ACUTE RENAL FAILURE       SUBJECTIVE:  Review of Systems   Constitutional: Negative for chills, diaphoresis, fever and malaise/fatigue. Eyes: Negative for blurred vision. Respiratory: Positive for cough. Negative for sputum production, shortness of breath and wheezing. Cardiovascular: Negative for chest pain, palpitations and leg swelling. Gastrointestinal: Negative for abdominal pain, diarrhea, nausea and vomiting. Genitourinary: Negative for dysuria, frequency and urgency. Urinary catheter in place, suprapubic   Musculoskeletal: Negative for falls and myalgias. Neurological: Negative for dizziness, tingling, sensory change and headaches. Psychiatric/Behavioral: Negative for depression and suicidal ideas. The patient is not nervous/anxious.       OBJECTIVE:  Visit Vitals  /77 (BP 1 Location: Left arm, BP Patient Position: Sitting)   Pulse 75   Temp 98.2 °F (36.8 °C) (Oral)   Resp 18   Ht 5' 7\" (1.702 m)   SpO2 98%   BMI 21.21 kg/m²     Physical Exam   Constitutional: He is oriented to person, place, and time and well-developed, well-nourished, and in no distress. HENT:   Head: Normocephalic. Eyes: Pupils are equal, round, and reactive to light. Conjunctivae and EOM are normal.   Neck: No thyromegaly present. Cardiovascular: Normal rate, regular rhythm and normal heart sounds. Pulmonary/Chest: Effort normal and breath sounds normal. He has no wheezes. He has no rales. He exhibits no tenderness. Abdominal: Soft. Bowel sounds are normal. He exhibits no distension. There is no tenderness. There is no guarding. Musculoskeletal: He exhibits no edema. Neurological: He is alert and oriented to person, place, and time. Pt ambulating via wheelchair   Skin: Skin is warm and dry. No erythema. Psychiatric: Mood and affect normal.   Nursing note and vitals reviewed. ASSESSMENT:  Diagnoses and all orders for this visit:    1. Hospital discharge follow-up    2. Medicare annual wellness visit, subsequent  -     NE ANNUAL ALCOHOL SCREEN [address]    3. Screening for alcoholism  -     NE ANNUAL ALCOHOL SCREEN 15 MIN    4. Screening for depression  -     DEPRESSION SCREEN ANNUAL    5. Sepsis due to urinary tract infection ([facility] [address]    Other orders  -     cholecalciferol, VITAMIN D3, (VITAMIN D3) 5,000 unit tab tablet; Take 1 Tab by mouth every seven (7) days. PLAN:  Continue current medication regimen  Medication refilled today    I have discussed the diagnosis with the patient and the intended plan as seen in the above orders. The patient has received an after-visit summary and questions were answered concerning future plans. I have discussed medication side effects and warnings with the patient as well. Patient will call for further questions. Follow-up and Dispositions    · Return in about 1 month (around 9/6/2019) for with PCP.        Nyla Ghosh NP

## 2019-08-12 RX ORDER — TAMSULOSIN HYDROCHLORIDE 0.4 MG/1
CAPSULE ORAL
Qty: 90 CAP | Refills: 1 | Status: SHIPPED | OUTPATIENT
Start: 2019-08-12 | End: 2019-10-01

## 2019-08-16 ENCOUNTER — OFFICE VISIT (OUTPATIENT)
Dept: UROLOGY | Age: 74
End: 2019-08-16

## 2019-08-16 VITALS
SYSTOLIC BLOOD PRESSURE: 144 MMHG | DIASTOLIC BLOOD PRESSURE: 82 MMHG | HEART RATE: 74 BPM | OXYGEN SATURATION: 98 % | BODY MASS INDEX: 21.21 KG/M2 | HEIGHT: 67 IN

## 2019-08-16 DIAGNOSIS — R33.9 URINARY RETENTION: Primary | ICD-10-CM

## 2019-08-16 DIAGNOSIS — C61 PROSTATE CANCER (HCC): ICD-10-CM

## 2019-08-16 NOTE — PROGRESS NOTES
Mr. Roxi Byers has a reminder for a \"due or due soon\" health maintenance. I have asked that he contact his primary care provider for follow-up on this health maintenance.

## 2019-08-16 NOTE — PROCEDURES
The suprapubic tube is changed with no difficulty. He will be seen again in 1 month for another catheter change.

## 2019-08-16 NOTE — PROGRESS NOTES
Chief Complaint   Patient presents with    Catheter Change     suprapubic       HISTORY OF PRESENT ILLNESS:  Trisha Loya is a 75 y.o. male who presents today for changing of his suprapubic tube. This is changed without difficulty. See procedure note for that. In talking with his son, the patient was seen and evaluated and even admitted to [facility] 1 August with the diagnosis of sepsis. Multiple blood cultures were negative and his urine culture of course grew out bacteria but the suprapubic tube is the source of that and has been for many years. He was weak on the right-hand side and the hospital staff sent felt that that was probably a light stroke or TIA. Since he has been discharged, the son reports that he is gaining strength back in that arm and so hopefully he will not be completely paretic in that arm. He is also taking Flomax and since he is unable to urinate per urethra at all, I have told the son to stop taking that. Some of his symptoms at the time of his admission were lightheadedness and Flomax can certainly cause that in certain people.            ROS    Past Medical History:   Diagnosis Date    Cataract     both    Colon polyp 9-2004    Diabetes ([facility] [address]     IDDM    DJD (degenerative joint disease) of knee     ED (erectile dysfunction)     Sparrow catheter in place     GERD (gastroesophageal reflux disease) 12-01-08    Glaucoma suspect     Gout, unspecified 10-25-06    Hyperlipidemia     Hypertension     Insomnia 7-20-06    Phimosis 6-16-00    Prostate cancer ([address] 3-3-09    Stroke ([facility] [address] 2007    weakness right side       Past Surgical History:   Procedure Laterality Date    HX POLYPECTOMY  9-2004    ID COLONOSCOPY FLX DX W/COLLJ [facility] WHEN PFRMD  9/1/2004    polyp/Dr Alvarado    ID COLONOSCOPY FLX DX W/COLLJ SPEC WHEN PFRMD  9-2007    incomplete; Dr. Scott Pierre       Social History     Tobacco Use    Smoking status: Former Smoker    Smokeless tobacco: Never Used   Substance Use Topics    Alcohol use: No    Drug use: No       Allergies   Allergen Reactions    Bactrim [Sulfamethoxazole-Trimethoprim] Other (comments)     CAUSED ACUTE RENAL FAILURE       Family History   Problem Relation Age of Onset    Hypertension Mother     Diabetes Father     Cancer Father         prostate    Heart Disease Father 70        CABG    Hypertension Sister     Hypertension Brother     Diabetes Brother        Current Outpatient Medications   Medication Sig Dispense Refill    tamsulosin (FLOMAX) 0.4 mg capsule TAKE ONE CAPSULE BY MOUTH EVERY DAY AFTER MEALS 90 Cap 1    cholecalciferol, VITAMIN D3, (VITAMIN D3) 5,000 unit tab tablet Take 1 Tab by mouth every seven (7) days. 30 Tab 2    carvedilol (COREG) 12.5 mg tablet TAKE 1 TABLET BY MOUTH TWICE A DAY 60 Tab 0    allopurinol (ZYLOPRIM) 100 mg tablet TAKE 1 TABLET BY MOUTH TWICE DAILY 60 Tab 0    rosuvastatin (CRESTOR) 20 mg tablet TAKE 1 TABLET BY MOUTH NIGHTLY 90 Tab 0    Insulin Needles, Disposable, (BD ULTRA-FINE MINI PEN NEEDLE) 31 gauge x 3/16\" ndle Inject TID before meals  E11.9 100 Pen Needle 5    insulin lispro (HUMALOG) 100 unit/mL injection For Blood Sugar (mg/dL) of:     Less than 150=0 units           150 -199=2 units  200 -249=4 units  250 -299=6 units  300 -349=8 units  350 and above=10 units  Check blood glucose 3 times daily and at bedtime and inject insulin per SS. 1 Vial 0    aspirin delayed-release 81 mg tablet Take 81 mg by mouth daily.  glucose 4 gram chewable tablet Take 4 Tabs by mouth as needed. 50 Tab 0    omega-3 fatty acids-vitamin e (FISH OIL) 1,000 mg Cap Take 1,400 mg by mouth daily.  ciprofloxacin HCl (CIPRO) 500 mg tablet Take 1 Tab by mouth every twenty-four (24) hours.  10 Tab 0    OTHER Check CBC, CMP, Mg in 5 days, results to PCP 1 each 0       Lab Results   Component Value Date/Time    Prostate Specific Ag 0.1 04/12/2019 08:00 AM    Prostate Specific Ag <0.1 04/19/2017 09:24 AM    Prostate Specific Ag <0.1 01/13/2016 12:20 PM    Prostate Specific Ag <0.1 09/17/2014 08:22 AM    Prostate Specific Ag 4.2 11/01/2008              PHYSICAL EXAMINATION:   Visit Vitals  /82 (BP 1 Location: Right arm, BP Patient Position: Sitting)   Pulse 74   Ht 5' 7\" (1.702 m)   SpO2 98%   BMI 21.21 kg/m²     Constitutional: WDWN, Pleasant and appropriate affect, No acute distress. CV:  No peripheral swelling noted  Respiratory: No respiratory distress or difficulties  Abdomen:  No abdominal masses or tenderness. No CVA tenderness. No inguinal hernias noted. Suprapubic tube is changed with no incident. See procedure note.  Male:    Deferred today skin: No jaundice. Neuro/Psych:  Alert and oriented x 3, affect appropriate. Lymphatic:   No enlarged inguinal lymph nodes. REVIEW OF LABS AND IMAGING:     Imaging Report Reviewed? NO     Images Reviewed? NO           Other Lab Data Reviewed? YES         ASSESSMENT:     ICD-10-CM ICD-9-CM    1. Urinary retention R33.9 788.20 CHANGE SUPRAPUBIC TUBE   2. Prostate cancer ([facility] [address] [id number]             PLAN / DISCUSSION: : Continue suprapubic tube drainage but stop the Flomax. See him again in 1 month for catheter change. The patient expresses understanding and agreement of the discussion and plan. Monty Villa MD on 8/16/2019         Please note:  Voice recognition was used to generate this report, which may have resulted in some phonetic based errors in grammar and contents. Even though attempts were made to correct all the mistakes, some may have been missed, and remained in the body of the document.

## 2019-08-16 NOTE — PATIENT INSTRUCTIONS
Urinary Retention: Care Instructions  Your Care Instructions    Urinary retention means that you aren't able to urinate. In men, it is often caused by a blockage of the urinary tract from an enlarged prostate gland. In men and women, it can also be caused by an infection or nerve damage. Or it may be a side effect of a medicine. The doctor may have drained the urine from your bladder. If you still have problems passing urine, you may need to use a catheter at home. This is used to empty your bladder until the problem can be fixed. Your doctor may put a catheter in your bladder before you go home. If so, he or she will tell you when to come back to have the catheter removed. The doctor has checked you closely. But problems can develop later. If you notice any problems or new symptoms, get medical treatment right away. Follow-up care is a key part of your treatment and safety. Be sure to make and go to all appointments, and call your doctor if you are having problems. It's also a good idea to know your test results and keep a list of the medicines you take. How can you care for yourself at home? · Take your medicines exactly as prescribed. Call your doctor if you think you are having a problem with your medicine. You will get more details on the specific medicines your doctor prescribes. · Check with your doctor before you use any over-the-counter medicines. Many cold and allergy medicines, for example, can make this problem worse. Make sure your doctor knows all of the medicines, vitamins, supplements, and herbal remedies you take. · Spread out through the day the amount of fluid you drink. Do not drink a lot at bedtime. · Avoid alcohol and caffeine. · If you have been given a catheter, or if one is already in place, follow the instructions you were given. Always wash your hands before and after you handle the catheter. When should you call for help?   Call your doctor now or seek immediate medical care if:    · You cannot urinate at all, or it is getting harder to urinate.     · You have symptoms of a urinary tract infection. These may include:  ? Pain or burning when you urinate. ? A frequent need to urinate without being able to pass much urine. ? Pain in the flank, which is just below the rib cage and above the waist on either side of the back. ? Blood in your urine. ? A fever.    Watch closely for changes in your health, and be sure to contact your doctor if:    · You have any problems with your catheter.     · You do not get better as expected. Where can you learn more? Go to http://theresa-mumtaz.info/. Enter M244 in the search box to learn more about \"Urinary Retention: Care Instructions. \"  Current as of: December 19, 2018  Content Version: 12.1  © 6792-0421 [employer]. Care instructions adapted under license by [employer] (which disclaims liability or warranty for this information). If you have questions about a medical condition or this instruction, always ask your healthcare professional. [place] [address] any warranty or liability for your use of this information.

## 2019-08-20 NOTE — PROGRESS NOTES
Internal Medicine Progress note       Patient: Alvaro Farah HJUF:     : 1973 Attending: Lance Garcia MD   51year old male 8/3/2019          CC:     Recent Events/Subjective:     Resting in bed  Complaints of persistent cough and unable to sleep at night  Some dyspnea on exertion  Minimal shortness of breath at rest  On nasal cannula, 2 L  Pain controlled  Remains NPO, on TPN  NG tube LOS, output remains elevated  Fevers persist, increased leukocytosis at 20,000 today, lactate noted normal               Vital Last Value 24 Hour Range   Temperature (!) 102.1 Â°F (38.9 Â°C) Temp  Min: 99 Â°F (37.2 Â°C)  Max: 102.4 Â°F (39.1 Â°C)   Pulse 104 Pulse  Min: 92  Max: 133   Respiratory 24 Resp  Min: 24  Max: 26   Blood Pressure 116/69 BP  Min: 88/55  Max: 156/86   Arterial /80 No data recorded   O2 Sat 2 L/min NA   Pulse Oximetry 97 % SpO2  Min: 96 %  Max: 100 %     Vital Today Admitted   Weight 65.5 kg Weight: 65.6 kg   BMI N/A BMI (Calculated): 22.32          Last I/O 3 shifts  I/O last 3 completed shifts:   In: 2949 [I.V.:2292; IV Piggyback:226]  Out: 9543 [Urine:800; Drains:3550]    Physical Exam:       General appearance: Awake/comfortable: family at bedside  Head: Normocephalic,  Atraumatic, no obvious abnormalities   Neck: supple, full ROM, + JVD, trachea midline  Lungs-Good Air entry B/L  Heart: RRR and S1, S2 normal, no murmur, gallop or rub   Abdomen:  , round, distended, hypoactive   Extremities: Non tender minimal edema  Pulses:+1- and symmetric radial, dorsalis pedis   Skin: WDI, no rashes, lesions or wounds   Neurologic: awake, comfortable: nonfocal, oriented x3, no confusion at present      Labs  Recent Labs   Lab 19  0355 19  1115 19  0950 19  0335 19  0438 19  2322  19  0440  19  0535  08/15/19  0525   WBC 20.6*  --   --  13.6* 9.9 9.4  --  8.3  --  10.2  --  13.6*   HGB 10.8*  --   --  8.4* 11.5* 11.3*  --  11.8*  --  11.5*  --  11.4* This is the Subsequent Medicare Annual Wellness Exam, performed 12 months or more after the Initial AWV or the last Subsequent AWV    I have reviewed the patient's medical history in detail and updated the computerized patient record. History     Past Medical History:   Diagnosis Date    Cataract     both    Colon polyp 9-2004    Diabetes ([facility] [address]     IDDM    DJD (degenerative joint disease) of knee     ED (erectile dysfunction)     Sparrow catheter in place     GERD (gastroesophageal reflux disease) 12-01-08    Glaucoma suspect     Gout, unspecified 10-25-06    Hyperlipidemia     Hypertension     Insomnia 7-20-06    Phimosis 6-16-00    Prostate cancer ([facility] [address] 3-3-09    Stroke ([facility] [address] 2007    weakness right side      Past Surgical History:   Procedure Laterality Date    HX POLYPECTOMY  9-2004    AL COLONOSCOPY FLX DX W/COLLJ SPEC WHEN PFRMD  9/1/2004    polyp/Dr Alvarado    AL COLONOSCOPY FLX DX W/COLLJ SPEC WHEN PFRMD  9-2007    incomplete; Dr. Shady Eli     Current Outpatient Medications   Medication Sig Dispense Refill    cholecalciferol, VITAMIN D3, (VITAMIN D3) 5,000 unit tab tablet Take 1 Tab by mouth every seven (7) days. 30 Tab 2    ciprofloxacin HCl (CIPRO) 500 mg tablet Take 1 Tab by mouth every twenty-four (24) hours.  10 Tab 0    carvedilol (COREG) 12.5 mg tablet TAKE 1 TABLET BY MOUTH TWICE A DAY 60 Tab 0    allopurinol (ZYLOPRIM) 100 mg tablet TAKE 1 TABLET BY MOUTH TWICE DAILY 60 Tab 0    rosuvastatin (CRESTOR) 20 mg tablet TAKE 1 TABLET BY MOUTH NIGHTLY 90 Tab 0    tamsulosin (FLOMAX) 0.4 mg capsule TAKE ONE CAPSULE BY MOUTH EVERY DAY AFTER MEALS 30 Cap 4    Insulin Needles, Disposable, (BD ULTRA-FINE MINI PEN NEEDLE) 31 gauge x 3/16\" ndle Inject TID before meals  E11.9 100 Pen Needle 5    insulin lispro (HUMALOG) 100 unit/mL injection For Blood Sugar (mg/dL) of:     Less than 150=0 units           150 -199=2 units  200 -249=4 units  250 -299=6 units  300 -349=8 units  350 HCT 33.7*  --   --  27.5* 37.9* 36.2*  --  38.5*  --  36.0*  --  35.1*     --   --  184 305 307  --  336  --  294  --  222   SEG 85  --   --   --   --   --   --  68  --  77  --  86   LYMP  --   --   --  6 9  --   --   --   --   --   --   --    SODIUM 140 139  --  130* 141 140  --  146*  --  144  --  141   POTASSIUM 3.8 4.2 4.0 3.1* 4.1 3.9   < > 3.2*   < > 3.2*   < > 3.5   CHLORIDE 107 107  --  102 111* 107  --  110*  --  108*  --  105   CO2 26 25  --  20* 23 24  --  28  --  27  --  28   ANIONGAP 11 11  --  11 11 13  --  11  --  12  --  12   BUN 9 9  --  7 17 18  --  17  --  13  --  20   CREATININE 0.74 0.78  --  0.70 0.94 1.00  --  0.93  --  0.76  --  0.84   GFRNA >90 >90  --  >90 >90 89  --  >90  --  >90  --  >90   GFRA >90 >90  --  >90 >90 >90  --  >90  --  >90  --  >90   GLUCOSE 131* 100*  --  719* 79 83  --  88  --  87  --  104*   CALCIUM 8.6 8.2*  --  6.5* 8.6 8.7  --  9.0  --  8.7  --  8.5   ALBUMIN 2.5* 2.5*  --   --  2.8*  --   --  2.9*  --   --   --  2.6*   PHOS 3.5  --   --   --   --   --   --  4.8*  --   --   --  3.0   MG 1.7  --   --   --  1.4* 1.5*  --  2.1   < >  --   --  1.3*   AST 15 12  --   --  17  --   --  35  --   --   --  35   GPT 16 19  --   --  25  --   --  33  --   --   --  29   ALKPT 103 91  --   --  90  --   --  97  --   --   --  184*   BILIRUBIN 0.5 1.0  --   --  0.9  --   --  0.8  --   --   --  1.3*    < > = values in this interval not displayed.          Medications/Infusions:       Scheduled:   â¢ meropenem (MERREM) IVPB  500 mg Intravenous 4 times per day   â¢ fat emulsion  250 mL Intravenous Q24H   â¢ melatonin  3 mg Oral Nightly   â¢ predniSONE  10 mg Oral Daily with breakfast   â¢ guaifenesin  200 mg Oral Q4H   â¢ pregabalin  100 mg Oral 2 times per day   â¢ lactobacillus acidophilus  2 tablet Oral BID   â¢ lisinopril  5 mg Oral Daily   â¢ traZODone  200 mg Oral Nightly   â¢ metoCLOPramide  5 mg Intravenous 4 times per day   â¢ sodium chloride (PF)  10 mL Injection 3 times per day   â¢ and above=10 units  Check blood glucose 3 times daily and at bedtime and inject insulin per SS. 1 Vial 0    aspirin delayed-release 81 mg tablet Take 81 mg by mouth daily.  glucose 4 gram chewable tablet Take 4 Tabs by mouth as needed. 50 Tab 0    omega-3 fatty acids-vitamin e (FISH OIL) 1,000 mg Cap Take 1,400 mg by mouth daily.       OTHER Check CBC, CMP, Mg in 5 days, results to PCP 1 each 0     Allergies   Allergen Reactions    Bactrim [Sulfamethoxazole-Trimethoprim] Other (comments)     CAUSED ACUTE RENAL FAILURE     Family History   Problem Relation Age of Onset    Hypertension Mother     Diabetes Father     Cancer Father         prostate    Heart Disease Father 70        CABG    Hypertension Sister     Hypertension Brother     Diabetes Brother      Social History     Tobacco Use    Smoking status: Former Smoker    Smokeless tobacco: Never Used   Substance Use Topics    Alcohol use: No     Patient Active Problem List   Diagnosis Code    Essential hypertension I10    Type I (juvenile type) diabetes mellitus with renal manifestations, not stated as uncontrolled(250.41) ([facility] [address] E10.29    Hypercholesterolemia E78.00    Gout M10.9    GERD (gastroesophageal reflux disease) K21.9    Vitamin D deficiency E55.9    Stroke ([facility] [address] I63.9    Prostate cancer ([facility] [address] C61    Acute encephalopathy G93.40    Hypoglycemia E16.2    Need for Tdap vaccination Z23    Proteinuria R80.9    CKD (chronic kidney disease) stage 3, GFR 30-59 ml/min ([facility] N18.3    Diabetes mellitus type 2, controlled ([facility] [address] E11.9    Advance care planning Z71.89    Encounter for immunization Z23    Need for pneumococcal vaccine Z23    Encounter for screening colonoscopy Z12.11    Medicare annual wellness visit, subsequent Z00.00    History of CVA (cerebrovascular accident) Z80.78    Pneumonia J18.9   [facility] discharge follow-up Z09    Gastrointestinal hemorrhage associated with duodenitis K29.81    Need for influenza pantoprazole  40 mg Intravenous Q12H   â¢ fluticasone-umeclidinium-vilanterol  1 puff Inhalation Daily Resp   â¢ sodium chloride (PF)  2 mL Injection 2 times per day       Continuous Infusions:   â¢ parenteral nutrition adult standard (61 to 76 kg) - For [employer]     â¢ dextrose 10 % infusion     â¢ parenteral nutrition adult standard (61 to 76 kg) - For [facility] [employer] 41.5 mL/hr at 08/20/19 0704   â¢ lactated ringers infusion 75 mL/hr at 08/20/19 0428   â¢ implanted intrathecal pump     â¢ dextrose 5 % infusion     â¢ sodium chloride 0.9% infusion 10 mL/hr at 08/20/19 0430     . Radiology/Imaging: reviewed              Assessment:     Acute GI bleeding, hemorrhagic shock, upper GI/esophageal bleed, s/p IR embolization   Septic shock-E coli bacteremia: urine vs biliary source-resolved  Ongoing fevers: multifactorial  Lumbar seroma r/o pseudomeningocele  Acute renal failure: from above-resolved  Thrombocytopenia: Sepsis/ETOH+/- mild cirrhosis  Intractable LBP s/p IT Ziconotide pump  Langerhans histiocytosis-in remission  Hx of ETOH pancreatitis  LV DFx-likely stress induced  Atrial fib-paroxysmal with RVR  Partial SBO? Hx of PUD    Plan & Recommendations:   TPN   NPO  NGT LIS  Follow abdominal exam  Surgical follow-up  WBC scan pending  Monitor fevers  ID follow up   abx therapy   Follow labs, monitor leukocytosis   Rehab efforts   Monitor HGB, hold anticoagulation   Pain control   ICU monitoring         Fanny Knott NP  8/20/2019       As above. Tolerating TPN. Await WBC scan results. Agree with GI recommendations. High-risk surgical candidate-would try and avoid laparotomy. vaccination Z23    Hyperglycemia due to type 2 diabetes mellitus (HCC) E11.65    Cough R05    Benign prostatic hyperplasia with urinary retention N40.1, R33.8    Type 2 diabetes with nephropathy (HCC) E11.21    Dehydration E86.0    Generalized weakness R53.1    Failure to thrive in adult R62.7    Acute on chronic renal failure (HCC) N17.9, N18.9    Poor conditioning Z78.9    Indwelling Sparrow catheter present Z96.0    Other constipation K59.09    Hyperkalemia E87.5    Sepsis ([address] A41.9       Depression Risk Factor Screening:     3 most recent PHQ Screens 8/2/2019   PHQ Not Done Medical Reason (indicate in comments)   Little interest or pleasure in doing things Not at all   Feeling down, depressed, irritable, or hopeless Not at all   Total Score PHQ 2 0     Alcohol Risk Factor Screening: You do not drink alcohol or very rarely. Functional Ability and Level of Safety:   Hearing Loss  Hearing is good. Activities of Daily Living  The home contains: handrails, grab bars and bath seats, walker, wheelchair  Patient needs help with:  transportation, preparing meals, managing medications, dressing and bathing    Fall Risk  Fall Risk Assessment, last 12 mths 3/7/2019   Able to walk? Yes   Fall in past 12 months? No   Fall with injury? -   Number of falls in past 12 months -   Fall Risk Score -       Abuse Screen  Patient is not abused    Cognitive Screening   Evaluation of Cognitive Function:  Has your family/caregiver stated any concerns about your memory: yes  Normal    Patient Care Team   Patient Care Team:  Manfred Gastelum NP as PCP - General (Nurse Practitioner)  Lawson Willett MD as Physician (Urology)  Kayla Cherry MD -Nephrologist    Assessment/Plan   Education and counseling provided:  Are appropriate based on today's review and evaluation    Diagnoses and all orders for this visit:    1.  Medicare annual wellness visit, subsequent  -     [place] ANNUAL ALCOHOL SCREEN [address] ANNUAL    2. Screening for alcoholism  -     [place] ANNUAL ALCOHOL SCREEN 15 MIN    3. Screening for depression  -     DEPRESSION SCREEN ANNUAL    Other orders  -     cholecalciferol, VITAMIN D3, (VITAMIN D3) 5,000 unit tab tablet; Take 1 Tab by mouth every seven (7) days.         Health Maintenance Due   Topic Date Due    Shingrix Vaccine Age 49> (1 of 2) 08/31/1994    DTaP/Tdap/Td series (1 - Tdap) 08/16/2013    COLONOSCOPY  09/01/2017    GLAUCOMA SCREENING Q2Y  04/28/2018    EYE EXAM RETINAL OR DILATED  04/28/2018    FOOT EXAM Q1  10/18/2018    MICROALBUMIN Q1  06/04/2019    MEDICARE YEARLY EXAM  06/05/2019

## 2019-08-27 DIAGNOSIS — I10 ESSENTIAL HYPERTENSION: ICD-10-CM

## 2019-08-27 DIAGNOSIS — M10.00 IDIOPATHIC GOUT, UNSPECIFIED CHRONICITY, UNSPECIFIED SITE: ICD-10-CM

## 2019-08-30 RX ORDER — ALLOPURINOL 100 MG/1
TABLET ORAL
Qty: 180 TAB | Refills: 1 | Status: SHIPPED | OUTPATIENT
Start: 2019-08-30 | End: 2020-03-13 | Stop reason: SDUPTHER

## 2019-08-30 RX ORDER — CARVEDILOL 12.5 MG/1
TABLET ORAL
Qty: 180 TAB | Refills: 1 | Status: SHIPPED | OUTPATIENT
Start: 2019-08-30 | End: 2020-03-13 | Stop reason: SDUPTHER

## 2019-10-01 ENCOUNTER — OFFICE VISIT (OUTPATIENT)
Dept: FAMILY MEDICINE CLINIC | Age: 74
End: 2019-10-01

## 2019-10-01 ENCOUNTER — OFFICE VISIT (OUTPATIENT)
Dept: UROLOGY | Age: 74
End: 2019-10-01

## 2019-10-01 VITALS
DIASTOLIC BLOOD PRESSURE: 61 MMHG | HEART RATE: 53 BPM | SYSTOLIC BLOOD PRESSURE: 135 MMHG | OXYGEN SATURATION: 100 % | HEIGHT: 67 IN | RESPIRATION RATE: 20 BRPM | WEIGHT: 124.6 LBS | BODY MASS INDEX: 19.56 KG/M2 | TEMPERATURE: 98.4 F

## 2019-10-01 VITALS — HEART RATE: 57 BPM | BODY MASS INDEX: 21.21 KG/M2 | HEIGHT: 67 IN | OXYGEN SATURATION: 100 %

## 2019-10-01 DIAGNOSIS — Z23 ENCOUNTER FOR IMMUNIZATION: ICD-10-CM

## 2019-10-01 DIAGNOSIS — R33.9 URINARY RETENTION: Primary | ICD-10-CM

## 2019-10-01 DIAGNOSIS — E78.00 HYPERCHOLESTEROLEMIA: ICD-10-CM

## 2019-10-01 DIAGNOSIS — E11.65 TYPE 2 DIABETES MELLITUS WITH HYPERGLYCEMIA, WITHOUT LONG-TERM CURRENT USE OF INSULIN (HCC): Primary | ICD-10-CM

## 2019-10-01 DIAGNOSIS — I10 ESSENTIAL HYPERTENSION: ICD-10-CM

## 2019-10-01 RX ORDER — CHOLECALCIFEROL TAB 125 MCG (5000 UNIT) 125 MCG
5000 TAB ORAL
Qty: 30 TAB | Refills: 2 | Status: SHIPPED | OUTPATIENT
Start: 2019-10-01

## 2019-10-01 RX ORDER — INSULIN LISPRO 100 [IU]/ML
INJECTION, SOLUTION INTRAVENOUS; SUBCUTANEOUS
Qty: 1 VIAL | Refills: 0
Start: 2019-10-01

## 2019-10-01 NOTE — PROGRESS NOTES
NARRATIVE:  The patient is a 69-year-old -American male who is known to this office being managed for urinary retention with suprapubic tube. The patient has not had any significant issues                      IMPRESSION:  Urinary retention      PLAN:  The patient 12 Arabic suprapubic tube is changed out in routine fashion and the patient will return in 1 month    This visit exceeded 10 minutes and greater than 50% was counseling. The patient expresses understanding of the treatment plan and wishes to proceed    This dictation used voice recognition software and there may be mistakes.     Shane Guy MD

## 2019-10-01 NOTE — PROGRESS NOTES
Patient here for a cholesterol problem/hypertension/diabetes Follow up. 1. Have you been to the ER, urgent care clinic since your last visit? Hospitalized since your last visit? No    2. Have you seen or consulted any other health care providers outside of the [address] [place] [address] since your last visit? Include any pap smears or colon screening.  No

## 2019-10-01 NOTE — PROGRESS NOTES
HISTORY OF PRESENT ILLNESS  Josiah Bowman is a 75 y.o. male. HPI  Patient is here today for evaluation and treatment of: Diabetes/Hypertension/Cholesterol Problem    Diabetes- blood sugars have been ranging in the  90s-120s; Last A1c was controlled  Lab Results   Component Value Date/Time    Hemoglobin A1c 6.5 (H) 08/02/2019 02:21 AM     HTN:   BP stable. Continues on meds as listed below; Hypercholesterolemia:  On crestor; takes med daily. Attempts a lower cholesterol diet. Pt has a h/o urinary retention form Prostate cancer treatment. He has a suprapubic catheter in place. Current Outpatient Medications:     insulin lispro (HUMALOG) 100 unit/mL injection, For Blood Sugar (mg/dL) of:    Less than 150=0 units          150 -199=2 units 200 -249=4 units 250 -299=6 units 300 -349=8 units 350 and above=10 units Check blood glucose 3 times daily and at bedtime and inject insulin per SS., Disp: 1 Vial, Rfl: 0    cholecalciferol, VITAMIN D3, (VITAMIN D3) 5,000 unit tab tablet, Take 1 Tab by mouth every seven (7) days. , Disp: 30 Tab, Rfl: 2    carvedilol (COREG) 12.5 mg tablet, TAKE 1 TABLET BY MOUTH TWICE A DAY, Disp: 180 Tab, Rfl: 1    allopurinol (ZYLOPRIM) 100 mg tablet, TAKE 1 TABLET BY MOUTH TWICE DAILY, Disp: 180 Tab, Rfl: 1    rosuvastatin (CRESTOR) 20 mg tablet, TAKE 1 TABLET BY MOUTH NIGHTLY, Disp: 90 Tab, Rfl: 0    Insulin Needles, Disposable, (BD ULTRA-FINE MINI PEN NEEDLE) 31 gauge x 3/16\" ndle, Inject TID before meals  E11.9, Disp: 100 Pen Needle, Rfl: 5    aspirin delayed-release 81 mg tablet, Take 81 mg by mouth daily. , Disp: , Rfl:     glucose 4 gram chewable tablet, Take 4 Tabs by mouth as needed. , Disp: 50 Tab, Rfl: 0    omega-3 fatty acids-vitamin e (FISH OIL) 1,000 mg Cap, Take 1,400 mg by mouth daily. , Disp: , Rfl:     OTHER, Check CBC, CMP, Mg in 5 days, results to PCP, Disp: 1 each, Rfl: 0    PMH,  Meds, Allergies, Family History, Social history reviewed    Review of Systems   Constitutional: Negative for chills and fever. Cardiovascular: Negative for chest pain and palpitations. Physical Exam   Visit Vitals  /61 (BP 1 Location: Right arm, BP Patient Position: Sitting)   Pulse (!) 53   Temp 98.4 °F (36.9 °C) (Oral)   Resp 20   Ht 5' 7\" (1.702 m)   Wt 124 lb 9.6 oz (56.5 kg)   SpO2 100%   BMI 19.52 kg/m²     General appearance: alert, cooperative, no distress, appears stated age  Neck: supple, symmetrical, trachea midline, no adenopathy, thyroid: not enlarged, symmetric, no tenderness/mass/nodules, no carotid bruit and no JVD  Lungs: clear to auscultation bilaterally  Heart: regular rate and rhythm, S1, S2 normal, no murmur, click, rub or gallop    Extremities: extremities normal, atraumatic, no cyanosis or edema    Lab Results   Component Value Date/Time    Cholesterol, total 93 08/02/2019 02:21 AM    HDL Cholesterol 46 08/02/2019 02:21 AM    LDL, calculated 33.6 08/02/2019 02:21 AM    VLDL, calculated 13.4 08/02/2019 02:21 AM    Triglyceride 67 08/02/2019 02:21 AM    CHOL/HDL Ratio 2.0 08/02/2019 02:21 AM     Lab Results   Component Value Date/Time    Sodium 143 08/03/2019 01:30 PM    Potassium 4.0 08/03/2019 01:30 PM    Chloride 111 08/03/2019 01:30 PM    CO2 24 08/03/2019 01:30 PM    Anion gap 8 08/03/2019 01:30 PM    Glucose 115 (H) 08/03/2019 01:30 PM    BUN 44 (H) 08/03/2019 01:30 PM    Creatinine 2.28 (H) 08/03/2019 01:30 PM    BUN/Creatinine ratio 19 08/03/2019 01:30 PM    GFR est AA 34 (L) 08/03/2019 01:30 PM    GFR est non-AA 28 (L) 08/03/2019 01:30 PM    Calcium 9.2 08/03/2019 01:30 PM     Lab Results   Component Value Date/Time    Hemoglobin A1c 6.5 (H) 08/02/2019 02:21 AM       ASSESSMENT and PLAN    ICD-10-CM ICD-9-CM    1. Type 2 diabetes mellitus with hyperglycemia, without long-term current use of insulin (HCC) E11.65 250.00      790.29    2. Essential hypertension I10 401.9    3. Hypercholesterolemia E78.00 272.0    4.  Encounter for immunization Z23 V03.89 INFLUENZA VACCINE INACTIVATED (IIV), SUBUNIT, ADJUVANTED, IM      PNEUMOCOCCAL POLYSACCHARIDE VACCINE, 23-VALENT, ADULT OR IMMUNOSUPPRESSED PT DOSE,       As above   above all stable unless otherwise noted   treatment plan as listed below  Orders Placed This Encounter    Influenza Vaccine Inactivated (IIV) (FLUAD), Subunit, Adjuvanted, IM (99574)    Pneumococcal polysaccharide vaccine, 23-valent, adult or immunosuppressed pt dose    insulin lispro (HUMALOG) 100 unit/mL injection    cholecalciferol, VITAMIN D3, (VITAMIN D3) 5,000 unit tab tablet     Flu shot today  Follow-up and Dispositions    · Return in about 4 months (around 2/1/2020) for htn/chol/DM. An After Visit Summary was printed and given to the patient. This has been fully explained to the patient, who indicates understanding.   Follow up with consultants/urology as scheduled

## 2019-10-01 NOTE — PATIENT INSTRUCTIONS

## 2019-10-01 NOTE — PATIENT INSTRUCTIONS
Suprapubic Catheter Care: Care Instructions  Your Care Instructions  A suprapubic catheter is a thin tube placed into your bladder just above the pubic bone. The tube allows urine to drain out of your bladder. The urine collects in a bag attached to the tube. The bag is usually attached to your leg. Sometimes the catheter tube has a valve that lets you drain the urine into the toilet or other container. You may need a suprapubic catheter if you have nerve damage, a problem with your urinary tract, or a disease that weakens your muscles. Having a catheter for a long time increases the risk of getting a urinary tract infection. So catheter care focuses on preventing infection. Follow-up care is a key part of your treatment and safety. Be sure to make and go to all appointments, and call your doctor if you are having problems. It's also a good idea to know your test results and keep a list of the medicines you take. How can you care for yourself at home? · Wash your hands before you handle the catheter. · Clean the area around the catheter with soap and water at least one time every day. Wash the area with soap and water after every bowel movement. · Keep the drainage bag lower than your bladder to keep urine from backing up. · Clean the bag every day after removing it from the catheter. Use another container while you clean the bag. To clean the bag, fill it with 2 parts vinegar to 3 parts water and let it stand for 20 minutes. Then empty it out, and let it air dry. · Empty the drainage bag when it is full or at least every 8 hours. When should you call for help? Call your doctor now or seek immediate medical care if:    · Your catheter becomes blocked and urine does not collect in the drainage bag.     · Your catheter leaks.     · You have blood or pus in your urine.     · You have pain in your back just below your rib cage.  This is called flank pain.     · You have a fever, chills, or body aches.     · You have groin or belly pain.     · Your urine is cloudy or smells bad.     · You have pain, increasing redness, or bleeding around the catheter.     · You have swelling around the catheter or in your belly.    Watch closely for changes in your health, and be sure to contact your doctor if you have any problems. Where can you learn more? Go to http://theresa-mumtaz.info/. Enter A746 in the search box to learn more about \"Suprapubic Catheter Care: Care Instructions. \"  Current as of: December 19, 2018  Content Version: 12.2  © 9857-4679 [employer]. Care instructions adapted under license by [employer] (which disclaims liability or warranty for this information). If you have questions about a medical condition or this instruction, always ask your healthcare professional. [address] any warranty or liability for your use of this information.

## 2019-10-01 NOTE — PROGRESS NOTES
Mr. Gerhard Dunlap has a reminder for a \"due or due soon\" health maintenance. I have asked that he contact his primary care provider for follow-up on this health maintenance.

## 2019-10-31 ENCOUNTER — OFFICE VISIT (OUTPATIENT)
Dept: FAMILY MEDICINE CLINIC | Age: 74
End: 2019-10-31

## 2019-10-31 VITALS
RESPIRATION RATE: 18 BRPM | OXYGEN SATURATION: 97 % | TEMPERATURE: 98.5 F | WEIGHT: 128.2 LBS | HEIGHT: 67 IN | BODY MASS INDEX: 20.12 KG/M2 | SYSTOLIC BLOOD PRESSURE: 158 MMHG | DIASTOLIC BLOOD PRESSURE: 68 MMHG | HEART RATE: 60 BPM

## 2019-10-31 DIAGNOSIS — I10 ESSENTIAL HYPERTENSION: Primary | ICD-10-CM

## 2019-10-31 DIAGNOSIS — N18.30 CKD (CHRONIC KIDNEY DISEASE) STAGE 3, GFR 30-59 ML/MIN (HCC): ICD-10-CM

## 2019-10-31 NOTE — PROGRESS NOTES
HISTORY OF PRESENT ILLNESS  Jazmine Farmer is a 75 y.o. male. HPI  Patient is here today for evaluation and treatment of: HTN and CKD - stage 3 . His son is with with him and his his primary caregiver for assistance with ADLs. He is in in need of [facility] paperwork completed to assist him with caring for his dad. Pt has been well; He has no new complaints. Current Outpatient Medications:     insulin lispro (HUMALOG) 100 unit/mL injection, For Blood Sugar (mg/dL) of:    Less than 150=0 units          150 -199=2 units 200 -249=4 units 250 -299=6 units 300 -349=8 units 350 and above=10 units Check blood glucose 3 times daily and at bedtime and inject insulin per SS., Disp: 1 Vial, Rfl: 0    cholecalciferol, VITAMIN D3, (VITAMIN D3) 5,000 unit tab tablet, Take 1 Tab by mouth every seven (7) days. , Disp: 30 Tab, Rfl: 2    carvedilol (COREG) 12.5 mg tablet, TAKE 1 TABLET BY MOUTH TWICE A DAY, Disp: 180 Tab, Rfl: 1    allopurinol (ZYLOPRIM) 100 mg tablet, TAKE 1 TABLET BY MOUTH TWICE DAILY, Disp: 180 Tab, Rfl: 1    rosuvastatin (CRESTOR) 20 mg tablet, TAKE 1 TABLET BY MOUTH NIGHTLY, Disp: 90 Tab, Rfl: 0    Insulin Needles, Disposable, (BD ULTRA-FINE MINI PEN NEEDLE) 31 gauge x 3/16\" ndle, Inject TID before meals  E11.9, Disp: 100 Pen Needle, Rfl: 5    aspirin delayed-release 81 mg tablet, Take 81 mg by mouth daily. , Disp: , Rfl:     glucose 4 gram chewable tablet, Take 4 Tabs by mouth as needed. , Disp: 50 Tab, Rfl: 0    omega-3 fatty acids-vitamin e (FISH OIL) 1,000 mg Cap, Take 1,400 mg by mouth daily. , Disp: , Rfl:     OTHER, Check CBC, CMP, Mg in 5 days, results to PCP, Disp: 1 each, Rfl: 0        Current Outpatient Medications:     insulin lispro (HUMALOG) 100 unit/mL injection, For Blood Sugar (mg/dL) of:    Less than 150=0 units          150 -199=2 units 200 -249=4 units 250 -299=6 units 300 -349=8 units 350 and above=10 units Check blood glucose 3 times daily and at bedtime and inject insulin per SS., Disp: 1 Vial, Rfl: 0    cholecalciferol, VITAMIN D3, (VITAMIN D3) 5,000 unit tab tablet, Take 1 Tab by mouth every seven (7) days. , Disp: 30 Tab, Rfl: 2    carvedilol (COREG) 12.5 mg tablet, TAKE 1 TABLET BY MOUTH TWICE A DAY, Disp: 180 Tab, Rfl: 1    allopurinol (ZYLOPRIM) 100 mg tablet, TAKE 1 TABLET BY MOUTH TWICE DAILY, Disp: 180 Tab, Rfl: 1    rosuvastatin (CRESTOR) 20 mg tablet, TAKE 1 TABLET BY MOUTH NIGHTLY, Disp: 90 Tab, Rfl: 0    Insulin Needles, Disposable, (BD ULTRA-FINE MINI PEN NEEDLE) 31 gauge x 3/16\" ndle, Inject TID before meals  E11.9, Disp: 100 Pen Needle, Rfl: 5    aspirin delayed-release 81 mg tablet, Take 81 mg by mouth daily. , Disp: , Rfl:     glucose 4 gram chewable tablet, Take 4 Tabs by mouth as needed. , Disp: 50 Tab, Rfl: 0    omega-3 fatty acids-vitamin e (FISH OIL) 1,000 mg Cap, Take 1,400 mg by mouth daily. , Disp: , Rfl:     OTHER, Check CBC, CMP, Mg in 5 days, results to PCP, Disp: 1 each, Rfl: 0      PMH,  Meds, Allergies, Family History, Social history reviewed    Review of Systems   Constitutional: Negative for chills and fever. Cardiovascular: Negative for chest pain and leg swelling. Physical Exam   Constitutional: He appears well-developed and well-nourished. No distress. Cardiovascular: Normal rate and regular rhythm. Exam reveals no gallop and no friction rub. No murmur heard. Pulmonary/Chest: Breath sounds normal. No respiratory distress. He has no wheezes. Musculoskeletal: He exhibits no edema. Nursing note and vitals reviewed. Visit Vitals  /68   Pulse 60   Temp 98.5 °F (36.9 °C) (Oral)   Resp 18   Ht 5' 7\" (1.702 m)   Wt 128 lb 3.2 oz (58.2 kg)   SpO2 97%   BMI 20.08 kg/m²         ASSESSMENT and PLAN    ICD-10-CM ICD-9-CM    1. Essential hypertension I10 401.9    2. CKD (chronic kidney disease) stage 3, GFR 30-59 ml/min ([facility]) N18.3 585. 3        As above,   above all stable unless otherwise noted  fmla forms completed  Follow-up and Dispositions    · Return in about 4 months (around 2/29/2020), or 2/2019, for htn, Chol. An After Visit Summary was printed and given to the patient. This has been fully explained to the patient, who indicates understanding.

## 2019-10-31 NOTE — PROGRESS NOTES
Patient here for [facility] paperwork completion Follow up. 1. Have you been to the ER, urgent care clinic since your last visit? Hospitalized since your last visit? No    2. Have you seen or consulted any other health care providers outside of the [address] [place] [address] since your last visit? Include any pap smears or colon screening.  No

## 2019-10-31 NOTE — PATIENT INSTRUCTIONS
DASH Diet: Care Instructions Your Care Instructions The DASH diet is an eating plan that can help lower your blood pressure. DASH stands for Dietary Approaches to Stop Hypertension. Hypertension is high blood pressure. The DASH diet focuses on eating foods that are high in calcium, potassium, and magnesium. These nutrients can lower blood pressure. The foods that are highest in these nutrients are fruits, vegetables, low-fat dairy products, nuts, seeds, and legumes. But taking calcium, potassium, and magnesium supplements instead of eating foods that are high in those nutrients does not have the same effect. The DASH diet also includes whole grains, fish, and poultry. The DASH diet is one of several lifestyle changes your doctor may recommend to lower your high blood pressure. Your doctor may also want you to decrease the amount of sodium in your diet. Lowering sodium while following the DASH diet can lower blood pressure even further than just the DASH diet alone. Follow-up care is a key part of your treatment and safety. Be sure to make and go to all appointments, and call your doctor if you are having problems. It's also a good idea to know your test results and keep a list of the medicines you take. How can you care for yourself at home? Following the DASH diet · Eat 4 to 5 servings of fruit each day. A serving is 1 medium-sized piece of fruit, ½ cup chopped or canned fruit, 1/4 cup dried fruit, or 4 ounces (½ cup) of fruit juice. Choose fruit more often than fruit juice. · Eat 4 to 5 servings of vegetables each day. A serving is 1 cup of lettuce or raw leafy vegetables, ½ cup of chopped or cooked vegetables, or 4 ounces (½ cup) of vegetable juice. Choose vegetables more often than vegetable juice. · Get 2 to 3 servings of low-fat and fat-free dairy each day. A serving is 8 ounces of milk, 1 cup of yogurt, or 1 ½ ounces of cheese. · Eat 6 to 8 servings of grains each day. A serving is 1 slice of bread, 1 ounce of dry cereal, or ½ cup of cooked rice, pasta, or cooked cereal. Try to choose whole-grain products as much as possible. · Limit lean meat, poultry, and fish to 2 servings each day. A serving is 3 ounces, about the size of a deck of cards. · Eat 4 to 5 servings of nuts, seeds, and legumes (cooked dried beans, lentils, and split peas) each week. A serving is 1/3 cup of nuts, 2 tablespoons of seeds, or ½ cup of cooked beans or peas. · Limit fats and oils to 2 to 3 servings each day. A serving is 1 teaspoon of vegetable oil or 2 tablespoons of salad dressing. · Limit sweets and added sugars to 5 servings or less a week. A serving is 1 tablespoon jelly or jam, ½ cup sorbet, or 1 cup of lemonade. · Eat less than 2,300 milligrams (mg) of sodium a day. If you limit your sodium to 1,500 mg a day, you can lower your blood pressure even more. Tips for success · Start small. Do not try to make dramatic changes to your diet all at once. You might feel that you are missing out on your favorite foods and then be more likely to not follow the plan. Make small changes, and stick with them. Once those changes become habit, add a few more changes. · Try some of the following: ? Make it a goal to eat a fruit or vegetable at every meal and at snacks. This will make it easy to get the recommended amount of fruits and vegetables each day. ? Try yogurt topped with fruit and nuts for a snack or healthy dessert. ? Add lettuce, tomato, cucumber, and onion to sandwiches. ? Combine a ready-made pizza crust with low-fat mozzarella cheese and lots of vegetable toppings. Try using tomatoes, squash, spinach, broccoli, carrots, cauliflower, and onions. ? Have a variety of cut-up vegetables with a low-fat dip as an appetizer instead of chips and dip. ? Sprinkle sunflower seeds or chopped almonds over salads.  Or try adding chopped walnuts or almonds to cooked vegetables. ? Try some vegetarian meals using beans and peas. Add garbanzo or kidney beans to salads. Make burritos and tacos with mashed anderson beans or black beans. Where can you learn more? Go to http://theresa-mumtaz.info/. Enter U586 in the search box to learn more about \"DASH Diet: Care Instructions. \" Current as of: April 9, 2019 Content Version: 12.2 © 5536-7037 [employer]. Care instructions adapted under license by [employer] (which disclaims liability or warranty for this information). If you have questions about a medical condition or this instruction, always ask your healthcare professional. [address] any warranty or liability for your use of this information.

## 2019-11-01 ENCOUNTER — OFFICE VISIT (OUTPATIENT)
Dept: UROLOGY | Age: 74
End: 2019-11-01

## 2019-11-01 ENCOUNTER — TELEPHONE (OUTPATIENT)
Dept: UROLOGY | Age: 74
End: 2019-11-01

## 2019-11-01 VITALS
DIASTOLIC BLOOD PRESSURE: 70 MMHG | SYSTOLIC BLOOD PRESSURE: 140 MMHG | HEART RATE: 56 BPM | OXYGEN SATURATION: 99 % | WEIGHT: 128 LBS | HEIGHT: 67 IN | BODY MASS INDEX: 20.09 KG/M2

## 2019-11-01 DIAGNOSIS — R33.9 URINARY RETENTION: Primary | ICD-10-CM

## 2019-11-01 NOTE — PATIENT INSTRUCTIONS
Suprapubic Catheter Care: Care Instructions  Your Care Instructions  A suprapubic catheter is a thin tube placed into your bladder just above the pubic bone. The tube allows urine to drain out of your bladder. The urine collects in a bag attached to the tube. The bag is usually attached to your leg. Sometimes the catheter tube has a valve that lets you drain the urine into the toilet or other container. You may need a suprapubic catheter if you have nerve damage, a problem with your urinary tract, or a disease that weakens your muscles. Having a catheter for a long time increases the risk of getting a urinary tract infection. So catheter care focuses on preventing infection. Follow-up care is a key part of your treatment and safety. Be sure to make and go to all appointments, and call your doctor if you are having problems. It's also a good idea to know your test results and keep a list of the medicines you take. How can you care for yourself at home? · Wash your hands before you handle the catheter. · Clean the area around the catheter with soap and water at least one time every day. Wash the area with soap and water after every bowel movement. · Keep the drainage bag lower than your bladder to keep urine from backing up. · Clean the bag every day after removing it from the catheter. Use another container while you clean the bag. To clean the bag, fill it with 2 parts vinegar to 3 parts water and let it stand for 20 minutes. Then empty it out, and let it air dry. · Empty the drainage bag when it is full or at least every 8 hours. When should you call for help? Call your doctor now or seek immediate medical care if:    · Your catheter becomes blocked and urine does not collect in the drainage bag.     · Your catheter leaks.     · You have blood or pus in your urine.     · You have pain in your back just below your rib cage.  This is called flank pain.     · You have a fever, chills, or body aches.     · You have groin or belly pain.     · Your urine is cloudy or smells bad.     · You have pain, increasing redness, or bleeding around the catheter.     · You have swelling around the catheter or in your belly.    Watch closely for changes in your health, and be sure to contact your doctor if you have any problems. Where can you learn more? Go to http://theresa-mumtaz.info/. Enter M218 in the search box to learn more about \"Suprapubic Catheter Care: Care Instructions. \"  Current as of: December 19, 2018  Content Version: 12.2  © 7989-6215 [employer]. Care instructions adapted under license by [employer] (which disclaims liability or warranty for this information). If you have questions about a medical condition or this instruction, always ask your healthcare professional. [address] any warranty or liability for your use of this information.

## 2019-11-01 NOTE — TELEPHONE ENCOUNTER
Left message for patient letting him know that after his next Catheter change with COU on December 2; his follow up appointment will be with [facility] on December 30, 2019 @ 9:30am.

## 2019-11-01 NOTE — PROGRESS NOTES
Mr. Misael Elias has a reminder for a \"due or due soon\" health maintenance. I have asked that he contact his primary care provider for follow-up on this health maintenance.

## 2019-11-01 NOTE — PROGRESS NOTES
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 Pertinent history reveals that 10 years ago the patient had cryoablation of the prostate and his PSA has essentially been undetectable or within stable normal levels. He has had a PSA checked this year and it was 0.1                      IMPRESSION:  Urinary retention      PLAN:  Patient 12 Angolan suprapubic tube is changed out without difficulty    The patient and son are advised of the impending closure of this office and we will see the patient one more time in a month to change at the suprapubic tube and will arrange an appointment 1 month thereafter to be seen at [facility]. The patient is known to that practice because that is where he underwent the cryoablation    This visit exceeded 15 minutes and greater than 50% was counseling. The patient expresses understanding of the treatment plan and wishes to proceed    This dictation used voice recognition software and there may be mistakes.     Kellen Nelson MD

## 2019-12-02 ENCOUNTER — OFFICE VISIT (OUTPATIENT)
Dept: UROLOGY | Age: 74
End: 2019-12-02

## 2019-12-02 VITALS
HEART RATE: 60 BPM | BODY MASS INDEX: 20.05 KG/M2 | HEIGHT: 67 IN | SYSTOLIC BLOOD PRESSURE: 160 MMHG | OXYGEN SATURATION: 98 % | DIASTOLIC BLOOD PRESSURE: 77 MMHG

## 2019-12-02 DIAGNOSIS — R33.9 URINARY RETENTION: Primary | ICD-10-CM

## 2019-12-02 NOTE — PROGRESS NOTES
Mr. Naomi Preston has a reminder for a \"due or due soon\" health maintenance. I have asked that he contact his primary care provider for follow-up on this health maintenance.

## 2019-12-02 NOTE — PROGRESS NOTES
Mr. Jose Huertas has a reminder for a \"due or due soon\" health maintenance. I have asked that he contact his primary care provider for follow-up on this health maintenance.

## 2019-12-02 NOTE — PROGRESS NOTES
Lab Results   Component Value Date/Time    Prostate Specific Ag 0.1 04/12/2019 08:00 AM    Prostate Specific Ag <0.1 04/19/2017 09:24 AM    Prostate Specific Ag <0.1 01/13/2016 12:20 PM    Prostate Specific Ag <0.1 09/17/2014 08:22 AM    Prostate Specific Ag 4.2 11/01/2008    NARRATIVE:    The patient is a pleasant 43-year-old -American male who is known to this office and is being managed with suprapubic tube drainage. He was last seen 1 month ago                    IMPRESSION:  Suprapubic tube      PLAN:  The patient's 12 Congolese suprapubic tube is changed under sterile conditions in routine fashion without difficulties. The son is aware of the need to get continuity of care through [facility] [place] and will be leaving here and going over to that office today to arrange for exchange of suprapubic tube in 1 month    This visit exceeded 10 minutes and greater than 50% was counseling. The patient expresses understanding of the treatment plan and wishes to proceed    This dictation used voice recognition software and there may be mistakes.     Cezar Salgado MD

## 2019-12-02 NOTE — PATIENT INSTRUCTIONS
Suprapubic Catheter Care: Care Instructions  Your Care Instructions  A suprapubic catheter is a thin tube placed into your bladder just above the pubic bone. The tube allows urine to drain out of your bladder. The urine collects in a bag attached to the tube. The bag is usually attached to your leg. Sometimes the catheter tube has a valve that lets you drain the urine into the toilet or other container. You may need a suprapubic catheter if you have nerve damage, a problem with your urinary tract, or a disease that weakens your muscles. Having a catheter for a long time increases the risk of getting a urinary tract infection. So catheter care focuses on preventing infection. Follow-up care is a key part of your treatment and safety. Be sure to make and go to all appointments, and call your doctor if you are having problems. It's also a good idea to know your test results and keep a list of the medicines you take. How can you care for yourself at home? · Wash your hands before you handle the catheter. · Clean the area around the catheter with soap and water at least one time every day. Wash the area with soap and water after every bowel movement. · Keep the drainage bag lower than your bladder to keep urine from backing up. · Clean the bag every day after removing it from the catheter. Use another container while you clean the bag. To clean the bag, fill it with 2 parts vinegar to 3 parts water and let it stand for 20 minutes. Then empty it out, and let it air dry. · Empty the drainage bag when it is full or at least every 8 hours. When should you call for help? Call your doctor now or seek immediate medical care if:    · Your catheter becomes blocked and urine does not collect in the drainage bag.     · Your catheter leaks.     · You have blood or pus in your urine.     · You have pain in your back just below your rib cage.  This is called flank pain.     · You have a fever, chills, or body aches.     · You have groin or belly pain.     · Your urine is cloudy or smells bad.     · You have pain, increasing redness, or bleeding around the catheter.     · You have swelling around the catheter or in your belly.    Watch closely for changes in your health, and be sure to contact your doctor if you have any problems. Where can you learn more? Go to http://theresa-mumtaz.info/. Enter W722 in the search box to learn more about \"Suprapubic Catheter Care: Care Instructions. \"  Current as of: December 19, 2018  Content Version: 12.2  © 0076-0940 [employer]. Care instructions adapted under license by [employer] (which disclaims liability or warranty for this information). If you have questions about a medical condition or this instruction, always ask your healthcare professional. [address] any warranty or liability for your use of this information.

## 2019-12-16 PROBLEM — Z87.898 HISTORY OF URINARY RETENTION: Status: ACTIVE | Noted: 2019-12-16

## 2019-12-16 PROBLEM — Z93.50 CYSTOSTOMY IN PLACE (HCC): Status: ACTIVE | Noted: 2019-12-16

## 2019-12-16 PROBLEM — A41.9 SEPSIS (HCC): Status: RESOLVED | Noted: 2019-08-01 | Resolved: 2019-12-16

## 2019-12-16 PROBLEM — Z97.8 INDWELLING FOLEY CATHETER PRESENT: Status: RESOLVED | Noted: 2018-11-12 | Resolved: 2019-12-16

## 2019-12-16 PROBLEM — R39.14 BENIGN PROSTATIC HYPERPLASIA WITH INCOMPLETE BLADDER EMPTYING: Status: ACTIVE | Noted: 2017-10-18

## 2019-12-20 DIAGNOSIS — E78.00 HYPERCHOLESTEROLEMIA: ICD-10-CM

## 2019-12-20 RX ORDER — ROSUVASTATIN CALCIUM 20 MG/1
TABLET, COATED ORAL
Qty: 90 TAB | Refills: 0 | Status: SHIPPED | OUTPATIENT
Start: 2019-12-20 | End: 2020-02-03 | Stop reason: SDUPTHER

## 2020-02-03 ENCOUNTER — HOSPITAL ENCOUNTER (OUTPATIENT)
Dept: LAB | Age: 75
Discharge: HOME | End: 2020-02-03
Payer: MEDICARE

## 2020-02-03 ENCOUNTER — OFFICE VISIT (OUTPATIENT)
Dept: FAMILY MEDICINE CLINIC | Age: 75
End: 2020-02-03

## 2020-02-03 VITALS
WEIGHT: 124 LBS | TEMPERATURE: 97.8 F | RESPIRATION RATE: 16 BRPM | HEART RATE: 52 BPM | OXYGEN SATURATION: 98 % | DIASTOLIC BLOOD PRESSURE: 60 MMHG | HEIGHT: 66 IN | BODY MASS INDEX: 19.93 KG/M2 | SYSTOLIC BLOOD PRESSURE: 110 MMHG

## 2020-02-03 DIAGNOSIS — E78.00 HYPERCHOLESTEROLEMIA: Primary | ICD-10-CM

## 2020-02-03 DIAGNOSIS — I10 ESSENTIAL HYPERTENSION: ICD-10-CM

## 2020-02-03 DIAGNOSIS — Z12.11 SCREENING FOR COLON CANCER: ICD-10-CM

## 2020-02-03 DIAGNOSIS — E78.00 HYPERCHOLESTEROLEMIA: ICD-10-CM

## 2020-02-03 LAB
ALT SERPL-CCNC: 11 U/L (ref 16–61)
ANION GAP SERPL CALC-SCNC: 5 MMOL/L (ref 3–18)
AST SERPL-CCNC: 7 U/L (ref 10–38)
BUN SERPL-MCNC: 39 MG/DL (ref 7–18)
BUN/CREAT SERPL: 18 (ref 12–20)
CALCIUM SERPL-MCNC: 9.2 MG/DL (ref 8.5–10.1)
CHLORIDE SERPL-SCNC: 115 MMOL/L (ref 100–111)
CHOLEST SERPL-MCNC: 166 MG/DL
CO2 SERPL-SCNC: 23 MMOL/L (ref 21–32)
CREAT SERPL-MCNC: 2.2 MG/DL (ref 0.6–1.3)
GLUCOSE SERPL-MCNC: 92 MG/DL (ref 74–99)
HDLC SERPL-MCNC: 45 MG/DL (ref 40–60)
HDLC SERPL: 3.7 {RATIO} (ref 0–5)
LDLC SERPL CALC-MCNC: 107.6 MG/DL (ref 0–100)
LIPID PROFILE,FLP: ABNORMAL
POTASSIUM SERPL-SCNC: 4.8 MMOL/L (ref 3.5–5.5)
SODIUM SERPL-SCNC: 143 MMOL/L (ref 136–145)
TRIGL SERPL-MCNC: 67 MG/DL (ref ?–150)
VLDLC SERPL CALC-MCNC: 13.4 MG/DL

## 2020-02-03 PROCEDURE — 84460 ALANINE AMINO (ALT) (SGPT): CPT

## 2020-02-03 PROCEDURE — 36415 COLL VENOUS BLD VENIPUNCTURE: CPT

## 2020-02-03 PROCEDURE — 80061 LIPID PANEL: CPT

## 2020-02-03 PROCEDURE — 84450 TRANSFERASE (AST) (SGOT): CPT

## 2020-02-03 PROCEDURE — 80048 BASIC METABOLIC PNL TOTAL CA: CPT

## 2020-02-03 RX ORDER — ROSUVASTATIN CALCIUM 20 MG/1
TABLET, COATED ORAL
Qty: 90 TAB | Refills: 3 | Status: SHIPPED | OUTPATIENT
Start: 2020-02-03 | End: 2020-12-08 | Stop reason: SDUPTHER

## 2020-02-03 NOTE — PATIENT INSTRUCTIONS
DASH Diet: Care Instructions  Your Care Instructions    The DASH diet is an eating plan that can help lower your blood pressure. DASH stands for Dietary Approaches to Stop Hypertension. Hypertension is high blood pressure. The DASH diet focuses on eating foods that are high in calcium, potassium, and magnesium. These nutrients can lower blood pressure. The foods that are highest in these nutrients are fruits, vegetables, low-fat dairy products, nuts, seeds, and legumes. But taking calcium, potassium, and magnesium supplements instead of eating foods that are high in those nutrients does not have the same effect. The DASH diet also includes whole grains, fish, and poultry. The DASH diet is one of several lifestyle changes your doctor may recommend to lower your high blood pressure. Your doctor may also want you to decrease the amount of sodium in your diet. Lowering sodium while following the DASH diet can lower blood pressure even further than just the DASH diet alone. Follow-up care is a key part of your treatment and safety. Be sure to make and go to all appointments, and call your doctor if you are having problems. It's also a good idea to know your test results and keep a list of the medicines you take. How can you care for yourself at home? Following the DASH diet  · Eat 4 to 5 servings of fruit each day. A serving is 1 medium-sized piece of fruit, ½ cup chopped or canned fruit, 1/4 cup dried fruit, or 4 ounces (½ cup) of fruit juice. Choose fruit more often than fruit juice. · Eat 4 to 5 servings of vegetables each day. A serving is 1 cup of lettuce or raw leafy vegetables, ½ cup of chopped or cooked vegetables, or 4 ounces (½ cup) of vegetable juice. Choose vegetables more often than vegetable juice. · Get 2 to 3 servings of low-fat and fat-free dairy each day. A serving is 8 ounces of milk, 1 cup of yogurt, or 1 ½ ounces of cheese. · Eat 6 to 8 servings of grains each day.  A serving is 1 slice of bread, 1 ounce of dry cereal, or ½ cup of cooked rice, pasta, or cooked cereal. Try to choose whole-grain products as much as possible. · Limit lean meat, poultry, and fish to 2 servings each day. A serving is 3 ounces, about the size of a deck of cards. · Eat 4 to 5 servings of nuts, seeds, and legumes (cooked dried beans, lentils, and split peas) each week. A serving is 1/3 cup of nuts, 2 tablespoons of seeds, or ½ cup of cooked beans or peas. · Limit fats and oils to 2 to 3 servings each day. A serving is 1 teaspoon of vegetable oil or 2 tablespoons of salad dressing. · Limit sweets and added sugars to 5 servings or less a week. A serving is 1 tablespoon jelly or jam, ½ cup sorbet, or 1 cup of lemonade. · Eat less than 2,300 milligrams (mg) of sodium a day. If you limit your sodium to 1,500 mg a day, you can lower your blood pressure even more. Tips for success  · Start small. Do not try to make dramatic changes to your diet all at once. You might feel that you are missing out on your favorite foods and then be more likely to not follow the plan. Make small changes, and stick with them. Once those changes become habit, add a few more changes. · Try some of the following:  ? Make it a goal to eat a fruit or vegetable at every meal and at snacks. This will make it easy to get the recommended amount of fruits and vegetables each day. ? Try yogurt topped with fruit and nuts for a snack or healthy dessert. ? Add lettuce, tomato, cucumber, and onion to sandwiches. ? Combine a ready-made pizza crust with low-fat mozzarella cheese and lots of vegetable toppings. Try using tomatoes, squash, spinach, broccoli, carrots, cauliflower, and onions. ? Have a variety of cut-up vegetables with a low-fat dip as an appetizer instead of chips and dip. ? Sprinkle sunflower seeds or chopped almonds over salads. Or try adding chopped walnuts or almonds to cooked vegetables.   ? Try some vegetarian meals using beans and peas. Add garbanzo or kidney beans to salads. Make burritos and tacos with mashed anderson beans or black beans. Where can you learn more? Go to http://theresa-mumtaz.info/. Enter E450 in the search box to learn more about \"DASH Diet: Care Instructions. \"  Current as of: April 9, 2019  Content Version: 12.2  © 1430-9463 [employer]. Care instructions adapted under license by [employer] (which disclaims liability or warranty for this information). If you have questions about a medical condition or this instruction, always ask your healthcare professional. [address] any warranty or liability for your use of this information.

## 2020-02-03 NOTE — PROGRESS NOTES
HISTORY OF PRESENT ILLNESS  Maliha Plascencia is a 75 y.o. male. HPI  Patient is here today for evaluation and treatment of: Hypertension/Cholesterol problem    Hypertension: initial BP is up. Better at second check. On coreg; Takes med daily. Cholesterol: on crestor. Takes med daily. Needs a refill     His diabetes is followed by endocrine. DM HM is followed by endocrine as well per son. Pt needs to make a follow up appointment with endocrine,   Sees renal for CKD per his son. ( Dr. Roney Rmoero)    Son needs [facility] for taking care of his father. He will be out 3-4  times a month for an 8 hour period of time to provide transporatation to Doctors appointment and home care assistance for pts ADL. Agrees to [facility] for colorectal screening. Current Outpatient Medications:     rosuvastatin (CRESTOR) 20 mg tablet, TAKE 1 TABLET BY MOUTH NIGHTLY, Disp: 90 Tab, Rfl: 0    insulin lispro (HUMALOG) 100 unit/mL injection, For Blood Sugar (mg/dL) of:    Less than 150=0 units          150 -199=2 units 200 -249=4 units 250 -299=6 units 300 -349=8 units 350 and above=10 units Check blood glucose 3 times daily and at bedtime and inject insulin per SS., Disp: 1 Vial, Rfl: 0    cholecalciferol, VITAMIN D3, (VITAMIN D3) 5,000 unit tab tablet, Take 1 Tab by mouth every seven (7) days. , Disp: 30 Tab, Rfl: 2    carvedilol (COREG) 12.5 mg tablet, TAKE 1 TABLET BY MOUTH TWICE A DAY, Disp: 180 Tab, Rfl: 1    allopurinol (ZYLOPRIM) 100 mg tablet, TAKE 1 TABLET BY MOUTH TWICE DAILY, Disp: 180 Tab, Rfl: 1    Insulin Needles, Disposable, (BD ULTRA-FINE MINI PEN NEEDLE) 31 gauge x 3/16\" ndle, Inject TID before meals  E11.9, Disp: 100 Pen Needle, Rfl: 5    aspirin delayed-release 81 mg tablet, Take 81 mg by mouth daily. , Disp: , Rfl:     glucose 4 gram chewable tablet, Take 4 Tabs by mouth as needed. , Disp: 50 Tab, Rfl: 0    omega-3 fatty acids-vitamin e (FISH OIL) 1,000 mg Cap, Take 1,400 mg by mouth daily. , Disp: , Rfl: PMH,  Meds, Allergies, Family History, Social history reviewed    Review of Systems   Constitutional: Negative for chills and fever. Respiratory: Negative for shortness of breath and wheezing. Cardiovascular: Negative for chest pain and palpitations. Physical Exam   Visit Vitals  /60   Pulse (!) 52   Temp 97.8 °F (36.6 °C) (Oral)   Resp 16   Ht 5' 6\" (1.676 m)   Wt 124 lb (56.2 kg)   SpO2 98%   BMI 20.01 kg/m²     General appearance: alert, cooperative, no distress, appears stated age  Neck: supple, symmetrical, trachea midline, no adenopathy, thyroid: not enlarged, symmetric, no tenderness/mass/nodules, no carotid bruit and no JVD  Lungs: clear to auscultation bilaterally  Heart: regular rate and rhythm, S1, S2 normal, no murmur, click, rub or gallop    Extremities: extremities normal, atraumatic, no cyanosis or edema    Lab Results   Component Value Date/Time    Cholesterol, total 93 08/02/2019 02:21 AM    HDL Cholesterol 46 08/02/2019 02:21 AM    LDL, calculated 33.6 08/02/2019 02:21 AM    VLDL, calculated 13.4 08/02/2019 02:21 AM    Triglyceride 67 08/02/2019 02:21 AM    CHOL/HDL Ratio 2.0 08/02/2019 02:21 AM     Lab Results   Component Value Date/Time    Sodium 143 08/03/2019 01:30 PM    Potassium 4.0 08/03/2019 01:30 PM    Chloride 111 08/03/2019 01:30 PM    CO2 24 08/03/2019 01:30 PM    Anion gap 8 08/03/2019 01:30 PM    Glucose 115 (H) 08/03/2019 01:30 PM    BUN 40 (H) 12/30/2019 11:29 AM    Creatinine 2.2 (H) 12/30/2019 11:29 AM    BUN/Creatinine ratio 19 08/03/2019 01:30 PM    GFR est AA 34 (L) 08/03/2019 01:30 PM    GFR est non-AA 28 (L) 08/03/2019 01:30 PM    Calcium 9.2 08/03/2019 01:30 PM         ASSESSMENT and PLAN    ICD-10-CM ICD-9-CM    1. Hypercholesterolemia E78.00 272.0 rosuvastatin (CRESTOR) 20 mg tablet      LIPID PANEL      AST      ALT   2. Essential hypertension Z48 774.4 METABOLIC PANEL, BASIC   3.  Screening for colon cancer Z12.11 V76.51 COLOGUARD TEST (FECAL DNA COLORECTAL CANCER SCREENING)       As above,   above all stable unless otherwise noted   treatment plan as listed below  Orders Placed This Encounter    COLOGUARD TEST (FECAL DNA COLORECTAL CANCER SCREENING)    LIPID PANEL    METABOLIC PANEL, BASIC    AST    ALT    rosuvastatin (CRESTOR) 20 mg tablet     Refilled crestor  Will plan to fill out son's FMLA papers once they are returned here. Follow-up and Dispositions    · Return in about 4 months (around 6/3/2020) for htn, Chol. An After Visit Summary was printed and given to the patient. This has been fully explained to the patient, who indicates understanding.

## 2020-02-03 NOTE — PROGRESS NOTES
1. Have you been to the ER, urgent care clinic since your last visit? Hospitalized since your last visit? No    2. Have you seen or consulted any other health care providers outside of the [address] [place] [address] since your last visit? Include any pap smears or colon screening.  YesRyan Box MD - urology

## 2020-02-11 ENCOUNTER — TELEPHONE (OUTPATIENT)
Dept: FAMILY MEDICINE CLINIC | Age: 75
End: 2020-02-11

## 2020-02-11 NOTE — TELEPHONE ENCOUNTER
Pt's son dropped off [place] paperwork. Stated was told by Dr Alexis andrade to drop off paperworl    Pt given copy of form for form, aware form completion may take 7 to 10 business days.  Also aware an appt could be needed    Form to be given to nurse Ursula Pack

## 2020-02-17 NOTE — TELEPHONE ENCOUNTER
Called patient at [phone] (non-secure line) and did not leave a detailed message. Patient needs to be informed that [facility] paperwork is complete and ready for  at the .

## 2020-02-20 ENCOUNTER — TELEPHONE (OUTPATIENT)
Dept: FAMILY MEDICINE CLINIC | Age: 75
End: 2020-02-20

## 2020-02-20 NOTE — TELEPHONE ENCOUNTER
Pt's son called to check status of [facility] papers, notes say that they are ready but not in drawer, can you please adv pt [phone]

## 2020-03-13 DIAGNOSIS — I10 ESSENTIAL HYPERTENSION: ICD-10-CM

## 2020-03-13 DIAGNOSIS — M10.00 IDIOPATHIC GOUT, UNSPECIFIED CHRONICITY, UNSPECIFIED SITE: ICD-10-CM

## 2020-03-13 NOTE — TELEPHONE ENCOUNTER
Last visit 2/03/2020 MD Francisco Reese   Next appointment 06/03/2020 MD Francisco Reese   Previous refill encounter(s) 08/3/2019 Allopurinol #180 with 1 refill, 08/30/2019 Coreg #180 with 1 refill    Requested Prescriptions     Pending Prescriptions Disp Refills    carvediloL (COREG) 12.5 mg tablet 180 Tab 1     Sig: Take 1 Tab by mouth two (2) times a day.  allopurinoL (ZYLOPRIM) 100 mg tablet 180 Tab 1     Sig: Take 1 Tab by mouth two (2) times a day.

## 2020-03-14 RX ORDER — ALLOPURINOL 100 MG/1
100 TABLET ORAL 2 TIMES DAILY
Qty: 180 TAB | Refills: 1 | Status: SHIPPED | OUTPATIENT
Start: 2020-03-14 | End: 2020-11-06

## 2020-03-14 RX ORDER — CARVEDILOL 12.5 MG/1
12.5 TABLET ORAL 2 TIMES DAILY
Qty: 180 TAB | Refills: 1 | Status: SHIPPED | OUTPATIENT
Start: 2020-03-14 | End: 2020-11-06

## 2020-06-18 DIAGNOSIS — E11.9 DIABETES MELLITUS WITH NO COMPLICATION (HCC): ICD-10-CM

## 2020-06-22 RX ORDER — PEN NEEDLE, DIABETIC 31 GX3/16"
NEEDLE, DISPOSABLE MISCELLANEOUS
Qty: 100 PEN NEEDLE | Refills: 5 | Status: SHIPPED | OUTPATIENT
Start: 2020-06-22

## 2020-11-06 DIAGNOSIS — M10.00 IDIOPATHIC GOUT, UNSPECIFIED CHRONICITY, UNSPECIFIED SITE: ICD-10-CM

## 2020-11-06 DIAGNOSIS — I10 ESSENTIAL HYPERTENSION: ICD-10-CM

## 2020-11-06 RX ORDER — ALLOPURINOL 100 MG/1
TABLET ORAL
Qty: 180 TAB | Refills: 1 | Status: SHIPPED | OUTPATIENT
Start: 2020-11-06 | End: 2021-01-01

## 2020-11-06 RX ORDER — CARVEDILOL 12.5 MG/1
TABLET ORAL
Qty: 180 TAB | Refills: 1 | Status: SHIPPED | OUTPATIENT
Start: 2020-11-06 | End: 2021-01-01

## 2020-12-08 DIAGNOSIS — E78.00 HYPERCHOLESTEROLEMIA: ICD-10-CM

## 2020-12-08 NOTE — TELEPHONE ENCOUNTER
Last Visit: 2/3/20 with MD Joce Hatch  Next Appointment: Advised to follow-up in 4 months  Previous Refill Encounter(s): 2/3/20 #90 with 3 refills    Requested Prescriptions     Pending Prescriptions Disp Refills    rosuvastatin (CRESTOR) 20 mg tablet 90 Tab 0     Sig: TAKE 1 TABLET BY MOUTH NIGHTLY

## 2020-12-11 RX ORDER — ROSUVASTATIN CALCIUM 20 MG/1
TABLET, COATED ORAL
Qty: 90 TAB | Refills: 0 | Status: SHIPPED | OUTPATIENT
Start: 2020-12-11

## 2021-01-01 DIAGNOSIS — M10.00 IDIOPATHIC GOUT, UNSPECIFIED CHRONICITY, UNSPECIFIED SITE: ICD-10-CM

## 2021-01-01 DIAGNOSIS — I10 ESSENTIAL HYPERTENSION: ICD-10-CM

## 2021-01-01 RX ORDER — CARVEDILOL 12.5 MG/1
TABLET ORAL
Qty: 180 TABLET | Refills: 1 | Status: SHIPPED | OUTPATIENT
Start: 2021-01-01 | End: 2021-01-01

## 2021-01-01 RX ORDER — ALLOPURINOL 100 MG/1
TABLET ORAL
Qty: 180 TABLET | Refills: 1 | Status: SHIPPED | OUTPATIENT
Start: 2021-01-01 | End: 2021-01-01

## 2022-01-01 ENCOUNTER — HOME CARE VISIT (OUTPATIENT)
Dept: HOSPICE | Facility: HOSPICE | Age: 77
End: 2022-01-01
Payer: MEDICARE

## 2022-01-01 ENCOUNTER — APPOINTMENT (OUTPATIENT)
Dept: GENERAL RADIOLOGY | Age: 77
DRG: 698 | End: 2022-01-01
Attending: HOSPITALIST
Payer: MEDICARE

## 2022-01-01 ENCOUNTER — APPOINTMENT (OUTPATIENT)
Dept: GENERAL RADIOLOGY | Age: 77
DRG: 698 | End: 2022-01-01
Attending: EMERGENCY MEDICINE
Payer: MEDICARE

## 2022-01-01 ENCOUNTER — HOSPITAL ENCOUNTER (EMERGENCY)
Age: 77
Discharge: HOME | End: 2022-02-11
Attending: STUDENT IN AN ORGANIZED HEALTH CARE EDUCATION/TRAINING PROGRAM | Admitting: STUDENT IN AN ORGANIZED HEALTH CARE EDUCATION/TRAINING PROGRAM
Payer: COMMERCIAL

## 2022-01-01 ENCOUNTER — HOME CARE VISIT (OUTPATIENT)
Dept: SCHEDULING | Facility: HOME HEALTH | Age: 77
End: 2022-01-01
Payer: MEDICARE

## 2022-01-01 ENCOUNTER — PATIENT OUTREACH (OUTPATIENT)
Dept: CASE MANAGEMENT | Age: 77
End: 2022-01-01

## 2022-01-01 ENCOUNTER — APPOINTMENT (OUTPATIENT)
Dept: ULTRASOUND IMAGING | Age: 77
DRG: 698 | End: 2022-01-01
Attending: PHYSICIAN ASSISTANT
Payer: MEDICARE

## 2022-01-01 ENCOUNTER — APPOINTMENT (OUTPATIENT)
Dept: MRI IMAGING | Age: 77
DRG: 698 | End: 2022-01-01
Attending: HOSPITALIST
Payer: MEDICARE

## 2022-01-01 ENCOUNTER — APPOINTMENT (OUTPATIENT)
Dept: GENERAL RADIOLOGY | Age: 77
DRG: 698 | End: 2022-01-01
Attending: INTERNAL MEDICINE
Payer: MEDICARE

## 2022-01-01 ENCOUNTER — APPOINTMENT (OUTPATIENT)
Dept: CT IMAGING | Age: 77
DRG: 698 | End: 2022-01-01
Attending: EMERGENCY MEDICINE
Payer: MEDICARE

## 2022-01-01 ENCOUNTER — HOSPICE ADMISSION (OUTPATIENT)
Dept: HOSPICE | Facility: HOSPICE | Age: 77
End: 2022-01-01
Payer: MEDICARE

## 2022-01-01 ENCOUNTER — HOSPITAL ENCOUNTER (INPATIENT)
Age: 77
LOS: 10 days | Discharge: HOSPICE | DRG: 698 | End: 2022-02-23
Attending: EMERGENCY MEDICINE | Admitting: EMERGENCY MEDICINE
Payer: MEDICARE

## 2022-01-01 VITALS — RESPIRATION RATE: 18 BRPM | OXYGEN SATURATION: 91 % | HEART RATE: 100 BPM

## 2022-01-01 VITALS
SYSTOLIC BLOOD PRESSURE: 119 MMHG | RESPIRATION RATE: 26 BRPM | HEIGHT: 66 IN | HEART RATE: 64 BPM | WEIGHT: 134.9 LBS | TEMPERATURE: 97.6 F | OXYGEN SATURATION: 96 % | BODY MASS INDEX: 21.68 KG/M2 | DIASTOLIC BLOOD PRESSURE: 53 MMHG

## 2022-01-01 VITALS
HEART RATE: 68 BPM | SYSTOLIC BLOOD PRESSURE: 130 MMHG | OXYGEN SATURATION: 98 % | DIASTOLIC BLOOD PRESSURE: 70 MMHG | RESPIRATION RATE: 18 BRPM

## 2022-01-01 VITALS
OXYGEN SATURATION: 100 % | HEART RATE: 87 BPM | HEIGHT: 66 IN | TEMPERATURE: 97.9 F | WEIGHT: 125 LBS | RESPIRATION RATE: 16 BRPM | BODY MASS INDEX: 20.09 KG/M2 | SYSTOLIC BLOOD PRESSURE: 171 MMHG | DIASTOLIC BLOOD PRESSURE: 109 MMHG

## 2022-01-01 VITALS
HEART RATE: 66 BPM | RESPIRATION RATE: 18 BRPM | DIASTOLIC BLOOD PRESSURE: 64 MMHG | OXYGEN SATURATION: 98 % | SYSTOLIC BLOOD PRESSURE: 140 MMHG

## 2022-01-01 VITALS — RESPIRATION RATE: 24 BRPM | HEART RATE: 89 BPM | OXYGEN SATURATION: 90 %

## 2022-01-01 VITALS — HEART RATE: 100 BPM | OXYGEN SATURATION: 90 % | TEMPERATURE: 98.7 F | RESPIRATION RATE: 24 BRPM

## 2022-01-01 VITALS
HEART RATE: 70 BPM | DIASTOLIC BLOOD PRESSURE: 70 MMHG | RESPIRATION RATE: 18 BRPM | SYSTOLIC BLOOD PRESSURE: 138 MMHG | OXYGEN SATURATION: 96 %

## 2022-01-01 DIAGNOSIS — A41.9 SEPSIS WITH ACUTE RENAL FAILURE WITHOUT SEPTIC SHOCK, DUE TO UNSPECIFIED ORGANISM, UNSPECIFIED ACUTE RENAL FAILURE TYPE (HCC): Primary | ICD-10-CM

## 2022-01-01 DIAGNOSIS — N17.9 SEPSIS WITH ACUTE RENAL FAILURE WITHOUT SEPTIC SHOCK, DUE TO UNSPECIFIED ORGANISM, UNSPECIFIED ACUTE RENAL FAILURE TYPE (HCC): Primary | ICD-10-CM

## 2022-01-01 DIAGNOSIS — I69.398 CVA, OLD, ALTERATIONS OF SENSATIONS: ICD-10-CM

## 2022-01-01 DIAGNOSIS — R41.82 ALTERED MENTAL STATUS, UNSPECIFIED ALTERED MENTAL STATUS TYPE: ICD-10-CM

## 2022-01-01 DIAGNOSIS — T83.9XXA FOLEY CATHETER PROBLEM, INITIAL ENCOUNTER (HCC): Primary | ICD-10-CM

## 2022-01-01 DIAGNOSIS — R53.81 DEBILITY: ICD-10-CM

## 2022-01-01 DIAGNOSIS — R65.20 SEPSIS WITH ACUTE RENAL FAILURE WITHOUT SEPTIC SHOCK, DUE TO UNSPECIFIED ORGANISM, UNSPECIFIED ACUTE RENAL FAILURE TYPE (HCC): Primary | ICD-10-CM

## 2022-01-01 DIAGNOSIS — G93.40 ACUTE ENCEPHALOPATHY: ICD-10-CM

## 2022-01-01 DIAGNOSIS — R57.9 SHOCK (HCC): ICD-10-CM

## 2022-01-01 DIAGNOSIS — A41.9 SEPSIS, DUE TO UNSPECIFIED ORGANISM, UNSPECIFIED WHETHER ACUTE ORGAN DYSFUNCTION PRESENT (HCC): ICD-10-CM

## 2022-01-01 DIAGNOSIS — R20.9 CVA, OLD, ALTERATIONS OF SENSATIONS: ICD-10-CM

## 2022-01-01 DIAGNOSIS — Z71.89 GOALS OF CARE, COUNSELING/DISCUSSION: ICD-10-CM

## 2022-01-01 LAB
ALBUMIN SERPL-MCNC: 1.9 G/DL (ref 3.4–5)
ALBUMIN SERPL-MCNC: 1.9 G/DL (ref 3.4–5)
ALBUMIN SERPL-MCNC: 2 G/DL (ref 3.4–5)
ALBUMIN SERPL-MCNC: 2.1 G/DL (ref 3.4–5)
ALBUMIN SERPL-MCNC: 2.2 G/DL (ref 3.4–5)
ALBUMIN SERPL-MCNC: 2.9 G/DL (ref 3.4–5)
ALBUMIN/GLOB SERPL: 0.5 {RATIO} (ref 0.8–1.7)
ALBUMIN/GLOB SERPL: 0.6 {RATIO} (ref 0.8–1.7)
ALP SERPL-CCNC: 45 U/L (ref 45–117)
ALP SERPL-CCNC: 46 U/L (ref 45–117)
ALP SERPL-CCNC: 50 U/L (ref 45–117)
ALP SERPL-CCNC: 52 U/L (ref 45–117)
ALP SERPL-CCNC: 56 U/L (ref 45–117)
ALP SERPL-CCNC: 61 U/L (ref 45–117)
ALT SERPL-CCNC: 6 U/L (ref 16–61)
ALT SERPL-CCNC: 7 U/L (ref 16–61)
ALT SERPL-CCNC: 9 U/L (ref 16–61)
ALT SERPL-CCNC: 9 U/L (ref 16–61)
AMPHET UR QL SCN: NEGATIVE
ANION GAP BLD CALC-SCNC: 10 MMOL/L (ref 10–20)
ANION GAP BLD CALC-SCNC: 3 MMOL/L (ref 10–20)
ANION GAP SERPL CALC-SCNC: 2 MMOL/L (ref 3–18)
ANION GAP SERPL CALC-SCNC: 3 MMOL/L (ref 3–18)
ANION GAP SERPL CALC-SCNC: 4 MMOL/L (ref 3–18)
ANION GAP SERPL CALC-SCNC: 5 MMOL/L (ref 3–18)
ANION GAP SERPL CALC-SCNC: 6 MMOL/L (ref 3–18)
ANION GAP SERPL CALC-SCNC: 7 MMOL/L (ref 3–18)
ANION GAP SERPL CALC-SCNC: 8 MMOL/L (ref 3–18)
ANION GAP SERPL CALC-SCNC: 8 MMOL/L (ref 3–18)
APAP SERPL-MCNC: 2 UG/ML (ref 10–30)
APPEARANCE UR: ABNORMAL
AST SERPL-CCNC: 14 U/L (ref 10–38)
AST SERPL-CCNC: 6 U/L (ref 10–38)
AST SERPL-CCNC: 6 U/L (ref 10–38)
AST SERPL-CCNC: 7 U/L (ref 10–38)
AST SERPL-CCNC: 9 U/L (ref 10–38)
AST SERPL-CCNC: <3 U/L (ref 10–38)
ATRIAL RATE: 105 BPM
BACTERIA SPEC CULT: ABNORMAL
BACTERIA SPEC CULT: NORMAL
BACTERIA URNS QL MICRO: ABNORMAL /HPF
BARBITURATES UR QL SCN: NEGATIVE
BASE DEFICIT BLD-SCNC: 7.1 MMOL/L
BASE DEFICIT BLD-SCNC: 7.8 MMOL/L
BASOPHILS # BLD: 0 K/UL (ref 0–0.1)
BASOPHILS # BLD: 0.1 K/UL (ref 0–0.1)
BASOPHILS NFR BLD: 0 % (ref 0–2)
BASOPHILS NFR BLD: 1 % (ref 0–2)
BENZODIAZ UR QL: NEGATIVE
BILIRUB SERPL-MCNC: 0.3 MG/DL (ref 0.2–1)
BILIRUB SERPL-MCNC: 0.4 MG/DL (ref 0.2–1)
BILIRUB SERPL-MCNC: 0.4 MG/DL (ref 0.2–1)
BILIRUB SERPL-MCNC: 0.5 MG/DL (ref 0.2–1)
BILIRUB SERPL-MCNC: 0.6 MG/DL (ref 0.2–1)
BILIRUB SERPL-MCNC: 0.8 MG/DL (ref 0.2–1)
BILIRUB UR QL: NEGATIVE
BODY TEMPERATURE: 102.4
BODY TEMPERATURE: 102.4
BUN SERPL-MCNC: 23 MG/DL (ref 7–18)
BUN SERPL-MCNC: 23 MG/DL (ref 7–18)
BUN SERPL-MCNC: 24 MG/DL (ref 7–18)
BUN SERPL-MCNC: 25 MG/DL (ref 7–18)
BUN SERPL-MCNC: 26 MG/DL (ref 7–18)
BUN SERPL-MCNC: 30 MG/DL (ref 7–18)
BUN SERPL-MCNC: 31 MG/DL (ref 7–18)
BUN SERPL-MCNC: 33 MG/DL (ref 7–18)
BUN SERPL-MCNC: 37 MG/DL (ref 7–18)
BUN SERPL-MCNC: 40 MG/DL (ref 7–18)
BUN SERPL-MCNC: 46 MG/DL (ref 7–18)
BUN SERPL-MCNC: 48 MG/DL (ref 7–18)
BUN/CREAT SERPL: 14 (ref 12–20)
BUN/CREAT SERPL: 14 (ref 12–20)
BUN/CREAT SERPL: 15 (ref 12–20)
BUN/CREAT SERPL: 16 (ref 12–20)
BUN/CREAT SERPL: 17 (ref 12–20)
BUN/CREAT SERPL: 17 (ref 12–20)
BUN/CREAT SERPL: 18 (ref 12–20)
BUN/CREAT SERPL: 18 (ref 12–20)
BUN/CREAT SERPL: 20 (ref 12–20)
CA-I BLD-MCNC: 1.03 MMOL/L (ref 1.12–1.32)
CA-I BLD-MCNC: 1.21 MMOL/L (ref 1.12–1.32)
CALCIUM SERPL-MCNC: 8.3 MG/DL (ref 8.5–10.1)
CALCIUM SERPL-MCNC: 8.3 MG/DL (ref 8.5–10.1)
CALCIUM SERPL-MCNC: 8.4 MG/DL (ref 8.5–10.1)
CALCIUM SERPL-MCNC: 8.4 MG/DL (ref 8.5–10.1)
CALCIUM SERPL-MCNC: 8.6 MG/DL (ref 8.5–10.1)
CALCIUM SERPL-MCNC: 8.9 MG/DL (ref 8.5–10.1)
CALCIUM SERPL-MCNC: 9 MG/DL (ref 8.5–10.1)
CALCIUM SERPL-MCNC: 9.4 MG/DL (ref 8.5–10.1)
CALCIUM SERPL-MCNC: 9.7 MG/DL (ref 8.5–10.1)
CALCIUM SERPL-MCNC: ABNORMAL MG/DL (ref 8.5–10.1)
CALCULATED P AXIS, ECG09: 76 DEGREES
CALCULATED R AXIS, ECG10: 48 DEGREES
CALCULATED T AXIS, ECG11: 80 DEGREES
CANNABINOIDS UR QL SCN: NEGATIVE
CHLORIDE BLD-SCNC: 116 MMOL/L (ref 98–107)
CHLORIDE BLD-SCNC: 119 MMOL/L (ref 98–107)
CHLORIDE SERPL-SCNC: 107 MMOL/L (ref 100–111)
CHLORIDE SERPL-SCNC: 109 MMOL/L (ref 100–111)
CHLORIDE SERPL-SCNC: 110 MMOL/L (ref 100–111)
CHLORIDE SERPL-SCNC: 114 MMOL/L (ref 100–111)
CHLORIDE SERPL-SCNC: 115 MMOL/L (ref 100–111)
CHLORIDE SERPL-SCNC: 115 MMOL/L (ref 100–111)
CHLORIDE SERPL-SCNC: 118 MMOL/L (ref 100–111)
CHLORIDE SERPL-SCNC: 118 MMOL/L (ref 100–111)
CHLORIDE SERPL-SCNC: 119 MMOL/L (ref 100–111)
CHLORIDE SERPL-SCNC: 120 MMOL/L (ref 100–111)
CHLORIDE SERPL-SCNC: 122 MMOL/L (ref 100–111)
CHLORIDE SERPL-SCNC: 122 MMOL/L (ref 100–111)
CO2 BLD-SCNC: 18 MMOL/L (ref 19–24)
CO2 BLD-SCNC: 20 MMOL/L (ref 19–24)
CO2 SERPL-SCNC: 18 MMOL/L (ref 21–32)
CO2 SERPL-SCNC: 18 MMOL/L (ref 21–32)
CO2 SERPL-SCNC: 19 MMOL/L (ref 21–32)
CO2 SERPL-SCNC: 20 MMOL/L (ref 21–32)
CO2 SERPL-SCNC: 21 MMOL/L (ref 21–32)
CO2 SERPL-SCNC: 24 MMOL/L (ref 21–32)
CO2 SERPL-SCNC: 25 MMOL/L (ref 21–32)
CO2 SERPL-SCNC: 26 MMOL/L (ref 21–32)
CO2 SERPL-SCNC: 26 MMOL/L (ref 21–32)
CO2 SERPL-SCNC: 29 MMOL/L (ref 21–32)
CO2 SERPL-SCNC: 29 MMOL/L (ref 21–32)
CO2 SERPL-SCNC: 32 MMOL/L (ref 21–32)
COCAINE UR QL SCN: NEGATIVE
COLOR UR: ABNORMAL
CREAT BLD-MCNC: 2.71 MG/DL (ref 0.6–1.3)
CREAT BLD-MCNC: 3.09 MG/DL (ref 0.6–1.3)
CREAT SERPL-MCNC: 1.46 MG/DL (ref 0.6–1.3)
CREAT SERPL-MCNC: 1.47 MG/DL (ref 0.6–1.3)
CREAT SERPL-MCNC: 1.62 MG/DL (ref 0.6–1.3)
CREAT SERPL-MCNC: 1.62 MG/DL (ref 0.6–1.3)
CREAT SERPL-MCNC: 1.76 MG/DL (ref 0.6–1.3)
CREAT SERPL-MCNC: 1.77 MG/DL (ref 0.6–1.3)
CREAT SERPL-MCNC: 1.79 MG/DL (ref 0.6–1.3)
CREAT SERPL-MCNC: 1.86 MG/DL (ref 0.6–1.3)
CREAT SERPL-MCNC: 1.87 MG/DL (ref 0.6–1.3)
CREAT SERPL-MCNC: 1.9 MG/DL (ref 0.6–1.3)
CREAT SERPL-MCNC: 2.19 MG/DL (ref 0.6–1.3)
CREAT SERPL-MCNC: 2.24 MG/DL (ref 0.6–1.3)
CREAT SERPL-MCNC: 2.37 MG/DL (ref 0.6–1.3)
CREAT SERPL-MCNC: 2.77 MG/DL (ref 0.6–1.3)
DIAGNOSIS, 93000: NORMAL
DIFFERENTIAL METHOD BLD: ABNORMAL
EOSINOPHIL # BLD: 0 K/UL (ref 0–0.4)
EOSINOPHIL # BLD: 0 K/UL (ref 0–0.4)
EOSINOPHIL # BLD: 0.2 K/UL (ref 0–0.4)
EOSINOPHIL # BLD: 0.3 K/UL (ref 0–0.4)
EOSINOPHIL # BLD: 0.3 K/UL (ref 0–0.4)
EOSINOPHIL # BLD: 0.4 K/UL (ref 0–0.4)
EOSINOPHIL # BLD: 0.4 K/UL (ref 0–0.4)
EOSINOPHIL NFR BLD: 0 % (ref 0–5)
EOSINOPHIL NFR BLD: 0 % (ref 0–5)
EOSINOPHIL NFR BLD: 2 % (ref 0–5)
EOSINOPHIL NFR BLD: 3 % (ref 0–5)
EOSINOPHIL NFR BLD: 4 % (ref 0–5)
EPITH CASTS URNS QL MICRO: ABNORMAL /LPF (ref 0–5)
ERYTHROCYTE [DISTWIDTH] IN BLOOD BY AUTOMATED COUNT: 16.8 % (ref 11.6–14.5)
ERYTHROCYTE [DISTWIDTH] IN BLOOD BY AUTOMATED COUNT: 16.9 % (ref 11.6–14.5)
ERYTHROCYTE [DISTWIDTH] IN BLOOD BY AUTOMATED COUNT: 16.9 % (ref 11.6–14.5)
ERYTHROCYTE [DISTWIDTH] IN BLOOD BY AUTOMATED COUNT: 17 % (ref 11.6–14.5)
ERYTHROCYTE [DISTWIDTH] IN BLOOD BY AUTOMATED COUNT: 17.1 % (ref 11.6–14.5)
ERYTHROCYTE [DISTWIDTH] IN BLOOD BY AUTOMATED COUNT: 17.2 % (ref 11.6–14.5)
ERYTHROCYTE [DISTWIDTH] IN BLOOD BY AUTOMATED COUNT: 17.3 % (ref 11.6–14.5)
ERYTHROCYTE [DISTWIDTH] IN BLOOD BY AUTOMATED COUNT: 17.6 % (ref 11.6–14.5)
EST. AVERAGE GLUCOSE BLD GHB EST-MCNC: 134 MG/DL
FLUAV RNA SPEC QL NAA+PROBE: NOT DETECTED
FLUBV RNA SPEC QL NAA+PROBE: NOT DETECTED
GLOBULIN SER CALC-MCNC: 3.5 G/DL (ref 2–4)
GLOBULIN SER CALC-MCNC: 3.8 G/DL (ref 2–4)
GLOBULIN SER CALC-MCNC: 4.8 G/DL (ref 2–4)
GLUCOSE BLD STRIP.AUTO-MCNC: 102 MG/DL (ref 70–110)
GLUCOSE BLD STRIP.AUTO-MCNC: 102 MG/DL (ref 70–110)
GLUCOSE BLD STRIP.AUTO-MCNC: 103 MG/DL (ref 70–110)
GLUCOSE BLD STRIP.AUTO-MCNC: 104 MG/DL (ref 70–110)
GLUCOSE BLD STRIP.AUTO-MCNC: 107 MG/DL (ref 70–110)
GLUCOSE BLD STRIP.AUTO-MCNC: 113 MG/DL (ref 70–110)
GLUCOSE BLD STRIP.AUTO-MCNC: 113 MG/DL (ref 70–110)
GLUCOSE BLD STRIP.AUTO-MCNC: 114 MG/DL (ref 70–110)
GLUCOSE BLD STRIP.AUTO-MCNC: 117 MG/DL (ref 70–110)
GLUCOSE BLD STRIP.AUTO-MCNC: 121 MG/DL (ref 70–110)
GLUCOSE BLD STRIP.AUTO-MCNC: 131 MG/DL (ref 70–110)
GLUCOSE BLD STRIP.AUTO-MCNC: 133 MG/DL (ref 70–110)
GLUCOSE BLD STRIP.AUTO-MCNC: 137 MG/DL (ref 70–110)
GLUCOSE BLD STRIP.AUTO-MCNC: 138 MG/DL (ref 70–110)
GLUCOSE BLD STRIP.AUTO-MCNC: 140 MG/DL (ref 70–110)
GLUCOSE BLD STRIP.AUTO-MCNC: 142 MG/DL (ref 70–110)
GLUCOSE BLD STRIP.AUTO-MCNC: 143 MG/DL (ref 70–110)
GLUCOSE BLD STRIP.AUTO-MCNC: 145 MG/DL (ref 70–110)
GLUCOSE BLD STRIP.AUTO-MCNC: 151 MG/DL (ref 70–110)
GLUCOSE BLD STRIP.AUTO-MCNC: 155 MG/DL (ref 70–110)
GLUCOSE BLD STRIP.AUTO-MCNC: 159 MG/DL (ref 70–110)
GLUCOSE BLD STRIP.AUTO-MCNC: 160 MG/DL (ref 70–110)
GLUCOSE BLD STRIP.AUTO-MCNC: 161 MG/DL (ref 70–110)
GLUCOSE BLD STRIP.AUTO-MCNC: 162 MG/DL (ref 70–110)
GLUCOSE BLD STRIP.AUTO-MCNC: 166 MG/DL (ref 70–110)
GLUCOSE BLD STRIP.AUTO-MCNC: 167 MG/DL (ref 70–110)
GLUCOSE BLD STRIP.AUTO-MCNC: 168 MG/DL (ref 70–110)
GLUCOSE BLD STRIP.AUTO-MCNC: 175 MG/DL (ref 70–110)
GLUCOSE BLD STRIP.AUTO-MCNC: 183 MG/DL (ref 70–110)
GLUCOSE BLD STRIP.AUTO-MCNC: 193 MG/DL (ref 70–110)
GLUCOSE BLD STRIP.AUTO-MCNC: 194 MG/DL (ref 70–110)
GLUCOSE BLD STRIP.AUTO-MCNC: 196 MG/DL (ref 70–110)
GLUCOSE BLD STRIP.AUTO-MCNC: 206 MG/DL (ref 70–110)
GLUCOSE BLD STRIP.AUTO-MCNC: 206 MG/DL (ref 70–110)
GLUCOSE BLD STRIP.AUTO-MCNC: 208 MG/DL (ref 70–110)
GLUCOSE BLD STRIP.AUTO-MCNC: 210 MG/DL (ref 70–110)
GLUCOSE BLD STRIP.AUTO-MCNC: 219 MG/DL (ref 70–110)
GLUCOSE BLD STRIP.AUTO-MCNC: 222 MG/DL (ref 70–110)
GLUCOSE BLD STRIP.AUTO-MCNC: 222 MG/DL (ref 70–110)
GLUCOSE BLD STRIP.AUTO-MCNC: 223 MG/DL (ref 70–110)
GLUCOSE BLD STRIP.AUTO-MCNC: 231 MG/DL (ref 70–110)
GLUCOSE BLD STRIP.AUTO-MCNC: 256 MG/DL (ref 70–110)
GLUCOSE BLD STRIP.AUTO-MCNC: 71 MG/DL (ref 70–110)
GLUCOSE BLD STRIP.AUTO-MCNC: 79 MG/DL (ref 70–110)
GLUCOSE BLD STRIP.AUTO-MCNC: 96 MG/DL (ref 70–110)
GLUCOSE BLD STRIP.AUTO-MCNC: >600 MG/DL (ref 70–110)
GLUCOSE BLD-MCNC: 216 MG/DL (ref 65–100)
GLUCOSE BLD-MCNC: 234 MG/DL (ref 65–100)
GLUCOSE SERPL-MCNC: 117 MG/DL (ref 74–99)
GLUCOSE SERPL-MCNC: 131 MG/DL (ref 74–99)
GLUCOSE SERPL-MCNC: 162 MG/DL (ref 74–99)
GLUCOSE SERPL-MCNC: 168 MG/DL (ref 74–99)
GLUCOSE SERPL-MCNC: 178 MG/DL (ref 74–99)
GLUCOSE SERPL-MCNC: 190 MG/DL (ref 74–99)
GLUCOSE SERPL-MCNC: 193 MG/DL (ref 74–99)
GLUCOSE SERPL-MCNC: 198 MG/DL (ref 74–99)
GLUCOSE SERPL-MCNC: 206 MG/DL (ref 74–99)
GLUCOSE SERPL-MCNC: 208 MG/DL (ref 74–99)
GLUCOSE SERPL-MCNC: 222 MG/DL (ref 74–99)
GLUCOSE SERPL-MCNC: 228 MG/DL (ref 74–99)
GLUCOSE SERPL-MCNC: 88 MG/DL (ref 74–99)
GLUCOSE SERPL-MCNC: 91 MG/DL (ref 74–99)
GLUCOSE UR STRIP.AUTO-MCNC: NEGATIVE MG/DL
GRAM STN SPEC: ABNORMAL
HBA1C MFR BLD: 6.3 % (ref 4.2–5.6)
HCO3 BLD-SCNC: 16.7 MMOL/L (ref 22–26)
HCO3 BLD-SCNC: 18.6 MMOL/L (ref 22–26)
HCT VFR BLD AUTO: 27.8 % (ref 36–48)
HCT VFR BLD AUTO: 27.9 % (ref 36–48)
HCT VFR BLD AUTO: 28.5 % (ref 36–48)
HCT VFR BLD AUTO: 29.1 % (ref 36–48)
HCT VFR BLD AUTO: 29.2 % (ref 36–48)
HCT VFR BLD AUTO: 29.3 % (ref 36–48)
HCT VFR BLD AUTO: 29.5 % (ref 36–48)
HCT VFR BLD AUTO: 30.1 % (ref 36–48)
HCT VFR BLD AUTO: 30.3 % (ref 36–48)
HCT VFR BLD AUTO: 32.1 % (ref 36–48)
HCT VFR BLD AUTO: 32.7 % (ref 36–48)
HCT VFR BLD AUTO: 39.9 % (ref 36–48)
HCT VFR BLD AUTO: 44.5 % (ref 36–48)
HDSCOM,HDSCOM: NORMAL
HGB BLD-MCNC: 10.3 G/DL (ref 13–16)
HGB BLD-MCNC: 10.6 G/DL (ref 13–16)
HGB BLD-MCNC: 12.8 G/DL (ref 13–16)
HGB BLD-MCNC: 14 G/DL (ref 13–16)
HGB BLD-MCNC: 9.2 G/DL (ref 13–16)
HGB BLD-MCNC: 9.3 G/DL (ref 13–16)
HGB BLD-MCNC: 9.4 G/DL (ref 13–16)
HGB BLD-MCNC: 9.5 G/DL (ref 13–16)
HGB BLD-MCNC: 9.6 G/DL (ref 13–16)
HGB BLD-MCNC: 9.7 G/DL (ref 13–16)
HGB BLD-MCNC: 9.8 G/DL (ref 13–16)
HGB UR QL STRIP: ABNORMAL
IMM GRANULOCYTES # BLD AUTO: 0 K/UL
IMM GRANULOCYTES # BLD AUTO: 0 K/UL
IMM GRANULOCYTES # BLD AUTO: 0 K/UL (ref 0–0.04)
IMM GRANULOCYTES # BLD AUTO: 0.1 K/UL (ref 0–0.04)
IMM GRANULOCYTES NFR BLD AUTO: 0 %
IMM GRANULOCYTES NFR BLD AUTO: 0 %
IMM GRANULOCYTES NFR BLD AUTO: 0 % (ref 0–0.5)
IMM GRANULOCYTES NFR BLD AUTO: 1 % (ref 0–0.5)
INR PPP: 1.1 (ref 0.8–1.2)
INR PPP: 1.1 (ref 0.8–1.2)
INR PPP: 1.2 (ref 0.8–1.2)
INR PPP: 1.3 (ref 0.8–1.2)
KETONES UR QL STRIP.AUTO: NEGATIVE MG/DL
LACTATE BLD-SCNC: 1.47 MMOL/L (ref 0.4–2)
LACTATE BLD-SCNC: 3.98 MMOL/L (ref 0.4–2)
LACTATE BLD-SCNC: 4.2 MMOL/L (ref 0.4–2)
LACTATE SERPL-SCNC: 1.3 MMOL/L (ref 0.4–2)
LACTATE SERPL-SCNC: 1.5 MMOL/L (ref 0.4–2)
LEUKOCYTE ESTERASE UR QL STRIP.AUTO: ABNORMAL
LYMPHOCYTES # BLD: 1.1 K/UL (ref 0.9–3.6)
LYMPHOCYTES # BLD: 1.4 K/UL (ref 0.9–3.6)
LYMPHOCYTES # BLD: 1.7 K/UL (ref 0.9–3.6)
LYMPHOCYTES # BLD: 1.7 K/UL (ref 0.9–3.6)
LYMPHOCYTES # BLD: 2.3 K/UL (ref 0.9–3.6)
LYMPHOCYTES # BLD: 2.4 K/UL (ref 0.9–3.6)
LYMPHOCYTES # BLD: 2.6 K/UL (ref 0.9–3.6)
LYMPHOCYTES NFR BLD: 13 % (ref 21–52)
LYMPHOCYTES NFR BLD: 14 % (ref 21–52)
LYMPHOCYTES NFR BLD: 16 % (ref 21–52)
LYMPHOCYTES NFR BLD: 16 % (ref 21–52)
LYMPHOCYTES NFR BLD: 23 % (ref 21–52)
LYMPHOCYTES NFR BLD: 23 % (ref 21–52)
LYMPHOCYTES NFR BLD: 8 % (ref 21–52)
MAGNESIUM SERPL-MCNC: 1.8 MG/DL (ref 1.6–2.6)
MAGNESIUM SERPL-MCNC: 1.8 MG/DL (ref 1.6–2.6)
MAGNESIUM SERPL-MCNC: 1.9 MG/DL (ref 1.6–2.6)
MAGNESIUM SERPL-MCNC: 2 MG/DL (ref 1.6–2.6)
MAGNESIUM SERPL-MCNC: 2.1 MG/DL (ref 1.6–2.6)
MAGNESIUM SERPL-MCNC: 2.2 MG/DL (ref 1.6–2.6)
MAGNESIUM SERPL-MCNC: 2.3 MG/DL (ref 1.6–2.6)
MAGNESIUM SERPL-MCNC: 2.3 MG/DL (ref 1.6–2.6)
MCH RBC QN AUTO: 26.6 PG (ref 24–34)
MCH RBC QN AUTO: 26.6 PG (ref 24–34)
MCH RBC QN AUTO: 26.7 PG (ref 24–34)
MCH RBC QN AUTO: 26.7 PG (ref 24–34)
MCH RBC QN AUTO: 27 PG (ref 24–34)
MCH RBC QN AUTO: 27.1 PG (ref 24–34)
MCH RBC QN AUTO: 27.2 PG (ref 24–34)
MCH RBC QN AUTO: 27.2 PG (ref 24–34)
MCH RBC QN AUTO: 27.3 PG (ref 24–34)
MCH RBC QN AUTO: 27.5 PG (ref 24–34)
MCH RBC QN AUTO: 27.5 PG (ref 24–34)
MCH RBC QN AUTO: 27.8 PG (ref 24–34)
MCH RBC QN AUTO: 28 PG (ref 24–34)
MCHC RBC AUTO-ENTMCNC: 31.2 G/DL (ref 31–37)
MCHC RBC AUTO-ENTMCNC: 31.5 G/DL (ref 31–37)
MCHC RBC AUTO-ENTMCNC: 32.1 G/DL (ref 31–37)
MCHC RBC AUTO-ENTMCNC: 32.2 G/DL (ref 31–37)
MCHC RBC AUTO-ENTMCNC: 32.3 G/DL (ref 31–37)
MCHC RBC AUTO-ENTMCNC: 32.4 G/DL (ref 31–37)
MCHC RBC AUTO-ENTMCNC: 32.5 G/DL (ref 31–37)
MCHC RBC AUTO-ENTMCNC: 32.6 G/DL (ref 31–37)
MCHC RBC AUTO-ENTMCNC: 33 G/DL (ref 31–37)
MCHC RBC AUTO-ENTMCNC: 33.3 G/DL (ref 31–37)
MCHC RBC AUTO-ENTMCNC: 33.5 G/DL (ref 31–37)
MCV RBC AUTO: 82.4 FL (ref 78–100)
MCV RBC AUTO: 82.7 FL (ref 78–100)
MCV RBC AUTO: 83.2 FL (ref 78–100)
MCV RBC AUTO: 83.3 FL (ref 78–100)
MCV RBC AUTO: 83.7 FL (ref 78–100)
MCV RBC AUTO: 83.9 FL (ref 78–100)
MCV RBC AUTO: 84 FL (ref 78–100)
MCV RBC AUTO: 84.7 FL (ref 78–100)
MCV RBC AUTO: 85.3 FL (ref 78–100)
MCV RBC AUTO: 85.6 FL (ref 78–100)
MCV RBC AUTO: 85.7 FL (ref 78–100)
METHADONE UR QL: NEGATIVE
MONOCYTES # BLD: 0 K/UL (ref 0.05–1.2)
MONOCYTES # BLD: 0.5 K/UL (ref 0.05–1.2)
MONOCYTES # BLD: 0.6 K/UL (ref 0.05–1.2)
MONOCYTES # BLD: 0.7 K/UL (ref 0.05–1.2)
MONOCYTES # BLD: 1 K/UL (ref 0.05–1.2)
MONOCYTES NFR BLD: 0 % (ref 3–10)
MONOCYTES NFR BLD: 4 % (ref 3–10)
MONOCYTES NFR BLD: 4 % (ref 3–10)
MONOCYTES NFR BLD: 5 % (ref 3–10)
MONOCYTES NFR BLD: 5 % (ref 3–10)
MONOCYTES NFR BLD: 6 % (ref 3–10)
MONOCYTES NFR BLD: 7 % (ref 3–10)
NEUTS BAND NFR BLD MANUAL: 13 % (ref 0–5)
NEUTS BAND NFR BLD MANUAL: 17 % (ref 0–5)
NEUTS SEG # BLD: 10 K/UL (ref 1.8–8)
NEUTS SEG # BLD: 10.7 K/UL (ref 1.8–8)
NEUTS SEG # BLD: 16.7 K/UL (ref 1.8–8)
NEUTS SEG # BLD: 6.4 K/UL (ref 1.8–8)
NEUTS SEG # BLD: 7.1 K/UL (ref 1.8–8)
NEUTS SEG # BLD: 7.2 K/UL (ref 1.8–8)
NEUTS SEG # BLD: 8 K/UL (ref 1.8–8)
NEUTS SEG NFR BLD: 65 % (ref 40–73)
NEUTS SEG NFR BLD: 65 % (ref 40–73)
NEUTS SEG NFR BLD: 68 % (ref 40–73)
NEUTS SEG NFR BLD: 70 % (ref 40–73)
NEUTS SEG NFR BLD: 75 % (ref 40–73)
NEUTS SEG NFR BLD: 79 % (ref 40–73)
NEUTS SEG NFR BLD: 84 % (ref 40–73)
NITRITE UR QL STRIP.AUTO: NEGATIVE
NRBC # BLD: 0 K/UL (ref 0–0.01)
NRBC # BLD: 0.02 K/UL (ref 0–0.01)
NRBC # BLD: 0.03 K/UL (ref 0–0.01)
NRBC BLD-RTO: 0 PER 100 WBC
NRBC BLD-RTO: 0.2 PER 100 WBC
NRBC BLD-RTO: 0.2 PER 100 WBC
OPIATES UR QL: NEGATIVE
P-R INTERVAL, ECG05: 120 MS
PCO2 BLDV: 34.3 MMHG (ref 41–51)
PCO2 BLDV: 41 MMHG (ref 41–51)
PCP UR QL: NEGATIVE
PH BLDV: 7.28 [PH] (ref 7.32–7.42)
PH BLDV: 7.31 [PH] (ref 7.32–7.42)
PH UR STRIP: >8.5 [PH] (ref 5–8)
PHOSPHATE SERPL-MCNC: 2 MG/DL (ref 2.5–4.9)
PHOSPHATE SERPL-MCNC: 2 MG/DL (ref 2.5–4.9)
PHOSPHATE SERPL-MCNC: 2.1 MG/DL (ref 2.5–4.9)
PHOSPHATE SERPL-MCNC: 2.4 MG/DL (ref 2.5–4.9)
PHOSPHATE SERPL-MCNC: 2.5 MG/DL (ref 2.5–4.9)
PHOSPHATE SERPL-MCNC: 2.5 MG/DL (ref 2.5–4.9)
PHOSPHATE SERPL-MCNC: 2.6 MG/DL (ref 2.5–4.9)
PHOSPHATE SERPL-MCNC: 3.3 MG/DL (ref 2.5–4.9)
PLATELET # BLD AUTO: 187 K/UL (ref 135–420)
PLATELET # BLD AUTO: 190 K/UL (ref 135–420)
PLATELET # BLD AUTO: 191 K/UL (ref 135–420)
PLATELET # BLD AUTO: 199 K/UL (ref 135–420)
PLATELET # BLD AUTO: 206 K/UL (ref 135–420)
PLATELET # BLD AUTO: 216 K/UL (ref 135–420)
PLATELET # BLD AUTO: 234 K/UL (ref 135–420)
PLATELET # BLD AUTO: 245 K/UL (ref 135–420)
PLATELET # BLD AUTO: 276 K/UL (ref 135–420)
PLATELET # BLD AUTO: 315 K/UL (ref 135–420)
PLATELET # BLD AUTO: 318 K/UL (ref 135–420)
PLATELET # BLD AUTO: 329 K/UL (ref 135–420)
PLATELET # BLD AUTO: 342 K/UL (ref 135–420)
PLATELET COMMENTS,PCOM: ABNORMAL
PLATELET COMMENTS,PCOM: ABNORMAL
PMV BLD AUTO: 10.5 FL (ref 9.2–11.8)
PMV BLD AUTO: 10.7 FL (ref 9.2–11.8)
PMV BLD AUTO: 10.7 FL (ref 9.2–11.8)
PMV BLD AUTO: 10.8 FL (ref 9.2–11.8)
PMV BLD AUTO: 11 FL (ref 9.2–11.8)
PMV BLD AUTO: 11.2 FL (ref 9.2–11.8)
PMV BLD AUTO: 11.2 FL (ref 9.2–11.8)
PMV BLD AUTO: 11.3 FL (ref 9.2–11.8)
PMV BLD AUTO: 11.5 FL (ref 9.2–11.8)
PMV BLD AUTO: 11.8 FL (ref 9.2–11.8)
PMV BLD AUTO: 12.1 FL (ref 9.2–11.8)
PMV BLD AUTO: 12.3 FL (ref 9.2–11.8)
PMV BLD AUTO: 9.9 FL (ref 9.2–11.8)
PO2 BLDV: 24 MMHG (ref 25–40)
PO2 BLDV: 56 MMHG (ref 25–40)
POTASSIUM BLD-SCNC: 5.4 MMOL/L (ref 3.5–5.1)
POTASSIUM BLD-SCNC: 9 MMOL/L (ref 3.5–5.1)
POTASSIUM SERPL-SCNC: 3.9 MMOL/L (ref 3.5–5.5)
POTASSIUM SERPL-SCNC: 3.9 MMOL/L (ref 3.5–5.5)
POTASSIUM SERPL-SCNC: 4.2 MMOL/L (ref 3.5–5.5)
POTASSIUM SERPL-SCNC: 4.2 MMOL/L (ref 3.5–5.5)
POTASSIUM SERPL-SCNC: 4.4 MMOL/L (ref 3.5–5.5)
POTASSIUM SERPL-SCNC: 4.6 MMOL/L (ref 3.5–5.5)
POTASSIUM SERPL-SCNC: 4.8 MMOL/L (ref 3.5–5.5)
POTASSIUM SERPL-SCNC: 4.9 MMOL/L (ref 3.5–5.5)
POTASSIUM SERPL-SCNC: 5 MMOL/L (ref 3.5–5.5)
POTASSIUM SERPL-SCNC: 5.2 MMOL/L (ref 3.5–5.5)
POTASSIUM SERPL-SCNC: 5.3 MMOL/L (ref 3.5–5.5)
POTASSIUM SERPL-SCNC: ABNORMAL MMOL/L (ref 3.5–5.5)
PROCALCITONIN SERPL-MCNC: 60.78 NG/ML
PROT SERPL-MCNC: 5.4 G/DL (ref 6.4–8.2)
PROT SERPL-MCNC: 5.4 G/DL (ref 6.4–8.2)
PROT SERPL-MCNC: 5.6 G/DL (ref 6.4–8.2)
PROT SERPL-MCNC: 5.7 G/DL (ref 6.4–8.2)
PROT SERPL-MCNC: 5.8 G/DL (ref 6.4–8.2)
PROT SERPL-MCNC: 7.7 G/DL (ref 6.4–8.2)
PROT UR STRIP-MCNC: 300 MG/DL
PROTHROMBIN TIME: 14 SEC (ref 11.5–15.2)
PROTHROMBIN TIME: 14.3 SEC (ref 11.5–15.2)
PROTHROMBIN TIME: 14.8 SEC (ref 11.5–15.2)
PROTHROMBIN TIME: 15.1 SEC (ref 11.5–15.2)
PROTHROMBIN TIME: 15.1 SEC (ref 11.5–15.2)
PROTHROMBIN TIME: 15.6 SEC (ref 11.5–15.2)
PROTHROMBIN TIME: 16 SEC (ref 11.5–15.2)
PROTHROMBIN TIME: 16.3 SEC (ref 11.5–15.2)
Q-T INTERVAL, ECG07: 342 MS
QRS DURATION, ECG06: 78 MS
QTC CALCULATION (BEZET), ECG08: 452 MS
RBC # BLD AUTO: 3.32 M/UL (ref 4.35–5.65)
RBC # BLD AUTO: 3.34 M/UL (ref 4.35–5.65)
RBC # BLD AUTO: 3.42 M/UL (ref 4.35–5.65)
RBC # BLD AUTO: 3.46 M/UL (ref 4.35–5.65)
RBC # BLD AUTO: 3.48 M/UL (ref 4.35–5.65)
RBC # BLD AUTO: 3.52 M/UL (ref 4.35–5.65)
RBC # BLD AUTO: 3.53 M/UL (ref 4.35–5.65)
RBC # BLD AUTO: 3.62 M/UL (ref 4.35–5.65)
RBC # BLD AUTO: 3.64 M/UL (ref 4.35–5.65)
RBC # BLD AUTO: 3.86 M/UL (ref 4.35–5.65)
RBC # BLD AUTO: 3.86 M/UL (ref 4.35–5.65)
RBC # BLD AUTO: 4.66 M/UL (ref 4.35–5.65)
RBC # BLD AUTO: 5.19 M/UL (ref 4.35–5.65)
RBC #/AREA URNS HPF: ABNORMAL /HPF (ref 0–5)
RBC MORPH BLD: ABNORMAL
RBC MORPH BLD: ABNORMAL
SALICYLATES SERPL-MCNC: <1.7 MG/DL (ref 2.8–20)
SAO2 % BLD: 29 %
SAO2 % BLD: 81 %
SARS-COV-2, COV2: NOT DETECTED
SERVICE CMNT-IMP: ABNORMAL
SERVICE CMNT-IMP: NORMAL
SODIUM BLD-SCNC: 139 MMOL/L (ref 136–145)
SODIUM BLD-SCNC: 145 MMOL/L (ref 136–145)
SODIUM SERPL-SCNC: 140 MMOL/L (ref 136–145)
SODIUM SERPL-SCNC: 141 MMOL/L (ref 136–145)
SODIUM SERPL-SCNC: 142 MMOL/L (ref 136–145)
SODIUM SERPL-SCNC: 143 MMOL/L (ref 136–145)
SODIUM SERPL-SCNC: 144 MMOL/L (ref 136–145)
SODIUM SERPL-SCNC: 144 MMOL/L (ref 136–145)
SODIUM SERPL-SCNC: 145 MMOL/L (ref 136–145)
SODIUM SERPL-SCNC: 147 MMOL/L (ref 136–145)
SODIUM SERPL-SCNC: 147 MMOL/L (ref 136–145)
SP GR UR REFRACTOMETRY: 1.01 (ref 1–1.03)
SPECIMEN SITE: ABNORMAL
SPECIMEN SITE: ABNORMAL
TRI-PHOS CRY URNS QL MICRO: ABNORMAL
TROPONIN-HIGH SENSITIVITY: 129 NG/L (ref 0–78)
TROPONIN-HIGH SENSITIVITY: 242 NG/L (ref 0–78)
TSH SERPL DL<=0.05 MIU/L-ACNC: 0.7 UIU/ML (ref 0.36–3.74)
UROBILINOGEN UR QL STRIP.AUTO: 0.2 EU/DL (ref 0.2–1)
VENTRICULAR RATE, ECG03: 105 BPM
WBC # BLD AUTO: 10.5 K/UL (ref 4.6–13.2)
WBC # BLD AUTO: 10.5 K/UL (ref 4.6–13.2)
WBC # BLD AUTO: 10.7 K/UL (ref 4.6–13.2)
WBC # BLD AUTO: 10.7 K/UL (ref 4.6–13.2)
WBC # BLD AUTO: 12.1 K/UL (ref 4.6–13.2)
WBC # BLD AUTO: 12.6 K/UL (ref 4.6–13.2)
WBC # BLD AUTO: 12.7 K/UL (ref 4.6–13.2)
WBC # BLD AUTO: 20.3 K/UL (ref 4.6–13.2)
WBC # BLD AUTO: 8.6 K/UL (ref 4.6–13.2)
WBC # BLD AUTO: 9.6 K/UL (ref 4.6–13.2)
WBC # BLD AUTO: 9.8 K/UL (ref 4.6–13.2)
WBC URNS QL MICRO: ABNORMAL /HPF (ref 0–5)

## 2022-01-01 PROCEDURE — 74011636637 HC RX REV CODE- 636/637: Performed by: HOSPITALIST

## 2022-01-01 PROCEDURE — 36415 COLL VENOUS BLD VENIPUNCTURE: CPT

## 2022-01-01 PROCEDURE — 0651 HSPC ROUTINE HOME CARE

## 2022-01-01 PROCEDURE — 97530 THERAPEUTIC ACTIVITIES: CPT

## 2022-01-01 PROCEDURE — 74176 CT ABD & PELVIS W/O CONTRAST: CPT

## 2022-01-01 PROCEDURE — 2709999900 HC NON-CHARGEABLE SUPPLY

## 2022-01-01 PROCEDURE — 74011000250 HC RX REV CODE- 250: Performed by: FAMILY MEDICINE

## 2022-01-01 PROCEDURE — 74011250636 HC RX REV CODE- 250/636: Performed by: EMERGENCY MEDICINE

## 2022-01-01 PROCEDURE — 83735 ASSAY OF MAGNESIUM: CPT

## 2022-01-01 PROCEDURE — 74011000250 HC RX REV CODE- 250: Performed by: EMERGENCY MEDICINE

## 2022-01-01 PROCEDURE — 96375 TX/PRO/DX INJ NEW DRUG ADDON: CPT

## 2022-01-01 PROCEDURE — 96365 THER/PROPH/DIAG IV INF INIT: CPT

## 2022-01-01 PROCEDURE — 83605 ASSAY OF LACTIC ACID: CPT

## 2022-01-01 PROCEDURE — 76770 US EXAM ABDO BACK WALL COMP: CPT

## 2022-01-01 PROCEDURE — 74011000250 HC RX REV CODE- 250: Performed by: PHYSICIAN ASSISTANT

## 2022-01-01 PROCEDURE — 80048 BASIC METABOLIC PNL TOTAL CA: CPT

## 2022-01-01 PROCEDURE — 99291 CRITICAL CARE FIRST HOUR: CPT | Performed by: INTERNAL MEDICINE

## 2022-01-01 PROCEDURE — 71045 X-RAY EXAM CHEST 1 VIEW: CPT

## 2022-01-01 PROCEDURE — 94762 N-INVAS EAR/PLS OXIMTRY CONT: CPT

## 2022-01-01 PROCEDURE — 99232 SBSQ HOSP IP/OBS MODERATE 35: CPT | Performed by: HOSPITALIST

## 2022-01-01 PROCEDURE — 85025 COMPLETE CBC W/AUTO DIFF WBC: CPT

## 2022-01-01 PROCEDURE — G0156 HHCP-SVS OF AIDE,EA 15 MIN: HCPCS

## 2022-01-01 PROCEDURE — 74011250636 HC RX REV CODE- 250/636: Performed by: INTERNAL MEDICINE

## 2022-01-01 PROCEDURE — 74011250637 HC RX REV CODE- 250/637: Performed by: HOSPITALIST

## 2022-01-01 PROCEDURE — 92526 ORAL FUNCTION THERAPY: CPT

## 2022-01-01 PROCEDURE — G0299 HHS/HOSPICE OF RN EA 15 MIN: HCPCS

## 2022-01-01 PROCEDURE — 74011250637 HC RX REV CODE- 250/637: Performed by: PHYSICIAN ASSISTANT

## 2022-01-01 PROCEDURE — 99239 HOSP IP/OBS DSCHRG MGMT >30: CPT | Performed by: HOSPITALIST

## 2022-01-01 PROCEDURE — 85610 PROTHROMBIN TIME: CPT

## 2022-01-01 PROCEDURE — 74011636637 HC RX REV CODE- 636/637: Performed by: PHYSICIAN ASSISTANT

## 2022-01-01 PROCEDURE — 84100 ASSAY OF PHOSPHORUS: CPT

## 2022-01-01 PROCEDURE — 97535 SELF CARE MNGMENT TRAINING: CPT

## 2022-01-01 PROCEDURE — 74011000250 HC RX REV CODE- 250: Performed by: INTERNAL MEDICINE

## 2022-01-01 PROCEDURE — 80053 COMPREHEN METABOLIC PANEL: CPT

## 2022-01-01 PROCEDURE — 99285 EMERGENCY DEPT VISIT HI MDM: CPT

## 2022-01-01 PROCEDURE — 82962 GLUCOSE BLOOD TEST: CPT

## 2022-01-01 PROCEDURE — 92611 MOTION FLUOROSCOPY/SWALLOW: CPT

## 2022-01-01 PROCEDURE — 99223 1ST HOSP IP/OBS HIGH 75: CPT | Performed by: FAMILY MEDICINE

## 2022-01-01 PROCEDURE — 80143 DRUG ASSAY ACETAMINOPHEN: CPT

## 2022-01-01 PROCEDURE — APPSS60 APP SPLIT SHARED TIME 46-60 MINUTES: Performed by: PHYSICIAN ASSISTANT

## 2022-01-01 PROCEDURE — 87040 BLOOD CULTURE FOR BACTERIA: CPT

## 2022-01-01 PROCEDURE — 97162 PT EVAL MOD COMPLEX 30 MIN: CPT

## 2022-01-01 PROCEDURE — 65660000004 HC RM CVT STEPDOWN

## 2022-01-01 PROCEDURE — 65620000000 HC RM CCU GENERAL

## 2022-01-01 PROCEDURE — 74011000258 HC RX REV CODE- 258: Performed by: EMERGENCY MEDICINE

## 2022-01-01 PROCEDURE — 85027 COMPLETE CBC AUTOMATED: CPT

## 2022-01-01 PROCEDURE — G0155 HHCP-SVS OF CSW,EA 15 MIN: HCPCS

## 2022-01-01 PROCEDURE — 77030008771 HC TU NG SALEM SUMP -A

## 2022-01-01 PROCEDURE — 74230 X-RAY XM SWLNG FUNCJ C+: CPT

## 2022-01-01 PROCEDURE — 74011000250 HC RX REV CODE- 250: Performed by: HOSPITALIST

## 2022-01-01 PROCEDURE — 84145 PROCALCITONIN (PCT): CPT

## 2022-01-01 PROCEDURE — 87636 SARSCOV2 & INF A&B AMP PRB: CPT

## 2022-01-01 PROCEDURE — 87077 CULTURE AEROBIC IDENTIFY: CPT

## 2022-01-01 PROCEDURE — 99221 1ST HOSP IP/OBS SF/LOW 40: CPT | Performed by: NURSE PRACTITIONER

## 2022-01-01 PROCEDURE — 99233 SBSQ HOSP IP/OBS HIGH 50: CPT | Performed by: NURSE PRACTITIONER

## 2022-01-01 PROCEDURE — 76450000000

## 2022-01-01 PROCEDURE — 3331090004 HSPC SERVICE INTENSITY ADD-ON

## 2022-01-01 PROCEDURE — 92610 EVALUATE SWALLOWING FUNCTION: CPT

## 2022-01-01 PROCEDURE — 93005 ELECTROCARDIOGRAM TRACING: CPT

## 2022-01-01 PROCEDURE — 96374 THER/PROPH/DIAG INJ IV PUSH: CPT

## 2022-01-01 PROCEDURE — 3336500001 HSPC ELECTION

## 2022-01-01 PROCEDURE — HOSPICE MEDICATION HC HH HOSPICE MEDICATION

## 2022-01-01 PROCEDURE — 97166 OT EVAL MOD COMPLEX 45 MIN: CPT

## 2022-01-01 PROCEDURE — 87186 SC STD MICRODIL/AGAR DIL: CPT

## 2022-01-01 PROCEDURE — 74018 RADEX ABDOMEN 1 VIEW: CPT

## 2022-01-01 PROCEDURE — 74011250636 HC RX REV CODE- 250/636: Performed by: PHYSICIAN ASSISTANT

## 2022-01-01 PROCEDURE — 83036 HEMOGLOBIN GLYCOSYLATED A1C: CPT

## 2022-01-01 PROCEDURE — 80307 DRUG TEST PRSMV CHEM ANLYZR: CPT

## 2022-01-01 PROCEDURE — 87086 URINE CULTURE/COLONY COUNT: CPT

## 2022-01-01 PROCEDURE — 70450 CT HEAD/BRAIN W/O DYE: CPT

## 2022-01-01 PROCEDURE — 84443 ASSAY THYROID STIM HORMONE: CPT

## 2022-01-01 PROCEDURE — 70551 MRI BRAIN STEM W/O DYE: CPT

## 2022-01-01 PROCEDURE — 84484 ASSAY OF TROPONIN QUANT: CPT

## 2022-01-01 PROCEDURE — 51700 IRRIGATION OF BLADDER: CPT

## 2022-01-01 PROCEDURE — 80179 DRUG ASSAY SALICYLATE: CPT

## 2022-01-01 PROCEDURE — 82947 ASSAY GLUCOSE BLOOD QUANT: CPT

## 2022-01-01 PROCEDURE — 99283 EMERGENCY DEPT VISIT LOW MDM: CPT

## 2022-01-01 PROCEDURE — 81001 URINALYSIS AUTO W/SCOPE: CPT

## 2022-01-01 PROCEDURE — 96361 HYDRATE IV INFUSION ADD-ON: CPT

## 2022-01-01 RX ORDER — ONDANSETRON 8 MG/1
4 TABLET, ORALLY DISINTEGRATING ORAL
Status: DISCONTINUED | OUTPATIENT
Start: 2022-01-01 | End: 2022-01-01 | Stop reason: HOSPADM

## 2022-01-01 RX ORDER — FACIAL-BODY WIPES
10 EACH TOPICAL
Qty: 5 SUPPOSITORY | Refills: 0 | Status: SHIPPED | OUTPATIENT
Start: 2022-01-01 | End: 2022-01-01 | Stop reason: SDUPTHER

## 2022-01-01 RX ORDER — ACETAMINOPHEN 650 MG/1
650 SUPPOSITORY RECTAL
Qty: 4 SUPPOSITORY | Refills: 0 | Status: SHIPPED | OUTPATIENT
Start: 2022-01-01 | End: 2022-01-01 | Stop reason: SDUPTHER

## 2022-01-01 RX ORDER — LEVOFLOXACIN 5 MG/ML
750 INJECTION, SOLUTION INTRAVENOUS EVERY 24 HOURS
Status: DISCONTINUED | OUTPATIENT
Start: 2022-01-01 | End: 2022-01-01

## 2022-01-01 RX ORDER — INSULIN LISPRO 100 [IU]/ML
INJECTION, SOLUTION INTRAVENOUS; SUBCUTANEOUS EVERY 6 HOURS
Status: DISCONTINUED | OUTPATIENT
Start: 2022-01-01 | End: 2022-01-01 | Stop reason: HOSPADM

## 2022-01-01 RX ORDER — CIPROFLOXACIN 2 MG/ML
400 INJECTION, SOLUTION INTRAVENOUS EVERY 12 HOURS
Status: DISCONTINUED | OUTPATIENT
Start: 2022-01-01 | End: 2022-01-01 | Stop reason: HOSPADM

## 2022-01-01 RX ORDER — ACETAMINOPHEN 325 MG/1
650 TABLET ORAL
Status: DISCONTINUED | OUTPATIENT
Start: 2022-01-01 | End: 2022-01-01 | Stop reason: HOSPADM

## 2022-01-01 RX ORDER — LORAZEPAM 2 MG/ML
0.5 CONCENTRATE ORAL
Qty: 30 ML | Refills: 0 | Status: SHIPPED | OUTPATIENT
Start: 2022-01-01

## 2022-01-01 RX ORDER — SODIUM CHLORIDE 0.9 % (FLUSH) 0.9 %
5-40 SYRINGE (ML) INJECTION EVERY 8 HOURS
Status: DISCONTINUED | OUTPATIENT
Start: 2022-01-01 | End: 2022-01-01 | Stop reason: HOSPADM

## 2022-01-01 RX ORDER — MAGNESIUM SULFATE HEPTAHYDRATE 40 MG/ML
2 INJECTION, SOLUTION INTRAVENOUS ONCE
Status: COMPLETED | OUTPATIENT
Start: 2022-01-01 | End: 2022-01-01

## 2022-01-01 RX ORDER — SODIUM CHLORIDE 0.9 % (FLUSH) 0.9 %
5-10 SYRINGE (ML) INJECTION AS NEEDED
Status: DISCONTINUED | OUTPATIENT
Start: 2022-01-01 | End: 2022-01-01

## 2022-01-01 RX ORDER — GUAIFENESIN 100 MG/5ML
81 LIQUID (ML) ORAL DAILY
Status: DISCONTINUED | OUTPATIENT
Start: 2022-01-01 | End: 2022-01-01 | Stop reason: HOSPADM

## 2022-01-01 RX ORDER — LORAZEPAM 2 MG/ML
0.5 CONCENTRATE ORAL
Qty: 30 ML | Refills: 0 | Status: SHIPPED | OUTPATIENT
Start: 2022-01-01 | End: 2022-01-01 | Stop reason: SDUPTHER

## 2022-01-01 RX ORDER — MAGNESIUM SULFATE 100 %
4 CRYSTALS MISCELLANEOUS AS NEEDED
Status: DISCONTINUED | OUTPATIENT
Start: 2022-01-01 | End: 2022-01-01 | Stop reason: HOSPADM

## 2022-01-01 RX ORDER — DEXTROSE MONOHYDRATE 100 MG/ML
125-250 INJECTION, SOLUTION INTRAVENOUS AS NEEDED
Status: DISCONTINUED | OUTPATIENT
Start: 2022-01-01 | End: 2022-01-01 | Stop reason: HOSPADM

## 2022-01-01 RX ORDER — DEXTROSE 50 % IN WATER (D50W) INTRAVENOUS SYRINGE
25-50 AS NEEDED
Status: DISCONTINUED | OUTPATIENT
Start: 2022-01-01 | End: 2022-01-01

## 2022-01-01 RX ORDER — HYOSCYAMINE SULFATE 0.12 MG/1
0.12 TABLET SUBLINGUAL
Qty: 10 TABLET | Refills: 0 | Status: SHIPPED | OUTPATIENT
Start: 2022-01-01 | End: 2022-01-01 | Stop reason: SDUPTHER

## 2022-01-01 RX ORDER — LEVOFLOXACIN 5 MG/ML
750 INJECTION, SOLUTION INTRAVENOUS
Status: DISCONTINUED | OUTPATIENT
Start: 2022-01-01 | End: 2022-01-01 | Stop reason: DRUGHIGH

## 2022-01-01 RX ORDER — ONDANSETRON 2 MG/ML
4 INJECTION INTRAMUSCULAR; INTRAVENOUS
Status: DISCONTINUED | OUTPATIENT
Start: 2022-01-01 | End: 2022-01-01 | Stop reason: HOSPADM

## 2022-01-01 RX ORDER — ACETAMINOPHEN 650 MG/1
650 SUPPOSITORY RECTAL
Qty: 4 SUPPOSITORY | Refills: 0 | Status: SHIPPED | OUTPATIENT
Start: 2022-01-01

## 2022-01-01 RX ORDER — FACIAL-BODY WIPES
10 EACH TOPICAL
Qty: 5 SUPPOSITORY | Refills: 0 | Status: SHIPPED | OUTPATIENT
Start: 2022-01-01

## 2022-01-01 RX ORDER — SODIUM CHLORIDE 0.9 % (FLUSH) 0.9 %
5-40 SYRINGE (ML) INJECTION AS NEEDED
Status: DISCONTINUED | OUTPATIENT
Start: 2022-01-01 | End: 2022-01-01 | Stop reason: HOSPADM

## 2022-01-01 RX ORDER — VANCOMYCIN HYDROCHLORIDE
1250 ONCE
Status: COMPLETED | OUTPATIENT
Start: 2022-01-01 | End: 2022-01-01

## 2022-01-01 RX ORDER — MORPHINE SULFATE 20 MG/ML
0.25 SOLUTION ORAL
Qty: 30 ML | Refills: 0 | Status: SHIPPED | OUTPATIENT
Start: 2022-01-01 | End: 2022-01-01

## 2022-01-01 RX ORDER — HYOSCYAMINE SULFATE 0.12 MG/1
0.12 TABLET SUBLINGUAL
Qty: 10 TABLET | Refills: 0 | Status: SHIPPED | OUTPATIENT
Start: 2022-01-01

## 2022-01-01 RX ORDER — NOREPINEPHRINE BITARTRATE 1 MG/ML
INJECTION, SOLUTION INTRAVENOUS
Status: DISCONTINUED
Start: 2022-01-01 | End: 2022-01-01 | Stop reason: WASHOUT

## 2022-01-01 RX ORDER — LEVOFLOXACIN 5 MG/ML
750 INJECTION, SOLUTION INTRAVENOUS ONCE
Status: COMPLETED | OUTPATIENT
Start: 2022-01-01 | End: 2022-01-01

## 2022-01-01 RX ORDER — DEXTROSE MONOHYDRATE AND SODIUM CHLORIDE 5; .45 G/100ML; G/100ML
75 INJECTION, SOLUTION INTRAVENOUS CONTINUOUS
Status: DISCONTINUED | OUTPATIENT
Start: 2022-01-01 | End: 2022-01-01

## 2022-01-01 RX ORDER — NOREPINEPHRINE BITARTRATE/D5W 8 MG/250ML
.5-16 PLASTIC BAG, INJECTION (ML) INTRAVENOUS
Status: DISCONTINUED | OUTPATIENT
Start: 2022-01-01 | End: 2022-01-01

## 2022-01-01 RX ORDER — ATORVASTATIN CALCIUM 40 MG/1
40 TABLET, FILM COATED ORAL DAILY
Status: DISCONTINUED | OUTPATIENT
Start: 2022-01-01 | End: 2022-01-01 | Stop reason: HOSPADM

## 2022-01-01 RX ORDER — LEVOFLOXACIN 5 MG/ML
500 INJECTION, SOLUTION INTRAVENOUS
Status: DISCONTINUED | OUTPATIENT
Start: 2022-01-01 | End: 2022-01-01

## 2022-01-01 RX ORDER — ACETAMINOPHEN 650 MG/1
650 SUPPOSITORY RECTAL
Status: DISCONTINUED | OUTPATIENT
Start: 2022-01-01 | End: 2022-01-01 | Stop reason: HOSPADM

## 2022-01-01 RX ADMIN — MULTIVITAMIN 15 ML: LIQUID ORAL at 09:59

## 2022-01-01 RX ADMIN — FAMOTIDINE 20 MG: 10 INJECTION INTRAVENOUS at 09:07

## 2022-01-01 RX ADMIN — MULTIVITAMIN 15 ML: LIQUID ORAL at 09:08

## 2022-01-01 RX ADMIN — CEFEPIME HYDROCHLORIDE 1 G: 1 INJECTION, POWDER, FOR SOLUTION INTRAMUSCULAR; INTRAVENOUS at 09:13

## 2022-01-01 RX ADMIN — SODIUM CHLORIDE 1000 ML: 9 INJECTION, SOLUTION INTRAVENOUS at 20:31

## 2022-01-01 RX ADMIN — Medication 3 UNITS: at 06:42

## 2022-01-01 RX ADMIN — FAMOTIDINE 20 MG: 10 INJECTION INTRAVENOUS at 09:05

## 2022-01-01 RX ADMIN — ACETAMINOPHEN 650 MG: 650 SUPPOSITORY RECTAL at 04:37

## 2022-01-01 RX ADMIN — Medication 3 UNITS: at 18:11

## 2022-01-01 RX ADMIN — FAMOTIDINE 20 MG: 10 INJECTION INTRAVENOUS at 10:14

## 2022-01-01 RX ADMIN — CIPROFLOXACIN 400 MG: 2 INJECTION, SOLUTION INTRAVENOUS at 13:45

## 2022-01-01 RX ADMIN — CIPROFLOXACIN 400 MG: 2 INJECTION, SOLUTION INTRAVENOUS at 14:41

## 2022-01-01 RX ADMIN — ACETAMINOPHEN 650 MG: 325 TABLET ORAL at 20:51

## 2022-01-01 RX ADMIN — SODIUM CHLORIDE, PRESERVATIVE FREE 10 ML: 5 INJECTION INTRAVENOUS at 05:20

## 2022-01-01 RX ADMIN — SODIUM CHLORIDE, PRESERVATIVE FREE 10 ML: 5 INJECTION INTRAVENOUS at 08:17

## 2022-01-01 RX ADMIN — CEFEPIME HYDROCHLORIDE 1 G: 1 INJECTION, POWDER, FOR SOLUTION INTRAMUSCULAR; INTRAVENOUS at 21:57

## 2022-01-01 RX ADMIN — ATORVASTATIN CALCIUM 40 MG: 40 TABLET, FILM COATED ORAL at 08:11

## 2022-01-01 RX ADMIN — ASPIRIN 81 MG CHEWABLE TABLET 81 MG: 81 TABLET CHEWABLE at 09:09

## 2022-01-01 RX ADMIN — SODIUM CHLORIDE, PRESERVATIVE FREE 10 ML: 5 INJECTION INTRAVENOUS at 06:42

## 2022-01-01 RX ADMIN — SODIUM CHLORIDE, PRESERVATIVE FREE 10 ML: 5 INJECTION INTRAVENOUS at 22:00

## 2022-01-01 RX ADMIN — Medication 3 UNITS: at 18:17

## 2022-01-01 RX ADMIN — MULTIVITAMIN 15 ML: LIQUID ORAL at 08:12

## 2022-01-01 RX ADMIN — BARIUM SULFATE 60 ML: 400 SUSPENSION ORAL at 11:20

## 2022-01-01 RX ADMIN — ATORVASTATIN CALCIUM 40 MG: 40 TABLET, FILM COATED ORAL at 09:08

## 2022-01-01 RX ADMIN — VANCOMYCIN HYDROCHLORIDE 1250 MG: 10 INJECTION, POWDER, LYOPHILIZED, FOR SOLUTION INTRAVENOUS at 04:36

## 2022-01-01 RX ADMIN — SODIUM CHLORIDE, PRESERVATIVE FREE 10 ML: 5 INJECTION INTRAVENOUS at 23:28

## 2022-01-01 RX ADMIN — SODIUM CHLORIDE, PRESERVATIVE FREE 10 ML: 5 INJECTION INTRAVENOUS at 00:36

## 2022-01-01 RX ADMIN — DEXTROSE MONOHYDRATE AND SODIUM CHLORIDE 75 ML/HR: 5; .45 INJECTION, SOLUTION INTRAVENOUS at 05:49

## 2022-01-01 RX ADMIN — SODIUM CHLORIDE, PRESERVATIVE FREE 10 ML: 5 INJECTION INTRAVENOUS at 01:29

## 2022-01-01 RX ADMIN — SODIUM CHLORIDE, PRESERVATIVE FREE 10 ML: 5 INJECTION INTRAVENOUS at 13:22

## 2022-01-01 RX ADMIN — SODIUM CHLORIDE, PRESERVATIVE FREE 10 ML: 5 INJECTION INTRAVENOUS at 22:06

## 2022-01-01 RX ADMIN — Medication 4 UNITS: at 18:22

## 2022-01-01 RX ADMIN — FAMOTIDINE 20 MG: 10 INJECTION INTRAVENOUS at 10:35

## 2022-01-01 RX ADMIN — CIPROFLOXACIN 400 MG: 2 INJECTION, SOLUTION INTRAVENOUS at 13:40

## 2022-01-01 RX ADMIN — NOREPINEPHRINE BITARTRATE 7 MCG/MIN: 1 SOLUTION INTRAVENOUS at 09:19

## 2022-01-01 RX ADMIN — ASPIRIN 81 MG CHEWABLE TABLET 81 MG: 81 TABLET CHEWABLE at 10:33

## 2022-01-01 RX ADMIN — CIPROFLOXACIN 400 MG: 2 INJECTION, SOLUTION INTRAVENOUS at 01:46

## 2022-01-01 RX ADMIN — Medication 2 UNITS: at 06:29

## 2022-01-01 RX ADMIN — CIPROFLOXACIN 400 MG: 2 INJECTION, SOLUTION INTRAVENOUS at 01:52

## 2022-01-01 RX ADMIN — Medication 2 UNITS: at 07:01

## 2022-01-01 RX ADMIN — CEFEPIME HYDROCHLORIDE 1 G: 1 INJECTION, POWDER, FOR SOLUTION INTRAMUSCULAR; INTRAVENOUS at 20:17

## 2022-01-01 RX ADMIN — FAMOTIDINE 20 MG: 10 INJECTION INTRAVENOUS at 09:52

## 2022-01-01 RX ADMIN — Medication 2 UNITS: at 05:55

## 2022-01-01 RX ADMIN — MULTIVITAMIN 15 ML: LIQUID ORAL at 09:09

## 2022-01-01 RX ADMIN — DEXTROSE MONOHYDRATE AND SODIUM CHLORIDE 75 ML/HR: 5; .45 INJECTION, SOLUTION INTRAVENOUS at 17:30

## 2022-01-01 RX ADMIN — CIPROFLOXACIN 400 MG: 2 INJECTION, SOLUTION INTRAVENOUS at 13:05

## 2022-01-01 RX ADMIN — SODIUM CHLORIDE, PRESERVATIVE FREE 10 ML: 5 INJECTION INTRAVENOUS at 05:18

## 2022-01-01 RX ADMIN — Medication 6 UNITS: at 05:22

## 2022-01-01 RX ADMIN — Medication 3 UNITS: at 23:52

## 2022-01-01 RX ADMIN — SODIUM CHLORIDE, PRESERVATIVE FREE 10 ML: 5 INJECTION INTRAVENOUS at 21:47

## 2022-01-01 RX ADMIN — Medication 4 UNITS: at 06:40

## 2022-01-01 RX ADMIN — DEXTROSE MONOHYDRATE AND SODIUM CHLORIDE 75 ML/HR: 5; .45 INJECTION, SOLUTION INTRAVENOUS at 06:46

## 2022-01-01 RX ADMIN — Medication 9 UNITS: at 18:39

## 2022-01-01 RX ADMIN — Medication 6 UNITS: at 11:34

## 2022-01-01 RX ADMIN — FAMOTIDINE 20 MG: 10 INJECTION INTRAVENOUS at 11:28

## 2022-01-01 RX ADMIN — SODIUM CHLORIDE, PRESERVATIVE FREE 10 ML: 5 INJECTION INTRAVENOUS at 13:59

## 2022-01-01 RX ADMIN — SODIUM CHLORIDE, PRESERVATIVE FREE 10 ML: 5 INJECTION INTRAVENOUS at 14:00

## 2022-01-01 RX ADMIN — ATORVASTATIN CALCIUM 40 MG: 40 TABLET, FILM COATED ORAL at 10:33

## 2022-01-01 RX ADMIN — CIPROFLOXACIN 400 MG: 2 INJECTION, SOLUTION INTRAVENOUS at 15:27

## 2022-01-01 RX ADMIN — CIPROFLOXACIN 400 MG: 2 INJECTION, SOLUTION INTRAVENOUS at 14:09

## 2022-01-01 RX ADMIN — ATORVASTATIN CALCIUM 40 MG: 40 TABLET, FILM COATED ORAL at 19:00

## 2022-01-01 RX ADMIN — CIPROFLOXACIN 400 MG: 2 INJECTION, SOLUTION INTRAVENOUS at 02:09

## 2022-01-01 RX ADMIN — ASPIRIN 81 MG CHEWABLE TABLET 81 MG: 81 TABLET CHEWABLE at 09:08

## 2022-01-01 RX ADMIN — FAMOTIDINE 20 MG: 10 INJECTION INTRAVENOUS at 10:33

## 2022-01-01 RX ADMIN — SODIUM CHLORIDE, PRESERVATIVE FREE 10 ML: 5 INJECTION INTRAVENOUS at 05:52

## 2022-01-01 RX ADMIN — ASPIRIN 81 MG CHEWABLE TABLET 81 MG: 81 TABLET CHEWABLE at 09:59

## 2022-01-01 RX ADMIN — SODIUM CHLORIDE, PRESERVATIVE FREE 10 ML: 5 INJECTION INTRAVENOUS at 13:03

## 2022-01-01 RX ADMIN — Medication 2 UNITS: at 11:35

## 2022-01-01 RX ADMIN — SODIUM CHLORIDE, PRESERVATIVE FREE 10 ML: 5 INJECTION INTRAVENOUS at 14:55

## 2022-01-01 RX ADMIN — SODIUM CHLORIDE, SODIUM GLUCONATE, SODIUM ACETATE, POTASSIUM CHLORIDE AND MAGNESIUM CHLORIDE 75 ML/HR: 526; 502; 368; 37; 30 INJECTION, SOLUTION INTRAVENOUS at 04:30

## 2022-01-01 RX ADMIN — CIPROFLOXACIN 400 MG: 2 INJECTION, SOLUTION INTRAVENOUS at 01:34

## 2022-01-01 RX ADMIN — CIPROFLOXACIN 400 MG: 2 INJECTION, SOLUTION INTRAVENOUS at 02:58

## 2022-01-01 RX ADMIN — Medication 4 UNITS: at 05:21

## 2022-01-01 RX ADMIN — CIPROFLOXACIN 400 MG: 2 INJECTION, SOLUTION INTRAVENOUS at 01:53

## 2022-01-01 RX ADMIN — CEFEPIME 2 G: 2 INJECTION, POWDER, FOR SOLUTION INTRAVENOUS at 20:38

## 2022-01-01 RX ADMIN — FAMOTIDINE 20 MG: 10 INJECTION INTRAVENOUS at 09:23

## 2022-01-01 RX ADMIN — SODIUM CHLORIDE, PRESERVATIVE FREE 10 ML: 5 INJECTION INTRAVENOUS at 05:22

## 2022-01-01 RX ADMIN — SODIUM CHLORIDE, SODIUM GLUCONATE, SODIUM ACETATE, POTASSIUM CHLORIDE AND MAGNESIUM CHLORIDE 1000 ML: 526; 502; 368; 37; 30 INJECTION, SOLUTION INTRAVENOUS at 09:15

## 2022-01-01 RX ADMIN — ASPIRIN 81 MG CHEWABLE TABLET 81 MG: 81 TABLET CHEWABLE at 19:00

## 2022-01-01 RX ADMIN — CEFEPIME HYDROCHLORIDE 1 G: 1 INJECTION, POWDER, FOR SOLUTION INTRAMUSCULAR; INTRAVENOUS at 09:54

## 2022-01-01 RX ADMIN — SODIUM CHLORIDE, PRESERVATIVE FREE 10 ML: 5 INJECTION INTRAVENOUS at 06:04

## 2022-01-01 RX ADMIN — MAGNESIUM SULFATE 2 G: 2 INJECTION INTRAVENOUS at 07:27

## 2022-01-01 RX ADMIN — BARIUM SULFATE 30 ML: 400 PASTE ORAL at 11:20

## 2022-01-01 RX ADMIN — MULTIVITAMIN 15 ML: LIQUID ORAL at 10:32

## 2022-01-01 RX ADMIN — Medication 6 UNITS: at 11:38

## 2022-01-01 RX ADMIN — SODIUM CHLORIDE, PRESERVATIVE FREE 10 ML: 5 INJECTION INTRAVENOUS at 21:53

## 2022-01-01 RX ADMIN — FAMOTIDINE 20 MG: 10 INJECTION INTRAVENOUS at 12:58

## 2022-01-01 RX ADMIN — Medication 2 UNITS: at 18:00

## 2022-01-01 RX ADMIN — BARIUM SULFATE 15 ML: 400 SUSPENSION ORAL at 11:40

## 2022-01-01 RX ADMIN — SODIUM CHLORIDE, PRESERVATIVE FREE 10 ML: 5 INJECTION INTRAVENOUS at 15:29

## 2022-01-01 RX ADMIN — SODIUM CHLORIDE, PRESERVATIVE FREE 10 ML: 5 INJECTION INTRAVENOUS at 07:04

## 2022-01-01 RX ADMIN — SODIUM CHLORIDE, PRESERVATIVE FREE 10 ML: 5 INJECTION INTRAVENOUS at 21:58

## 2022-01-01 RX ADMIN — ATORVASTATIN CALCIUM 40 MG: 40 TABLET, FILM COATED ORAL at 09:09

## 2022-01-01 RX ADMIN — CIPROFLOXACIN 400 MG: 2 INJECTION, SOLUTION INTRAVENOUS at 13:21

## 2022-01-01 RX ADMIN — SODIUM CHLORIDE, PRESERVATIVE FREE 5 ML: 5 INJECTION INTRAVENOUS at 06:00

## 2022-01-01 RX ADMIN — Medication 3 UNITS: at 23:34

## 2022-01-01 RX ADMIN — Medication 6 UNITS: at 18:07

## 2022-01-01 RX ADMIN — CIPROFLOXACIN 400 MG: 2 INJECTION, SOLUTION INTRAVENOUS at 15:35

## 2022-01-01 RX ADMIN — FAMOTIDINE 20 MG: 10 INJECTION INTRAVENOUS at 10:00

## 2022-01-01 RX ADMIN — DEXTROSE MONOHYDRATE AND SODIUM CHLORIDE 75 ML/HR: 5; .45 INJECTION, SOLUTION INTRAVENOUS at 19:45

## 2022-01-01 RX ADMIN — CIPROFLOXACIN 400 MG: 2 INJECTION, SOLUTION INTRAVENOUS at 02:13

## 2022-01-01 RX ADMIN — LEVOFLOXACIN 750 MG: 750 INJECTION, SOLUTION INTRAVENOUS at 01:53

## 2022-01-01 RX ADMIN — CEFEPIME HYDROCHLORIDE 1 G: 1 INJECTION, POWDER, FOR SOLUTION INTRAMUSCULAR; INTRAVENOUS at 09:11

## 2022-01-01 RX ADMIN — ATORVASTATIN CALCIUM 40 MG: 40 TABLET, FILM COATED ORAL at 09:59

## 2022-01-01 RX ADMIN — MULTIVITAMIN 15 ML: LIQUID ORAL at 12:59

## 2022-01-01 RX ADMIN — NOREPINEPHRINE BITARTRATE 4 MCG/MIN: 1 SOLUTION INTRAVENOUS at 00:36

## 2022-01-01 RX ADMIN — Medication 3 UNITS: at 12:59

## 2022-01-01 RX ADMIN — SODIUM CHLORIDE, PRESERVATIVE FREE 10 ML: 5 INJECTION INTRAVENOUS at 14:41

## 2022-01-01 RX ADMIN — CIPROFLOXACIN 400 MG: 2 INJECTION, SOLUTION INTRAVENOUS at 02:04

## 2022-01-01 RX ADMIN — DEXTROSE MONOHYDRATE AND SODIUM CHLORIDE 75 ML/HR: 5; .45 INJECTION, SOLUTION INTRAVENOUS at 17:08

## 2022-01-01 RX ADMIN — Medication 2 UNITS: at 06:19

## 2022-01-01 RX ADMIN — ASPIRIN 81 MG CHEWABLE TABLET 81 MG: 81 TABLET CHEWABLE at 08:11

## 2022-01-01 RX ADMIN — SODIUM CHLORIDE 701 ML: 900 INJECTION, SOLUTION INTRAVENOUS at 20:42

## 2022-01-01 RX ADMIN — BARIUM SULFATE 30 ML: 400 PASTE ORAL at 11:40

## 2022-01-01 RX ADMIN — DEXTROSE MONOHYDRATE AND SODIUM CHLORIDE 75 ML/HR: 5; .45 INJECTION, SOLUTION INTRAVENOUS at 06:08

## 2022-01-01 RX ADMIN — SODIUM CHLORIDE, PRESERVATIVE FREE 10 ML: 5 INJECTION INTRAVENOUS at 13:04

## 2022-01-01 RX ADMIN — Medication 3 UNITS: at 06:30

## 2022-02-11 NOTE — ED TRIAGE NOTES
Pt presents to ED with son who reports that pt has been having difficulty voiding through suprapubic catheter.  Per son pts cath has not been changed in 3 months due to lck of transportation

## 2022-02-12 NOTE — DISCHARGE INSTRUCTIONS
Please call the urology office on Monday. They will arrange to see you then and replace the gutierrez.

## 2022-02-12 NOTE — ED NOTES
11:17 PM  02/11/22     Discharge instructions given to Nirmala Parekh (name) with verbalization of understanding. Patient accompanied by son. Patient discharged with the following prescriptions n/a. Patient discharged to home (destination).       Elli Ponce RN

## 2022-02-12 NOTE — ED NOTES
Attempted to irrigate catheter as requested by MD. Able to push fluid into the bladder, but was unable to withdraw the same amount; met a great deal of resistance but nothing was returned. Provider notified.

## 2022-02-12 NOTE — Clinical Note
Status[de-identified] INPATIENT [101]   Type of Bed: Intensive Care [6]   Cardiac Monitoring Required?: Yes   Inpatient Hospitalization Certified Necessary for the Following Reasons: 4.  Patient requires ICU level of care interventions (further clarification in H&P documentation)   Admitting Diagnosis: Sepsis [facility]) [[id number]]   Admitting Physician: Izabella Rainey [[id number]]   Attending Physician: Izabella Rainey [[id number]]   Estimated Length of Stay: 2 Midnights   Discharge Plan[de-identified] Home with Office Follow-up

## 2022-02-12 NOTE — ED PROVIDER NOTES
EMERGENCY DEPARTMENT HISTORY AND PHYSICAL EXAM    I have evaluated the patient at 7:51 PM      Date: 2/11/2022  Patient Name: Nirmala Parekh    History of Presenting Illness     Chief Complaint   Patient presents with    Unable To Void    Urinary Catheter Problem         History Provided By: Patient  Location/Duration/Severity/Modifying factors   The patient is a 79-year-old male with history of diabetes, GERD, gout, hyperlipidemia, hypertension, prior prostate cancer with urethral stenosis requiring suprapubic catheter, prior stroke with residual right-sided weakness presenting to the emergency department with his son for evaluation of oliguria. Patient's son states that the patient has not voided for the last hour prior to arrival concerning for urinary tract infection and an issue with his suprapubic catheter. States that it has been 3 months since the catheter was last replaced by the urologist office. States that he supposed to do it every month but he is unable to bring the patient for his appointments. States that he has been having leaking around his suprapubic catheter site to his diaper. No fever chills or other otherwise from the patient          PCP: Yaya Tello MD    Current Outpatient Medications   Medication Sig Dispense Refill    carvediloL (COREG) 12.5 mg tablet Take 1 Tablet by mouth two (2) times a day. THIS REFILL IS  APPROVED. APPOINTMENT WILL BE REQUIRED BEFORE NEXT REFILL IS  APPROVED. 180 Tablet 0    allopurinoL (ZYLOPRIM) 100 mg tablet Take 1 Tablet by mouth two (2) times a day. THIS REFILL IS  APPROVED. APPOINTMENT WILL BE REQUIRED BEFORE NEXT REFILL IS  APPROVED. 180 Tablet 0    levoFLOXacin (LEVAQUIN) 500 mg tablet Take 1 Tablet by mouth daily.  7 Tablet 0    rosuvastatin (CRESTOR) 20 mg tablet TAKE 1 TABLET BY MOUTH NIGHTLY 90 Tab 0    Insulin Needles, Disposable, (BD Ultra-Fine Mini Pen Needle) 31 gauge x 3/16\" ndle INJECT 3 TIMES A DAY BEFORE MEALS E11.9 100 Pen Needle 5    insulin lispro (HUMALOG) 100 unit/mL injection For Blood Sugar (mg/dL) of:     Less than 150=0 units           150 -199=2 units  200 -249=4 units  250 -299=6 units  300 -349=8 units  350 and above=10 units  Check blood glucose 3 times daily and at bedtime and inject insulin per SS. 1 Vial 0    cholecalciferol, VITAMIN D3, (VITAMIN D3) 5,000 unit tab tablet Take 1 Tab by mouth every seven (7) days. 30 Tab 2    aspirin delayed-release 81 mg tablet Take 81 mg by mouth daily.  glucose 4 gram chewable tablet Take 4 Tabs by mouth as needed. 50 Tab 0    omega-3 fatty acids-vitamin e (FISH OIL) 1,000 mg Cap Take 1,400 mg by mouth daily. Past History     Past Medical History:  Past Medical History:   Diagnosis Date    Cataract     both    Colon polyp 9-2004    Diabetes ([facility]     IDDM    DJD (degenerative joint disease) of knee     ED (erectile dysfunction)     Sparrow catheter in place     GERD (gastroesophageal reflux disease) 12-01-08    Glaucoma suspect     Gout, unspecified 10-25-06    Hyperlipidemia     Hypertension     Insomnia 7-20-06    Phimosis 6-16-00    Prostate cancer ([facility] 3-3-09    Stroke ([facility] 2007    weakness right side       Past Surgical History:  Past Surgical History:   Procedure Laterality Date    HX POLYPECTOMY  9-2004    [place] COLONOSCOPY FLX DX W/COLLJ SPEC WHEN PFRMD  9/1/2004    polyp/Dr Alvarado    [place] COLONOSCOPY FLX DX W/COLLJ SPEC WHEN PFRMD  9-2007    incomplete; Dr. John Weir       Family History:  Family History   Problem Relation Age of Onset    Hypertension Mother     Diabetes Father     Cancer Father         prostate    Heart Disease Father 70        CABG    Hypertension Sister     Hypertension Brother     Diabetes Brother        Social History:  Social History     Tobacco Use    Smoking status: Former Smoker    Smokeless tobacco: Never Used   Vaping Use    Vaping Use: Never used   Substance Use Topics    Alcohol use: No    Drug use:  No Allergies: Allergies   Allergen Reactions    Bactrim [Sulfamethoxazole-Trimethoprim] Other (comments)     CAUSED ACUTE RENAL FAILURE         Review of Systems       Review of Systems   Unable to perform ROS: Dementia         Physical Exam     Visit Vitals  BP (!) 171/109 (BP 1 Location: Left upper arm, BP Patient Position: Sitting)   Pulse 87   Temp 97.9 °F (36.6 °C)   Resp 16   Ht 5' 6\" (1.676 m)   Wt 56.7 kg (125 lb)   SpO2 100%   BMI 20.18 kg/m²         Physical Exam  Constitutional:       General: He is not in acute distress. Appearance: He is not toxic-appearing. HENT:      Head: Normocephalic and atraumatic. Mouth/Throat:      Mouth: Mucous membranes are dry. Cardiovascular:      Rate and Rhythm: Normal rate and regular rhythm. Heart sounds: Normal heart sounds. No murmur heard. No friction rub. No gallop. Pulmonary:      Effort: Pulmonary effort is normal.      Breath sounds: Normal breath sounds. Abdominal:      General: There is no distension. Palpations: Abdomen is soft. There is no mass. Tenderness: There is no abdominal tenderness. There is no guarding. Hernia: No hernia is present. Comments: Suprapubic catheter in place with urine leaking around the catheter. No purulence or signs of infection otherwise. Musculoskeletal:         General: No swelling, tenderness or deformity. Cervical back: Normal range of motion and neck supple. Skin:     General: Skin is warm and dry. Capillary Refill: Capillary refill takes less than 2 seconds. Findings: No rash. Neurological:      General: No focal deficit present. Mental Status: He is alert. Mental status is at baseline.    Psychiatric:         Mood and Affect: Mood normal.           Diagnostic Study Results     Labs -  Recent Results (from the past 12 hour(s))   CBC WITH AUTOMATED DIFF    Collection Time: 02/11/22  9:15 PM   Result Value Ref Range    WBC 10.5 4.6 - 13.2 K/uL    RBC 4.66 4.35 - 5.65 M/uL    HGB 12.8 (L) 13.0 - 16.0 g/dL    HCT 39.9 36.0 - 48.0 %    MCV 85.6 78.0 - 100.0 FL    MCH 27.5 24.0 - 34.0 PG    MCHC 32.1 31.0 - 37.0 g/dL    RDW 17.0 (H) 11.6 - 14.5 %    PLATELET 062 923 - 502 K/uL    MPV 11.5 9.2 - 11.8 FL    NRBC 0.0 0  WBC    ABSOLUTE NRBC 0.00 0.00 - 0.01 K/uL    NEUTROPHILS 68 40 - 73 %    LYMPHOCYTES 23 21 - 52 %    MONOCYTES 6 3 - 10 %    EOSINOPHILS 2 0 - 5 %    BASOPHILS 0 0 - 2 %    IMMATURE GRANULOCYTES 0 0.0 - 0.5 %    ABS. NEUTROPHILS 7.2 1.8 - 8.0 K/UL    ABS. LYMPHOCYTES 2.4 0.9 - 3.6 K/UL    ABS. MONOCYTES 0.6 0.05 - 1.2 K/UL    ABS. EOSINOPHILS 0.2 0.0 - 0.4 K/UL    ABS. BASOPHILS 0.0 0.0 - 0.1 K/UL    ABS. IMM. GRANS. 0.0 0.00 - 0.04 K/UL    DF AUTOMATED     METABOLIC PANEL, BASIC    Collection Time: 02/11/22  9:15 PM   Result Value Ref Range    Sodium 142 136 - 145 mmol/L    Potassium 5.3 3.5 - 5.5 mmol/L    Chloride 115 (H) 100 - 111 mmol/L    CO2 21 21 - 32 mmol/L    Anion gap 6 3.0 - 18 mmol/L    Glucose 193 (H) 74 - 99 mg/dL    BUN 37 (H) 7.0 - 18 MG/DL    Creatinine 2.24 (H) 0.6 - 1.3 MG/DL    BUN/Creatinine ratio 17 12 - 20      GFR est AA 35 (L) >60 ml/min/1.73m2    GFR est non-AA 29 (L) >60 ml/min/1.73m2    Calcium 9.7 8.5 - 10.1 MG/DL       Radiologic Studies -   No orders to display         Medical Decision Making   I am the first provider for this patient. I reviewed the vital signs, available nursing notes, past medical history, past surgical history, family history and social history. Vital Signs-Reviewed the patient's vital signs. Records Reviewed: Nursing Notes and Old Medical Records (Time of Review: 7:51 PM)    ED Course: Progress Notes, Reevaluation, and Consults:         Provider Notes (Medical Decision Making):   MDM  Number of Diagnoses or Management Options  Sparrow catheter problem, initial encounter [facility])  Diagnosis management comments: 25-year-old male presenting inability to void and pubic catheter problem.   Will obtain screening lab work to evaluate his renal function. Will attempt to flush the urinary catheter and obtain a UA. Will discuss case with urology. 2234:  Patient's blood work unremarkable. Renal function is a baseline. We were unable to remove the Sparrow catheter. I discussed the case with Dr. Anyi Wade of urology. He will arrange to see the patient on Monday to have the catheter changed. No further recommendations here. This has been discussed with the patient's son who verbalizes understanding and agreement with the plan. Diagnosis     Clinical Impression:   1. Sparrow catheter problem, initial encounter [facility])        Disposition: home    Follow-up Information     Follow up With Specialties Details Why [address]    [facility] EMERGENCY DEPT Emergency Medicine  As needed, If symptoms worsen [address] [facility] [address]  [phone]    [facility] [place]  Call in 1 day  [address]    Dee Crawford MD Urology Call in 1 day  [address] [facility]  [phone]             Patient's Medications   Start Taking    No medications on file   Continue Taking    ALLOPURINOL (ZYLOPRIM) 100 MG TABLET    Take 1 Tablet by mouth two (2) times a day. THIS REFILL IS  APPROVED. APPOINTMENT WILL BE REQUIRED BEFORE NEXT REFILL IS  APPROVED. ASPIRIN DELAYED-RELEASE 81 MG TABLET    Take 81 mg by mouth daily. CARVEDILOL (COREG) 12.5 MG TABLET    Take 1 Tablet by mouth two (2) times a day. THIS REFILL IS  APPROVED. APPOINTMENT WILL BE REQUIRED BEFORE NEXT REFILL IS  APPROVED. CHOLECALCIFEROL, VITAMIN D3, (VITAMIN D3) 5,000 UNIT TAB TABLET    Take 1 Tab by mouth every seven (7) days. GLUCOSE 4 GRAM CHEWABLE TABLET    Take 4 Tabs by mouth as needed.     INSULIN LISPRO (HUMALOG) 100 UNIT/ML INJECTION    For Blood Sugar (mg/dL) of:     Less than 150=0 units           150 -199=2 units  200 -249=4 units  250 -299=6 units  300 -349=8 units  350 and above=10 units  Check blood glucose 3 times daily and at bedtime and inject insulin per SS. INSULIN NEEDLES, DISPOSABLE, (BD ULTRA-FINE MINI PEN NEEDLE) 31 GAUGE X 3/16\" NDLE    INJECT 3 TIMES A DAY BEFORE MEALS E11.9    LEVOFLOXACIN (LEVAQUIN) 500 MG TABLET    Take 1 Tablet by mouth daily. OMEGA-3 FATTY ACIDS-VITAMIN E (FISH OIL) 1,000 MG CAP    Take 1,400 mg by mouth daily. ROSUVASTATIN (CRESTOR) 20 MG TABLET    TAKE 1 TABLET BY MOUTH NIGHTLY   These Medications have changed    No medications on file   Stop Taking    No medications on file     Disclaimer: Sections of this note are dictated using utilizing voice recognition software. Minor typographical errors may be present. If questions arise, please do not hesitate to contact me or call our department.

## 2022-02-13 PROBLEM — A41.9 SEPSIS (HCC): Status: ACTIVE | Noted: 2022-01-01

## 2022-02-13 NOTE — PROGRESS NOTES
1650- Report received from ER nurse. Awaiting patient arrival.     0- Patient arrived from ER. Patient alert, following commands. Patient states name but otherwise only grunts to questions. Per son who is the primary caregiver this is baseline. Patient placed on VS monitor. Sinus rhythm in the 90's. /76. BS clear on RA with O2 sats > 95%. Suprapubic catheter in place and draining. Some white/pink drainage noted from insertion site. Patient given bed bath. Sacrum with signs of a healed pressure injury, now intact. Mepilex placed on site. 1915- Change of shift, report given to WILFREDO Altman.

## 2022-02-13 NOTE — ED PROVIDER NOTES
EMERGENCY DEPARTMENT HISTORY AND PHYSICAL EXAM  This was created with voice recognition software and transcription errors may be present. 8:08 PM  Date: 2/12/2022  Patient Name: Jeffery Alvarenga    History of Presenting Illness     Chief Complaint:    History Provided By:     HPI: Jeffery Alvarenga is a 67 y.o. male past medical history of cataracts diabetes DJD Sparrow catheter in place, suprapubic, GERD glaucoma hyperlipidemia hypertension prostate cancer and stroke with residual right-sided weakness who presents for altered mental status. Patient was seen last night for difficulty with his suprapubic catheter. His son was with him this afternoon patient was in his normal state of health and his son went to get food. When he returned he found his dad was confused had vomited and had some purulent material in his diaper.   He was brought in to the emergency department and seen immediately upon arrival.    PCP: Fern Delcid MD      Past History     Past Medical History:  Past Medical History:   Diagnosis Date    Cataract     both    Colon polyp 9-2004    Diabetes ([facility] [address]     IDDM    DJD (degenerative joint disease) of knee     ED (erectile dysfunction)     Sparrow catheter in place     GERD (gastroesophageal reflux disease) 12-01-08    Glaucoma suspect     Gout, unspecified 10-25-06    Hyperlipidemia     Hypertension     Insomnia 7-20-06    Phimosis 6-16-00    Prostate cancer ([facility] [address] 3-3-09    Stroke [facility]) 2007    weakness right side       Past Surgical History:  Past Surgical History:   Procedure Laterality Date    HX POLYPECTOMY  9-2004    [place] COLONOSCOPY FLX DX W/COLLJ [facility] WHEN PFRMD  9/1/2004    polyp/Dr Alvarado    WI COLONOSCOPY FLX DX W/COLLJ SPEC WHEN PFRMD  9-2007    incomplete; Dr. Alexis Sa       Family History:  Family History   Problem Relation Age of Onset    Hypertension Mother     Diabetes Father     Cancer Father         prostate    Heart Disease Father 70        CABG    Hypertension Sister     Hypertension Brother     Diabetes Brother        Social History:  Social History     Tobacco Use    Smoking status: Former Smoker    Smokeless tobacco: Never Used   Vaping Use    Vaping Use: Never used   Substance Use Topics    Alcohol use: No    Drug use: No       Allergies: Allergies   Allergen Reactions    Bactrim [Sulfamethoxazole-Trimethoprim] Other (comments)     CAUSED ACUTE RENAL FAILURE       Review of Systems     Review of Systems   Unable to perform ROS: Mental status change   All other systems reviewed and are negative. 10 point review of systems otherwise negative unless noted in HPI. Physical Exam       Physical Exam  Constitutional:       Appearance: Normal appearance. HENT:      Head: Normocephalic. Nose: Nose normal.      Mouth/Throat:      Mouth: Mucous membranes are moist.   Eyes:      Pupils: Pupils are equal, round, and reactive to light. Cardiovascular:      Rate and Rhythm: Normal rate. Pulses: Normal pulses. Pulmonary:      Effort: Pulmonary effort is normal.   Abdominal:      General: Abdomen is flat. Genitourinary:     Comments: Suprapubic catheter  Musculoskeletal:         General: Normal range of motion. Cervical back: Normal range of motion. Skin:     General: Skin is warm. Capillary Refill: Capillary refill takes less than 2 seconds. Neurological:      General: No focal deficit present. Psychiatric:         Mood and Affect: Mood normal.         Diagnostic Study Results     Vital Signs  EKG: Sinus tachycardia 105 normal axis normal intervals no ST elevation or depression no hypertrophy  Labs: White count of 8 but 13% bands initial lactic 4 EMIL with slightly low bicarb  Imagin reviewed    Medical Decision Making     ED Course: Progress Notes, Reevaluation, and Consults:    I will be the provider of record for this patient.      Provider Notes (Medical Decision Making): Patient presents to the emergency department with altered mental status. Initial blood pressure noted to be fifty-five systolic. I was called to bedside repeat blood pressure 608 systolic patient is warm to touch history of suprapubic complication last night. Will start broad-spectrum antibiotics and sepsis protocol. Patient is awake and his son is bedside. With persistent hypotension after fluid resuscitation. Given his son was consented for a central line by Dr. Tawanna Bee. Procedure note: I assisted Dr. Tawanna Bee on the central line. She attempted the first attempt and was able to access the femoral vein on the right's however there was trace amount of resistance on the guidewire and I was unable to definitively visualize the guidewire in the proximal femoral vein so the guidewire was pulled I then attempted the central line under ultrasound guidance. Using standard Seldinger technique I was able to place a right femoral central line with confirmation of the guidewire in the femoral vein prior to dilation. All ports flushed and steffen back and the guidewire was removed and discarded appropriately. Discussed the case with Dr. Bud Marie for the ICU who accepts. Urology consulted by dr Ankur Julian. Critical Care Time:  The services I provided to this patient were to treat and/or prevent clinically significant deterioration that could result in the failure of one or more body systems and/or organ systems due to septic shock. Services included the following:  -reviewing nursing notes and old charts  -vital sign assessments  -direct patient care  -medication orders and management  -interpreting and reviewing diagnostic studies/labs  -re-evaluations  -documentation time    Aggregate critical care time was 90 minutes, which includes only time during which I was engaged in work directly related to the patient's care as described above, whether I was at bedside or elsewhere in the Emergency Department.  It did not include time spent performing other reported procedures or the services of residents, students, nurses, or advance practice providers. Tesha Humphries MD    2:38 AM         Diagnosis     Clinical Impression: No diagnosis found. Disposition:        Patient's Medications   Start Taking    No medications on file   Continue Taking    ALLOPURINOL (ZYLOPRIM) 100 MG TABLET    Take 1 Tablet by mouth two (2) times a day. THIS REFILL IS  APPROVED. APPOINTMENT WILL BE REQUIRED BEFORE NEXT REFILL IS  APPROVED. ASPIRIN DELAYED-RELEASE 81 MG TABLET    Take 81 mg by mouth daily. CARVEDILOL (COREG) 12.5 MG TABLET    Take 1 Tablet by mouth two (2) times a day. THIS REFILL IS  APPROVED. APPOINTMENT WILL BE REQUIRED BEFORE NEXT REFILL IS  APPROVED. CHOLECALCIFEROL, VITAMIN D3, (VITAMIN D3) 5,000 UNIT TAB TABLET    Take 1 Tab by mouth every seven (7) days. GLUCOSE 4 GRAM CHEWABLE TABLET    Take 4 Tabs by mouth as needed. INSULIN LISPRO (HUMALOG) 100 UNIT/ML INJECTION    For Blood Sugar (mg/dL) of:     Less than 150=0 units           150 -199=2 units  200 -249=4 units  250 -299=6 units  300 -349=8 units  350 and above=10 units  Check blood glucose 3 times daily and at bedtime and inject insulin per SS. INSULIN NEEDLES, DISPOSABLE, (BD ULTRA-FINE MINI PEN NEEDLE) 31 GAUGE X 3/16\" NDLE    INJECT 3 TIMES A DAY BEFORE MEALS E11.9    LEVOFLOXACIN (LEVAQUIN) 500 MG TABLET    Take 1 Tablet by mouth daily. OMEGA-3 FATTY ACIDS-VITAMIN E (FISH OIL) 1,000 MG CAP    Take 1,400 mg by mouth daily.     ROSUVASTATIN (CRESTOR) 20 MG TABLET    TAKE 1 TABLET BY MOUTH NIGHTLY   These Medications have changed    No medications on file   Stop Taking    No medications on file

## 2022-02-13 NOTE — H&P
[facility]  Pulmonary, Critical Care, and Sleep Medicine    Name: Gwenn Cooks MRN: [id number]   : 1945 Hospital: [address] [place] [address]   Date: 2022        Critical Care History and Physical      IMPRESSION:   · Septic Shock - Appears to be ureosepsis in setting of chronic suprapubic catheter with recent () complication/difficulty with cath exchange, likely due to obstruction. CXR (-). Tmax 102. 4'F upon arrival. WBC currently wnl but with 13% bands. · Lactic Acidosis - Initial LA 4.20, now resolved s/p 1.2L IVF in ED. · Acute Cystitis with Pyuria - In setting of chronic suprapubic catheter. UA shows +4 bacteria. Notable pus on old catheter upon catheter exchange in ED by Urology. · Mild B/L Hydronephrosis and hydroureter without evidence of ureteral calculus  - Noted on CT Abd/Pelv.   · Urinary Retention - s/p PVP with no change. S/p Cysto 2020 - bladder stones and wide open prostatic urethra. Per chart, suprapubic cath hasn't been changed in 3 months due to lack of transportation. Plan from ED visit () was to have catheter changed on Monday () by Dr. Sanjana Henson with Urology. · Renal stones - Noted on CT Abd/Pelv  · Acute Encephalopathy - Likely 2/2 above process. Toxic/Metabolic in nature  · Hyperglycemia in setting of known DM II - Initial   · EMIL on CKD, stage 3 - 2/2 above. · Prostate CA -  , s/p cryoablation.    · Hx of CVA  · Hx of HTN     Patient Active Problem List   Diagnosis Code    Essential hypertension I10    Type I (juvenile type) diabetes mellitus with renal manifestations, not stated as uncontrolled(250.41) ([facility] [address] E10.29    Hypercholesterolemia E78.00    Gout M10.9    GERD (gastroesophageal reflux disease) K21.9    Vitamin D deficiency E55.9    Stroke ([facility] [address] I63.9    Hx of Prostate Cancer,  cryotherapy C61    Acute encephalopathy G93.40    Hypoglycemia E16.2    Proteinuria R80.9    CKD (chronic kidney disease) stage 3, GFR 30-59 ml/min ([facility]) N18.30    Diabetes mellitus type 2, controlled ([facility] [address] E11.9    Advance care planning Z71.89    Encounter for screening colonoscopy Z12.11    History of CVA (cerebrovascular accident) Z80.78    Pneumonia J18.9   [facility] discharge follow-up Z09    Gastrointestinal hemorrhage associated with duodenitis K29.81    Need for influenza vaccination Z23    Hyperglycemia due to type 2 diabetes mellitus ([facility] [address] E11.65    Cough R05.9    Benign prostatic hyperplasia with incomplete bladder emptying N40.1, R39.14    Type 2 diabetes with nephropathy ([facility]) E11.21    Dehydration E86.0    Generalized weakness R53.1    Failure to thrive in adult R62.7    Acute on chronic renal failure ([facility]) N17.9, N18.9    Poor conditioning Z78.9    Other constipation K59.09    Hyperkalemia E87.5    History of urinary retention Z87.898    Status SupraPubic Catheter Z93.50    Sepsis ([facility]) A41.9      RECOMMENDATIONS:   · Resp: Titrate supp O2 for SpO2 >90%. Doing well on RA. Optimize bronchial hygiene. Frequent IS as able. · I/D: Sepsis bundle per hospital protocol, f/u BxCx/UC. F/U Wound Cx from suprapubic site where pus is noted. Trend LA q4 until normal. Check & Trend PCT. Con't ABX - Cefepime, Levaquin and Vanc. Check MRSA swab and rapid COVID-19. Deescalate ABX once Cx's finalize. Trend temp & WBC curve. · Hem/Onc: Daily CBC; H/H, and plts are stable. Monitor s/s for any active bleeding. Check PT/INR. · CVS: Currently requiring Levophed gtt. Actively titrate vasopressors aim MAP >65mmHg. Check troponin. PRN fluid bolus. Reasonable to update Echo but not urgent. R Fem CVL placed in ED. · Metabolic: Daily BMP, Mag & Phos; monitor e-lytes; replace PRN. Monitor K+ as its on higher end of normal.   · Renal/: Trend Renal indices. Suprapubic cath exchanged in ED by Urology. Urology following. Strict I/Os. · Endocrine: POC Glucose q6; SSI. Check TSH level.  Anticipate vasopressor needs will improve now that cath has been exchanged, but if BP worsens will add stress dose steroids. · GI: Trend LFTs, Zofran PRN for N/V. Keep NPO for now until mental status improves. May need SLP pending mentation. · Musc/Skin: No acute issues, wound care PRN. · Neuro: Closely monitor mental status for improvement. Head CT (-). · Fluids: Plasmalyte at 75cc/hr  · Code Status: Full     Best Practices/Safety Bundles:  · Sepsis Bundle per Hospital Protocol  · Glycemic control; avoid Hypoglycemia  · IHI ICU Bundles:  ·  Central Line Bundle Followed  and Gutierrez Bundle Followed    · Mech Vent patients/ Pulmonary pts:   · Aspiration Precautions - HOB >30'  · Stress Ulcer prophylaxis: N/A  · DVT prophylaxis: SQH  · Need for Lines, gutierrez assessed. · Restraints Need: Not required    · Palliative care evaluation - Not currently consulted    Subjective/History: This patient has been seen and evaluated at the request of Dr. Anette Lyman for urosepsis currently requiring vasopressor support  02/13/22    Patient is a 67 y.o. male with PMH of cataracts, DM 2, DJD, GERD, s/p prostate cancer (2008, s/p cryoablation), urinary retention (now requiring suprapubic catheter since 2008 2/2 prostate cancer), HLD, HTN, glaucoma, CVA (with residual right-sided weakness) who presented to [facility] ED on 2/12/2022 with altered mental status. Per patient's son and ED record, patient was seen last night in the ED (2/11) due to having difficulty voiding through suprapubic catheter. Per patient's son at that time, catheter had not been changed in 3 months due to lack of transportation. Patient follows with urology for suprapubic catheter, last appointment was 05/2022. Patient is post to have monthly catheter exchanges. Patient was D/C'd from ER with plan to have catheter exchanged at urology office on 2/14. No complaints of fever or chills at that time.       Patient currently presents with altered mentation, lethargy, fever (T-max 102.4 °F), tachycardia and hypotension with SBP in the 50s. Sepsis protocol initiated, patient was given 1.2 L of IVF and ultimately started on vasopressors for BP support. Right femoral CVL placed in ED. Broad spec ABX started with cefepime, Levaquin, vancomycin. Lab evaluation significant for lactic acidosis with initial LA of 4.2 (now resolved s/p IVF), WBC normal but 13% bands, EMIL on CKD with initial CR of 3.09. In addition patient was noted to have some hyperglycemia in setting of DM 2. Suprapubic catheter was flushed in the ED with minimal return of urine enough to send a UA which showed 4+ bacteria. Head CT () for acute process. CT abdomen/pelvis showed mild bilateral hydronephrosis and hydroureter without evidence of ureteral calculus. There is a 5 mm calculus layering within the urinary bladder. Suprapubic bladder catheter is in expected position with a tiny amount of gas within the lumen of the urinary bladder. There is sludge and or stones within the gallbladder. Urology was consulted and saw patient in ED were subprepubic catheter was exchanged. There is pus noted on the tip of the old suprapubic catheter. Subsequently, patient does have UOP with noted pyuria. Patient was admitted to ICU for further management. Upon ICU evaluation, patient is awake and able to protect airway but currently nonverbal.  Levophed is currently running at 20mcg/min with SBP in the 110s to 120s. SPO2 100% on room air. Discussed with RN to titrate down Levophed for goal MAP>65 mmHg.      The patient is critically ill and can not provide additional history due to currently non-verbal.       Past Medical History:   Diagnosis Date    Cataract     both    Colon polyp 9-2004    Diabetes ([facility] [address]     IDDM    DJD (degenerative joint disease) of knee     ED (erectile dysfunction)     Sparrow catheter in place     GERD (gastroesophageal reflux disease) 12-01-08    Glaucoma suspect     Gout, unspecified 10-25-06    Hyperlipidemia     Hypertension     Insomnia 7-20-06    Phimosis 6-16-00    Prostate cancer ([facility] [address] 3-3-09    Stroke [facility]) 2007    weakness right side        Past Surgical History:   Procedure Laterality Date    HX POLYPECTOMY  9-2004    MN COLONOSCOPY FLX DX W/COLLJ [facility] WHEN PFRMD  9/1/2004    polyp/Dr Alvarado    MN COLONOSCOPY FLX DX W/COLLJ SPEC WHEN PFRMD  9-2007    incomplete; Dr. Trudy Lam        Prior to Admission medications    Medication Sig Start Date End Date Taking? Authorizing Provider   carvediloL (COREG) 12.5 mg tablet Take 1 Tablet by mouth two (2) times a day. THIS REFILL IS  APPROVED. APPOINTMENT WILL BE REQUIRED BEFORE NEXT REFILL IS  APPROVED. 12/29/21   Collin Vitale MD   allopurinoL (ZYLOPRIM) 100 mg tablet Take 1 Tablet by mouth two (2) times a day. THIS REFILL IS  APPROVED. APPOINTMENT WILL BE REQUIRED BEFORE NEXT REFILL IS  APPROVED. 12/29/21   Collin Vitale MD   levoFLOXacin (LEVAQUIN) 500 mg tablet Take 1 Tablet by mouth daily. 5/26/21   LINDA Moran   rosuvastatin (CRESTOR) 20 mg tablet TAKE 1 TABLET BY MOUTH NIGHTLY 12/11/20   Collin Vitale MD   Insulin Needles, Disposable, (BD Ultra-Fine Mini Pen Needle) 31 gauge x 3/16\" ndle INJECT 3 TIMES A DAY BEFORE MEALS E11.9 6/22/20   Collin Vitale MD   insulin lispro (HUMALOG) 100 unit/mL injection For Blood Sugar (mg/dL) of:     Less than 150=0 units           150 -199=2 units  200 -249=4 units  250 -299=6 units  300 -349=8 units  350 and above=10 units  Check blood glucose 3 times daily and at bedtime and inject insulin per SS. 10/1/19   Collin Vitale MD   cholecalciferol, VITAMIN D3, (VITAMIN D3) 5,000 unit tab tablet Take 1 Tab by mouth every seven (7) days. 10/1/19   Collin Vitale MD   aspirin delayed-release 81 mg tablet Take 81 mg by mouth daily. Provider, Historical   glucose 4 gram chewable tablet Take 4 Tabs by mouth as needed.  6/4/18   Ron Ron,    omega-3 fatty acids-vitamin e (FISH OIL) 1,000 mg Cap Take 1,400 mg by mouth daily. 5/10/10   Provider, Historical       Current Facility-Administered Medications   Medication Dose Route Frequency    [START ON 2022] Vancomycin Lab Information: Random Level Due with AM labs on   1 Each Other ONCE    cefepime (MAXIPIME) 1 g in 0.9% sodium chloride (MBP/ADV) 50 mL MBP  1 g IntraVENous Q12H    vancomycin (VANCOCIN) 1250 mg in  ml infusion  1,250 mg IntraVENous ONCE    Vancomycin: Pharmacy To Dose   Other Rx Dosing/Monitoring    NOREPINephrine (LEVOPHED) 8 mg in 5% dextrose 250mL (32 mcg/mL) infusion  0.5-16 mcg/min IntraVENous TITRATE    levoFLOXacin (LEVAQUIN) 750 mg in D5W IVPB  750 mg IntraVENous ONCE    Followed by   Dayami Webb ON 2022] levoFLOXacin (LEVAQUIN) 500 mg in D5W IVPB  500 mg IntraVENous Q48H       Allergies   Allergen Reactions    Bactrim [Sulfamethoxazole-Trimethoprim] Other (comments)     CAUSED ACUTE RENAL FAILURE        Social History     Tobacco Use    Smoking status: Former Smoker    Smokeless tobacco: Never Used   Substance Use Topics    Alcohol use: No        Family History   Problem Relation Age of Onset    Hypertension Mother     Diabetes Father     Cancer Father         prostate    Heart Disease Father 70        CABG    Hypertension Sister     Hypertension Brother     Diabetes Brother           Review of Systems:  Review of systems not obtained due to patient factors. Objective:   Vital Signs:    Visit Vitals  BP (!) 97/57   Pulse 94   Temp 99.2 °F (37.3 °C)   Resp 26   Ht 5' 6\" (1.676 m)   Wt 56.4 kg (124 lb 4.8 oz)   SpO2 98%   BMI 20.06 kg/m²               Temp (24hrs), Av.8 °F (38.2 °C), Min:99.2 °F (37.3 °C), Max:102.4 °F (39.1 °C)       Intake/Output:   Last shift:       1901 -  0700  In: 8529 [I.V.:1711]  Out: -   Last 3 shifts: No intake/output data recorded.     Intake/Output Summary (Last 24 hours) at 2022 0233  Last data filed at 2022 0005  Gross per 24 hour   Intake 1711 ml   Output    Net 1711 ml       Physical Exam:     General:  Confused but awake, non-verbal currently, cooperative, NAD   Head:  Normocephalic, without obvious abnormality, atraumatic. Eyes:  Conjunctivae/corneas clear. PERRL. Pinpoint pupils bilat and sluggish but reactive. Nose: Nares normal. Septum midline. Mucosa normal. No drainage or sinus tenderness. Throat: Lips, mucosa, and tongue normal. Teeth and gums normal.   Neck: Supple, symmetrical, trachea midline, no adenopathy, no carotid bruit and no JVD. Lungs:   Symmetrical chest rise; good AE bilat; CTAB; no wheezes/rhonchi/rales noted. Heart:  RRR, S1, S2 normal, no m/r/g   Abdomen:   Soft, non-tender. Bowel sounds normal. +suprapubic catheter. +scant pus noted around cath site. -odor. No masses,  No organomegaly. Extremities: Extremities normal, atraumatic, no cyanosis or edema. Pulses: 2+ and symmetric all extremities. Skin: Skin color, texture, turgor normal. B/L feet dry, flaky skin. Neurologic: Non-verbal currently, eyes open, responds to voice   Devices: Suprapubic catheter, R fem CVL       Data:     Recent Results (from the past 24 hour(s))   BLOOD GAS,CHEM8,LACTIC ACID POC    Collection Time: 02/12/22  8:28 PM   Result Value Ref Range    Calcium, ionized (POC) 1.03 (L) 1.12 - 1.32 mmol/L    Base deficit (POC) 7.8 mmol/L    HCO3 (POC) 16.7 (L) 22 - 26 MMOL/L    CO2, POC 18 (L) 19 - 24 MMOL/L    O2 SAT 81 %    Patient temp.  102.4      Sample source VENOUS BLOOD      Performed by Bharti Flynn     Sodium (POC) 139 136 - 145 mmol/L    Potassium (POC) 9.0 (HH) 3.5 - 5.1 mmol/L    Glucose (POC) 234 (H) 65 - 100 mg/dL    Creatinine (POC) 3.09 (H) 0.6 - 1.3 mg/dL    Lactic Acid (POC) 4.20 (HH) 0.40 - 2.00 mmol/L    Chloride (POC) 119 (H) 98 - 107 mmol/L    Anion gap, POC 3 (L) 10 - 20      GFRAA, POC 24 (L) >60 ml/min/1.73m2    GFRNA, POC 20 (L) >60 ml/min/1.73m2    pH, venous (POC) 7.31 (L) 7.32 - 7.42 pCO2, venous (POC) 34.3 (L) 41 - 51 MMHG    pO2, venous (POC) 56 (H) 25 - 40 mmHg    Critical value read back KATIE    EKG, 12 LEAD, INITIAL    Collection Time: 02/12/22  8:36 PM   Result Value Ref Range    Ventricular Rate 105 BPM    Atrial Rate 105 BPM    P-R Interval 120 ms    QRS Duration 78 ms    Q-T Interval 342 ms    QTC Calculation (Bezet) 452 ms    Calculated P Axis 76 degrees    Calculated R Axis 48 degrees    Calculated T Axis 80 degrees    Diagnosis       Poor data quality, interpretation may be adversely affected  Sinus tachycardia  Low voltage QRS  Borderline ECG  When compared with ECG of 01-AUG-2019 15:51,  premature ventricular complexes are no longer present     METABOLIC PANEL, COMPREHENSIVE    Collection Time: 02/12/22  8:42 PM   Result Value Ref Range    Sodium 143 136 - 145 mmol/L    Potassium 5.0 3.5 - 5.5 mmol/L    Chloride 115 (H) 100 - 111 mmol/L    CO2 20 (L) 21 - 32 mmol/L    Anion gap 8 3.0 - 18 mmol/L    Glucose 198 (H) 74 - 99 mg/dL    BUN 46 (H) 7.0 - 18 MG/DL    Creatinine 2.77 (H) 0.6 - 1.3 MG/DL    BUN/Creatinine ratio 17 12 - 20      GFR est AA 27 (L) >60 ml/min/1.73m2    GFR est non-AA 22 (L) >60 ml/min/1.73m2    Calcium 9.4 8.5 - 10.1 MG/DL    Bilirubin, total 0.8 0.2 - 1.0 MG/DL    ALT (SGPT) 7 (L) 16 - 61 U/L    AST (SGOT) <3 (L) 10 - 38 U/L    Alk.  phosphatase 61 45 - 117 U/L    Protein, total 7.7 6.4 - 8.2 g/dL    Albumin 2.9 (L) 3.4 - 5.0 g/dL    Globulin 4.8 (H) 2.0 - 4.0 g/dL    A-G Ratio 0.6 (L) 0.8 - 1.7     CBC WITH AUTOMATED DIFF    Collection Time: 02/12/22  8:42 PM   Result Value Ref Range    WBC 8.6 4.6 - 13.2 K/uL    RBC 5.19 4.35 - 5.65 M/uL    HGB 14.0 13.0 - 16.0 g/dL    HCT 44.5 36.0 - 48.0 %    MCV 85.7 78.0 - 100.0 FL    MCH 27.0 24.0 - 34.0 PG    MCHC 31.5 31.0 - 37.0 g/dL    RDW 17.2 (H) 11.6 - 14.5 %    PLATELET 949 962 - 130 K/uL    MPV 11.8 9.2 - 11.8 FL    NRBC 0.0 0  WBC    ABSOLUTE NRBC 0.00 0.00 - 0.01 K/uL    NEUTROPHILS 70 40 - 73 %    BAND NEUTROPHILS 13 (H) 0 - 5 %    LYMPHOCYTES 16 (L) 21 - 52 %    MONOCYTES 0 (L) 3 - 10 %    EOSINOPHILS 0 0 - 5 %    BASOPHILS 1 0 - 2 %    IMMATURE GRANULOCYTES 0 %    ABS. NEUTROPHILS 7.1 1.8 - 8.0 K/UL    ABS. LYMPHOCYTES 1.4 0.9 - 3.6 K/UL    ABS. MONOCYTES 0.0 (L) 0.05 - 1.2 K/UL    ABS. EOSINOPHILS 0.0 0.0 - 0.4 K/UL    ABS. BASOPHILS 0.1 0.0 - 0.1 K/UL    ABS. IMM. GRANS. 0.0 K/UL    DF MANUAL      PLATELET COMMENTS ADEQUATE PLATELETS      RBC COMMENTS ANISOCYTOSIS  1+       BLOOD GAS,CHEM8,LACTIC ACID POC    Collection Time: 02/12/22  8:42 PM   Result Value Ref Range    Calcium, ionized (POC) 1.21 1.12 - 1.32 mmol/L    Base deficit (POC) 7.1 mmol/L    HCO3 (POC) 18.6 (L) 22 - 26 MMOL/L    CO2, POC 20 19 - 24 MMOL/L    O2 SAT 29 %    Patient temp.  102.4      Sample source VENOUS BLOOD      Performed by Baldev Cullen     Sodium (POC) 145 136 - 145 mmol/L    Potassium (POC) 5.4 (H) 3.5 - 5.1 mmol/L    Glucose (POC) 216 (H) 65 - 100 mg/dL    Creatinine (POC) 2.71 (H) 0.6 - 1.3 mg/dL    Lactic Acid (POC) 3.98 (HH) 0.40 - 2.00 mmol/L    Chloride (POC) 116 (H) 98 - 107 mmol/L    Anion gap, POC 10 10 - 20      GFRAA, POC 28 (L) >60 ml/min/1.73m2    GFRNA, POC 23 (L) >60 ml/min/1.73m2    pH, venous (POC) 7.28 (L) 7.32 - 7.42      pCO2, venous (POC) 41.0 41 - 51 MMHG    pO2, venous (POC) 24 (L) 25 - 40 mmHg    Critical value read back KATIE    URINALYSIS W/ RFLX MICROSCOPIC    Collection Time: 02/12/22  8:47 PM   Result Value Ref Range    Color DARK YELLOW      Appearance TURBID      Specific gravity 1.013 1.005 - 1.030      pH (UA) >8.5 (H) 5.0 - 8.0    Protein 300 (A) NEG mg/dL    Glucose Negative NEG mg/dL    Ketone Negative NEG mg/dL    Bilirubin Negative NEG      Blood MODERATE (A) NEG      Urobilinogen 0.2 0.2 - 1.0 EU/dL    Nitrites Negative NEG      Leukocyte Esterase MODERATE (A) NEG     URINE MICROSCOPIC ONLY    Collection Time: 02/12/22  8:47 PM   Result Value Ref Range    WBC 0 to 1 0 - 5 /hpf    RBC 11 to 20 0 - 5 /hpf    Epithelial cells FEW 0 - 5 /lpf    Bacteria 4+ (A) NEG /hpf    Triple Phosphate crystals 1+ (A) NEG   POC LACTIC ACID    Collection Time: 02/12/22 11:56 PM   Result Value Ref Range    Lactic Acid (POC) 1.47 0.40 - 2.00 mmol/L           Recent Labs     02/12/22 2042 02/12/22 2028   HCO3I 18.6* 16.7*       Telemetry:normal sinus rhythm    ECHO  [09/29/2014]:  SUMMARY:   Left ventricle: Size was normal. Systolic function was normal by EF   (biplane method of disks). Ejection fraction was estimated in the range of   55 % to 60 %. There were no regional wall motion abnormalities. Doppler   parameters were consistent with abnormal left ventricular relaxation   (grade 1 diastolic dysfunction). Right ventricle: Systolic pressure was mildly increased. Estimated peak   pressure was 43 mmHg. Atrial septum: Contrast injection was performed. There was no   right-to-left shunt, with provocative maneuvers to increase right atrial   pressure. Tricuspid valve: There was mild regurgitation. INDICATIONS: Evaluate for suspected cardiac source of embolism. Imaging:  I have personally reviewed the patients radiographs and have reviewed the reports:    CXR [02/12/2022]: FINDINGS:      The cardiac silhouette is top normal in normal. Calcified plaque is demonstrated  within the arch of the aorta which is mildly  tortuous. The lungs are clear. Pulmonary vascularity is normal. The costophrenic angles are sharply defined. Narrowing of the left AC and glenohumeral joint with spurring. .     IMPRESSION     Atherosclerosis. Top normal cardiac size. Arthritis at the left shoulder      CT ABD PELV WO CONT [02/12/2022]: FINDINGS:      Abdomen-      Mild crowding of the bronchovascular markings at the bases. Trace pericardial  effusion.     The liver and spleen are normal in size and density. The biliary tree is not  dilated. The gallbladder is normal in size.  Increased density within the lumen of the  gallbladder. No gallbladder wall thickening nor surrounding inflammation.     Bilateral mild hydronephrosis and hydroureter which can be followed down to the  bladder base. No renal nor ureteral calculus. Normal size kidneys.     Bilateral mild adrenal nodular thickening without change     Tiny amount of fluid in the paracolic gutters.     The large and small bowel seem unremarkable.     Pelvis -     There is no free pelvic fluid.     The pelvic viscera are unremarkable.     Mild circumferential thickening of the urinary bladder wall. Suprapubic Sparrow  catheter. Small amount of air within the bladder lumen. 5 mm calculus layering  within the urinary bladder.     Review of osseous structures throughout on bone window settings shows no  significant osseous pathology.     IMPRESSION     Bilateral mild hydronephrosis and hydroureter without evidence of ureteral  calculus. 5 mm calculus layering within the urinary bladder. Mild circumferential urinary bladder wall thickening. This may be seen with  cystitis or mild chronic outlet obstruction. Suprapubic bladder catheter in expected position. Tiny amount of gas within the  lumen of the urinary bladder. Sludge and/or stones within the gallbladder. As clinically indicated follow-up  ultrasound of the right upper quadrant could be of benefit. Mild micronodular adrenal thickening consistent with hyperplasia without change.        Report provided to the emergency department at 2317 hrs    CT HEAD WO CONT [02/12/2022]: FINDINGS:     There are no intra or extra axial fluid collections.  There is no hemorrhage.      There is mild global enlargement of the ventricular system, cortical sulci, and  basilar cisterns.     Subcortical and Periventricular low density changes are seen bilaterally.       Tiny focal chronic lacunar infarction is demonstrated within the left fouzia and  bilateral basal ganglia small lacunar infarct in the right thalamus of  indeterminate age.        The paranasal sinuses are clear.     IMPRESSION     Tiny lacunar right thalamic infarct of indeterminate age. Atrophy. Small vessel disease. Chronic lacunar infarcts          Work done to facilitate the patient's plan of care by reviewing EMR, completing initial care plan and assess and initiate treatment, diagnostic work-up for attending MD to complete consultation. Total of   60  min critical care time spent at bedside during the course of care providing evaluation,management and care decisions and ordering appropriate treatment related to critical care problems exclusive of procedures. The reason for providing this level of medical care for this critically ill patient was due a critical illness that impaired one or more vital organ systems such that there was a high probability of imminent or life threatening deterioration in the patients condition. This care involved high complexity decision making to assess, manipulate, and support vital system functions, to treat this degree vital organ system failure and to prevent further life threatening deterioration of the patients condition.         Rosa Nelson PA-C  02/13/22      Pulmonary Critical Care Medicine  [facility]

## 2022-02-13 NOTE — H&P
Hospitalist Admission Note    NAME: Umair Hanley   :     MRN:  [id number]     Date:  2022     Patient PCP: Stanley Marin MD  ________________________________________________________________________    My assessment of this patient's clinical condition and my plan of care is as follows. Assessment / Plan:  1. Acute cystitis with pyuria - mallika pus noted from SP catheter on presentation  2. Sepsis with septic shock, POA, secondary to above  3. Urinary retention with bilateral hydronephrosis and hydroureter in patient with indwelling suprapubic catheter  4. Lactic acidosis  5. EMIL on CKD 3  6. Elevated troponin  7. Acute encephalopathy, likely metabolic  8. DM2   9. History of hypertension  10. History of cerebrovascular disease and prior CVA    1. Patient initially referred for ICU admission due to need for vasopressor support. Pressors have been able to be weaned and blood pressures have remained stable over the course of the past few hours. Patient OK for admission to medical stepdown at this point. 2. Continue broad-spectrum antibiotic therapy as ordered, follow cultures. Suprapubic catheter has been changed by urology in the ED. 3. Consider nephrology eval if no improvement in renal indices. 4. Continue IV fluids, monitor renal indices and electrolytes. -Change fluids to D5 half-normal saline as blood sugars have been trending down. Continue Accu-Cheks and monitor. Hypoglycemia protocol in place but holding off on any insulin administration at this point. 5. Further plan pending response to current treatment and overall clinical course. Code Status: Full  DVT Prophylaxis: Lovenox          Subjective:   CHIEF COMPLAINT:  AMS    HISTORY OF PRESENT ILLNESS:     Umair Hanley is a 67 y.o.  male who presented to the ED late yesterday evening for evaluation of altered mental status.   Patient was found to be poorly responsive in triage and significantly hypotensive with the systolic blood pressure in the 50s. He was taken back to a room immediately for initiation of treatment. Work-up in the ED showed evidence for UTI and CT imaging showed bilateral hydronephrosis with hydroureter. There is no obvious evidence for obstruction on imaging but patient was noted to be retaining urine. A Sparrow catheter has been placed and broad-spectrum antibiotic therapy initiated. Patient did require vasopressor support initially but this is been able to be weaned. He is being referred for hospital admission for further evaluation/and. Past Medical History:   Diagnosis Date    Cataract     both    Colon polyp 9-2004    Diabetes ([facility] [address]     IDDM    DJD (degenerative joint disease) of knee     ED (erectile dysfunction)     Sparrow catheter in place     GERD (gastroesophageal reflux disease) 12-01-08    Glaucoma suspect     Gout, unspecified 10-25-06    Hyperlipidemia     Hypertension     Insomnia 7-20-06    Phimosis 6-16-00    Prostate cancer ([facility] [address] 3-3-09    Stroke ([facility] [address] 2007    weakness right side        Past Surgical History:   Procedure Laterality Date    HX POLYPECTOMY  9-2004    TX COLONOSCOPY FLX DX W/COLLJ SPEC WHEN PFRMD  9/1/2004    polyp/Dr Alvarado    TX COLONOSCOPY FLX DX W/COLLJ SPEC WHEN PFRMD  9-2007    incomplete; Dr. Matthew Iniguez       Social History     Tobacco Use    Smoking status: Former Smoker    Smokeless tobacco: Never Used   Substance Use Topics    Alcohol use: No        Family History   Problem Relation Age of Onset    Hypertension Mother     Diabetes Father     Cancer Father         prostate    Heart Disease Father 70        CABG    Hypertension Sister     Hypertension Brother     Diabetes Brother      Allergies   Allergen Reactions    Bactrim [Sulfamethoxazole-Trimethoprim] Other (comments)     CAUSED ACUTE RENAL FAILURE        Prior to Admission medications    Medication Sig Start Date End Date Taking?  Authorizing Provider carvediloL (COREG) 12.5 mg tablet Take 1 Tablet by mouth two (2) times a day. THIS REFILL IS  APPROVED. APPOINTMENT WILL BE REQUIRED BEFORE NEXT REFILL IS  APPROVED. 12/29/21   Fern Delcid MD   allopurinoL (ZYLOPRIM) 100 mg tablet Take 1 Tablet by mouth two (2) times a day. THIS REFILL IS  APPROVED. APPOINTMENT WILL BE REQUIRED BEFORE NEXT REFILL IS  APPROVED. 12/29/21   Fern Delcid MD   levoFLOXacin (LEVAQUIN) 500 mg tablet Take 1 Tablet by mouth daily. 5/26/21   LINDA Kim   rosuvastatin (CRESTOR) 20 mg tablet TAKE 1 TABLET BY MOUTH NIGHTLY 12/11/20   Fern Delcid MD   Insulin Needles, Disposable, (BD Ultra-Fine Mini Pen Needle) 31 gauge x 3/16\" ndle INJECT 3 TIMES A DAY BEFORE MEALS E11.9 6/22/20   Fern Delcid MD   insulin lispro (HUMALOG) 100 unit/mL injection For Blood Sugar (mg/dL) of:     Less than 150=0 units           150 -199=2 units  200 -249=4 units  250 -299=6 units  300 -349=8 units  350 and above=10 units  Check blood glucose 3 times daily and at bedtime and inject insulin per SS. 10/1/19   Fern Delcid MD   cholecalciferol, VITAMIN D3, (VITAMIN D3) 5,000 unit tab tablet Take 1 Tab by mouth every seven (7) days. 10/1/19   Fern Delcid MD   aspirin delayed-release 81 mg tablet Take 81 mg by mouth daily. Provider, Historical   glucose 4 gram chewable tablet Take 4 Tabs by mouth as needed. 6/4/18   Sarah Dennis S, DO   omega-3 fatty acids-vitamin e (FISH OIL) 1,000 mg Cap Take 1,400 mg by mouth daily. 5/10/10   Provider, Historical           Objective:   VITALS:    Visit Vitals  BP (!) 103/59   Pulse 84   Temp 99.2 °F (37.3 °C)   Resp 16   Ht 5' 6\" (1.676 m)   Wt 56.4 kg (124 lb 4.8 oz)   SpO2 98%   BMI 20.06 kg/m²       ROS:  I am not able to complete the review of systems because:    The patient is intubated and sedated   X The patient has altered mental status due to his acute medical problems    The patient has baseline aphasia from prior stroke(s)   X The patient has baseline dementia and is not reliable historian    The patient is in acute medical distress and unable to provide information             PHYSICAL EXAM:    General:    In NAD. Nontoxic-appearing. HEENT: Head NCAT. Pupils equal round sclerae anicteric. Neck:  Supple, trachea midline. Lungs:   Clear, no wheezes. Effort nonlabored, no accessory muscle use. Chest wall:  No chest wall tenderness. Chest expansion equal and symmetrical bilaterally. Heart:   RRR. Abdomen:   Soft, NT/ND. Bowel sounds normoactive. SP catheter in place lower abdomen. Extremities: Warm, no edema. No evidence for ischemia. Skin:     Not pale. Not Jaundiced  No rashes   Psych:  Mood normal.  Calm, no agitation. Neurologic: Awake, attempts to mumble some words but speech unintelligible. Moves extremities spontaneously. _______________________________________________________________________  Care Plan discussed with:    Comments   Patient     Family      RN X    Care Manager                    Consultant:      _______________________________________________________________________  Expected  Disposition:   Home  X   HH/PT/OT/RN    SNF/LTC    ARU    ________________________________________________________________________    Tests/Procedures:   CT head:  IMPRESSION     Tiny lacunar right thalamic infarct of indeterminate age. Atrophy. Small vessel disease. Chronic lacunar infarcts.        Report provided to the emergency department at 2306 hrs. CT abdomen/pelvis:  IMPRESSION     Bilateral mild hydronephrosis and hydroureter without evidence of ureteral  calculus. 5 mm calculus layering within the urinary bladder. Mild circumferential urinary bladder wall thickening. This may be seen with  cystitis or mild chronic outlet obstruction. Suprapubic bladder catheter in expected position. Tiny amount of gas within the  lumen of the urinary bladder.   Sludge and/or stones within the gallbladder. As clinically indicated follow-up  ultrasound of the right upper quadrant could be of benefit. Mild micronodular adrenal thickening consistent with hyperplasia without change. Report provided to the emergency department at 2317 hrs. CXR:  IMPRESSION  Atherosclerosis. Top normal cardiac size. Arthritis at the left shoulder. LAB DATA REVIEWED:    Recent Results (from the past 24 hour(s))   CULTURE, BLOOD    Collection Time: 02/12/22  8:16 PM    Specimen: Blood   Result Value Ref Range    Special Requests: NO SPECIAL REQUESTS      GRAM STAIN ANAEROBIC BOTTLE GRAM NEGATIVE RODS      GRAM STAIN        SMEAR CALLED TO AND CORRECTLY REPEATED BY: [facility] ED AT [id number] TO [facility]    GRAM STAIN AEROBIC BOTTLE GRAM NEGATIVE RODS      Culture result: (A)       GRAM NEGATIVE RODS GROWING IN 1 OF 2 BOTTLES DRAWN (NO SITE)   BLOOD GAS,CHEM8,LACTIC ACID POC    Collection Time: 02/12/22  8:28 PM   Result Value Ref Range    Calcium, ionized (POC) 1.03 (L) 1.12 - 1.32 mmol/L    Base deficit (POC) 7.8 mmol/L    HCO3 (POC) 16.7 (L) 22 - 26 MMOL/L    CO2, POC 18 (L) 19 - 24 MMOL/L    O2 SAT 81 %    Patient temp.  102.4      Sample source VENOUS BLOOD      Performed by Nathalie Biswas     Sodium (POC) 139 136 - 145 mmol/L    Potassium (POC) 9.0 (HH) 3.5 - 5.1 mmol/L    Glucose (POC) 234 (H) 65 - 100 mg/dL    Creatinine (POC) 3.09 (H) 0.6 - 1.3 mg/dL    Lactic Acid (POC) 4.20 (HH) 0.40 - 2.00 mmol/L    Chloride (POC) 119 (H) 98 - 107 mmol/L    Anion gap, POC 3 (L) 10 - 20      GFRAA, POC 24 (L) >60 ml/min/1.73m2    GFRNA, POC 20 (L) >60 ml/min/1.73m2    pH, venous (POC) 7.31 (L) 7.32 - 7.42      pCO2, venous (POC) 34.3 (L) 41 - 51 MMHG    pO2, venous (POC) 56 (H) 25 - 40 mmHg    Critical value read back KATIE    EKG, 12 LEAD, INITIAL    Collection Time: 02/12/22  8:36 PM   Result Value Ref Range    Ventricular Rate 105 BPM    Atrial Rate 105 BPM    P-R Interval 120 ms    QRS Duration 78 ms    Q-T Interval 342 ms    QTC Calculation (Bezet) 452 ms    Calculated P Axis 76 degrees    Calculated R Axis 48 degrees    Calculated T Axis 80 degrees    Diagnosis       Poor data quality, interpretation may be adversely affected  Sinus tachycardia  Low voltage QRS  Borderline ECG  When compared with ECG of 01-AUG-2019 15:51,  premature ventricular complexes are no longer present     CULTURE, BLOOD    Collection Time: 02/12/22  8:42 PM    Specimen: Blood   Result Value Ref Range    Special Requests: NO SPECIAL REQUESTS      Culture result: NO GROWTH AFTER 9 HOURS     METABOLIC PANEL, COMPREHENSIVE    Collection Time: 02/12/22  8:42 PM   Result Value Ref Range    Sodium 143 136 - 145 mmol/L    Potassium 5.0 3.5 - 5.5 mmol/L    Chloride 115 (H) 100 - 111 mmol/L    CO2 20 (L) 21 - 32 mmol/L    Anion gap 8 3.0 - 18 mmol/L    Glucose 198 (H) 74 - 99 mg/dL    BUN 46 (H) 7.0 - 18 MG/DL    Creatinine 2.77 (H) 0.6 - 1.3 MG/DL    BUN/Creatinine ratio 17 12 - 20      GFR est AA 27 (L) >60 ml/min/1.73m2    GFR est non-AA 22 (L) >60 ml/min/1.73m2    Calcium 9.4 8.5 - 10.1 MG/DL    Bilirubin, total 0.8 0.2 - 1.0 MG/DL    ALT (SGPT) 7 (L) 16 - 61 U/L    AST (SGOT) <3 (L) 10 - 38 U/L    Alk.  phosphatase 61 45 - 117 U/L    Protein, total 7.7 6.4 - 8.2 g/dL    Albumin 2.9 (L) 3.4 - 5.0 g/dL    Globulin 4.8 (H) 2.0 - 4.0 g/dL    A-G Ratio 0.6 (L) 0.8 - 1.7     CBC WITH AUTOMATED DIFF    Collection Time: 02/12/22  8:42 PM   Result Value Ref Range    WBC 8.6 4.6 - 13.2 K/uL    RBC 5.19 4.35 - 5.65 M/uL    HGB 14.0 13.0 - 16.0 g/dL    HCT 44.5 36.0 - 48.0 %    MCV 85.7 78.0 - 100.0 FL    MCH 27.0 24.0 - 34.0 PG    MCHC 31.5 31.0 - 37.0 g/dL    RDW 17.2 (H) 11.6 - 14.5 %    PLATELET 103 367 - 658 K/uL    MPV 11.8 9.2 - 11.8 FL    NRBC 0.0 0  WBC    ABSOLUTE NRBC 0.00 0.00 - 0.01 K/uL    NEUTROPHILS 70 40 - 73 %    BAND NEUTROPHILS 13 (H) 0 - 5 %    LYMPHOCYTES 16 (L) 21 - 52 %    MONOCYTES 0 (L) 3 - 10 %    EOSINOPHILS 0 0 - 5 %    BASOPHILS 1 0 - 2 %    IMMATURE GRANULOCYTES 0 %    ABS. NEUTROPHILS 7.1 1.8 - 8.0 K/UL    ABS. LYMPHOCYTES 1.4 0.9 - 3.6 K/UL    ABS. MONOCYTES 0.0 (L) 0.05 - 1.2 K/UL    ABS. EOSINOPHILS 0.0 0.0 - 0.4 K/UL    ABS. BASOPHILS 0.1 0.0 - 0.1 K/UL    ABS. IMM. GRANS. 0.0 K/UL    DF MANUAL      PLATELET COMMENTS ADEQUATE PLATELETS      RBC COMMENTS ANISOCYTOSIS  1+       BLOOD GAS,CHEM8,LACTIC ACID POC    Collection Time: 02/12/22  8:42 PM   Result Value Ref Range    Calcium, ionized (POC) 1.21 1.12 - 1.32 mmol/L    Base deficit (POC) 7.1 mmol/L    HCO3 (POC) 18.6 (L) 22 - 26 MMOL/L    CO2, POC 20 19 - 24 MMOL/L    O2 SAT 29 %    Patient temp.  102.4      Sample source VENOUS BLOOD      Performed by Nickie Pop     Sodium (POC) 145 136 - 145 mmol/L    Potassium (POC) 5.4 (H) 3.5 - 5.1 mmol/L    Glucose (POC) 216 (H) 65 - 100 mg/dL    Creatinine (POC) 2.71 (H) 0.6 - 1.3 mg/dL    Lactic Acid (POC) 3.98 (HH) 0.40 - 2.00 mmol/L    Chloride (POC) 116 (H) 98 - 107 mmol/L    Anion gap, POC 10 10 - 20      GFRAA, POC 28 (L) >60 ml/min/1.73m2    GFRNA, POC 23 (L) >60 ml/min/1.73m2    pH, venous (POC) 7.28 (L) 7.32 - 7.42      pCO2, venous (POC) 41.0 41 - 51 MMHG    pO2, venous (POC) 24 (L) 25 - 40 mmHg    Critical value read back KATIE    URINALYSIS W/ RFLX MICROSCOPIC    Collection Time: 02/12/22  8:47 PM   Result Value Ref Range    Color DARK YELLOW      Appearance TURBID      Specific gravity 1.013 1.005 - 1.030      pH (UA) >8.5 (H) 5.0 - 8.0    Protein 300 (A) NEG mg/dL    Glucose Negative NEG mg/dL    Ketone Negative NEG mg/dL    Bilirubin Negative NEG      Blood MODERATE (A) NEG      Urobilinogen 0.2 0.2 - 1.0 EU/dL    Nitrites Negative NEG      Leukocyte Esterase MODERATE (A) NEG     URINE MICROSCOPIC ONLY    Collection Time: 02/12/22  8:47 PM   Result Value Ref Range    WBC 0 to 1 0 - 5 /hpf    RBC 11 to 20 0 - 5 /hpf    Epithelial cells FEW 0 - 5 /lpf    Bacteria 4+ (A) NEG /hpf    Triple Phosphate crystals 1+ (A) NEG   POC LACTIC ACID    Collection Time: 02/12/22 11:56 PM   Result Value Ref Range    Lactic Acid (POC) 1.47 0.40 - 2.00 mmol/L   TROPONIN-HIGH SENSITIVITY    Collection Time: 02/13/22  4:40 AM   Result Value Ref Range    Troponin-High Sensitivity 129 (HH) 0 - 78 ng/L   HEMOGLOBIN A1C WITH EAG    Collection Time: 02/13/22  4:40 AM   Result Value Ref Range    Hemoglobin A1c 6.3 (H) 4.2 - 5.6 %    Est. average glucose 134 mg/dL   COVID-19 WITH INFLUENZA A/B    Collection Time: 02/13/22  4:44 AM   Result Value Ref Range    SARS-CoV-2 Not detected NOTD      Influenza A by PCR Not detected NOTD      Influenza B by PCR Not detected NOTD     METABOLIC PANEL, COMPREHENSIVE    Collection Time: 02/13/22  4:44 AM   Result Value Ref Range    Sodium 144 136 - 145 mmol/L    Potassium 4.6 3.5 - 5.5 mmol/L    Chloride 118 (H) 100 - 111 mmol/L    CO2 18 (L) 21 - 32 mmol/L    Anion gap 8 3.0 - 18 mmol/L    Glucose 228 (H) 74 - 99 mg/dL    BUN 48 (H) 7.0 - 18 MG/DL    Creatinine 2.37 (H) 0.6 - 1.3 MG/DL    BUN/Creatinine ratio 20 12 - 20      GFR est AA 32 (L) >60 ml/min/1.73m2    GFR est non-AA 27 (L) >60 ml/min/1.73m2    Calcium 8.3 (L) 8.5 - 10.1 MG/DL    Bilirubin, total 0.5 0.2 - 1.0 MG/DL    ALT (SGPT) 7 (L) 16 - 61 U/L    AST (SGOT) 6 (L) 10 - 38 U/L    Alk.  phosphatase 46 45 - 117 U/L    Protein, total 5.7 (L) 6.4 - 8.2 g/dL    Albumin 2.2 (L) 3.4 - 5.0 g/dL    Globulin 3.5 2.0 - 4.0 g/dL    A-G Ratio 0.6 (L) 0.8 - 1.7     CBC WITH AUTOMATED DIFF    Collection Time: 02/13/22  4:44 AM   Result Value Ref Range    WBC 20.3 (H) 4.6 - 13.2 K/uL    RBC 3.86 (L) 4.35 - 5.65 M/uL    HGB 10.6 (L) 13.0 - 16.0 g/dL    HCT 32.7 (L) 36.0 - 48.0 %    MCV 84.7 78.0 - 100.0 FL    MCH 27.5 24.0 - 34.0 PG    MCHC 32.4 31.0 - 37.0 g/dL    RDW 17.0 (H) 11.6 - 14.5 %    PLATELET 067 803 - 303 K/uL    MPV 10.7 9.2 - 11.8 FL    NRBC 0.0 0  WBC    ABSOLUTE NRBC 0.00 0.00 - 0.01 K/uL    NEUTROPHILS 65 40 - 73 %    BAND NEUTROPHILS 17 (H) 0 - 5 % LYMPHOCYTES 13 (L) 21 - 52 %    MONOCYTES 5 3 - 10 %    EOSINOPHILS 0 0 - 5 %    BASOPHILS 0 0 - 2 %    IMMATURE GRANULOCYTES 0 %    ABS. NEUTROPHILS 16.7 (H) 1.8 - 8.0 K/UL    ABS. LYMPHOCYTES 2.6 0.9 - 3.6 K/UL    ABS. MONOCYTES 1.0 0.05 - 1.2 K/UL    ABS. EOSINOPHILS 0.0 0.0 - 0.4 K/UL    ABS. BASOPHILS 0.0 0.0 - 0.1 K/UL    ABS. IMM.  GRANS. 0.0 K/UL    DF MANUAL      PLATELET COMMENTS ADEQUATE PLATELETS      RBC COMMENTS ANISOCYTOSIS  1+       MAGNESIUM    Collection Time: 02/13/22  4:44 AM   Result Value Ref Range    Magnesium 1.8 1.6 - 2.6 mg/dL   PHOSPHORUS    Collection Time: 02/13/22  4:44 AM   Result Value Ref Range    Phosphorus 3.3 2.5 - 4.9 MG/DL   PROTHROMBIN TIME + INR    Collection Time: 02/13/22  4:44 AM   Result Value Ref Range    Prothrombin time 16.0 (H) 11.5 - 15.2 sec    INR 1.3 (H) 0.8 - 1.2     PROCALCITONIN    Collection Time: 02/13/22  4:44 AM   Result Value Ref Range    Procalcitonin 60.78 ng/mL   TSH 3RD GENERATION    Collection Time: 02/13/22  4:44 AM   Result Value Ref Range    TSH 0.70 0.36 - 9.69 uIU/mL   SALICYLATE    Collection Time: 02/13/22  4:44 AM   Result Value Ref Range    Salicylate level <1.5 (L) 2.8 - 20.0 MG/DL   ACETAMINOPHEN    Collection Time: 02/13/22  4:44 AM   Result Value Ref Range    Acetaminophen level 2 (L) 10.0 - 30.0 ug/mL   DRUG SCREEN UR - W/ CONFIRM    Collection Time: 02/13/22  5:15 AM   Result Value Ref Range    BENZODIAZEPINES Negative NEG      BARBITURATES Negative NEG      THC (TH-CANNABINOL) Negative NEG      OPIATES Negative NEG      PCP(PHENCYCLIDINE) Negative NEG      COCAINE Negative NEG      AMPHETAMINES Negative NEG      METHADONE Negative NEG      HDSCOM (NOTE)    GLUCOSE, POC    Collection Time: 02/13/22  6:26 AM   Result Value Ref Range    Glucose (POC) 194 (H) 70 - 110 mg/dL   LACTIC ACID    Collection Time: 02/13/22  7:37 AM   Result Value Ref Range    Lactic acid 1.3 0.4 - 2.0 MMOL/L   GLUCOSE, POC    Collection Time: 02/13/22  9:23 AM Result Value Ref Range    Glucose (POC) 143 (H) 70 - 110 mg/dL   GLUCOSE, POC    Collection Time: 02/13/22 12:10 PM   Result Value Ref Range    Glucose (POC) 103 70 - 110 mg/dL   LACTIC ACID    Collection Time: 02/13/22 12:11 PM   Result Value Ref Range    Lactic acid 1.5 0.4 - 2.0 MMOL/L       Ashwini Madrigal MD  [address] [place] [address]      Disclaimer: Sections of this note are dictated utilizing voice recognition software and minor typographical errors may be present. If questions arise, please do not hesitate to contact me or call our department.

## 2022-02-13 NOTE — ED NOTES
Son states that he brought patient here last night and they flushed the patient's catheter. Patient has a suprapubic catheter. Son states that the patient was resting at home so the son told him that he was leaving to get something to eat and the patient said OK. Son states that when he got back there was a very foul odor, the patient had vomited and he wasn't responding. Son said that he checked his brief and it was full of \"discharge. \"

## 2022-02-13 NOTE — ED TRIAGE NOTES
Patient sitting in wheelchair when called to triage, not responding. Registration stated that his caregiver had stepped outside. Caregiver is patient's son. Patient not responding to voice or making eye contact. Blood pressure in triage 55/38. Patient taken straight back to room 4.

## 2022-02-13 NOTE — ED NOTES
Assumed care of pt. Vitals stable. Pt sleeping on stretcher. Assessment to be done when patient is more awake.

## 2022-02-13 NOTE — ED NOTES
Patient brought back from triage area urgently due to low blood pressure. As per patient son he was fine and he left iim to go get some dinner he said that took about 30 min and when he came back the patient was not acting himself he was confused and he noticed a strong odor and he had vomited as per son it was brownish  and he noticed some additional drainage around his supra pubic cath. Patient was seen last night in the ED for a blocked supra pubic cath it was irrigated and the patient was subsequently discharged. Patient's blood pressure now T 102.4 /63,  RR 31, SpO2 96%. Sepsis work up initiated.

## 2022-02-14 NOTE — PROGRESS NOTES
Problem: Dysphagia (Adult)  Goal: *Acute Goals and Plan of Care (Insert Text)  Description:     Patient will:  1. Tolerate PO trials with 0 s/s overt distress in 4/5 trials  2. Utilize compensatory swallow strategies/maneuvers (decrease bite/sip, size/rate, alt. liq/sol) with min cues in 4/5 trials  3. Perform oral-motor/laryngeal exercises to increase oropharyngeal swallow function with min cues  4. Complete an objective swallow study (i.e., MBSS) to assess swallow integrity, r/o aspiration, and determine of safest LRD, min A - met 2/4/2022    Recommend:   NPO with consideration of an alternate means of nutrition/hydration vs comfort feeds  Strict aspiration precautions (HOB >30 degrees at all times, Oral care TID)  May benefit from a palliative consult per MD discretion? 2/14/2022 1114 by Carolyn SHEEAHN  Outcome: Progressing Towards Goal   SPEECH PATHOLOGY MODIFIED BARIUM SWALLOW STUDY & TREATMENT    Patient: Brianna Rdz (78 y.o. male)  Date: 2/14/2022  Primary Diagnosis: Sepsis ([facility] [address] [A41.9]        Precautions: aspiration       ASSESSMENT :  Based on the objective data described below, the patient presents with mod-sev oropharyngeal dysphagia. Pt demo silent penetration to the chords with puree and honey-thick liquid presentations via spoon. Extremely labored oral prep and a-p transit observed with both consistencies. Premature spillage to pyriform sinuses with significant swallow delay observed with all PO presentations as well. Deficits include decreased orolingual strength/ROM, poor base of tongue retraction, weak laryngeal elevation/excursion, and impaired pharyngeal motility/sensation. Pt is at a high risk for aspiration, malnutrition, and dehydration with PO. Recommend NPO with consideration of an alternate means of nutrition/hydration vs comfort feeds, aspiration precautions, and oral care TID. He may benefit from a palliative consult per primary MD discretion?     TREATMENT :  Treatment provided post diagnostic testing including oropharyngeal anatomy/physiology, MBS results, diet recs, and high risk of aspiration with PO. Pt able to verbalize understanding - suspect limited carryover. Will continue to follow. Patient will benefit from skilled intervention to address the above impairments. Patient's rehabilitation potential is considered to be Guarded  Factors which may influence rehabilitation potential include:   []              None noted  [x]              Mental ability/status  [x]              Medical condition  []              Home/family situation and support systems  [x]              Safety awareness  []              Pain tolerance/management  []              Other:      PLAN :  Recommendations and Planned Interventions: See above  Frequency/Duration: Patient will be followed by speech-language pathology 1-2 times per day/3-5 days per week to address goals. Discharge Recommendations: Edenilson Hoffmann and To Be Determined     SUBJECTIVE:   Patient stated Cl Potts.     OBJECTIVE:     Past Medical History:   Diagnosis Date    Cataract     both    Colon polyp 9-2004    Diabetes ([facility] [address]     IDDM    DJD (degenerative joint disease) of knee     ED (erectile dysfunction)     Sparrow catheter in place     GERD (gastroesophageal reflux disease) 12-01-08    Glaucoma suspect     Gout, unspecified 10-25-06    Hyperlipidemia     Hypertension     Insomnia 7-20-06    Phimosis 6-16-00    Prostate cancer ([facility] [address] 3-3-09    Stroke [facility]) 2007    weakness right side     Past Surgical History:   Procedure Laterality Date    HX POLYPECTOMY  9-2004    [place] COLONOSCOPY FLX DX W/COLLJ SPEC WHEN PFRMD  9/1/2004    polyp/Dr Alvarado    [place] COLONOSCOPY FLX DX W/COLLJ SPEC WHEN PFRMD  9-2007    incomplete; Dr. Mitchell Schmidt     Prior Level of Function/Home Situation: unknown  Home Situation  Support Systems: Child(edelmira)  Patient Expects to be Discharged to[de-identified] Skilled nursing facility (vs home health)  Diet prior to admission: unknown  Current Diet:  NPO with consideration of an alternate means of nutrition/hydration vs comfort feeds   Radiologist:    Film Views: Lateral;Fluoro  Patient Position: 90 in fluoro chair    Trial 1:   Consistency Presented: Honey thick liquid;Puree   How Presented: SLP-fed/presented;Spoon       Bolus Acceptance: Impaired   Bolus Formation/Control: Impaired: Anterior;Delayed;Poor;Posterior;Premature spillage;Mastication; Incomplete   Propulsion: Delayed (# of seconds); Discoordination   Oral Residue: None   Initiation of Swallow: Triggered at vallecula       Penetration: During swallow; To cords   Aspiration/Timing: No evidence of aspiration   Pharyngeal Clearance: Vallecular residue;Pyriform residue ;10-50%   Attempted Modifications: Small sips and bites   Effective Modifications: None   Cues for Modifications: Moderate       Decreased Tongue Base Retraction?: Yes  Laryngeal Elevation: Reduced excursion with laryngeal vestibule gap;Minimal movement of larynx/epiglottis  Aspiration/Penetration Score: 5 (Penetration/Visible residue-Contrast contacts the folds, but is not ejected)   Pharyngeal Symmetry: Not assessed  Pharyngeal-Esophageal Segment: No impairment  Pharyngeal Dysfunction: Decreased tongue base retraction;Decreased strength;Decreased elevation/closure    Oral Phase Severity: Moderate-severe  Pharyngeal Phase Severity: Moderately severe    8-point Penetration-Aspiration Scale: Score 5    PAIN:  Pt reports 0/10 pain or discomfort prior to MBS. Pt reports 0/10 pain or discomfort post MBS. COMMUNICATION/EDUCATION:   [x]  Patient educated regarding MBS results and diet recommendations. []  Patient/family have participated as able in goal setting and plan of care. [x]  Patient/family agree to work toward stated goals and plan of care. []  Patient understands intent and goals of therapy, but is neutral about his/her participation.   []  Patient is unable to participate in goal setting and plan of care.     Thank you for this referral.    Severo Montane, M.S. CCC-SLP/L  Speech-Language Pathologist

## 2022-02-14 NOTE — PROGRESS NOTES
Bedside shift change report given to [facility] RN (oncoming nurse) by Anitha Faye RN (offgoing nurse).  Report included the following information SBAR, Kardex, ED Summary, Intake/Output, MAR, Recent Results and Cardiac Rhythm SR.

## 2022-02-14 NOTE — CONSULTS
Consult received. clnical presentation c/w septic shock - POA due to gram negative bloodstream infection,  Urinary retention, complicated UTI  S/p catheter exchange in ED per urology  Recommendations  Continue cefepime  F/u ID and susceptibility of gnr in blood cx and modify abx accordingly  repeat blood cx today  Repeat renal US to determine resolution of hydronephrosis  Full dictation to follow.  thanks

## 2022-02-14 NOTE — PROGRESS NOTES
conducted an initial consultation and Spiritual Assessment for Pat Givens, who is a 67 y. o.,male. Patients Primary Language is: [place].    According to the patients EMR Christianity Affiliation is: [facility].     The reason the Patient came to the hospital is:   Patient Active Problem List    Diagnosis Date Noted    History of urinary retention 12/16/2019    Status SupraPubic Catheter 12/16/2019    Sepsis ([address] 02/13/2022    Dehydration 11/12/2018    Generalized weakness 11/12/2018    Failure to thrive in adult 11/12/2018    Acute on chronic renal failure ([address] 11/12/2018    Poor conditioning 11/12/2018    Other constipation 11/12/2018    Hyperkalemia 11/12/2018    Type 2 diabetes with nephropathy ([address] 06/04/2018    Benign prostatic hyperplasia with incomplete bladder emptying 10/18/2017    Cough 04/26/2017    Hyperglycemia due to type 2 diabetes mellitus ([address] 01/19/2017   [facility] discharge follow-up 09/14/2016    Gastrointestinal hemorrhage associated with duodenitis 09/14/2016    Need for influenza vaccination 09/14/2016    Pneumonia 08/20/2016    History of CVA (cerebrovascular accident) 04/13/2016    Diabetes mellitus type 2, controlled ([address] 01/13/2016    Advance care planning 01/13/2016    Encounter for screening colonoscopy 01/13/2016    CKD (chronic kidney disease) stage 3, GFR 30-59 ml/min ([facility]) 09/16/2015    Proteinuria 06/10/2015    Acute encephalopathy 09/29/2014    Hypoglycemia 09/29/2014    Stroke ([address] 08/30/2011    Essential hypertension 01/12/2011    Type I (juvenile type) diabetes mellitus with renal manifestations, not stated as uncontrolled(250.41) ([address] 01/12/2011    Hypercholesterolemia 01/12/2011    Gout 01/12/2011    GERD (gastroesophageal reflux disease) 01/12/2011    Vitamin D deficiency 01/12/2011    Hx of Prostate Cancer, 2008 cryotherapy 03/03/2009        The  provided the following Interventions:  Initiated a relationship of care and support with patient in room 2310 this morning. Listened empathically as patient shared very little about his being here and very little response to any questions. Patient does not appear to have the capacity to address an advance directive at this time. Son came in and asked for us to help him do one. to compete one but I explained his current status and the son seemed ok with this information. Provided information about Spiritual Care Services. Offered prayer and assurance of continued prayers on patients behalf. .    The following outcomes were achieved:  Patient shared very limited information about his medical narrative. Assessment:  Patient does not have any Nondenominational/cultural needs that will affect patients preferences in health care. There are no further spiritual or Nondenominational issues which require Spiritual Care Services interventions at this time. Plan:  Chaplains will continue to follow and will provide pastoral care on an as needed/requested basis    . Renetta El   Spiritual Care   [phone]

## 2022-02-14 NOTE — PROGRESS NOTES
Problem: Pressure Injury - Risk of  Goal: *Prevention of pressure injury  Description: Document Breezy Scale and appropriate interventions in the flowsheet. Outcome: Progressing Towards Goal  Note: Pressure Injury Interventions:  Sensory Interventions: Assess changes in LOC,Check visual cues for pain,Keep linens dry and wrinkle-free,Minimize linen layers,Pressure redistribution bed/mattress (bed type)         Activity Interventions: Pressure redistribution bed/mattress(bed type),PT/OT evaluation    Mobility Interventions: Pressure redistribution bed/mattress (bed type),Turn and reposition approx. every two hours(pillow and wedges)    Nutrition Interventions: Document food/fluid/supplement intake,Offer support with meals,snacks and hydration    Friction and Shear Interventions: Apply protective barrier, creams and emollients,HOB 30 degrees or less,Minimize layers,Transferring/repositioning devices                Problem: Falls - Risk of  Goal: *Absence of Falls  Description: Document Armen Fall Risk and appropriate interventions in the flowsheet.   Outcome: Progressing Towards Goal  Note: Fall Risk Interventions:       Mentation Interventions: Bed/chair exit alarm,Door open when patient unattended,Adequate sleep, hydration, pain control,Toileting rounds,Update white board    Medication Interventions: Bed/chair exit alarm,Evaluate medications/consider consulting pharmacy    Elimination Interventions: Bed/chair exit alarm,Toileting schedule/hourly rounds              Problem: Patient Education: Go to Patient Education Activity  Goal: Patient/Family Education  Outcome: Progressing Towards Goal

## 2022-02-14 NOTE — PROGRESS NOTES
Problem: Dysphagia (Adult)  Goal: *Acute Goals and Plan of Care (Insert Text)  Description:     Patient will:  1. Tolerate PO trials with 0 s/s overt distress in 4/5 trials  2. Utilize compensatory swallow strategies/maneuvers (decrease bite/sip, size/rate, alt. liq/sol) with min cues in 4/5 trials  3. Perform oral-motor/laryngeal exercises to increase oropharyngeal swallow function with min cues  4. Complete an objective swallow study (i.e., MBSS) to assess swallow integrity, r/o aspiration, and determine of safest LRD, min A    Recommend:   NPO until completion of MBS  Strict aspiration precautions (HOB >30 degrees at all times, Oral care TID)    Outcome: Progressing Towards Goal   SPEECH LANGUAGE PATHOLOGY BEDSIDE SWALLOW EVALUATION    Patient: Charlie Hilton (78 y.o. male)  Date: 2/14/2022  Primary Diagnosis: Sepsis ([facility] [address] [A41.9]        Precautions: aspiration     PLOF: As per H&P    ASSESSMENT :  Based on the objective data described below, the patient presents with mild oral and mod-sev pharyngeal dysphagia. Pt with immediate wet/congested cough s/p small sip thin liquid trial. He was unable to perform labial seal for straw trial. Improved tolerance of puree observed; however, ~7 swallows observed per bolus presentation. Laryngeal elevation was extremely weak/delayed to palpation. Recommend NPO until completion of MBS to ensure safety of PO, aspiration precautions, and oral care TID. D/w RN. ST will continue to follow. Patient will benefit from skilled intervention to address the above impairments.   Patient's rehabilitation potential is considered to be Fair  Factors which may influence rehabilitation potential include:   []            None noted  [x]            Mental ability/status  [x]            Medical condition  []            Home/family situation and support systems  [x]            Safety awareness  []            Pain tolerance/management  []            Other:      PLAN :  Recommendations and Planned Interventions: See above  Frequency/Duration: Patient will be followed by speech-language pathology 1-2 times per day/3-5 days per week to address goals. Discharge Recommendations: Edenilson Hoffmann and To Be Determined     SUBJECTIVE:   Patient stated Hi. OBJECTIVE:     Past Medical History:   Diagnosis Date    Cataract     both    Colon polyp 9-2004    Diabetes ([facility] [address]     IDDM    DJD (degenerative joint disease) of knee     ED (erectile dysfunction)     Sparrow catheter in place     GERD (gastroesophageal reflux disease) 12-01-08    Glaucoma suspect     Gout, unspecified 10-25-06    Hyperlipidemia     Hypertension     Insomnia 7-20-06    Phimosis 6-16-00    Prostate cancer ([facility] [address] 3-3-09    Stroke ([facility] [address] 2007    weakness right side     Past Surgical History:   Procedure Laterality Date    HX POLYPECTOMY  9-2004    [place] COLONOSCOPY FLX DX W/COLLJ SPEC WHEN PFRMD  9/1/2004    polyp/Dr Alvarado    [place] COLONOSCOPY FLX DX W/COLLJ SPEC WHEN PFRMD  9-2007    incomplete; Dr. Bob Monge     Prior Level of Function/Home Situation: unknown  Diet prior to admission: unknown  Current Diet:  NPO until [facility]   Cognitive and Communication Status:  Neurologic State: Alert  Orientation Level: Oriented to person,Disoriented to time,Disoriented to situation,Disoriented to place  Cognition: Decreased command following  Oral Assessment:  Oral Assessment  Labial: No impairment  Dentition: Natural;Intact  Oral Hygiene: adequate  Lingual: Decreased strength;Decreased rate  Velum: No impairment  Mandible: No impairment  P.O. Trials:  Patient Position: [facility] at [facility]  Vocal quality prior to P.O.: No impairment  Consistency Presented:  Thin liquid;Puree  How Presented: SLP-fed/presented;Spoon;Cup/sip  Bolus Acceptance: Impaired  Bolus Formation/Control: Impaired  Type of Impairment: Anterior;Delayed;Poor;Mastication;Lip closure  Propulsion: Delayed (# of seconds)  Oral Residue: None  Initiation of Swallow: Delayed (# of seconds)  Laryngeal Elevation: Weak;Decreased  Aspiration Signs/Symptoms: Change vocal quality;Strong cough; Watery eyes;Clear throat  Pharyngeal Phase Characteristics: Suspected pharyngeal residue;Poor endurance;Multiple swallows  Effective Modifications: None  Cues for Modifications: Maximal     Oral Phase Severity: Mild  Pharyngeal Phase Severity : Moderate-severe    PAIN:  Start of Eval: appeared to be in no pain/discomfort  End of Eval: appeared to be in no pain/discomfort     After treatment:   []            Patient left in no apparent distress sitting up in chair  [x]            Patient left in no apparent distress in bed  [x]            Call bell left within reach  [x]            Nursing notified  []            Family present  []            Caregiver present  []            Bed alarm activated    COMMUNICATION/EDUCATION:   [x]            Aspiration precautions; swallow safety; compensatory techniques. []            Patient/family have participated as able in goal setting and plan of care. [x]            Patient/family agree to work toward stated goals and plan of care. []            Patient understands intent and goals of therapy; neutral about participation. []            Patient unable to participate in goal setting/plan of care; educ ongoing with interdisciplinary staff  [x]         Posted safety precautions in patient's room.     Thank you for this referral.    Azam Acosta M.S. CCC-SLP/L  Speech-Language Pathologist

## 2022-02-14 NOTE — PROGRESS NOTES
Bedside and Verbal shift change report given to Gopal Ayala (oncoming nurse) by Edi Rangel RN (offgoing nurse). Report included the following information SBAR, Kardex, Intake/Output, MAR, Recent Results and Cardiac Rhythm Sinus Rhythm w/ PVCs.      1029- Taken off unit to Radiology    1120- pt back on unit

## 2022-02-14 NOTE — PROGRESS NOTES
Speech Pathology:     Bedside swallow completed with recs of NPO until completion of MBS. Full report to follow.      Thank you for this referral.    Negin Munguia M.S. CCC-SLP/L  Speech-Language Pathologist

## 2022-02-14 NOTE — CONSULTS
1. Sepsis, due to unspecified organism, unspecified whether acute organ dysfunction present ([facility] [address]    2. Shock ([facility] [address]    3. Acute encephalopathy        ASSESSMENT:   Urosepsis with Cystitis   WBC 12.7<20.3   BCX 2/12/22: GNR x1   UA 2/12/22: Moderate blood, Moderate LE, WBC 0-1,  RBC  11-20, Bacteria 4+   Tmax 99.6    Bilateral Hydroureteronephrosis  Bladder Stone- 5mm  Possible Emphysematous Cystitis     Unknown grade, unknown stage CaP, PSA 4.2, S/p cryoablation 2008              Last PSA 12/30/2019 - 0.053      Hx of urinary retention managed with SP tube               S/p PVP with no change              Cysto: 8/5/2020 - bladder stones and wide open prostatic  Urethra   Last changed on 2/12/22      PLAN:    Appreciate overall management per medicine. CT reviewed, bilateral hydronephrosis, possible air in bladder, cystitits, bladder stone. Will order repeat RAMEZ to see if hydronephrosis has resolved after SP catheter replacement. Follow CX, continue IV Cefepime  Patient will need bladder stone tx outpatient. Maintain SP catheter- change every 4 weeks. Will see Wednesday    Follow up arranged? BRIGIDA Mason PA-C  [facility]  Available M-Fri, [facility]  Pager: [phone]    No chief complaint on file. HISTORY OF PRESENT ILLNESS:  Vinay Butler is a 67 y.o. male who is seen in consultation as referred by Chel Narvaez  for UTI, retained SP catheter, bilateral hydronephrosis, bladder stones. Patient with past urological history significant for prostate cancer, urinary retention managed with SP tube. Last changed on 5/26/21- via 16 fr over guidewire by Dr. Dorothy Gomez     Patient with PMHX significant for below, prostate CA, chronic SP gutierrez, presented to ED on 2/12/22 for hypotension, confusion, foul smelling urine, brown colored urine. Patient seen in ED last night for blocked SP catheter, this was irrigated and patient was discharged home. Patient admitted for septic shock.  SP catheter has not been changed in 3 months. CT scan obtained, shows bilateral hydroureteronephrosis, bladder stones, cystitis, possible gas in bladder lumen. Urology then consulted. Patient difficult to obtain history from. Medical history obtained from chart. No flowsheet data found.       Past Medical History:   Diagnosis Date    Cataract     both    Colon polyp 9-2004    Diabetes ([facility] [address]     IDDM    DJD (degenerative joint disease) of knee     ED (erectile dysfunction)     Sparrow catheter in place     GERD (gastroesophageal reflux disease) 12-01-08    Glaucoma suspect     Gout, unspecified 10-25-06    Hyperlipidemia     Hypertension     Insomnia 7-20-06    Phimosis 6-16-00    Prostate cancer ([facility] [address] 3-3-09    Stroke ([facility] [address] 2007    weakness right side       Past Surgical History:   Procedure Laterality Date    HX POLYPECTOMY  9-2004    OK COLONOSCOPY FLX DX W/COLLJ SPEC WHEN PFRMD  9/1/2004    polyp/Dr Alvarado    OK COLONOSCOPY FLX DX W/COLLJ SPEC WHEN PFRMD  9-2007    incomplete; Dr. Linda Castaneda       Social History     Tobacco Use    Smoking status: Former Smoker    Smokeless tobacco: Never Used   Vaping Use    Vaping Use: Never used   Substance Use Topics    Alcohol use: No    Drug use: No       Allergies   Allergen Reactions    Bactrim [Sulfamethoxazole-Trimethoprim] Other (comments)     CAUSED ACUTE RENAL FAILURE       Family History   Problem Relation Age of Onset    Hypertension Mother     Diabetes Father     Cancer Father         prostate    Heart Disease Father 70        CABG    Hypertension Sister     Hypertension Brother     Diabetes Brother        Current Facility-Administered Medications   Medication Dose Route Frequency Provider Last Rate Last Admin    sodium chloride (NS) flush 5-40 mL  5-40 mL IntraVENous Q8H Bola Martin PA-C   10 mL at 02/14/22 1400    sodium chloride (NS) flush 5-40 mL  5-40 mL IntraVENous PRN Bola Martin PA-C        acetaminophen (TYLENOL) tablet 650 mg  650 mg Oral Q6H PRN Leticia Newton, PA-C        Or    acetaminophen (TYLENOL) suppository 650 mg  650 mg Rectal Q6H PRN Leticia Newton, PA-C        ondansetron (ZOFRAN ODT) tablet 4 mg  4 mg Oral Q8H PRN Leticia Newton, PA-C        Or    ondansetron [facility]) injection 4 mg  4 mg IntraVENous Q6H PRN Leticia Newton, PA-C        insulin lispro (HUMALOG) injection   SubCUTAneous Q6H Leticia Newton, PA-C   2 Units at 02/14/22 8418    glucose chewable tablet 16 g  4 Tablet Oral PRN Leticia Newton, PA-C        glucagon (GLUCAGEN) injection 1 mg  1 mg IntraMUSCular PRN Leticia Newton, PA-C        dextrose 10% infusion 125-250 mL  125-250 mL IntraVENous PRN Leticia Newton, PA-C        famotidine (PF) (PEPCID) 20 mg in 0.9% sodium chloride 10 mL injection  20 mg IntraVENous Q24H Olga SHEEHAN MD   20 mg at 02/14/22 1128    dextrose 5 % - 0.45% NaCl infusion  75 mL/hr IntraVENous CONTINUOUS Luyc Antony MD 75 mL/hr at 02/14/22 0646 75 mL/hr at 02/14/22 0646    sodium chloride (NS) flush 5-10 mL  5-10 mL IntraVENous PRN Taylor Zheng MD        cefepime (MAXIPIME) 1 g in 0.9% sodium chloride (MBP/ADV) 50 mL MBP  1 g IntraVENous Q12H Taylor Zheng MD 12.5 mL/hr at 02/14/22 0913 1 g at 02/14/22 0913       Review of Systems  ROS is:    Negative for: Ophthalmologic issues, ENT issues, Cardiovascular issues, respiratory issues, GI issues, neurologic issues, hematoogic issues, skin lesions, musculoskeletal issues, psychiatric issues  Exceptions: yes    Positive for:    hypotension, confusion, foul smelling urine, brown colored urine. PHYSICAL EXAMINATION:   Visit Vitals  BP (!) 107/56 (BP 1 Location: Right upper arm, BP Patient Position: At rest)   Pulse 73   Temp 98.5 °F (36.9 °C)   Resp 20   Ht 5' 6\" (1.676 m)   Wt 61.2 kg (135 lb)   SpO2 100%   BMI 21.79 kg/m²     Constitutional: Well developed, well nourished male. No acute distress.     HEENT: Normocephalic, Atraumatic, EOM's intact   CV:  no edema  Respiratory: No respiratory distress or difficulties breathing   Abdomen:  Soft, diffuse tenderness. SP tract C/D/I- catheter in place with yellow urine, with debris in tubing.  Male:  No CVA tenderness  Skin: No evidence of jaundice. Normal color  Neuro/Psych:  Alert. Patient with few words. REVIEW OF LABS AND IMAGING:    CT 2/12/22: IMPRESSION     Bilateral mild hydronephrosis and hydroureter without evidence of ureteral  calculus. 5 mm calculus layering within the urinary bladder. Mild circumferential urinary bladder wall thickening. This may be seen with  cystitis or mild chronic outlet obstruction. Suprapubic bladder catheter in expected position. Tiny amount of gas within the  lumen of the urinary bladder. Sludge and/or stones within the gallbladder. As clinically indicated follow-up  ultrasound of the right upper quadrant could be of benefit. Mild micronodular adrenal thickening consistent with hyperplasia without change.        Report provided to the emergency department at 2317 hrs.     Labs: Results:   Chemistry    Recent Labs     02/14/22  0549 02/13/22 0444 02/12/22 2042   * 228* 198*    144 143   K 4.2 4.6 5.0   * 118* 115*   CO2 20* 18* 20*   BUN 40* 48* 46*   CREA 2.19* 2.37* 2.77*   CA 8.6 8.3* 9.4   AGAP 5 8 8   BUCR 18 20 17   AP 45 46 61   TP 5.8* 5.7* 7.7   ALB 2.0* 2.2* 2.9*   GLOB 3.8 3.5 4.8*   AGRAT 0.5* 0.6* 0.6*      CBC w/Diff Recent Labs     02/14/22  0549 02/13/22 0444 02/12/22 2042   WBC 12.7 20.3* 8.6   RBC 3.32* 3.86* 5.19   HGB 9.3* 10.6* 14.0   HCT 27.9* 32.7* 44.5    234 276   GRANS 84* 65 70   LYMPH 8* 13* 16*   EOS 3 0 0      Cultures Recent Labs     02/12/22 2042 02/12/22 2016   CULT NO GROWTH 2 DAYS GRAM NEGATIVE RODS GROWING IN 1 OF 2 BOTTLES DRAWN (NO SITE) IDENTIFICATION AND SUSCEPTIBILITY TO FOLLOW*     All Micro Results     Procedure Component Value Units Date/Time    CULTURE, BLOOD [[id number]]     Order Status: Sent Specimen: Blood     CULTURE, BLOOD [[id number]]  (Abnormal) Collected: 02/12/22 2016    Order Status: Completed Specimen: Blood Updated: 02/14/22 0956     Special Requests: NO SPECIAL REQUESTS        GRAM STAIN       ANAEROBIC BOTTLE GRAM NEGATIVE RODS                  SME[facility] CALL[facility] TO AND CORRECTLY REPEATED [facility] [phone] [facility]                  AEROBIC BOTTLE GRAM NEGATIVE RODS           Culture result:       GRAM NEGATIVE RODS GROWING IN 1 OF 2 BOTTLES DRAWN (NO SITE) IDENTIFICATION AND SUSCEPTIBILITY TO FOLLOW          CULTURE, BLOOD [[id number]] Collected: 02/12/22 2042    Order Status: Completed Specimen: Blood Updated: 02/14/22 0640     Special Requests: NO SPECIAL REQUESTS        Culture result: NO GROWTH 2 DAYS       COVID-19 WITH INFLUENZA A/B [[id number]] Collected: 02/13/22 0444    Order Status: Completed Specimen: Nasopharyngeal Updated: 02/13/22 0556     SARS-CoV-2 Not detected        Comment: Not Detected results do not preclude SARS-CoV-2 infection and should not be used as the sole basis for patient management decisions. Results must be combined with clinical observations, patient history, and epidemiological information. Influenza A by PCR Not detected        Influenza B by PCR Not detected        Comment: Testing was performed using joshua Shasha SARS-CoV-2 and Influenza A/B nucleic acid assay. This test is a multiplex Real-Time Reverse Transcriptase Polymerase Chain Reaction (RT-PCR) based in [employer] diagnostic test intended for the qualitative detection of nucleic acids from SARS-CoV-2, Influenza A, and Influenza B in nasopharyngeal for use under the FDA's Emergency Use Authorization (EAU) only.        Fact sheet for Patients: FindDrives[employer]pl  Fact sheet for Healthcare Providers: [employer]pl                 Urinalysis Color   Date Value Ref Range Status   02/12/2022 DARK YELLOW   Final     Appearance   Date Value Ref Range Status   02/12/2022 TURBID   Final     Specific gravity   Date Value Ref Range Status   02/12/2022 1.013 1.005 - 1.030   Final     pH (UA)   Date Value Ref Range Status   02/12/2022 >8.5 (H) 5.0 - 8.0 Final     Protein   Date Value Ref Range Status   02/12/2022 300 (A) NEG mg/dL Final     Ketone   Date Value Ref Range Status   02/12/2022 Negative NEG mg/dL Final     Bilirubin   Date Value Ref Range Status   02/12/2022 Negative NEG   Final     Blood   Date Value Ref Range Status   02/12/2022 MODERATE (A) NEG   Final     Urobilinogen   Date Value Ref Range Status   02/12/2022 0.2 0.2 - 1.0 EU/dL Final     Nitrites   Date Value Ref Range Status   02/12/2022 Negative NEG   Final     Leukocyte Esterase   Date Value Ref Range Status   02/12/2022 MODERATE (A) NEG   Final     Potassium   Date Value Ref Range Status   02/14/2022 4.2 3.5 - 5.5 mmol/L Final     Creatinine   Date Value Ref Range Status   02/14/2022 2.19 (H) 0.6 - 1.3 MG/DL Final     BUN   Date Value Ref Range Status   02/14/2022 40 (H) 7.0 - 18 MG/DL Final     Prostate Specific Ag   Date Value Ref Range Status   12/30/2019 0.053 <=4.400 ng/mL Final      PSA No results for input(s): PSA in the last 72 hours.    Coagulation Lab Results   Component Value Date/Time    Prothrombin time 16.3 (H) 02/14/2022 05:49 AM    Prothrombin time 16.0 (H) 02/13/2022 04:44 AM    INR 1.3 (H) 02/14/2022 05:49 AM    INR 1.3 (H) 02/13/2022 04:44 AM

## 2022-02-14 NOTE — PROGRESS NOTES
[facility]  [facility]    Patient: Duc Deluna Age: 67 y.o. : 1945 MR#: [id number] SSN: [id number]  Date/Time: 2022     Subjective:     Patient seen and evaluated, lying in bed, no acute distress. Family at bedside. Hospital course:  Duc Deluna is a 67 y.o.  male who presented to the ED late yesterday evening for evaluation of altered mental status. Patient was found to be poorly responsive in triage and significantly hypotensive with the systolic blood pressure in the 50s. He was taken back to a room immediately for initiation of treatment. Work-up in the ED showed evidence for UTI and CT imaging showed bilateral hydronephrosis with hydroureter. There is no obvious evidence for obstruction on imaging but patient was noted to be retaining urine. A Sparrow catheter has been placed and broad-spectrum antibiotic therapy initiated. Patient did require vasopressor support initially but this is been able to be weaned. He is being referred for hospital admission for further evaluation. Patient admitted to the ICU, treated with vasopressor support and broad-spectrum IV antibiotics. Patient also received IV fluids, stabilized and transferred down to CVT stepdown. Blood cultures positive, ID consulted      Assessment/Plan:     1. Sepsis with septic shocksecondary to UTI, continue broad-spectrum IV antibiotics. 2. Acute cystitis with pyuria and mallika pus noted from suprapubic catheter on presentationcontinue IV antibiotics, urine culture not sent, blood cultures positive for GNR, awaiting sensitivities, ID on board. 3. EMIL on CKD 3creatinine at baseline, continue to monitor. 4. Elevated troponinlikely secondary to demand ischemia  5. History of hypertensionholding home medications since patient was hypotensive on presentation.   DVT prophylaxisSCDs  Full code    Treatment plan discussed with patient and family at bedside Lc Hawkins MD  22      Case discussed with:  [x]Patient  [x]Family  []Nursing  []Case Management  DVT Prophylaxis:  []Lovenox  []Hep SQ  []SCDs  []Coumadin   []On Heparin gtt    Objective:   VS:   Visit Vitals  BP (!) 107/56 (BP 1 Location: Right upper arm, BP Patient Position: At rest)   Pulse 73   Temp 98.5 °F (36.9 °C)   Resp 20   Ht 5' 6\" (1.676 m)   Wt 61.2 kg (135 lb)   SpO2 100%   BMI 21.79 kg/m²      Tmax/24hrs: Temp (24hrs), Av.7 °F (37.1 °C), Min:98.1 °F (36.7 °C), Max:99.6 °F (37.6 °C)  IOBRIEF    Intake/Output Summary (Last 24 hours) at 2022 1606  Last data filed at 2022 1217  Gross per 24 hour   Intake 2724.4 ml   Output 1675 ml   Net 1049.4 ml       General:  Alert, cooperative, no acute distress    HEENT: PERRLA, anicteric sclerae. Pulmonary:  CTA Bilaterally. No Wheezing/Rhonchi/Rales. Cardiovascular: Regular rate and Rhythm. GI:  Soft, Non distended, Non tender. + Bowel sounds. Extremities:  No edema, cyanosis, clubbing. No calf tenderness.    Neurologic: Alert and awake, lying in bed  Additional:    Medications:   Current Facility-Administered Medications   Medication Dose Route Frequency    sodium chloride (NS) flush 5-40 mL  5-40 mL IntraVENous Q8H    sodium chloride (NS) flush 5-40 mL  5-40 mL IntraVENous PRN    acetaminophen (TYLENOL) tablet 650 mg  650 mg Oral Q6H PRN    Or    acetaminophen (TYLENOL) suppository 650 mg  650 mg Rectal Q6H PRN    ondansetron (ZOFRAN ODT) tablet 4 mg  4 mg Oral Q8H PRN    Or    ondansetron (ZOFRAN) injection 4 mg  4 mg IntraVENous Q6H PRN    insulin lispro (HUMALOG) injection   SubCUTAneous Q6H    glucose chewable tablet 16 g  4 Tablet Oral PRN    glucagon (GLUCAGEN) injection 1 mg  1 mg IntraMUSCular PRN    dextrose 10% infusion 125-250 mL  125-250 mL IntraVENous PRN    famotidine (PF) (PEPCID) 20 mg in 0.9% sodium chloride 10 mL injection  20 mg IntraVENous Q24H    dextrose 5 % - 0.45% NaCl infusion  75 mL/hr IntraVENous CONTINUOUS    sodium chloride (NS) flush 5-10 mL  5-10 mL IntraVENous PRN    cefepime (MAXIPIME) 1 g in 0.9% sodium chloride (MBP/ADV) 50 mL MBP  1 g IntraVENous Q12H       Imaging:   XR Results (most recent):  Results from Hospital Encounter encounter on 02/12/22    XR SWALLOW FUNC VIDEO    Narrative  MODIFIED BARIUM SWALLOW. CLINICAL INDICATION/HISTORY: Dysphagia. Feeding difficulties. History of CVA. Aspiration prior speech swallow study. Assessment for aspiration. Evaluation  for dietary considerations. COMPARISON: August 2, 2019. TECHNIQUE: A live videoradiographic swallowing function study was performed in  conjunction with the speech therapist.  The video is available in the department  for review by speech pathology and ancillary staff. Fluoroscopic images were not made available in PACS for radiologist review and  this report is strictly a procedural documentation by the physician assistant  with input from speech pathology. It is not considered inclusive or exclusive  of anatomic abnormalities and is not diagnostic beyond the specific  considerations regarding swallowing function as it relates to airway protection  while eating and drinking. Fluoroscopy time: 4 minutes 42 seconds    Fluoroscopic images: 0    Electronic capture cine loops: 18    FINDINGS:    Patient swallowed multiple consistencies of barium mixtures including honey and  pudding. There was penetration to vocal cords with both tested consistencies. Oral and pharyngeal phase delay, premature spillage, and pharyngeal residuals  were seen with both tested consistencies. Impression  Penetration to the vocal cords with both tested consistencies. Please see speech pathologist report for additional details and recommendations.        CT Results (most recent):  Results from Hospital Encounter encounter on 02/12/22    CT ABD PELV WO CONT    Narrative  EXAM: CT ABDOMEN AND PELVIS WITHOUT CONTRAST    CLINICAL HISTORY/INDICATION:  sepsis , hypotensive, confusion, vomiting    COMPARISON: CT abdomen and pelvis 8/19/2016. TECHNIQUE: No intravenous contrast was utilized for this helical thin section CT  of the abdomen and pelvis. Coronal and sagittal reformations obtained. Evaluation of the solid organs, bowel wall, and vascular structures are  therefore limited. All CT scans at this facility are performed using dose optimization technique as  appropriate to a performed exam, to include automated exposure control,  adjustment of the mA and/or kV according to patient's size (including  appropriate matching for site-specific examinations), or use of iterative  reconstruction technique. FINDINGS:    Abdomen-    Mild crowding of the bronchovascular markings at the bases. Trace pericardial  effusion. The liver and spleen are normal in size and density. The biliary tree is not  dilated. The gallbladder is normal in size. Increased density within the lumen of the  gallbladder. No gallbladder wall thickening nor surrounding inflammation. Bilateral mild hydronephrosis and hydroureter which can be followed down to the  bladder base. No renal nor ureteral calculus. Normal size kidneys. Bilateral mild adrenal nodular thickening without change    Tiny amount of fluid in the paracolic gutters. The large and small bowel seem unremarkable. Pelvis -    There is no free pelvic fluid. The pelvic viscera are unremarkable. Mild circumferential thickening of the urinary bladder wall. Suprapubic Sparrow  catheter. Small amount of air within the bladder lumen. 5 mm calculus layering  within the urinary bladder. Review of osseous structures throughout on bone window settings shows no  significant osseous pathology. Impression  Bilateral mild hydronephrosis and hydroureter without evidence of ureteral  calculus. 5 mm calculus layering within the urinary bladder.   Mild circumferential urinary bladder wall thickening. This may be seen with  cystitis or mild chronic outlet obstruction. Suprapubic bladder catheter in expected position. Tiny amount of gas within the  lumen of the urinary bladder. Sludge and/or stones within the gallbladder. As clinically indicated follow-up  ultrasound of the right upper quadrant could be of benefit. Mild micronodular adrenal thickening consistent with hyperplasia without change. Report provided to the emergency department at 2317 hrs. Labs:    Recent Results (from the past 48 hour(s))   CULTURE, BLOOD    Collection Time: 02/12/22  8:16 PM    Specimen: Blood   Result Value Ref Range    Special Requests: NO SPECIAL REQUESTS      GRAM STAIN ANAEROBIC BOTTLE GRAM NEGATIVE RODS      GRAM STAIN        SMEAR CALLED TO AND CORRECTLY REPEATED BY: [facility] ED AT [id number] TO S    GRAM STAIN AEROBIC BOTTLE GRAM NEGATIVE RODS      Culture result: (A)       GRAM NEGATIVE RODS GROWING IN 1 OF 2 BOTTLES DRAWN (NO SITE) IDENTIFICATION AND SUSCEPTIBILITY TO FOLLOW   BLOOD GAS,CHEM8,LACTIC ACID POC    Collection Time: 02/12/22  8:28 PM   Result Value Ref Range    Calcium, ionized (POC) 1.03 (L) 1.12 - 1.32 mmol/L    Base deficit (POC) 7.8 mmol/L    HCO3 (POC) 16.7 (L) 22 - 26 MMOL/L    CO2, POC 18 (L) 19 - 24 MMOL/L    O2 SAT 81 %    Patient temp.  102.4      Sample source VENOUS BLOOD      Performed by Patricia Castellanos     Sodium (POC) 139 136 - 145 mmol/L    Potassium (POC) 9.0 (HH) 3.5 - 5.1 mmol/L    Glucose (POC) 234 (H) 65 - 100 mg/dL    Creatinine (POC) 3.09 (H) 0.6 - 1.3 mg/dL    Lactic Acid (POC) 4.20 (HH) 0.40 - 2.00 mmol/L    Chloride (POC) 119 (H) 98 - 107 mmol/L    Anion gap, POC 3 (L) 10 - 20      GFRAA, POC 24 (L) >60 ml/min/1.73m2    GFRNA, POC 20 (L) >60 ml/min/1.73m2    pH, venous (POC) 7.31 (L) 7.32 - 7.42      pCO2, venous (POC) 34.3 (L) 41 - 51 MMHG    pO2, venous (POC) 56 (H) 25 - 40 mmHg    Critical value read back KATIE    EKG, 12 LEAD, INITIAL Collection Time: 02/12/22  8:36 PM   Result Value Ref Range    Ventricular Rate 105 BPM    Atrial Rate 105 BPM    P-R Interval 120 ms    QRS Duration 78 ms    Q-T Interval 342 ms    QTC Calculation (Bezet) 452 ms    Calculated P Axis 76 degrees    Calculated R Axis 48 degrees    Calculated T Axis 80 degrees    Diagnosis       Poor data quality, interpretation may be adversely affected  Sinus tachycardia  Low voltage QRS  Borderline ECG  When compared with ECG of 01-AUG-2019 15:51,  premature ventricular complexes are no longer present  Confirmed by Shanika Rojas ([id number]) on 2/14/2022 10:22:00 AM     CULTURE, BLOOD    Collection Time: 02/12/22  8:42 PM    Specimen: Blood   Result Value Ref Range    Special Requests: NO SPECIAL REQUESTS      Culture result: NO GROWTH 2 DAYS     METABOLIC PANEL, COMPREHENSIVE    Collection Time: 02/12/22  8:42 PM   Result Value Ref Range    Sodium 143 136 - 145 mmol/L    Potassium 5.0 3.5 - 5.5 mmol/L    Chloride 115 (H) 100 - 111 mmol/L    CO2 20 (L) 21 - 32 mmol/L    Anion gap 8 3.0 - 18 mmol/L    Glucose 198 (H) 74 - 99 mg/dL    BUN 46 (H) 7.0 - 18 MG/DL    Creatinine 2.77 (H) 0.6 - 1.3 MG/DL    BUN/Creatinine ratio 17 12 - 20      GFR est AA 27 (L) >60 ml/min/1.73m2    GFR est non-AA 22 (L) >60 ml/min/1.73m2    Calcium 9.4 8.5 - 10.1 MG/DL    Bilirubin, total 0.8 0.2 - 1.0 MG/DL    ALT (SGPT) 7 (L) 16 - 61 U/L    AST (SGOT) <3 (L) 10 - 38 U/L    Alk.  phosphatase 61 45 - 117 U/L    Protein, total 7.7 6.4 - 8.2 g/dL    Albumin 2.9 (L) 3.4 - 5.0 g/dL    Globulin 4.8 (H) 2.0 - 4.0 g/dL    A-G Ratio 0.6 (L) 0.8 - 1.7     CBC WITH AUTOMATED DIFF    Collection Time: 02/12/22  8:42 PM   Result Value Ref Range    WBC 8.6 4.6 - 13.2 K/uL    RBC 5.19 4.35 - 5.65 M/uL    HGB 14.0 13.0 - 16.0 g/dL    HCT 44.5 36.0 - 48.0 %    MCV 85.7 78.0 - 100.0 FL    MCH 27.0 24.0 - 34.0 PG    MCHC 31.5 31.0 - 37.0 g/dL    RDW 17.2 (H) 11.6 - 14.5 %    PLATELET 872 053 - 255 K/uL    MPV 11.8 9.2 - 11.8 FL NRBC 0.0 0  WBC    ABSOLUTE NRBC 0.00 0.00 - 0.01 K/uL    NEUTROPHILS 70 40 - 73 %    BAND NEUTROPHILS 13 (H) 0 - 5 %    LYMPHOCYTES 16 (L) 21 - 52 %    MONOCYTES 0 (L) 3 - 10 %    EOSINOPHILS 0 0 - 5 %    BASOPHILS 1 0 - 2 %    IMMATURE GRANULOCYTES 0 %    ABS. NEUTROPHILS 7.1 1.8 - 8.0 K/UL    ABS. LYMPHOCYTES 1.4 0.9 - 3.6 K/UL    ABS. MONOCYTES 0.0 (L) 0.05 - 1.2 K/UL    ABS. EOSINOPHILS 0.0 0.0 - 0.4 K/UL    ABS. BASOPHILS 0.1 0.0 - 0.1 K/UL    ABS. IMM. GRANS. 0.0 K/UL    DF MANUAL      PLATELET COMMENTS ADEQUATE PLATELETS      RBC COMMENTS ANISOCYTOSIS  1+       BLOOD GAS,CHEM8,LACTIC ACID POC    Collection Time: 02/12/22  8:42 PM   Result Value Ref Range    Calcium, ionized (POC) 1.21 1.12 - 1.32 mmol/L    Base deficit (POC) 7.1 mmol/L    HCO3 (POC) 18.6 (L) 22 - 26 MMOL/L    CO2, POC 20 19 - 24 MMOL/L    O2 SAT 29 %    Patient temp.  102.4      Sample source VENOUS BLOOD      Performed by Catalina Vasquez     Sodium (POC) 145 136 - 145 mmol/L    Potassium (POC) 5.4 (H) 3.5 - 5.1 mmol/L    Glucose (POC) 216 (H) 65 - 100 mg/dL    Creatinine (POC) 2.71 (H) 0.6 - 1.3 mg/dL    Lactic Acid (POC) 3.98 (HH) 0.40 - 2.00 mmol/L    Chloride (POC) 116 (H) 98 - 107 mmol/L    Anion gap, POC 10 10 - 20      GFRAA, POC 28 (L) >60 ml/min/1.73m2    GFRNA, POC 23 (L) >60 ml/min/1.73m2    pH, venous (POC) 7.28 (L) 7.32 - 7.42      pCO2, venous (POC) 41.0 41 - 51 MMHG    pO2, venous (POC) 24 (L) 25 - 40 mmHg    Critical value read back KATIE    URINALYSIS W/ RFLX MICROSCOPIC    Collection Time: 02/12/22  8:47 PM   Result Value Ref Range    Color DARK YELLOW      Appearance TURBID      Specific gravity 1.013 1.005 - 1.030      pH (UA) >8.5 (H) 5.0 - 8.0    Protein 300 (A) NEG mg/dL    Glucose Negative NEG mg/dL    Ketone Negative NEG mg/dL    Bilirubin Negative NEG      Blood MODERATE (A) NEG      Urobilinogen 0.2 0.2 - 1.0 EU/dL    Nitrites Negative NEG      Leukocyte Esterase MODERATE (A) NEG     URINE MICROSCOPIC ONLY Collection Time: 02/12/22  8:47 PM   Result Value Ref Range    WBC 0 to 1 0 - 5 /hpf    RBC 11 to 20 0 - 5 /hpf    Epithelial cells FEW 0 - 5 /lpf    Bacteria 4+ (A) NEG /hpf    Triple Phosphate crystals 1+ (A) NEG   POC LACTIC ACID    Collection Time: 02/12/22 11:56 PM   Result Value Ref Range    Lactic Acid (POC) 1.47 0.40 - 2.00 mmol/L   TROPONIN-HIGH SENSITIVITY    Collection Time: 02/13/22  4:40 AM   Result Value Ref Range    Troponin-High Sensitivity 129 (HH) 0 - 78 ng/L   HEMOGLOBIN A1C WITH EAG    Collection Time: 02/13/22  4:40 AM   Result Value Ref Range    Hemoglobin A1c 6.3 (H) 4.2 - 5.6 %    Est. average glucose 134 mg/dL   COVID-19 WITH INFLUENZA A/B    Collection Time: 02/13/22  4:44 AM   Result Value Ref Range    SARS-CoV-2 Not detected NOTD      Influenza A by PCR Not detected NOTD      Influenza B by PCR Not detected NOTD     METABOLIC PANEL, COMPREHENSIVE    Collection Time: 02/13/22  4:44 AM   Result Value Ref Range    Sodium 144 136 - 145 mmol/L    Potassium 4.6 3.5 - 5.5 mmol/L    Chloride 118 (H) 100 - 111 mmol/L    CO2 18 (L) 21 - 32 mmol/L    Anion gap 8 3.0 - 18 mmol/L    Glucose 228 (H) 74 - 99 mg/dL    BUN 48 (H) 7.0 - 18 MG/DL    Creatinine 2.37 (H) 0.6 - 1.3 MG/DL    BUN/Creatinine ratio 20 12 - 20      GFR est AA 32 (L) >60 ml/min/1.73m2    GFR est non-AA 27 (L) >60 ml/min/1.73m2    Calcium 8.3 (L) 8.5 - 10.1 MG/DL    Bilirubin, total 0.5 0.2 - 1.0 MG/DL    ALT (SGPT) 7 (L) 16 - 61 U/L    AST (SGOT) 6 (L) 10 - 38 U/L    Alk.  phosphatase 46 45 - 117 U/L    Protein, total 5.7 (L) 6.4 - 8.2 g/dL    Albumin 2.2 (L) 3.4 - 5.0 g/dL    Globulin 3.5 2.0 - 4.0 g/dL    A-G Ratio 0.6 (L) 0.8 - 1.7     CBC WITH AUTOMATED DIFF    Collection Time: 02/13/22  4:44 AM   Result Value Ref Range    WBC 20.3 (H) 4.6 - 13.2 K/uL    RBC 3.86 (L) 4.35 - 5.65 M/uL    HGB 10.6 (L) 13.0 - 16.0 g/dL    HCT 32.7 (L) 36.0 - 48.0 %    MCV 84.7 78.0 - 100.0 FL    MCH 27.5 24.0 - 34.0 PG    MCHC 32.4 31.0 - 37.0 g/dL    RDW 17.0 (H) 11.6 - 14.5 %    PLATELET 267 372 - 955 K/uL    MPV 10.7 9.2 - 11.8 FL    NRBC 0.0 0  WBC    ABSOLUTE NRBC 0.00 0.00 - 0.01 K/uL    NEUTROPHILS 65 40 - 73 %    BAND NEUTROPHILS 17 (H) 0 - 5 %    LYMPHOCYTES 13 (L) 21 - 52 %    MONOCYTES 5 3 - 10 %    EOSINOPHILS 0 0 - 5 %    BASOPHILS 0 0 - 2 %    IMMATURE GRANULOCYTES 0 %    ABS. NEUTROPHILS 16.7 (H) 1.8 - 8.0 K/UL    ABS. LYMPHOCYTES 2.6 0.9 - 3.6 K/UL    ABS. MONOCYTES 1.0 0.05 - 1.2 K/UL    ABS. EOSINOPHILS 0.0 0.0 - 0.4 K/UL    ABS. BASOPHILS 0.0 0.0 - 0.1 K/UL    ABS. IMM.  GRANS. 0.0 K/UL    DF MANUAL      PLATELET COMMENTS ADEQUATE PLATELETS      RBC COMMENTS ANISOCYTOSIS  1+       MAGNESIUM    Collection Time: 02/13/22  4:44 AM   Result Value Ref Range    Magnesium 1.8 1.6 - 2.6 mg/dL   PHOSPHORUS    Collection Time: 02/13/22  4:44 AM   Result Value Ref Range    Phosphorus 3.3 2.5 - 4.9 MG/DL   PROTHROMBIN TIME + INR    Collection Time: 02/13/22  4:44 AM   Result Value Ref Range    Prothrombin time 16.0 (H) 11.5 - 15.2 sec    INR 1.3 (H) 0.8 - 1.2     PROCALCITONIN    Collection Time: 02/13/22  4:44 AM   Result Value Ref Range    Procalcitonin 60.78 ng/mL   TSH 3RD GENERATION    Collection Time: 02/13/22  4:44 AM   Result Value Ref Range    TSH 0.70 0.36 - 7.18 uIU/mL   SALICYLATE    Collection Time: 02/13/22  4:44 AM   Result Value Ref Range    Salicylate level <4.6 (L) 2.8 - 20.0 MG/DL   ACETAMINOPHEN    Collection Time: 02/13/22  4:44 AM   Result Value Ref Range    Acetaminophen level 2 (L) 10.0 - 30.0 ug/mL   DRUG SCREEN UR - W/ CONFIRM    Collection Time: 02/13/22  5:15 AM   Result Value Ref Range    BENZODIAZEPINES Negative NEG      BARBITURATES Negative NEG      THC (TH-CANNABINOL) Negative NEG      OPIATES Negative NEG      PCP(PHENCYCLIDINE) Negative NEG      COCAINE Negative NEG      AMPHETAMINES Negative NEG      METHADONE Negative NEG      HDSCOM (NOTE)    GLUCOSE, POC    Collection Time: 02/13/22  6:26 AM   Result Value Ref Range    Glucose (POC) 194 (H) 70 - 110 mg/dL   LACTIC ACID    Collection Time: 02/13/22  7:37 AM   Result Value Ref Range    Lactic acid 1.3 0.4 - 2.0 MMOL/L   GLUCOSE, POC    Collection Time: 02/13/22  9:23 AM   Result Value Ref Range    Glucose (POC) 143 (H) 70 - 110 mg/dL   GLUCOSE, POC    Collection Time: 02/13/22 12:10 PM   Result Value Ref Range    Glucose (POC) 103 70 - 110 mg/dL   LACTIC ACID    Collection Time: 02/13/22 12:11 PM   Result Value Ref Range    Lactic acid 1.5 0.4 - 2.0 MMOL/L   TROPONIN-HIGH SENSITIVITY    Collection Time: 02/13/22  4:49 PM   Result Value Ref Range    Troponin-High Sensitivity 242 (HH) 0 - 78 ng/L   GLUCOSE, POC    Collection Time: 02/13/22  6:21 PM   Result Value Ref Range    Glucose (POC) 71 70 - 110 mg/dL   GLUCOSE, POC    Collection Time: 02/14/22 12:28 AM   Result Value Ref Range    Glucose (POC) 102 70 - 110 mg/dL   GLUCOSE, POC    Collection Time: 02/14/22  5:47 AM   Result Value Ref Range    Glucose (POC) 155 (H) 70 - 897 mg/dL   METABOLIC PANEL, COMPREHENSIVE    Collection Time: 02/14/22  5:49 AM   Result Value Ref Range    Sodium 145 136 - 145 mmol/L    Potassium 4.2 3.5 - 5.5 mmol/L    Chloride 120 (H) 100 - 111 mmol/L    CO2 20 (L) 21 - 32 mmol/L    Anion gap 5 3.0 - 18 mmol/L    Glucose 168 (H) 74 - 99 mg/dL    BUN 40 (H) 7.0 - 18 MG/DL    Creatinine 2.19 (H) 0.6 - 1.3 MG/DL    BUN/Creatinine ratio 18 12 - 20      GFR est AA 36 (L) >60 ml/min/1.73m2    GFR est non-AA 29 (L) >60 ml/min/1.73m2    Calcium 8.6 8.5 - 10.1 MG/DL    Bilirubin, total 0.3 0.2 - 1.0 MG/DL    ALT (SGPT) 6 (L) 16 - 61 U/L    AST (SGOT) 6 (L) 10 - 38 U/L    Alk.  phosphatase 45 45 - 117 U/L    Protein, total 5.8 (L) 6.4 - 8.2 g/dL    Albumin 2.0 (L) 3.4 - 5.0 g/dL    Globulin 3.8 2.0 - 4.0 g/dL    A-G Ratio 0.5 (L) 0.8 - 1.7     CBC WITH AUTOMATED DIFF    Collection Time: 02/14/22  5:49 AM   Result Value Ref Range    WBC 12.7 4.6 - 13.2 K/uL    RBC 3.32 (L) 4.35 - 5.65 M/uL    HGB 9.3 (L) 13.0 - 16.0 g/dL    HCT 27.9 (L) 36.0 - 48.0 %    MCV 84.0 78.0 - 100.0 FL    MCH 28.0 24.0 - 34.0 PG    MCHC 33.3 31.0 - 37.0 g/dL    RDW 17.0 (H) 11.6 - 14.5 %    PLATELET 511 439 - 739 K/uL    MPV 12.3 (H) 9.2 - 11.8 FL    NRBC 0.2 (H) 0  WBC    ABSOLUTE NRBC 0.02 (H) 0.00 - 0.01 K/uL    NEUTROPHILS 84 (H) 40 - 73 %    LYMPHOCYTES 8 (L) 21 - 52 %    MONOCYTES 4 3 - 10 %    EOSINOPHILS 3 0 - 5 %    BASOPHILS 0 0 - 2 %    IMMATURE GRANULOCYTES 1 (H) 0.0 - 0.5 %    ABS. NEUTROPHILS 10.7 (H) 1.8 - 8.0 K/UL    ABS. LYMPHOCYTES 1.1 0.9 - 3.6 K/UL    ABS. MONOCYTES 0.5 0.05 - 1.2 K/UL    ABS. EOSINOPHILS 0.3 0.0 - 0.4 K/UL    ABS. BASOPHILS 0.0 0.0 - 0.1 K/UL    ABS. IMM. GRANS. 0.1 (H) 0.00 - 0.04 K/UL    DF AUTOMATED     MAGNESIUM    Collection Time: 02/14/22  5:49 AM   Result Value Ref Range    Magnesium 2.3 1.6 - 2.6 mg/dL   PHOSPHORUS    Collection Time: 02/14/22  5:49 AM   Result Value Ref Range    Phosphorus 2.5 2.5 - 4.9 MG/DL   PROTHROMBIN TIME + INR    Collection Time: 02/14/22  5:49 AM   Result Value Ref Range    Prothrombin time 16.3 (H) 11.5 - 15.2 sec    INR 1.3 (H) 0.8 - 1.2     GLUCOSE, POC    Collection Time: 02/14/22 12:14 PM   Result Value Ref Range    Glucose (POC) 121 (H) 70 - 110 mg/dL       Signed By: Gumaro Berrios MD     February 14, 2022      I spent 25 minutes with the patient in face-to-face consultation, of which greater than 50% was spent in counseling and coordination of care as described above    Disclaimer: Sections of this note are dictated using utilizing voice recognition software. Minor typographical errors may be present. If questions arise, please do not hesitate to contact me or call our department.

## 2022-02-14 NOTE — PROGRESS NOTES
Reason for Admission:  Sepsis ([facility] [address] [A41.9]                 RUR Score:    19           Plan for utilizing home health: To be determined                      Likelihood of Readmission:   Moderate                         Do you (patient/family) have any concerns for transition/discharge?  no    Transition of Care Plan:       Initial assessment completed with patient's son Roshni King. And pt's daughter Sanjiv Service in pt's room. Cognitive status of patient: unable to assess. Pt was off the floor    Face sheet information confirmed:  yes. The patient son and daughter participate in his discharge plan and to receive any needed information. This patient lives in a home with son. Patient is not able to navigate steps as needed. Prior to hospitalization, patient was considered to be independent with ADLs/IADLS : no . If not independent,  patient needs assist with : dressing, bathing, food preparation, cooking, toileting and grooming    Patient has a current ACP document on file: no      Healthcare Decision Maker:     Click here to complete [address] including selection of the Healthcare Decision Maker Relationship (ie \"Primary\")    The son or medical transport will be available to transport patient home upon discharge. The patient already has [facility], Shower chair, [facility] medical equipment available in the home. Patient is not currently active with home health. Patient has not stayed in a skilled nursing facility or rehab. Was  stay within last 60 days : no. This patient is on dialysis :no      List of available SNF agencies were provided and reviewed with the patient prior to discharge. Freedom of choice signed: yes, for [facility]. Currently, the discharge plan is SNF vs home health    The patient's family assists with his medications as directed.     Patient's current insurance is Medicare, [employer]      Care Management Interventions  PCP Verified by CM: Yes  Palliative Care Criteria Met (RRAT>21 & CHF Dx)?: No  Mode of Transport at Discharge: Other (see comment)  Transition of Care Consult (CM Consult): Discharge Planning  Support Systems: Child(edelmira)  Confirm Follow Up Transport: Family  The Patient and/or Patient Representative was Provided with a Choice of Provider and Agrees with the Discharge Plan?: Yes  Name of the Patient Representative Who was Provided with a Choice of Provider and Agrees with the Discharge Plan: pt's son Lisa Sandoval.   Freedom of Choice List was Provided with Basic Dialogue that Supports the Patient's Individualized Plan of Care/Goals, Treatment Preferences and Shares the Quality Data Associated with the Providers?: Yes  Discharge Location  Patient Expects to be Discharged to[de-identified] Skilled nursing facility (vs home health)        ETELVINA Neal RN  Care Management  Pager: [phone]

## 2022-02-14 NOTE — PROGRESS NOTES
1915: Bedside and Verbal shift change report given to This Writer (oncoming nurse) by Lan RN  (offgoing nurse). Report included the following information SBAR, Kardex, Intake/Output, MAR, Recent Results and Cardiac Rhythm SR.     2100: Patient resting in bed, NAD noted. 2300: Patient resting in bed, NAD noted. 0123: TRANSFER - OUT REPORT:    Verbal report given to Leyda Albert RN (name) on Birdie Hutching  being transferred to [facility] (unit) for routine progression of care       Report consisted of patients Situation, Background, Assessment and   Recommendations(SBAR). Information from the following report(s) SBAR, Kardex, Intake/Output, MAR, Recent Results and Cardiac Rhythm SR c PVC's was reviewed with the receiving nurse. Lines:   Triple Lumen 02/13/22 Right Femoral (Active)   Central Line Being Utilized Yes 02/13/22 2000   Criteria for Appropriate Use Hemodynamically unstable, requiring monitoring lines, vasopressors, or volume resuscitation 02/13/22 2000   Site Assessment Clean, dry, & intact 02/14/22 0000   Infiltration Assessment 0 02/14/22 0000   Affected Extremity/Extremities Color distal to insertion site pink (or appropriate for race); Pulses palpable;Range of motion performed 02/13/22 2000   Date of Last Dressing Change 02/17/22 02/13/22 2000   Dressing Status Clean, dry, & intact 02/14/22 0000   Dressing Type Disk with Chlorhexadine gluconate (CHG); Transparent;Tape 02/13/22 2000   Proximal Hub Color/Line Status White;Flushed;Patent 02/13/22 2000   Positive Blood Return (Medial Site) Yes 02/13/22 2000   Medial Hub Color/Line Status Blue;Flushed;Patent 02/13/22 2000   Positive Blood Return (Lateral Site) Yes 02/13/22 2000   Distal Hub Color/Line Status Brown;Flushed;Patent 02/13/22 2000   Positive Blood Return (Site #3) Yes 02/13/22 2000   Alcohol Cap Used Yes 02/13/22 2000       Peripheral IV 02/12/22 Right Forearm (Active)   Site Assessment Clean, dry, & intact 02/14/22 0000   Phlebitis Assessment 0 02/14/22 0000   Infiltration Assessment 0 02/14/22 0000   Dressing Status Clean, dry, & intact 02/14/22 0000   Dressing Type Transparent;Tape 02/13/22 2000   Hub Color/Line Status Blue;Flushed;Patent 02/13/22 2000   Action Taken Blood drawn 02/12/22 2023   Alcohol Cap Used Yes 02/13/22 2000       Peripheral IV 02/12/22 Left;Posterior Forearm (Active)   Site Assessment Clean, dry, & intact 02/14/22 0000   Phlebitis Assessment 0 02/14/22 0000   Infiltration Assessment 0 02/14/22 0000   Dressing Status Clean, dry, & intact 02/14/22 0000   Dressing Type Transparent;Tape 02/13/22 2000   Hub Color/Line Status Blue;Flushed;Patent; Infusing 02/13/22 2000   Alcohol Cap Used Yes 02/13/22 2000       Peripheral IV 02/12/22 Right;Mid Forearm (Active)   Site Assessment Clean, dry, & intact 02/14/22 0000   Phlebitis Assessment 0 02/14/22 0000   Infiltration Assessment 0 02/14/22 0000   Dressing Status Clean, dry, & intact 02/14/22 0000   Dressing Type Transparent;Tape 02/13/22 2000   Hub Color/Line Status Flushed;Patent 02/13/22 2000   Action Taken Blood drawn 02/12/22 2042   Alcohol Cap Used Yes 02/13/22 2000        Opportunity for questions and clarification was provided.       Patient transported with:   Registered Nurse

## 2022-02-15 NOTE — PROGRESS NOTES
[facility] Hospitalist Group  Progress Note    Patient: Gordy Buerger Age: 67 y.o. : 1945 MR#: [id number] SSN: [id number]  Date/Time: 2/15/2022     Subjective:     Patient seen and evaluated, lying in bed, no acute distress. Family at bedside. Hospital course:  Gordy Buerger is a 67 y.o.  male who presented to the ED late yesterday evening for evaluation of altered mental status. Patient was found to be poorly responsive in triage and significantly hypotensive with the systolic blood pressure in the 50s. He was taken back to a room immediately for initiation of treatment. Work-up in the ED showed evidence for UTI and CT imaging showed bilateral hydronephrosis with hydroureter. There is no obvious evidence for obstruction on imaging but patient was noted to be retaining urine. A Sparrow catheter has been placed and broad-spectrum antibiotic therapy initiated. Patient did require vasopressor support initially but this is been able to be weaned. He is being referred for hospital admission for further evaluation. Patient admitted to the ICU, treated with vasopressor support and broad-spectrum IV antibiotics. Patient also received IV fluids, stabilized and transferred down to CVT stepdown. Blood cultures positive, ID consulted    Patient failed swallow eval - discussed with family , will place NGT & nutrition consulted for TF. Assessment/Plan:     1. Sepsis with septic shocksecondary to UTI, continue broad-spectrum IV antibiotics. 2. Acute cystitis with pyuria and mallika pus noted from suprapubic catheter on presentationcontinue IV antibiotics, urine culture not sent, blood cultures positive for GNR, awaiting sensitivities, ID on board. ID recommended to remove R femoral CVC   3. EMIL on CKD 3creatinine at baseline, continue to monitor. 4. Elevated troponinlikely secondary to demand ischemia  5.  History of hypertensionholding home medications since patient was hypotensive on presentation. DVT prophylaxisSCDs  Full code    Treatment plan discussed with patient and family at bedside  Continue TF , Nutrition on board. Peterson Warner MD  02/15/22      Case discussed with:  [x]Patient  [x]Family  []Nursing  []Case Management  DVT Prophylaxis:  []Lovenox  []Hep SQ  []SCDs  []Coumadin   []On Heparin gtt    Objective:   VS:   Visit Vitals  BP (!) 140/64   Pulse 68   Temp 98 °F (36.7 °C)   Resp 13   Ht 5' 6\" (1.676 m)   Wt 61.2 kg (135 lb)   SpO2 100%   BMI 21.79 kg/m²      Tmax/24hrs: Temp (24hrs), Av.8 °F (36.6 °C), Min:97.3 °F (36.3 °C), Max:98.3 °F (36.8 °C)  IOBRIEF    Intake/Output Summary (Last 24 hours) at 2/15/2022 1808  Last data filed at 2/15/2022 1615  Gross per 24 hour   Intake 1811.25 ml   Output 1100 ml   Net 711.25 ml       General:  Alert, cooperative, no acute distress    HEENT: PERRLA, anicteric sclerae. Pulmonary:  CTA Bilaterally. No Wheezing/Rhonchi/Rales. Cardiovascular: Regular rate and Rhythm. GI:  Soft, Non distended, Non tender. + Bowel sounds. Extremities:  No edema, cyanosis, clubbing. No calf tenderness.    Neurologic: Alert and awake, lying in bed  Additional:    Medications:   Current Facility-Administered Medications   Medication Dose Route Frequency    ciprofloxacin (CIPRO) 400 mg in D5W IVPB (premix)  400 mg IntraVENous Q12H    sodium chloride (NS) flush 5-40 mL  5-40 mL IntraVENous Q8H    sodium chloride (NS) flush 5-40 mL  5-40 mL IntraVENous PRN    acetaminophen (TYLENOL) tablet 650 mg  650 mg Oral Q6H PRN    Or    acetaminophen (TYLENOL) suppository 650 mg  650 mg Rectal Q6H PRN    ondansetron (ZOFRAN ODT) tablet 4 mg  4 mg Oral Q8H PRN    Or    ondansetron (ZOFRAN) injection 4 mg  4 mg IntraVENous Q6H PRN    insulin lispro (HUMALOG) injection   SubCUTAneous Q6H    glucose chewable tablet 16 g  4 Tablet Oral PRN    glucagon (GLUCAGEN) injection 1 mg  1 mg IntraMUSCular PRN    dextrose 10% infusion 125-250 mL  125-250 mL IntraVENous PRN    famotidine (PF) (PEPCID) 20 mg in 0.9% sodium chloride 10 mL injection  20 mg IntraVENous Q24H    dextrose 5 % - 0.45% NaCl infusion  75 mL/hr IntraVENous CONTINUOUS    sodium chloride (NS) flush 5-10 mL  5-10 mL IntraVENous PRN       Imaging:   XR Results (most recent):  Results from Hospital Encounter encounter on 02/12/22    XR ABD PORT  1 V    Narrative  XR ABD PORT  1 V: 2/15/2022 1:48 PM    CLINICAL INFORMATION  NG tube placement. COMPARISON  12 February 2022, 12 November 2018    FINDINGS  Normal bowel gas pattern, with no evidence of obstruction. Enteric contrast  outlines portions of the colon. Enteric tube passes below the diaphragm with tip overlying the gastric antrum. Impression  1. Enteric tube with tip overlying the gastric antrum. .       CT Results (most recent):  Results from Hospital Encounter encounter on 02/12/22    CT ABD PELV WO CONT    Narrative  EXAM: CT ABDOMEN AND PELVIS WITHOUT CONTRAST    CLINICAL HISTORY/INDICATION:  sepsis , hypotensive, confusion, vomiting    COMPARISON: CT abdomen and pelvis 8/19/2016. TECHNIQUE: No intravenous contrast was utilized for this helical thin section CT  of the abdomen and pelvis. Coronal and sagittal reformations obtained. Evaluation of the solid organs, bowel wall, and vascular structures are  therefore limited. All CT scans at this facility are performed using dose optimization technique as  appropriate to a performed exam, to include automated exposure control,  adjustment of the mA and/or kV according to patient's size (including  appropriate matching for site-specific examinations), or use of iterative  reconstruction technique. FINDINGS:    Abdomen-    Mild crowding of the bronchovascular markings at the bases. Trace pericardial  effusion. The liver and spleen are normal in size and density. The biliary tree is not  dilated. The gallbladder is normal in size.  Increased density within the lumen of the  gallbladder. No gallbladder wall thickening nor surrounding inflammation. Bilateral mild hydronephrosis and hydroureter which can be followed down to the  bladder base. No renal nor ureteral calculus. Normal size kidneys. Bilateral mild adrenal nodular thickening without change    Tiny amount of fluid in the paracolic gutters. The large and small bowel seem unremarkable. Pelvis -    There is no free pelvic fluid. The pelvic viscera are unremarkable. Mild circumferential thickening of the urinary bladder wall. Suprapubic Sparrow  catheter. Small amount of air within the bladder lumen. 5 mm calculus layering  within the urinary bladder. Review of osseous structures throughout on bone window settings shows no  significant osseous pathology. Impression  Bilateral mild hydronephrosis and hydroureter without evidence of ureteral  calculus. 5 mm calculus layering within the urinary bladder. Mild circumferential urinary bladder wall thickening. This may be seen with  cystitis or mild chronic outlet obstruction. Suprapubic bladder catheter in expected position. Tiny amount of gas within the  lumen of the urinary bladder. Sludge and/or stones within the gallbladder. As clinically indicated follow-up  ultrasound of the right upper quadrant could be of benefit. Mild micronodular adrenal thickening consistent with hyperplasia without change. Report provided to the emergency department at 2317 hrs.            Labs:    Recent Results (from the past 48 hour(s))   GLUCOSE, POC    Collection Time: 02/13/22  6:21 PM   Result Value Ref Range    Glucose (POC) 71 70 - 110 mg/dL   GLUCOSE, POC    Collection Time: 02/14/22 12:28 AM   Result Value Ref Range    Glucose (POC) 102 70 - 110 mg/dL   GLUCOSE, POC    Collection Time: 02/14/22  5:47 AM   Result Value Ref Range    Glucose (POC) 155 (H) 70 - 919 mg/dL   METABOLIC PANEL, COMPREHENSIVE    Collection Time: 02/14/22  5:49 AM   Result Value Ref Range    Sodium 145 136 - 145 mmol/L    Potassium 4.2 3.5 - 5.5 mmol/L    Chloride 120 (H) 100 - 111 mmol/L    CO2 20 (L) 21 - 32 mmol/L    Anion gap 5 3.0 - 18 mmol/L    Glucose 168 (H) 74 - 99 mg/dL    BUN 40 (H) 7.0 - 18 MG/DL    Creatinine 2.19 (H) 0.6 - 1.3 MG/DL    BUN/Creatinine ratio 18 12 - 20      GFR est AA 36 (L) >60 ml/min/1.73m2    GFR est non-AA 29 (L) >60 ml/min/1.73m2    Calcium 8.6 8.5 - 10.1 MG/DL    Bilirubin, total 0.3 0.2 - 1.0 MG/DL    ALT (SGPT) 6 (L) 16 - 61 U/L    AST (SGOT) 6 (L) 10 - 38 U/L    Alk. phosphatase 45 45 - 117 U/L    Protein, total 5.8 (L) 6.4 - 8.2 g/dL    Albumin 2.0 (L) 3.4 - 5.0 g/dL    Globulin 3.8 2.0 - 4.0 g/dL    A-G Ratio 0.5 (L) 0.8 - 1.7     CBC WITH AUTOMATED DIFF    Collection Time: 02/14/22  5:49 AM   Result Value Ref Range    WBC 12.7 4.6 - 13.2 K/uL    RBC 3.32 (L) 4.35 - 5.65 M/uL    HGB 9.3 (L) 13.0 - 16.0 g/dL    HCT 27.9 (L) 36.0 - 48.0 %    MCV 84.0 78.0 - 100.0 FL    MCH 28.0 24.0 - 34.0 PG    MCHC 33.3 31.0 - 37.0 g/dL    RDW 17.0 (H) 11.6 - 14.5 %    PLATELET 730 246 - 380 K/uL    MPV 12.3 (H) 9.2 - 11.8 FL    NRBC 0.2 (H) 0  WBC    ABSOLUTE NRBC 0.02 (H) 0.00 - 0.01 K/uL    NEUTROPHILS 84 (H) 40 - 73 %    LYMPHOCYTES 8 (L) 21 - 52 %    MONOCYTES 4 3 - 10 %    EOSINOPHILS 3 0 - 5 %    BASOPHILS 0 0 - 2 %    IMMATURE GRANULOCYTES 1 (H) 0.0 - 0.5 %    ABS. NEUTROPHILS 10.7 (H) 1.8 - 8.0 K/UL    ABS. LYMPHOCYTES 1.1 0.9 - 3.6 K/UL    ABS. MONOCYTES 0.5 0.05 - 1.2 K/UL    ABS. EOSINOPHILS 0.3 0.0 - 0.4 K/UL    ABS. BASOPHILS 0.0 0.0 - 0.1 K/UL    ABS. IMM.  GRANS. 0.1 (H) 0.00 - 0.04 K/UL    DF AUTOMATED     MAGNESIUM    Collection Time: 02/14/22  5:49 AM   Result Value Ref Range    Magnesium 2.3 1.6 - 2.6 mg/dL   PHOSPHORUS    Collection Time: 02/14/22  5:49 AM   Result Value Ref Range    Phosphorus 2.5 2.5 - 4.9 MG/DL   PROTHROMBIN TIME + INR    Collection Time: 02/14/22  5:49 AM   Result Value Ref Range    Prothrombin time 16. 3 (H) 11.5 - 15.2 sec    INR 1.3 (H) 0.8 - 1.2     GLUCOSE, POC    Collection Time: 02/14/22 12:14 PM   Result Value Ref Range    Glucose (POC) 121 (H) 70 - 110 mg/dL   CULTURE, BLOOD    Collection Time: 02/14/22  3:43 PM    Specimen: Blood   Result Value Ref Range    Special Requests: RIGHT  FEM        Culture result: NO GROWTH AFTER 14 HOURS     GLUCOSE, POC    Collection Time: 02/14/22  5:17 PM   Result Value Ref Range    Glucose (POC) 133 (H) 70 - 110 mg/dL   GLUCOSE, POC    Collection Time: 02/15/22 12:59 AM   Result Value Ref Range    Glucose (POC) 113 (H) 70 - 110 mg/dL   MAGNESIUM    Collection Time: 02/15/22  4:32 AM   Result Value Ref Range    Magnesium 2.3 1.6 - 2.6 mg/dL   PHOSPHORUS    Collection Time: 02/15/22  4:32 AM   Result Value Ref Range    Phosphorus 2.6 2.5 - 4.9 MG/DL   PROTHROMBIN TIME + INR    Collection Time: 02/15/22  4:32 AM   Result Value Ref Range    Prothrombin time 14.8 11.5 - 15.2 sec    INR 1.2 0.8 - 1.2     CBC WITH AUTOMATED DIFF    Collection Time: 02/15/22  4:32 AM   Result Value Ref Range    WBC 12.6 4.6 - 13.2 K/uL    RBC 3.34 (L) 4.35 - 5.65 M/uL    HGB 9.3 (L) 13.0 - 16.0 g/dL    HCT 27.8 (L) 36.0 - 48.0 %    MCV 83.2 78.0 - 100.0 FL    MCH 27.8 24.0 - 34.0 PG    MCHC 33.5 31.0 - 37.0 g/dL    RDW 17.0 (H) 11.6 - 14.5 %    PLATELET 150 095 - 090 K/uL    MPV 11.0 9.2 - 11.8 FL    NRBC 0.0 0  WBC    ABSOLUTE NRBC 0.00 0.00 - 0.01 K/uL    NEUTROPHILS 79 (H) 40 - 73 %    LYMPHOCYTES 14 (L) 21 - 52 %    MONOCYTES 4 3 - 10 %    EOSINOPHILS 3 0 - 5 %    BASOPHILS 0 0 - 2 %    IMMATURE GRANULOCYTES 1 (H) 0.0 - 0.5 %    ABS. NEUTROPHILS 10.0 (H) 1.8 - 8.0 K/UL    ABS. LYMPHOCYTES 1.7 0.9 - 3.6 K/UL    ABS. MONOCYTES 0.5 0.05 - 1.2 K/UL    ABS. EOSINOPHILS 0.4 0.0 - 0.4 K/UL    ABS. BASOPHILS 0.0 0.0 - 0.1 K/UL    ABS. IMM.  GRANS. 0.1 (H) 0.00 - 0.04 K/UL    DF AUTOMATED     METABOLIC PANEL, COMPREHENSIVE    Collection Time: 02/15/22  4:32 AM   Result Value Ref Range Sodium 147 (H) 136 - 145 mmol/L    Potassium  3.5 - 5.5 mmol/L     QUESTIONABLE RESULTS OBTAINED, SPECIMEN BEING RECOLLECTED    Chloride 122 (H) 100 - 111 mmol/L    CO2 18 (L) 21 - 32 mmol/L    Anion gap 7 3.0 - 18 mmol/L    Glucose 131 (H) 74 - 99 mg/dL    BUN 33 (H) 7.0 - 18 MG/DL    Creatinine 1.87 (H) 0.6 - 1.3 MG/DL    BUN/Creatinine ratio 18 12 - 20      GFR est AA 43 (L) >60 ml/min/1.73m2    GFR est non-AA 35 (L) >60 ml/min/1.73m2    Calcium  8.5 - 10.1 MG/DL     QUESTIONABLE RESULTS OBTAINED, SPECIMEN BEING RECOLLECTED    Bilirubin, total 0.4 0.2 - 1.0 MG/DL    ALT (SGPT) 7 (L) 16 - 61 U/L    AST (SGOT) 14 10 - 38 U/L    Alk.  phosphatase 56 45 - 117 U/L    Protein, total 5.4 (L) 6.4 - 8.2 g/dL    Albumin 1.9 (L) 3.4 - 5.0 g/dL    Globulin 3.5 2.0 - 4.0 g/dL    A-G Ratio 0.5 (L) 0.8 - 1.7     GLUCOSE, POC    Collection Time: 02/15/22  6:55 AM   Result Value Ref Range    Glucose (POC) >600 (HH) 70 - 110 mg/dL   GLUCOSE, POC    Collection Time: 02/15/22  6:58 AM   Result Value Ref Range    Glucose (POC) 175 (H) 70 - 110 mg/dL   GLUCOSE, POC    Collection Time: 02/15/22 11:16 AM   Result Value Ref Range    Glucose (POC) 79 70 - 110 mg/dL   CBC W/O DIFF    Collection Time: 02/15/22  2:53 PM   Result Value Ref Range    WBC 12.1 4.6 - 13.2 K/uL    RBC 3.86 (L) 4.35 - 5.65 M/uL    HGB 10.3 (L) 13.0 - 16.0 g/dL    HCT 32.1 (L) 36.0 - 48.0 %    MCV 83.2 78.0 - 100.0 FL    MCH 26.7 24.0 - 34.0 PG    MCHC 32.1 31.0 - 37.0 g/dL    RDW 17.2 (H) 11.6 - 14.5 %    PLATELET 536 300 - 997 K/uL    MPV 11.3 9.2 - 11.8 FL    NRBC 0.2 (H) 0  WBC    ABSOLUTE NRBC 0.03 (H) 0.00 - 6.40 K/uL   METABOLIC PANEL, BASIC    Collection Time: 02/15/22  2:53 PM   Result Value Ref Range    Sodium 147 (H) 136 - 145 mmol/L    Potassium 3.9 3.5 - 5.5 mmol/L    Chloride 122 (H) 100 - 111 mmol/L    CO2 19 (L) 21 - 32 mmol/L    Anion gap 6 3.0 - 18 mmol/L    Glucose 88 74 - 99 mg/dL    BUN 30 (H) 7.0 - 18 MG/DL    Creatinine 1.86 (H) 0.6 - 1.3 MG/DL    BUN/Creatinine ratio 16 12 - 20      GFR est AA 43 (L) >60 ml/min/1.73m2    GFR est non-AA 35 (L) >60 ml/min/1.73m2    Calcium 9.0 8.5 - 10.1 MG/DL       Signed By: Golden Lewis MD     February 15, 2022      I spent 25 minutes with the patient in face-to-face consultation, of which greater than 50% was spent in counseling and coordination of care as described above    Disclaimer: Sections of this note are dictated using utilizing voice recognition software. Minor typographical errors may be present. If questions arise, please do not hesitate to contact me or call our department.

## 2022-02-15 NOTE — PROGRESS NOTES
Bedside and Verbal shift change report given to Kathleen Reyna RN (oncoming nurse) by WILFREDO Dangelo (offgoing nurse). Report included the following information SBAR, Procedure Summary, Intake/Output, MAR and Recent Results. 9728- off the unit      0855- back on the unit      1307- NG tube inserted in Right Nare    1315- ordered xray to confirm placement    1350- Placement confirmed      1727- Started NG feed Jevity 1.5 at 15mL/hr. Next increase at [facility].

## 2022-02-15 NOTE — PROGRESS NOTES
Physician Progress Note      Sanna Crabtree  CSN #:                  [id number]  :                       1945  ADMIT DATE:       2022 8:06 PM  [address] [place] DATE:  RESPONDING  PROVIDER #:        Carl Martinez MD          QUERY TEXT:    Dear Hospitalist  Pt admitted with sepsis  with septic shock . Pt noted to have suprapubic  catheter. If possible, please document in the progress notes and discharge summary if you are evaluating and / or treating any of the following: The medical record reflects the following:  Risk Factors: SP  cath not changed in  3  months. Clinical Indicators: Acute cystitis with pyuria - mallika pus noted from SP catheter on presentation  Sepsis with septic shock, POA, secondary to above  Treatment: sp  cath exchanged  in ER ;  levaquin  IV  q48  hrs    Thank you,   [email or web address]  Options provided:  -- Sepsis related to  suprapubic  catheter  -- Sepsis unrelated to  suprapubic  catheter  -- Other - I will add my own diagnosis  -- Disagree - Not applicable / Not valid  -- Disagree - Clinically unable to determine / Unknown  -- Refer to Clinical Documentation Reviewer    PROVIDER RESPONSE TEXT:    This patient has sepsis related to suprapubic catheter.     Query created by: Antelmo Mcmillan on 2022 8:28 AM      Electronically signed by:  Carl Martinez MD 2/15/2022 10:38 AM

## 2022-02-15 NOTE — PROGRESS NOTES
SLP Note:       Follow up attempted. Could not progress with ST intervention because patient:       [x]  Lethargic, unable to be alerted enough for safe PO intake  []  Refused participation  []  Off the unit  []  NPO for procedure  [x]  Other: NG tube just placed, patient lethargic     Will continue to follow per POC.       Jessenia TATUM-SLP  Speech-Language Pathologist

## 2022-02-15 NOTE — PROGRESS NOTES
Problem: Pressure Injury - Risk of  Goal: *Prevention of pressure injury  Description: Document Breezy Scale and appropriate interventions in the flowsheet. Outcome: Progressing Towards Goal  Note: Pressure Injury Interventions:  Sensory Interventions: Assess changes in LOC,Keep linens dry and wrinkle-free,Minimize linen layers,Turn and reposition approx. every two hours (pillows and wedges if needed)         Activity Interventions: Pressure redistribution bed/mattress(bed type)    Mobility Interventions: HOB 30 degrees or less,Pressure redistribution bed/mattress (bed type),Turn and reposition approx. every two hours(pillow and wedges)    Nutrition Interventions: Document food/fluid/supplement intake,Offer support with meals,snacks and hydration    Friction and Shear Interventions: HOB 30 degrees or less,Minimize layers,Foam dressings/transparent film/skin sealants,Transferring/repositioning devices                Problem: Patient Education: Go to Patient Education Activity  Goal: Patient/Family Education  Outcome: Progressing Towards Goal     Problem: Falls - Risk of  Goal: *Absence of Falls  Description: Document Armen Fall Risk and appropriate interventions in the flowsheet. Outcome: Progressing Towards Goal  Note: Fall Risk Interventions:       Mentation Interventions: Bed/chair exit alarm,Door open when patient unattended,Evaluate medications/consider consulting pharmacy,Reorient patient,Room close to nurse's station    Medication Interventions: Evaluate medications/consider consulting pharmacy    Elimination Interventions:  Toileting schedule/hourly rounds              Problem: Patient Education: Go to Patient Education Activity  Goal: Patient/Family Education  Outcome: Progressing Towards Goal     Problem: Nutrition Deficit  Goal: *Optimize nutritional status  Outcome: Progressing Towards Goal

## 2022-02-15 NOTE — CONSULTS
Comprehensive Nutrition Assessment    Type and Reason for Visit: Initial,Positive nutrition screen,Consult    Nutrition Recommendations/Plan:   - Once NGT placement verified by KUB, start tube feeds of Jevity 1.5 at 15 mL/hr, advancing as pt tolerates by 10 mL q 8 hours to goal rate of 45 mL/hr with water flushes of 150 mL q 4 hours.      (goal EN regimen provides:  1620 kcal, 69 gm dextrose, 821 mL free water, 100% RDIs)  - IVF per MD      Nutrition Assessment:  Pt was NPO upon admission. S/p MBS on 2/14 and SLP recommended continue NPO. Per SLP note today, NGT placed. Consult received to start tube feeds. Malnutrition Assessment:  Malnutrition Status: At risk for malnutrition (specify) (wt loss over last several years PTA per chart hx)      Nutrition History and Allergies: Past medical hx: colon polyp, DM, GERD, gout, HLD, HTN, prostate cancer, stroke. Wt trends per chart hx:  173 lb on 5/10/2010;   185 lb on 5/9/2012;   179 lb on 2/26/2014;   168 lb on 4/26/2017;   158 lb on 11/7/2018; 144 lb on 1/2/2019;   135 lb on 5/15/2019;    124 lb on 10/1/2019;   125 lb on 2/11/2022;  124 lb on 2/13/2022;  135 lb on 2/14/2022. Question wt taken on 2/14/2022. No known food allergies     Estimated Daily Nutrient Needs:  Energy (kcal): 3294-6412; Weight Used for Energy Requirements: Admission (56.4 kg)  Protein (g): 34-45; Weight Used for Protein Requirements: Admission (x0.6-0.8)  Fluid (ml/day): 3452-3182; Method Used for Fluid Requirements: 1 ml/kcal      Nutrition Related Findings:  BM unknown. no edema.     IVF, D5 1/2NS at 75 mL/hr (90 gm dextrose, 306 kcal per day)      Wounds:    None       Current Nutrition Therapies:  DIET NPO    Anthropometric Measures:  · Height:  5' 6\" (167.6 cm)  · Current Body Wt:  61.2 kg (134 lb 14.7 oz)   · Admission Body Wt:  124 lb 5.4 oz (bed scale)    · Usual Body Wt:  56.2 kg (124 lb) (October 2019 per chart hx)     · Ideal Body Wt:  142 lbs:  95 %   · Adjusted Body Weight: ; Weight Adjustment for: No adjustment   · BMI Category:  Underweight (BMI less than 22) age over 72       Nutrition Diagnosis:   · Inadequate oral intake related to swallowing difficulty as evidenced by NPO or clear liquid status due to medical condition,swallowing study results,nutrition support-enteral nutrition      Nutrition Interventions:   Food and/or Nutrient Delivery: Continue NPO,Start tube feeding,IV fluid delivery  Nutrition Education and Counseling: No recommendations at this time,Education not indicated  Coordination of Nutrition Care: Continue to monitor while inpatient,Speech therapy,Swallow evaluation    Goals:  Enteral nutrition intake will meet >75% of patient's estimated nutrition needs within the next follow up date       Nutrition Monitoring and Evaluation:   Behavioral-Environmental Outcomes: None identified  Food/Nutrient Intake Outcomes: Enteral nutrition intake/tolerance,IVF intake  Physical Signs/Symptoms Outcomes: Chewing or swallowing,Biochemical data    Discharge Planning:     Too soon to determine     Electronically signed by Lalita Hardy RD on 2/15/2022 at 1:49 PM    Contact: [phone]

## 2022-02-15 NOTE — CONSULTS
Infectious Disease Consultation Note        Reason: Sepsis, gram-negative bloodstream infection    Current abx Prior abx   Cefepime since 2/12/2022  levofloxacin, vancomycin 2/12-2/13     Lines:   Right femoral cvc since 2/13/22    Assessment :     67 y.o. male who is seen in consultation as referred by Josseline Modi  for UTI, retained SP catheter, bilateral hydronephrosis, bladder stones, prostate cancer, urinary retention managed with SP tube  presented to ED on 2/12/22 for hypotension, confusion, foul smelling urine, brown colored urine. Patient seen in ED on 2/11 for blocked SP catheter, this was irrigated and patient was discharged home. clnical presentation c/w septic shock - POA due to proteus bloodstream infection,  Urinary retention, complicated UTI  S/p catheter exchange in ED per urology    Most likely source of Proteus bloodstream infection is complicated urinary tract infection. Unfortunately no urine culture sent on admission. Urine culture sent on 2/14 could be false negative due to prior antibiotics. Altered mentation-likely metabolic encephalopathy    Acute on chronic kidney disease-likely due to sepsis, volume depletion    Recommendations:   - discontinue cefepime. Start ciprofloxacin  - F/u repeat blood cx 2/14/22  - f/u Repeat renal US to determine resolution of hydronephrosis. Follow-up urology recommendations  -Discontinue right femoral CVC  -We will be able to switch to oral antibiotics if improved mentation and able to take p.o. meds    Thank you for consultation request. Above plan was discussed in details with rn, dr Chantale Miller. Please call me if any further questions or concerns. Will continue to participate in the care of this patient.   HPI:     67 y.o. male who is seen in consultation as referred by Josseline Modi  for UTI, retained SP catheter, bilateral hydronephrosis, bladder stones, prostate cancer, urinary retention managed with SP tube  presented to ED on 2/12/22 for hypotension, confusion, foul smelling urine, brown colored urine. Patient seen in ED on 2/11 for blocked SP catheter, this was irrigated and patient was discharged home. He presented back with altered mental status. Patient admitted for septic shock. SP catheter was not changed in 3 months prior to admission CT scan obtained, shows bilateral hydroureteronephrosis, bladder stones, cystitis, possible gas in bladder lumen. Urology consulted. Status post suprapubic catheter change on 2/12/2022. Blood culture positive for gram-negative rods. I have been consulted for further recommendations. Patient was nonverbal at the time of my evaluation. I obtained most history from review of records, talking to the primary team.  Past Medical History:   Diagnosis Date    Cataract     both    Colon polyp 9-2004    Diabetes ([facility] [address]     IDDM    DJD (degenerative joint disease) of knee     ED (erectile dysfunction)     Sparrow catheter in place     GERD (gastroesophageal reflux disease) 12-01-08    Glaucoma suspect     Gout, unspecified 10-25-06    Hyperlipidemia     Hypertension     Insomnia 7-20-06    Phimosis 6-16-00    Prostate cancer ([facility] [address] 3-3-09    Stroke ([facility] [address] 2007    weakness right side       Past Surgical History:   Procedure Laterality Date    HX POLYPECTOMY  9-2004    VT COLONOSCOPY FLX DX W/COLLJ SPEC WHEN PFRMD  9/1/2004    polyp/Dr Alvarado    VT COLONOSCOPY FLX DX W/COLLJ SPEC WHEN PFRMD  9-2007    incomplete; Dr. Huggins Settles       Current Discharge Medication List      CONTINUE these medications which have NOT CHANGED    Details   carvediloL (COREG) 12.5 mg tablet Take 1 Tablet by mouth two (2) times a day. THIS REFILL IS  APPROVED. APPOINTMENT WILL BE REQUIRED BEFORE NEXT REFILL IS  APPROVED. Qty: 180 Tablet, Refills: 0    Associated Diagnoses: Essential hypertension      allopurinoL (ZYLOPRIM) 100 mg tablet Take 1 Tablet by mouth two (2) times a day. THIS REFILL IS  APPROVED.  APPOINTMENT WILL BE REQUIRED BEFORE NEXT REFILL IS  APPROVED. Qty: 180 Tablet, Refills: 0    Associated Diagnoses: Idiopathic gout, unspecified chronicity, unspecified site      levoFLOXacin (LEVAQUIN) 500 mg tablet Take 1 Tablet by mouth daily. Qty: 7 Tablet, Refills: 0      rosuvastatin (CRESTOR) 20 mg tablet TAKE 1 TABLET BY MOUTH NIGHTLY  Qty: 90 Tab, Refills: 0    Associated Diagnoses: Hypercholesterolemia      Insulin Needles, Disposable, (BD Ultra-Fine Mini Pen Needle) 31 gauge x 3/16\" ndle INJECT 3 TIMES A DAY BEFORE MEALS E11.9  Qty: 100 Pen Needle, Refills: 5    Associated Diagnoses: Diabetes mellitus with no complication (HCC)      insulin lispro (HUMALOG) 100 unit/mL injection For Blood Sugar (mg/dL) of:     Less than 150=0 units           150 -199=2 units  200 -249=4 units  250 -299=6 units  300 -349=8 units  350 and above=10 units  Check blood glucose 3 times daily and at bedtime and inject insulin per SS. Qty: 1 Vial, Refills: 0      cholecalciferol, VITAMIN D3, (VITAMIN D3) 5,000 unit tab tablet Take 1 Tab by mouth every seven (7) days. Qty: 30 Tab, Refills: 2      aspirin delayed-release 81 mg tablet Take 81 mg by mouth daily. glucose 4 gram chewable tablet Take 4 Tabs by mouth as needed. Qty: 50 Tab, Refills: 0      omega-3 fatty acids-vitamin e (FISH OIL) 1,000 mg Cap Take 1,400 mg by mouth daily.              Current Facility-Administered Medications   Medication Dose Route Frequency    sodium chloride (NS) flush 5-40 mL  5-40 mL IntraVENous Q8H    sodium chloride (NS) flush 5-40 mL  5-40 mL IntraVENous PRN    acetaminophen (TYLENOL) tablet 650 mg  650 mg Oral Q6H PRN    Or    acetaminophen (TYLENOL) suppository 650 mg  650 mg Rectal Q6H PRN    ondansetron (ZOFRAN ODT) tablet 4 mg  4 mg Oral Q8H PRN    Or    ondansetron (ZOFRAN) injection 4 mg  4 mg IntraVENous Q6H PRN    insulin lispro (HUMALOG) injection   SubCUTAneous Q6H    glucose chewable tablet 16 g  4 Tablet Oral PRN    glucagon (GLUCAGEN) injection 1 mg  1 mg IntraMUSCular PRN    dextrose 10% infusion 125-250 mL  125-250 mL IntraVENous PRN    famotidine (PF) (PEPCID) 20 mg in 0.9% sodium chloride 10 mL injection  20 mg IntraVENous Q24H    dextrose 5 % - 0.45% NaCl infusion  75 mL/hr IntraVENous CONTINUOUS    sodium chloride (NS) flush 5-10 mL  5-10 mL IntraVENous PRN    cefepime (MAXIPIME) 1 g in 0.9% sodium chloride (MBP/ADV) 50 mL MBP  1 g IntraVENous Q12H       Allergies: Bactrim [sulfamethoxazole-trimethoprim]    Family History   Problem Relation Age of Onset    Hypertension Mother     Diabetes Father     Cancer Father         prostate    Heart Disease Father 70        CABG    Hypertension Sister     Hypertension Brother     Diabetes Brother      Social History     Socioeconomic History    Marital status:      Spouse name: Not on file    Number of children: Not on file    Years of education: Not on file    Highest education level: Not on file   Occupational History    Not on file   Tobacco Use    Smoking status: Former Smoker    Smokeless tobacco: Never Used   Vaping Use    Vaping Use: Never used   Substance and Sexual Activity    Alcohol use: No    Drug use: No    Sexual activity: Never   Other Topics Concern    Not on file   Social History Narrative    Not on file     Social Determinants of Health     Financial Resource Strain:     Difficulty of Paying Living Expenses: Not on file   Food Insecurity:     Worried About Running Out of Food in the Last Year: Not on file    Redd of Food in the Last Year: Not on file   Transportation Needs:     Lack of Transportation (Medical): Not on file    Lack of Transportation (Non-Medical):  Not on file   Physical Activity:     Days of Exercise per Week: Not on file    Minutes of Exercise per Session: Not on file   Stress:     Feeling of Stress : Not on file   Social Connections:     Frequency of Communication with Friends and Family: Not on file    Frequency of Social Gatherings with Friends and Family: Not on file    Attends Zoroastrian Services: Not on file    Active Member of Clubs or Organizations: Not on file    Attends Club or Organization Meetings: Not on file    Marital Status: Not on file   Intimate Partner Violence:     Fear of Current or Ex-Partner: Not on file    Emotionally Abused: Not on file    Physically Abused: Not on file    Sexually Abused: Not on file   Housing Stability:     Unable to Pay for Housing in the Last Year: Not on file    Number of [place] in the Last Year: Not on file    Unstable Housing in the Last Year: Not on file     Social History     Tobacco Use   Smoking Status Former Smoker   Smokeless Tobacco Never Used        Temp (24hrs), Av.1 °F (36.7 °C), Min:97.3 °F (36.3 °C), Max:98.8 °F (37.1 °C)    Visit Vitals  BP (!) 141/67   Pulse 68   Temp 97.3 °F (36.3 °C)   Resp 18   Ht 5' 6\" (1.676 m)   Wt 61.2 kg (135 lb)   SpO2 96%   BMI 21.79 kg/m²       ROS: unable to obtain due to patient factors    Physical Exam:    General:          laying on bed, sleepy, non verbal  HEENT:           Head NCAT.  sclerae anicteric. Neck:               Supple, trachea midline. Lungs:             Clear, no wheezes. Effort nonlabored, no accessory muscle use. Chest wall:      No chest wall tenderness. Chest expansion equal and symmetrical bilaterally. Heart:              RRR. Abdomen:        Soft, NT/ND. Bowel sounds normoactive. SP catheter in place lower abdomen. Extremities:     Warm, no edema. No evidence for ischemia. Skin:                Not pale. Not Jaundiced  No rashes   Psych:             Mood normal.  Calm, no agitation. Neurologic:      sleepy. Non verbal.  Does not follow commands.  Moves extremities spontaneously.       Labs: Results:   Chemistry Recent Labs     02/15/22  0432 22  0549 22  0444   * 168* 228*   * 145 144   K QUESTIONABLE RESULTS OBTAINED, SPECIMEN BEING RECOLLECTED 4.2 4.6   * 120* 118*   CO2 18* 20* 18*   BUN 33* 40* 48*   CREA 1.87* 2.19* 2.37*   CA QUESTIONABLE RESULTS OBTAINED, SPECIMEN BEING RECOLLECTED 8.6 8.3*   AGAP 7 5 8   BUCR 18 18 20   AP 56 45 46   TP 5.4* 5.8* 5.7*   ALB 1.9* 2.0* 2.2*   GLOB 3.5 3.8 3.5   AGRAT 0.5* 0.5* 0.6*      CBC w/Diff Recent Labs     02/15/22  0432 02/14/22  0549 02/13/22  0444   WBC 12.6 12.7 20.3*   RBC 3.34* 3.32* 3.86*   HGB 9.3* 9.3* 10.6*   HCT 27.8* 27.9* 32.7*    190 234   GRANS 79* 84* 65   LYMPH 14* 8* 13*   EOS 3 3 0      Microbiology Recent Labs     02/14/22  1543 02/12/22 2042 02/12/22 2016   CULT NO GROWTH AFTER 14 HOURS NO GROWTH 3 DAYS GRAM NEGATIVE RODS GROWING IN 1 OF 2 BOTTLES DRAWN (NO SITE) IDENTIFICATION AND SUSCEPTIBILITY TO FOLLOW*          RADIOLOGY:    All available imaging studies/reports in [facility] care for this admission were reviewed      Disclaimer: Sections of this note are dictated utilizing voice recognition software, which may have resulted in some phonetic based errors in grammar and contents. Even though attempts were made to correct all the mistakes, some may have been missed, and remained in the body of the document. If questions arise, please contact our department.     Dr. Danielle Chavez, Infectious Disease Specialist  [phone]  February 15, 2022  7:45 AM

## 2022-02-16 NOTE — CONSULTS
[facility]: [phone]  [facility]: [phone]     Patient Name: Jazmyn Nunn  YOB: 1945    Date of consult : 2/16/22  Reason for Consult: establish goals of care  Requesting Provider: Tyson Zamarripa MD   Primary Care Physician: Rena Boyle MD      SUMMARY:   Jazmyn Nunn is a 67 y.o. male with a past history of CKD 3, DM2, HTN, CVA in 2008, who was admitted on 2/12/2022 from home with a diagnosis of altered mental status and UTI. Current medical issues leading to Palliative Medicine involvement include: establish goals of care. CHIEF COMPLAINT: altered mental status    HPI/SUBJECTIVE:    Pt is a very debilitated AAM that lives at home with his son who is his primary caregiver. Pt was admitted through the ED from home for altered mental status and found to have significant hypotensive. Found to have a UTI. Pt has an indwelling suprapubic catheter with bilateral hydronephrosis and hydroureter. The patient is:   [] Verbal and participatory  [x] Non-participatory due to:  Minimally responsive at this time     GOALS OF CARE:  Patient/Health Care Proxy Stated Goals: Prolong life      TREATMENT PREFERENCES:   Code Status: Full Code         PALLIATIVE DIAGNOSES:   1. Goals of care/ACP  2. Altered mental status  3. CVA old  4. Debility       PLAN:   1. Goals of care/ACP  This NP and Noemí Cotto MSW in to see the patient at the bedside. Present were his sister and brother in law. That verified that the patient has 2 children. Have contacted patient son Jazmyn Nunn. Have reached out to patient's son and set up a meeting to discuss goals of care tomorrow 9:30 AM.  Goals of care: At this time patient remains a full code with full interventions  2. Altered mental status  Likely related to UTI. Unsure of patient's baseline mental status. Reported that he was completely dependent at home.   3.  CVA  Reported 2008 patient has been very debilitated since 2008. Noted on CT scan patient has tiny focal chronic lacunar infarction within the left fouzia and bilateral basal ganglia small lacunar infarct in the right thalamus, old. 4.  Debility  Patient at this time has a palliative performance score of around 30% is bedbound unable to do any work due to extensive disease, is total care, has reduced to minimal oral intake and drowsy level of consciousness. Unsure patient's baseline. 5.   Initial consult note routed to primary continuity provider  6. Communicated plan of care with: Palliative IDT      Advance Care Planning:  [] [facility] Interdisciplinary Team has updated the ACP Navigator with [address] and Patient Capacity    Primary Decision Maker ([address]): Pt's son Stalin Vega Interventions: Full interventions   Other Instructions:         As far as possible, the palliative care team has discussed with patient / health care proxy about goals of care / treatment preferences for patient.          HISTORY:     History obtained from: chart review   Active Problems:    Sepsis ([facility] [address] (2/13/2022)      Past Medical History:   Diagnosis Date    Cataract     both    Colon polyp 9-2004    Diabetes (HCC)     IDDM    DJD (degenerative joint disease) of knee     ED (erectile dysfunction)     Sparrow catheter in place     GERD (gastroesophageal reflux disease) 12-01-08    Glaucoma suspect     Gout, unspecified 10-25-06    Hyperlipidemia     Hypertension     Insomnia 7-20-06    Phimosis 6-16-00    Prostate cancer ([address] 3-3-09    Stroke ([facility] [address] 2007    weakness right side      Past Surgical History:   Procedure Laterality Date    HX POLYPECTOMY  9-2004    VT COLONOSCOPY FLX DX W/COLLJ [facility] WHEN PFRMD  9/1/2004    polyp/Dr Alvarado    VT COLONOSCOPY FLX DX W/COLLJ SPEC WHEN PFRMD  9-2007    incomplete; Dr. John Weir      Family History   Problem Relation Age of Onset    Hypertension Mother     Diabetes Father     Cancer Father         prostate    Heart Disease Father 70        CABG    Hypertension Sister     Hypertension Brother     Diabetes Brother      History reviewed, no pertinent family history. Social History     Tobacco Use    Smoking status: Former Smoker    Smokeless tobacco: Never Used   Substance Use Topics    Alcohol use: No     Allergies   Allergen Reactions    Bactrim [Sulfamethoxazole-Trimethoprim] Other (comments)     CAUSED ACUTE RENAL FAILURE      Current Facility-Administered Medications   Medication Dose Route Frequency    ciprofloxacin (CIPRO) 400 mg in D5W IVPB (premix)  400 mg IntraVENous Q12H    sodium chloride (NS) flush 5-40 mL  5-40 mL IntraVENous Q8H    sodium chloride (NS) flush 5-40 mL  5-40 mL IntraVENous PRN    acetaminophen (TYLENOL) tablet 650 mg  650 mg Oral Q6H PRN    Or    acetaminophen (TYLENOL) suppository 650 mg  650 mg Rectal Q6H PRN    ondansetron (ZOFRAN ODT) tablet 4 mg  4 mg Oral Q8H PRN    Or    ondansetron (ZOFRAN) injection 4 mg  4 mg IntraVENous Q6H PRN    insulin lispro (HUMALOG) injection   SubCUTAneous Q6H    glucose chewable tablet 16 g  4 Tablet Oral PRN    glucagon (GLUCAGEN) injection 1 mg  1 mg IntraMUSCular PRN    dextrose 10% infusion 125-250 mL  125-250 mL IntraVENous PRN    famotidine (PF) (PEPCID) 20 mg in 0.9% sodium chloride 10 mL injection  20 mg IntraVENous Q24H    dextrose 5 % - 0.45% NaCl infusion  75 mL/hr IntraVENous CONTINUOUS    sodium chloride (NS) flush 5-10 mL  5-10 mL IntraVENous PRN          Clinical Pain Assessment (nonverbal scale for nonverbal patients):        Activity (Movement): Lying quietly, normal position    Duration: for how long has pt been experiencing pain (e.g., 2 days, 1 month, years)  Frequency: how often pain is an issue (e.g., several times per day, once every few days, constant)     FUNCTIONAL ASSESSMENT:     Palliative Performance Scale (PPS):  PPS: 30    ECOG  ECOG Status : Completely disabled     PSYCHOSOCIAL/SPIRITUAL SCREENING:      Any spiritual / Pentecostal concerns:  [] Yes /  [x] No    Caregiver Burnout:  [] Yes /  [] No /  [x] No Caregiver Present      Anticipatory grief assessment:   [x] Normal  / [] Maladaptive        REVIEW OF SYSTEMS:     Systems: constitutional, ears/nose/mouth/throat, respiratory, gastrointestinal, genitourinary, musculoskeletal, integumentary, neurologic, psychiatric, endocrine. Positive findings noted below. Modified ESAS Completed by: provider                       Dyspnea: 0               Positive and pertinent negative findings in ROS are noted above in HPI. The following systems were [x] reviewed / [] unable to be reviewed as noted in HPI  Other findings are noted below. PHYSICAL EXAM:     Constitutional: resting with eyes closed did not appear in distress  Eyes: closed   ENMT: no nasal discharge, moist mucous membranes  Cardiovascular: regular rhythm, distal pulses intact  Respiratory: breathing not labored, symmetric  Gastrointestinal: soft non-tender, +bowel sounds  Musculoskeletal: no deformity, no tenderness to palpation  Skin: warm, dry  Neurologic: Not following commands, moving all extremities    Other: Wt Readings from Last 3 Encounters:   02/16/22 64.8 kg (142 lb 12.8 oz)   02/11/22 56.7 kg (125 lb)   05/26/21 58.1 kg (128 lb)     Blood pressure (!) 151/68, pulse (!) 54, temperature 97.9 °F (36.6 °C), resp. rate 15, height 5' 6\" (1.676 m), weight 64.8 kg (142 lb 12.8 oz), SpO2 100 %.   Pain:  Pain Scale 1: Adult Nonverbal Pain Scale                         LAB AND IMAGING FINDINGS:     Lab Results   Component Value Date/Time    WBC 10.7 02/16/2022 05:10 AM    HGB 9.3 (L) 02/16/2022 05:10 AM    PLATELET 603 81/19/0211 05:10 AM     Lab Results   Component Value Date/Time    Sodium 145 02/16/2022 05:10 AM    Potassium 3.9 02/16/2022 05:10 AM    Chloride 119 (H) 02/16/2022 05:10 AM    CO2 20 (L) 02/16/2022 05:10 AM    BUN 26 (H) 02/16/2022 05:10 AM Creatinine 1.79 (H) 02/16/2022 05:10 AM    Calcium 8.4 (L) 02/16/2022 05:10 AM    Magnesium 2.2 02/16/2022 05:10 AM    Phosphorus 2.0 (L) 02/16/2022 05:10 AM      Lab Results   Component Value Date/Time    Alk. phosphatase 50 02/16/2022 05:10 AM    Protein, total 5.4 (L) 02/16/2022 05:10 AM    Albumin 1.9 (L) 02/16/2022 05:10 AM    Globulin 3.5 02/16/2022 05:10 AM     Lab Results   Component Value Date/Time    INR 1.2 02/16/2022 05:10 AM    Prothrombin time 15.1 02/16/2022 05:10 AM      No results found for: IRON, FE, TIBC, IBCT, PSAT, FERR   No results found for: PH, PCO2, PO2  No components found for: [place]   Lab Results   Component Value Date/Time    CK 88 10/21/2016 09:04 PM    CK - MB 1.1 10/21/2016 09:04 PM              Total time: 30 minutes   Counseling / coordination time, spent as noted above:   > 50% counseling / coordination:  Time spent in direct consultation with the patient, medical team, and family     Prolonged service was provided for  []30 min   []75 min in face to face time in the presence of the patient, spent as noted above. Time Start:   Time End:     Disclaimer: Sections of this note are dictated using utilizing voice recognition software, which may have resulted in some phonetic based errors in grammar and contents. Even though attempts were made to correct all the mistakes, some may have been missed, and remained in the body of the document. If questions arise, please contact our department.

## 2022-02-16 NOTE — PROGRESS NOTES
Urology Progress Note        Assessment/Plan:     Patient Active Problem List   Diagnosis Code    Essential hypertension I10    Type I (juvenile type) diabetes mellitus with renal manifestations, not stated as uncontrolled(250.41) ([facility] E10.29    Hypercholesterolemia E78.00    Gout M10.9    GERD (gastroesophageal reflux disease) K21.9    Vitamin D deficiency E55.9    Stroke ([facility] I63.9    Hx of Prostate Cancer, 2008 cryotherapy C61    Acute encephalopathy G93.40    Hypoglycemia E16.2    Proteinuria R80.9    CKD (chronic kidney disease) stage 3, GFR 30-59 ml/min (HCC) N18.30    Diabetes mellitus type 2, controlled ([facility]) E11.9    Advance care planning Z71.89    Encounter for screening colonoscopy Z12.11    History of CVA (cerebrovascular accident) Z80.78    Pneumonia J18.9   [facility] discharge follow-up Z09    Gastrointestinal hemorrhage associated with duodenitis K29.81    Need for influenza vaccination Z23    Hyperglycemia due to type 2 diabetes mellitus (HCC) E11.65    Cough R05.9    Benign prostatic hyperplasia with incomplete bladder emptying N40.1, R39.14    Type 2 diabetes with nephropathy (HCC) E11.21    Dehydration E86.0    Generalized weakness R53.1    Failure to thrive in adult R62.7    Acute on chronic renal failure (HCC) N17.9, N18.9    Poor conditioning Z78.9    Other constipation K59.09    Hyperkalemia E87.5    History of urinary retention Z87.898    Status SupraPubic Catheter Z93.50    Sepsis ([facility] A41.9         ASSESSMENT:   Urosepsis with Cystitis              WBC 10.7<12.7<20.3              BCX 2/12/22: GNR x1, Repeat BCX NGTD              UA 2/12/22:  Moderate blood, Moderate LE, WBC 0-1,           RBC 11-20, Bacteria 4+   UCX NG     Bilateral Hydroureteronephrosis- Resolved on RAMEZ 2/15/22  Bladder Stone- 5mm  Possible Emphysematous Cystitis      Unknown grade, unknown stage CaP, PSA 4.2, S/p cryoablation 2008              Last PSA 12/30/2019 - 0.053      Hx of urinary retention managed with SP tube               S/p PVP with no change              Cysto: 2020 - bladder stones and wide open prostatic     Urethra              Last changed on 22        PLAN:    Appreciate overall management per medicine. RAMEZ reviewed, no hydronephrosis. ID consulted, following. Recommend start Cipro. Patient will need bladder stone tx outpatient. Maintain SP catheter- change every 4 weeks. Will arrange follow up outpatient to discuss cystolithopaxy. No  intervention at this time. Will sign off.     Follow up arranged? Yes     Chelo Holden PA-C  [facility]  Available , Leonie  Pager: [phone]         Subjective:     Daily Progress Note: 2022 7:17 AM    Monique Francis is doing [id number] He is currently not taking, NGT in place. VSS, WBC WNL. Son at bedside. Objective:     Visit Vitals  BP (!) 139/59 (BP 1 Location: Left upper arm, BP Patient Position: At rest;Lying)   Pulse 71   Temp 97.9 °F (36.6 °C)   Resp 22   Ht 5' 6\" (1.676 m)   Wt 64.8 kg (142 lb 12.8 oz)   SpO2 100%   BMI 23.05 kg/m²        Temp (24hrs), Av.2 °F (36.8 °C), Min:97.8 °F (36.6 °C), Max:98.9 °F (37.2 °C)      Intake and Output:   1901 -  0700  In: 1961.3 [I.V.:1811.3]  Out:  [Urine:]  No intake/output data recorded. PHYSICAL EXAMINATION:   Visit Vitals  BP (!) 139/59 (BP 1 Location: Left upper arm, BP Patient Position: At rest;Lying)   Pulse 71   Temp 97.9 °F (36.6 °C)   Resp 22   Ht 5' 6\" (1.676 m)   Wt 64.8 kg (142 lb 12.8 oz)   SpO2 100%   BMI 23.05 kg/m²     Constitutional: Well developed, well nourished male. No acute distress. HEENT: Normocephalic, Atraumatic, EOM's intact   CV:  no edema  Respiratory: No respiratory distress or difficulties breathing   Abdomen:  Soft, diffuse tenderness. SP tract C/D/I- catheter in place with clear, yellow urine. NGT in place.  Male:  No CVA tenderness  Skin: No evidence of jaundice.   Normal color  Neuro/Psych: Alert. No answering questions    Lab/Data Review:  [facility] 2/15/22: IMPRESSION  No hydronephrosis     Atrophic, echogenic kidneys.       Labs:     Labs: Results:   Chemistry    Recent Labs     02/16/22  0510 02/15/22  1453 02/15/22  0432 02/14/22  0549 02/14/22  0549   * 88 131*   < > 168*    147* 147*   < > 145   K 3.9 3.9 QUESTIONABLE RESULTS OBTAINED, SPECIMEN BEING RECOLLECTED   < > 4.2   * 122* 122*   < > 120*   CO2 20* 19* 18*   < > 20*   BUN 26* 30* 33*   < > 40*   CREA 1.79* 1.86* 1.87*   < > 2.19*   CA 8.4* 9.0 QUESTIONABLE RESULTS OBTAINED, SPECIMEN BEING RECOLLECTED   < > 8.6   AGAP 6 6 7   < > 5   BUCR 15 16 18   < > 18   AP 50  --  56  --  45   TP 5.4*  --  5.4*  --  5.8*   ALB 1.9*  --  1.9*  --  2.0*   GLOB 3.5  --  3.5  --  3.8   AGRAT 0.5*  --  0.5*  --  0.5*    < > = values in this interval not displayed. CBC w/Diff Recent Labs     02/16/22  0510 02/15/22  1453 02/15/22  0432 02/14/22  0549 02/14/22  0549   WBC 10.7 12.1 12.6   < > 12.7   RBC 3.42* 3.86* 3.34*   < > 3.32*   HGB 9.3* 10.3* 9.3*   < > 9.3*   HCT 28.5* 32.1* 27.8*   < > 27.9*    206 187   < > 190   GRANS 75*  --  79*  --  84*   LYMPH 16*  --  14*  --  8*   EOS 3  --  3  --  3    < > = values in this interval not displayed.       Cultures Recent Labs     02/14/22  1725 02/14/22  1543   CULT No growth (<1,000 CFU/ML) NO GROWTH AFTER 14 HOURS     All Micro Results     Procedure Component Value Units Date/Time    CULTURE, URINE [[id number]] Collected: 02/14/22 1725    Order Status: Completed Specimen: Urine Updated: 02/15/22 1816     Special Requests: ZAMORA CATH     Culture result: No growth (<1,000 CFU/ML)       CULTURE, BLOOD [[id number]]  (Abnormal)  (Susceptibility) Collected: 02/12/22 2016    Order Status: Completed Specimen: Blood Updated: 02/15/22 0959     Special Requests: NO SPECIAL REQUESTS        GRAM STAIN       ANAEROBIC BOTTLE GRAM NEGATIVE RODS                  SMEAR CALLED TO AND CORRECTLY REPEATED BY: [facility] ED AT [id number] TO BKS                  AEROBIC BOTTLE GRAM NEGATIVE RODS           Culture result:       PROTEUS MIRABILIS GROWING IN BOTH BOTTLES DRAWN NO SITE INDICATED          CULTURE, BLOOD [[id number]] Collected: 02/14/22 1543    Order Status: Completed Specimen: Blood Updated: 02/15/22 0651     Special Requests: --        RIGHT  FEM       Culture result: NO GROWTH AFTER 14 HOURS       CULTURE, BLOOD [[id number]] Collected: 02/12/22 2042    Order Status: Completed Specimen: Blood Updated: 02/15/22 0651     Special Requests: NO SPECIAL REQUESTS        Culture result: NO GROWTH 3 DAYS       COVID-19 WITH INFLUENZA A/B [[id number]] Collected: 02/13/22 0444    Order Status: Completed Specimen: Nasopharyngeal Updated: 02/13/22 0556     SARS-CoV-2 Not detected        Comment: Not Detected results do not preclude SARS-CoV-2 infection and should not be used as the sole basis for patient management decisions. Results must be combined with clinical observations, patient history, and epidemiological information. Influenza A by PCR Not detected        Influenza B by PCR Not detected        Comment: Testing was performed using joshua Shasha SARS-CoV-2 and Influenza A/B nucleic acid assay. This test is a multiplex Real-Time Reverse Transcriptase Polymerase Chain Reaction (RT-PCR) based in [employer] diagnostic test intended for the qualitative detection of nucleic acids from SARS-CoV-2, Influenza A, and Influenza B in nasopharyngeal for use under the FDA's Emergency Use Authorization (EAU) only.        Fact sheet for Patients: ConventionUpdate.co.nz  Fact sheet for Healthcare Providers: ConventionUpdate.co.nz                 Urinalysis Color   Date Value Ref Range Status   02/12/2022 DARK YELLOW   Final     Appearance   Date Value Ref Range Status   02/12/2022 TURBID   Final     Specific gravity   Date Value Ref Range Status   02/12/2022 1.013 1.005 - 1.030   Final pH (UA)   Date Value Ref Range Status   02/12/2022 >8.5 (H) 5.0 - 8.0 Final     Protein   Date Value Ref Range Status   02/12/2022 300 (A) NEG mg/dL Final     Ketone   Date Value Ref Range Status   02/12/2022 Negative NEG mg/dL Final     Bilirubin   Date Value Ref Range Status   02/12/2022 Negative NEG   Final     Blood   Date Value Ref Range Status   02/12/2022 MODERATE (A) NEG   Final     Urobilinogen   Date Value Ref Range Status   02/12/2022 0.2 0.2 - 1.0 EU/dL Final     Nitrites   Date Value Ref Range Status   02/12/2022 Negative NEG   Final     Leukocyte Esterase   Date Value Ref Range Status   02/12/2022 MODERATE (A) NEG   Final     Potassium   Date Value Ref Range Status   02/16/2022 3.9 3.5 - 5.5 mmol/L Final     Creatinine   Date Value Ref Range Status   02/16/2022 1.79 (H) 0.6 - 1.3 MG/DL Final     BUN   Date Value Ref Range Status   02/16/2022 26 (H) 7.0 - 18 MG/DL Final     Prostate Specific Ag   Date Value Ref Range Status   12/30/2019 0.053 <=4.400 ng/mL Final      PSA No results for input(s): PSA in the last 72 hours.    Coagulation Lab Results   Component Value Date/Time    Prothrombin time 15.1 02/16/2022 05:10 AM    Prothrombin time 14.8 02/15/2022 04:32 AM    INR 1.2 02/16/2022 05:10 AM    INR 1.2 02/15/2022 04:32 AM

## 2022-02-16 NOTE — PROGRESS NOTES
[address] [facility] [phone]  [phone]      Palliative Care Initial Visit:    This writer, along with Yasmine Garcia NP, with the Palliative Care team; visited with patient today to offer support. Family also at the bedside. Patient was sleeping, at the time of this visit. Patient did not appear to be any distress or discomfort. Patient's sister and her , were at the bedside. Sister verified that patient resides with his son Chucho Shields., FE#[phone]). Sister also verified that patient has a daughter Buffy Chacon, XE#[phone]). The Palliative Care team then phoned patient's son who verified what patient's sister informed the team. Son stated that patient resides with him and son takes care of all of patient's ADLs and IADLs. Son stated that he bathes patient and assists with giving patient his medications. He also assists with mobility, getting patient in and out of bed and into the wheelchair. Patient has a wheelchair, a walker and a bedside commode; at home. Son reported that patient has been getting weaker over the past couple of weeks. Patient can no longer handle eating utensils. He can eat finger foods. Patient's son stated that he is patient's medical POA and has the medical POA document. Son also stated that patient's daughter is his financial POA. The Palliative Care team set up a family meeting, so that patient's son could bring in the POA document and also discuss goals of care moving forward. The family meeting is set for 9:30AM tomorrow (2/17/2022). At this time, patient will remain a FULL CODE WITH FULL INTERVENTIONS. Recommendations: The Palliative Care team will continue to offer support to patient and his family, at this time. Goals of care will be addressed at the family meeting, on 2/17/2022. Ashwini Holman., [facility]  Palliative Care   WX#[phone]

## 2022-02-16 NOTE — PROGRESS NOTES
Infectious Disease progress Note        Reason: Sepsis, gram-negative bloodstream infection    Current abx Prior abx   Cefepime since 2/12/2022-2/15/22  Ciprofloxacin since 2/15/22  levofloxacin, vancomycin 2/12-2/13     Lines:   Right femoral cvc since 2/13/22-2/15/22    Assessment :     67 y.o. male who is seen in consultation as referred by Beth Burrows  for UTI, retained SP catheter, bilateral hydronephrosis, bladder stones, prostate cancer, urinary retention managed with SP tube  presented to ED on 2/12/22 for hypotension, confusion, foul smelling urine, brown colored urine. Patient seen in ED on 2/11 for blocked SP catheter, this was irrigated and patient was discharged home. clnical presentation c/w septic shock - POA due to proteus bloodstream infection (positive blood culture 2/12, negative blood culture 2/14),  Urinary retention, complicated UTI  S/p catheter exchange in ED per urology    Resolved hydronephrosis noted on renal ultrasound 2/15/2022    Most likely source of Proteus bloodstream infection is complicated urinary tract infection. Unfortunately no urine culture sent on admission. Urine culture sent on 2/14 could be false negative due to prior antibiotics. Altered mentation-likely metabolic encephalopathy-improving    Acute on chronic kidney disease-likely due to sepsis, volume depletion-improving    Recommendations:   - continue ciprofloxacin till 2/24/22  -Okay to switch to oral ciprofloxacin if improved mentation and able to take p.o. meds     Above plan was discussed in details with primary team.  Please call me if any further questions or concerns. Will continue to participate in the care of this patient. HPI:    Patient was nonverbal at the time of my evaluation.   I obtained most history from review of records, talking to the primary team.  Past Medical History:   Diagnosis Date    Cataract     both    Colon polyp 9-2004    Diabetes ([facility]     IDDM    DJD (degenerative joint disease) of knee     ED (erectile dysfunction)     Sparrow catheter in place     GERD (gastroesophageal reflux disease) 12-01-08    Glaucoma suspect     Gout, unspecified 10-25-06    Hyperlipidemia     Hypertension     Insomnia 7-20-06    Phimosis 6-16-00    Prostate cancer ([facility] 3-3-09    Stroke [facility]) 2007    weakness right side       Past Surgical History:   Procedure Laterality Date    HX POLYPECTOMY  9-2004    SD COLONOSCOPY FLX DX W/COLLJ SPEC WHEN PFRMD  9/1/2004    polyp/Dr Alvarado    SD COLONOSCOPY FLX DX W/COLLJ SPEC WHEN PFRMD  9-2007    incomplete; Dr. Linda Castaneda       Current Discharge Medication List      CONTINUE these medications which have NOT CHANGED    Details   carvediloL (COREG) 12.5 mg tablet Take 1 Tablet by mouth two (2) times a day. THIS REFILL IS  APPROVED. APPOINTMENT WILL BE REQUIRED BEFORE NEXT REFILL IS  APPROVED. Qty: 180 Tablet, Refills: 0    Associated Diagnoses: Essential hypertension      allopurinoL (ZYLOPRIM) 100 mg tablet Take 1 Tablet by mouth two (2) times a day. THIS REFILL IS  APPROVED. APPOINTMENT WILL BE REQUIRED BEFORE NEXT REFILL IS  APPROVED. Qty: 180 Tablet, Refills: 0    Associated Diagnoses: Idiopathic gout, unspecified chronicity, unspecified site      levoFLOXacin (LEVAQUIN) 500 mg tablet Take 1 Tablet by mouth daily.   Qty: 7 Tablet, Refills: 0      rosuvastatin (CRESTOR) 20 mg tablet TAKE 1 TABLET BY MOUTH NIGHTLY  Qty: 90 Tab, Refills: 0    Associated Diagnoses: Hypercholesterolemia      Insulin Needles, Disposable, (BD Ultra-Fine Mini Pen Needle) 31 gauge x 3/16\" ndle INJECT 3 TIMES A DAY BEFORE MEALS E11.9  Qty: 100 Pen Needle, Refills: 5    Associated Diagnoses: Diabetes mellitus with no complication (HCC)      insulin lispro (HUMALOG) 100 unit/mL injection For Blood Sugar (mg/dL) of:     Less than 150=0 units           150 -199=2 units  200 -249=4 units  250 -299=6 units  300 -349=8 units  350 and above=10 units  Check blood glucose 3 times daily and at bedtime and inject insulin per SS. Qty: 1 Vial, Refills: 0      cholecalciferol, VITAMIN D3, (VITAMIN D3) 5,000 unit tab tablet Take 1 Tab by mouth every seven (7) days. Qty: 30 Tab, Refills: 2      aspirin delayed-release 81 mg tablet Take 81 mg by mouth daily. glucose 4 gram chewable tablet Take 4 Tabs by mouth as needed. Qty: 50 Tab, Refills: 0      omega-3 fatty acids-vitamin e (FISH OIL) 1,000 mg Cap Take 1,400 mg by mouth daily.              Current Facility-Administered Medications   Medication Dose Route Frequency    ciprofloxacin (CIPRO) 400 mg in D5W IVPB (premix)  400 mg IntraVENous Q12H    sodium chloride (NS) flush 5-40 mL  5-40 mL IntraVENous Q8H    sodium chloride (NS) flush 5-40 mL  5-40 mL IntraVENous PRN    acetaminophen (TYLENOL) tablet 650 mg  650 mg Oral Q6H PRN    Or    acetaminophen (TYLENOL) suppository 650 mg  650 mg Rectal Q6H PRN    ondansetron (ZOFRAN ODT) tablet 4 mg  4 mg Oral Q8H PRN    Or    ondansetron (ZOFRAN) injection 4 mg  4 mg IntraVENous Q6H PRN    insulin lispro (HUMALOG) injection   SubCUTAneous Q6H    glucose chewable tablet 16 g  4 Tablet Oral PRN    glucagon (GLUCAGEN) injection 1 mg  1 mg IntraMUSCular PRN    dextrose 10% infusion 125-250 mL  125-250 mL IntraVENous PRN    famotidine (PF) (PEPCID) 20 mg in 0.9% sodium chloride 10 mL injection  20 mg IntraVENous Q24H    dextrose 5 % - 0.45% NaCl infusion  75 mL/hr IntraVENous CONTINUOUS    sodium chloride (NS) flush 5-10 mL  5-10 mL IntraVENous PRN       Allergies: Bactrim [sulfamethoxazole-trimethoprim]    Family History   Problem Relation Age of Onset    Hypertension Mother     Diabetes Father     Cancer Father         prostate    Heart Disease Father 70        CABG    Hypertension Sister     Hypertension Brother     Diabetes Brother      Social History     Socioeconomic History    Marital status:      Spouse name: Not on file    Number of children: Not on file    Years of education: Not on file    Highest education level: Not on file   Occupational History    Not on file   Tobacco Use    Smoking status: Former Smoker    Smokeless tobacco: Never Used   Vaping Use    Vaping Use: Never used   Substance and Sexual Activity    Alcohol use: No    Drug use: No    Sexual activity: Never   Other Topics Concern    Not on file   Social History Narrative    Not on file     Social Determinants of Health     Financial Resource Strain:     Difficulty of Paying Living Expenses: Not on file   Food Insecurity:     Worried About Running Out of Food in the Last Year: Not on file    Redd of Food in the Last Year: Not on file   Transportation Needs:     Lack of Transportation (Medical): Not on file    Lack of Transportation (Non-Medical): Not on file   Physical Activity:     Days of Exercise per Week: Not on file    Minutes of Exercise per Session: Not on file   Stress:     Feeling of Stress : Not on file   Social Connections:     Frequency of Communication with Friends and Family: Not on file    Frequency of Social Gatherings with Friends and Family: Not on file    Attends Mu-ism Services: Not on file    Active Member of [address] [place] [address] or Organizations: Not on file    Attends Club or Organization Meetings: Not on file    Marital Status: Not on file   Intimate Partner Violence:     Fear of Current or Ex-Partner: Not on file    Emotionally Abused: Not on file    Physically Abused: Not on file    Sexually Abused: Not on file   Housing Stability:     Unable to Pay for Housing in the Last Year: Not on file    Number of [place] in the Last Year: Not on file    Unstable Housing in the Last Year: Not on file     Social History     Tobacco Use   Smoking Status Former Smoker   Smokeless Tobacco Never Used        Temp (24hrs), Av.2 °F (36.8 °C), Min:97.8 °F (36.6 °C), Max:98.9 °F (37.2 °C)    Visit Vitals  BP (!) 139/59 (BP 1 Location: Left upper arm, BP Patient Position:  At rest;Lying)   Pulse 71   Temp 97.9 °F (36.6 °C)   Resp 22   Ht 5' 6\" (1.676 m)   Wt 64.8 kg (142 lb 12.8 oz)   SpO2 100%   BMI 23.05 kg/m²       ROS: unable to obtain due to patient factors    Physical Exam:    General:          laying on bed, sleepy, non verbal  HEENT:           Head NCAT.  sclerae anicteric. Neck:               Supple, trachea midline. Lungs:             Clear, no wheezes. Effort nonlabored, no accessory muscle use. Chest wall:      No chest wall tenderness. Chest expansion equal and symmetrical bilaterally. Heart:              RRR. Abdomen:        Soft, NT/ND. Bowel sounds normoactive. SP catheter in place lower abdomen. Extremities:     Warm, no edema. No evidence for ischemia. Skin:                Not pale. Not Jaundiced  No rashes   Psych:             Mood normal.  Calm, no agitation. Neurologic:      sleepy. Non verbal.  Does not follow commands. Moves extremities spontaneously.       Labs: Results:   Chemistry Recent Labs     02/16/22  0510 02/15/22  1453 02/15/22  0432 02/14/22  0549 02/14/22  0549   * 88 131*   < > 168*    147* 147*   < > 145   K 3.9 3.9 QUESTIONABLE RESULTS OBTAINED, SPECIMEN BEING RECOLLECTED   < > 4.2   * 122* 122*   < > 120*   CO2 20* 19* 18*   < > 20*   BUN 26* 30* 33*   < > 40*   CREA 1.79* 1.86* 1.87*   < > 2.19*   CA 8.4* 9.0 QUESTIONABLE RESULTS OBTAINED, SPECIMEN BEING RECOLLECTED   < > 8.6   AGAP 6 6 7   < > 5   BUCR 15 16 18   < > 18   AP 50  --  56  --  45   TP 5.4*  --  5.4*  --  5.8*   ALB 1.9*  --  1.9*  --  2.0*   GLOB 3.5  --  3.5  --  3.8   AGRAT 0.5*  --  0.5*  --  0.5*    < > = values in this interval not displayed.       CBC w/Diff Recent Labs     02/16/22  0510 02/15/22  1453 02/15/22  0432 02/14/22  0549 02/14/22  0549   WBC 10.7 12.1 12.6   < > 12.7   RBC 3.42* 3.86* 3.34*   < > 3.32*   HGB 9.3* 10.3* 9.3*   < > 9.3*   HCT 28.5* 32.1* 27.8*   < > 27.9*    206 187   < > 190   GRANS 75*  --  79*  --  84* LYMPH 16*  --  14*  --  8*   EOS 3  --  3  --  3    < > = values in this interval not displayed. Microbiology Recent Labs     02/14/22  1725 02/14/22  1543   CULT No growth (<1,000 CFU/ML) NO GROWTH AFTER 14 HOURS          RADIOLOGY:    All available imaging studies/reports in [facility] for this admission were reviewed      Disclaimer: Sections of this note are dictated utilizing voice recognition software, which may have resulted in some phonetic based errors in grammar and contents. Even though attempts were made to correct all the mistakes, some may have been missed, and remained in the body of the document. If questions arise, please contact our department.     Dr. Rafi Delgado, Infectious Disease Specialist  [phone]  February 16, 2022  7:45 AM

## 2022-02-16 NOTE — PROGRESS NOTES
[facility] Hospitalist Group  Progress Note    Patient: France Sanchez Age: 67 y.o. : 1945 MR#: [id number] SSN: [id number]  Date/Time: 2022     Subjective:     Patient seen and evaluated, lying in bed, no acute distress. Family at bedside. Hospital course:  France Sanchez is a 67 y.o.  male who presented to the ED late yesterday evening for evaluation of altered mental status. Patient was found to be poorly responsive in triage and significantly hypotensive with the systolic blood pressure in the 50s. He was taken back to a room immediately for initiation of treatment. Work-up in the ED showed evidence for UTI and CT imaging showed bilateral hydronephrosis with hydroureter. There is no obvious evidence for obstruction on imaging but patient was noted to be retaining urine. A Sparrow catheter has been placed and broad-spectrum antibiotic therapy initiated. Patient did require vasopressor support initially but this is been able to be weaned. He is being referred for hospital admission for further evaluation. Patient admitted to the ICU, treated with vasopressor support and broad-spectrum IV antibiotics. Patient also received IV fluids, stabilized and transferred down to CVT stepdown. Blood cultures positive, ID consulted    Patient failed swallow eval - discussed with family , will place NGT & nutrition consulted for TF. Palliative care consulted, continue current treatment plan. Patient remains full code at this time. Assessment/Plan:     1. Sepsis with septic shocksecondary to UTI, continue broad-spectrum IV antibiotics. 2. Acute cystitis with pyuria and mallika pus noted from suprapubic catheter on presentationcontinue IV antibiotics, urine culture not sent, blood cultures positive for GNR, awaiting sensitivities, ID on board. ID recommended to remove R femoral CVC, repeat blood cultures are negative from   3.  EMIL on CKD 3creatinine at baseline, continue to monitor. 4. Elevated troponinlikely secondary to demand ischemia  5. History of hypertensionholding home medications since patient was hypotensive on presentation. DVT prophylaxisSCDs  Full code    Treatment plan discussed with patient and family at bedside  Continue TF , Nutrition on board. Pattie Corrales MD  22      Case discussed with:  [x]Patient  [x]Family  []Nursing  []Case Management  DVT Prophylaxis:  []Lovenox  []Hep SQ  []SCDs  []Coumadin   []On Heparin gtt    Objective:   VS:   Visit Vitals  BP (!) 151/68 (BP 1 Location: Left upper arm, BP Patient Position: At rest)   Pulse (!) 54   Temp 97.9 °F (36.6 °C)   Resp 15   Ht 5' 6\" (1.676 m)   Wt 64.8 kg (142 lb 12.8 oz)   SpO2 100%   BMI 23.05 kg/m²      Tmax/24hrs: Temp (24hrs), Av.5 °F (36.9 °C), Min:97.9 °F (36.6 °C), Max:99.1 °F (37.3 °C)  IOBRIEF    Intake/Output Summary (Last 24 hours) at 2022 1539  Last data filed at 2022 1522  Gross per 24 hour   Intake 3314.5 ml   Output 1520 ml   Net 1794.5 ml       General:  Alert, cooperative, no acute distress    HEENT: PERRLA, anicteric sclerae. Pulmonary:  CTA Bilaterally. No Wheezing/Rhonchi/Rales. Cardiovascular: Regular rate and Rhythm. GI:  Soft, Non distended, Non tender. + Bowel sounds. Extremities:  No edema, cyanosis, clubbing. No calf tenderness.    Neurologic: Alert and awake, lying in bed  Additional:    Medications:   Current Facility-Administered Medications   Medication Dose Route Frequency    ciprofloxacin (CIPRO) 400 mg in D5W IVPB (premix)  400 mg IntraVENous Q12H    sodium chloride (NS) flush 5-40 mL  5-40 mL IntraVENous Q8H    sodium chloride (NS) flush 5-40 mL  5-40 mL IntraVENous PRN    acetaminophen (TYLENOL) tablet 650 mg  650 mg Oral Q6H PRN    Or    acetaminophen (TYLENOL) suppository 650 mg  650 mg Rectal Q6H PRN    ondansetron (ZOFRAN ODT) tablet 4 mg  4 mg Oral Q8H PRN    Or    ondansetron (ZOFRAN) injection 4 mg  4 mg IntraVENous Q6H PRN    insulin lispro (HUMALOG) injection   SubCUTAneous Q6H    glucose chewable tablet 16 g  4 Tablet Oral PRN    glucagon (GLUCAGEN) injection 1 mg  1 mg IntraMUSCular PRN    dextrose 10% infusion 125-250 mL  125-250 mL IntraVENous PRN    famotidine (PF) (PEPCID) 20 mg in 0.9% sodium chloride 10 mL injection  20 mg IntraVENous Q24H    dextrose 5 % - 0.45% NaCl infusion  75 mL/hr IntraVENous CONTINUOUS    sodium chloride (NS) flush 5-10 mL  5-10 mL IntraVENous PRN       Imaging:   XR Results (most recent):  Results from Hospital Encounter encounter on 02/12/22    XR ABD (KUB)    Narrative  EXAM: XR ABD (KUB)    INDICATION: NGT replaced. COMPARISON: Abdominal radiograph 2/15/2022 at 14:18    TECHNIQUE: 1 view of the abdomen was obtained. FINDINGS:  Enteric tube tip is in the stomach. Oral contrast seen in the colon. Air is seen throughout the small and large  bowel, without clear evidence for obstruction. No significant change from recent  prior. Impression  Enteric tube tip in the stomach. CT Results (most recent):  Results from Hospital Encounter encounter on 02/12/22    CT ABD PELV WO CONT    Narrative  EXAM: CT ABDOMEN AND PELVIS WITHOUT CONTRAST    CLINICAL HISTORY/INDICATION:  sepsis , hypotensive, confusion, vomiting    COMPARISON: CT abdomen and pelvis 8/19/2016. TECHNIQUE: No intravenous contrast was utilized for this helical thin section CT  of the abdomen and pelvis. Coronal and sagittal reformations obtained. Evaluation of the solid organs, bowel wall, and vascular structures are  therefore limited.     All CT scans at this facility are performed using dose optimization technique as  appropriate to a performed exam, to include automated exposure control,  adjustment of the mA and/or kV according to patient's size (including  appropriate matching for site-specific examinations), or use of iterative  reconstruction technique. FINDINGS:    Abdomen-    Mild crowding of the bronchovascular markings at the bases. Trace pericardial  effusion. The liver and spleen are normal in size and density. The biliary tree is not  dilated. The gallbladder is normal in size. Increased density within the lumen of the  gallbladder. No gallbladder wall thickening nor surrounding inflammation. Bilateral mild hydronephrosis and hydroureter which can be followed down to the  bladder base. No renal nor ureteral calculus. Normal size kidneys. Bilateral mild adrenal nodular thickening without change    Tiny amount of fluid in the paracolic gutters. The large and small bowel seem unremarkable. Pelvis -    There is no free pelvic fluid. The pelvic viscera are unremarkable. Mild circumferential thickening of the urinary bladder wall. Suprapubic Sparrow  catheter. Small amount of air within the bladder lumen. 5 mm calculus layering  within the urinary bladder. Review of osseous structures throughout on bone window settings shows no  significant osseous pathology. Impression  Bilateral mild hydronephrosis and hydroureter without evidence of ureteral  calculus. 5 mm calculus layering within the urinary bladder. Mild circumferential urinary bladder wall thickening. This may be seen with  cystitis or mild chronic outlet obstruction. Suprapubic bladder catheter in expected position. Tiny amount of gas within the  lumen of the urinary bladder. Sludge and/or stones within the gallbladder. As clinically indicated follow-up  ultrasound of the right upper quadrant could be of benefit. Mild micronodular adrenal thickening consistent with hyperplasia without change. Report provided to the emergency department at 2317 hrs.            Labs:    Recent Results (from the past 48 hour(s))   CULTURE, BLOOD    Collection Time: 02/14/22  3:43 PM    Specimen: Blood   Result Value Ref Range    Special Requests: RIGHT  FEM        Culture result: NO GROWTH 2 DAYS     GLUCOSE, POC    Collection Time: 02/14/22  5:17 PM   Result Value Ref Range    Glucose (POC) 133 (H) 70 - 110 mg/dL   CULTURE, URINE    Collection Time: 02/14/22  5:25 PM    Specimen: Urine   Result Value Ref Range    Special Requests: ZAMORA CATH     Culture result: No growth (<1,000 CFU/ML)     GLUCOSE, POC    Collection Time: 02/15/22 12:59 AM   Result Value Ref Range    Glucose (POC) 113 (H) 70 - 110 mg/dL   MAGNESIUM    Collection Time: 02/15/22  4:32 AM   Result Value Ref Range    Magnesium 2.3 1.6 - 2.6 mg/dL   PHOSPHORUS    Collection Time: 02/15/22  4:32 AM   Result Value Ref Range    Phosphorus 2.6 2.5 - 4.9 MG/DL   PROTHROMBIN TIME + INR    Collection Time: 02/15/22  4:32 AM   Result Value Ref Range    Prothrombin time 14.8 11.5 - 15.2 sec    INR 1.2 0.8 - 1.2     CBC WITH AUTOMATED DIFF    Collection Time: 02/15/22  4:32 AM   Result Value Ref Range    WBC 12.6 4.6 - 13.2 K/uL    RBC 3.34 (L) 4.35 - 5.65 M/uL    HGB 9.3 (L) 13.0 - 16.0 g/dL    HCT 27.8 (L) 36.0 - 48.0 %    MCV 83.2 78.0 - 100.0 FL    MCH 27.8 24.0 - 34.0 PG    MCHC 33.5 31.0 - 37.0 g/dL    RDW 17.0 (H) 11.6 - 14.5 %    PLATELET 753 731 - 060 K/uL    MPV 11.0 9.2 - 11.8 FL    NRBC 0.0 0  WBC    ABSOLUTE NRBC 0.00 0.00 - 0.01 K/uL    NEUTROPHILS 79 (H) 40 - 73 %    LYMPHOCYTES 14 (L) 21 - 52 %    MONOCYTES 4 3 - 10 %    EOSINOPHILS 3 0 - 5 %    BASOPHILS 0 0 - 2 %    IMMATURE GRANULOCYTES 1 (H) 0.0 - 0.5 %    ABS. NEUTROPHILS 10.0 (H) 1.8 - 8.0 K/UL    ABS. LYMPHOCYTES 1.7 0.9 - 3.6 K/UL    ABS. MONOCYTES 0.5 0.05 - 1.2 K/UL    ABS. EOSINOPHILS 0.4 0.0 - 0.4 K/UL    ABS. BASOPHILS 0.0 0.0 - 0.1 K/UL    ABS. IMM.  GRANS. 0.1 (H) 0.00 - 0.04 K/UL    DF AUTOMATED     METABOLIC PANEL, COMPREHENSIVE    Collection Time: 02/15/22  4:32 AM   Result Value Ref Range    Sodium 147 (H) 136 - 145 mmol/L    Potassium  3.5 - 5.5 mmol/L     QUESTIONABLE RESULTS OBTAINED, SPECIMEN BEING RECOLLECTED    Chloride 122 (H) 100 - 111 mmol/L    CO2 18 (L) 21 - 32 mmol/L    Anion gap 7 3.0 - 18 mmol/L    Glucose 131 (H) 74 - 99 mg/dL    BUN 33 (H) 7.0 - 18 MG/DL    Creatinine 1.87 (H) 0.6 - 1.3 MG/DL    BUN/Creatinine ratio 18 12 - 20      GFR est AA 43 (L) >60 ml/min/1.73m2    GFR est non-AA 35 (L) >60 ml/min/1.73m2    Calcium  8.5 - 10.1 MG/DL     QUESTIONABLE RESULTS OBTAINED, SPECIMEN BEING RECOLLECTED    Bilirubin, total 0.4 0.2 - 1.0 MG/DL    ALT (SGPT) 7 (L) 16 - 61 U/L    AST (SGOT) 14 10 - 38 U/L    Alk.  phosphatase 56 45 - 117 U/L    Protein, total 5.4 (L) 6.4 - 8.2 g/dL    Albumin 1.9 (L) 3.4 - 5.0 g/dL    Globulin 3.5 2.0 - 4.0 g/dL    A-G Ratio 0.5 (L) 0.8 - 1.7     GLUCOSE, POC    Collection Time: 02/15/22  6:55 AM   Result Value Ref Range    Glucose (POC) >600 (HH) 70 - 110 mg/dL   GLUCOSE, POC    Collection Time: 02/15/22  6:58 AM   Result Value Ref Range    Glucose (POC) 175 (H) 70 - 110 mg/dL   GLUCOSE, POC    Collection Time: 02/15/22 11:16 AM   Result Value Ref Range    Glucose (POC) 79 70 - 110 mg/dL   CBC W/O DIFF    Collection Time: 02/15/22  2:53 PM   Result Value Ref Range    WBC 12.1 4.6 - 13.2 K/uL    RBC 3.86 (L) 4.35 - 5.65 M/uL    HGB 10.3 (L) 13.0 - 16.0 g/dL    HCT 32.1 (L) 36.0 - 48.0 %    MCV 83.2 78.0 - 100.0 FL    MCH 26.7 24.0 - 34.0 PG    MCHC 32.1 31.0 - 37.0 g/dL    RDW 17.2 (H) 11.6 - 14.5 %    PLATELET 470 437 - 101 K/uL    MPV 11.3 9.2 - 11.8 FL    NRBC 0.2 (H) 0  WBC    ABSOLUTE NRBC 0.03 (H) 0.00 - 5.22 K/uL   METABOLIC PANEL, BASIC    Collection Time: 02/15/22  2:53 PM   Result Value Ref Range    Sodium 147 (H) 136 - 145 mmol/L    Potassium 3.9 3.5 - 5.5 mmol/L    Chloride 122 (H) 100 - 111 mmol/L    CO2 19 (L) 21 - 32 mmol/L    Anion gap 6 3.0 - 18 mmol/L    Glucose 88 74 - 99 mg/dL    BUN 30 (H) 7.0 - 18 MG/DL    Creatinine 1.86 (H) 0.6 - 1.3 MG/DL    BUN/Creatinine ratio 16 12 - 20      GFR est AA 43 (L) >60 ml/min/1.73m2    GFR est non-AA 35 (L) >60 ml/min/1.73m2    Calcium 9.0 8.5 - 10.1 MG/DL   GLUCOSE, POC    Collection Time: 02/15/22  6:49 PM   Result Value Ref Range    Glucose (POC) 131 (H) 70 - 110 mg/dL   GLUCOSE, POC    Collection Time: 02/16/22 12:09 AM   Result Value Ref Range    Glucose (POC) 151 (H) 70 - 110 mg/dL   MAGNESIUM    Collection Time: 02/16/22  5:10 AM   Result Value Ref Range    Magnesium 2.2 1.6 - 2.6 mg/dL   PHOSPHORUS    Collection Time: 02/16/22  5:10 AM   Result Value Ref Range    Phosphorus 2.0 (L) 2.5 - 4.9 MG/DL   PROTHROMBIN TIME + INR    Collection Time: 02/16/22  5:10 AM   Result Value Ref Range    Prothrombin time 15.1 11.5 - 15.2 sec    INR 1.2 0.8 - 1.2     CBC WITH AUTOMATED DIFF    Collection Time: 02/16/22  5:10 AM   Result Value Ref Range    WBC 10.7 4.6 - 13.2 K/uL    RBC 3.42 (L) 4.35 - 5.65 M/uL    HGB 9.3 (L) 13.0 - 16.0 g/dL    HCT 28.5 (L) 36.0 - 48.0 %    MCV 83.3 78.0 - 100.0 FL    MCH 27.2 24.0 - 34.0 PG    MCHC 32.6 31.0 - 37.0 g/dL    RDW 16.8 (H) 11.6 - 14.5 %    PLATELET 613 778 - 141 K/uL    MPV 12.1 (H) 9.2 - 11.8 FL    NRBC 0.0 0  WBC    ABSOLUTE NRBC 0.00 0.00 - 0.01 K/uL    NEUTROPHILS 75 (H) 40 - 73 %    LYMPHOCYTES 16 (L) 21 - 52 %    MONOCYTES 5 3 - 10 %    EOSINOPHILS 3 0 - 5 %    BASOPHILS 0 0 - 2 %    IMMATURE GRANULOCYTES 1 (H) 0.0 - 0.5 %    ABS. NEUTROPHILS 8.0 1.8 - 8.0 K/UL    ABS. LYMPHOCYTES 1.7 0.9 - 3.6 K/UL    ABS. MONOCYTES 0.5 0.05 - 1.2 K/UL    ABS. EOSINOPHILS 0.3 0.0 - 0.4 K/UL    ABS. BASOPHILS 0.0 0.0 - 0.1 K/UL    ABS. IMM.  GRANS. 0.1 (H) 0.00 - 0.04 K/UL    DF AUTOMATED     METABOLIC PANEL, COMPREHENSIVE    Collection Time: 02/16/22  5:10 AM   Result Value Ref Range    Sodium 145 136 - 145 mmol/L    Potassium 3.9 3.5 - 5.5 mmol/L    Chloride 119 (H) 100 - 111 mmol/L    CO2 20 (L) 21 - 32 mmol/L    Anion gap 6 3.0 - 18 mmol/L    Glucose 206 (H) 74 - 99 mg/dL    BUN 26 (H) 7.0 - 18 MG/DL    Creatinine 1.79 (H) 0.6 - 1.3 MG/DL    BUN/Creatinine ratio 15 12 - 20      GFR est AA 45 (L) >60 ml/min/1.73m2    GFR est non-AA 37 (L) >60 ml/min/1.73m2    Calcium 8.4 (L) 8.5 - 10.1 MG/DL    Bilirubin, total 0.4 0.2 - 1.0 MG/DL    ALT (SGPT) 9 (L) 16 - 61 U/L    AST (SGOT) 7 (L) 10 - 38 U/L    Alk. phosphatase 50 45 - 117 U/L    Protein, total 5.4 (L) 6.4 - 8.2 g/dL    Albumin 1.9 (L) 3.4 - 5.0 g/dL    Globulin 3.5 2.0 - 4.0 g/dL    A-G Ratio 0.5 (L) 0.8 - 1.7     GLUCOSE, POC    Collection Time: 02/16/22  6:13 AM   Result Value Ref Range    Glucose (POC) 196 (H) 70 - 110 mg/dL   GLUCOSE, POC    Collection Time: 02/16/22 11:38 AM   Result Value Ref Range    Glucose (POC) 96 70 - 110 mg/dL       Signed By: Silke Belcher MD     February 16, 2022      I spent 25 minutes with the patient in face-to-face consultation, of which greater than 50% was spent in counseling and coordination of care as described above    Disclaimer: Sections of this note are dictated using utilizing voice recognition software. Minor typographical errors may be present. If questions arise, please do not hesitate to contact me or call our department.

## 2022-02-16 NOTE — DIABETES MGMT
Diabetes/ Glycemic Control Plan of Care    Assessment:   TF started this morning via NG  Receiving corrective humalog  Will continue to monitor     DX:   1. Sepsis, due to unspecified organism, unspecified whether acute organ dysfunction present ([facility] [address]     2. Shock ([facility] [address]     3. Acute encephalopathy        Fasting/ Morning blood glucose:   Lab Results   Component Value Date/Time    Glucose 206 (H) 02/16/2022 05:10 AM    Glucose (POC) 96 02/16/2022 11:38 AM     IV Fluids containing dextrose: none  Steroids: none       Blood glucose values: Within target range (70-180mg/dL):no    Current insulin orders:   Corrective humalog, normal insulin resistant, every 6 hours    Total Daily Dose previous 24 hours =   2 units humalog    Current A1c:   Lab Results   Component Value Date/Time    Hemoglobin A1c 6.3 (H) 02/13/2022 04:40 AM      equivalent  to ave Blood Glucose of  134 mg/dl for 2-3 months prior to admission  Adequate glycemic control PTA: yes    Nutrition/Diet: NPO  TF initiated this morning  - Jevity 1.5 currently infusing at 25 ml/hr - advancing as pt tolerates by 10 mL q 8 hours to goal rate of 45 mL/hr with water flushes of 150 mL q 4 hours. Home diabetes medications:   Key Antihyperglycemic Medications             insulin lispro (HUMALOG) 100 unit/mL injection For Blood Sugar (mg/dL) of:     Less than 150=0 units           150 -199=2 units  200 -249=4 units  250 -299=6 units  300 -349=8 units  350 and above=10 units  Check blood glucose 3 times daily and at bedtime and inject insulin per SS. Plan/Goals:   Blood glucose will be within target of 70 - 180 mg/dl within 72 hours  Reinforce dietary and medication compliance at home.        Education:  [] Refer to Diabetes Education Record                       [x] Education not indicated at this time     Marcella Almaraz MPH RN [address]  Pager [phone]  Office [phone]

## 2022-02-17 NOTE — PROGRESS NOTES
SLP Note:       Follow up attempted. Could not progress with ST intervention because patient:       [x]  Lethargic, unable to be alerted enough for safe PO intake  Patient noted to be gurgling and coughing while asleep, unable to get patient alert enough to swallow despite maximal cues or open mouth to suction oral cavity. RN notified. Will continue to follow per POC.       Jessenia TATUM-SLP  Speech-Language Pathologist

## 2022-02-17 NOTE — PROGRESS NOTES
Infectious Disease progress Note        Reason: Sepsis, gram-negative bloodstream infection    Current abx Prior abx   Cefepime since 2/12/2022-2/15/22  Ciprofloxacin since 2/15/22  levofloxacin, vancomycin 2/12-2/13     Lines:   Right femoral cvc since 2/13/22-2/15/22    Assessment :     67 y.o. male who is seen in consultation as referred by Mariposa Tobin  for UTI, retained SP catheter, bilateral hydronephrosis, bladder stones, prostate cancer, urinary retention managed with SP tube  presented to ED on 2/12/22 for hypotension, confusion, foul smelling urine, brown colored urine. Patient seen in ED on 2/11 for blocked SP catheter, this was irrigated and patient was discharged home. clnical presentation c/w septic shock - POA due to proteus bloodstream infection (positive blood culture 2/12, negative blood culture 2/14),  Urinary retention, complicated UTI  S/p catheter exchange in ED per urology    Resolved hydronephrosis noted on renal ultrasound 2/15/2022    Most likely source of Proteus bloodstream infection is complicated urinary tract infection. Unfortunately no urine culture sent on admission. Urine culture sent on 2/14 could be false negative due to prior antibiotics. Altered mentation-likely metabolic encephalopathy-improving    Acute on chronic kidney disease-likely due to sepsis, volume depletion-improving    Recommendations:   - continue ciprofloxacin till 2/24/22  -Okay to switch to oral ciprofloxacin if improved mentation and able to take p.o. meds     Above plan was discussed in details with primary team, family at bedside. Please call me if any further questions or concerns. Will continue to participate in the care of this patient. HPI:    Patient was nonverbal at the time of my evaluation.   I obtained most history from review of records, talking to the primary team.  Past Medical History:   Diagnosis Date    Cataract     both    Colon polyp 9-2004    Diabetes ([facility] [address]     IDDM    DJD (degenerative joint disease) of knee     ED (erectile dysfunction)     Sparrow catheter in place     GERD (gastroesophageal reflux disease) 12-01-08    Glaucoma suspect     Gout, unspecified 10-25-06    Hyperlipidemia     Hypertension     Insomnia 7-20-06    Phimosis 6-16-00    Prostate cancer ([facility] [address] 3-3-09    Stroke [facility]) 2007    weakness right side       Past Surgical History:   Procedure Laterality Date    HX POLYPECTOMY  9-2004    IA COLONOSCOPY FLX DX W/COLLJ SPEC WHEN PFRMD  9/1/2004    polyp/Dr Alvarado    IA COLONOSCOPY FLX DX W/COLLJ SPEC WHEN PFRMD  9-2007    incomplete; Dr. Micky Tang       Current Discharge Medication List      CONTINUE these medications which have NOT CHANGED    Details   carvediloL (COREG) 12.5 mg tablet Take 1 Tablet by mouth two (2) times a day. THIS REFILL IS  APPROVED. APPOINTMENT WILL BE REQUIRED BEFORE NEXT REFILL IS  APPROVED. Qty: 180 Tablet, Refills: 0    Associated Diagnoses: Essential hypertension      allopurinoL (ZYLOPRIM) 100 mg tablet Take 1 Tablet by mouth two (2) times a day. THIS REFILL IS  APPROVED. APPOINTMENT WILL BE REQUIRED BEFORE NEXT REFILL IS  APPROVED. Qty: 180 Tablet, Refills: 0    Associated Diagnoses: Idiopathic gout, unspecified chronicity, unspecified site      levoFLOXacin (LEVAQUIN) 500 mg tablet Take 1 Tablet by mouth daily.   Qty: 7 Tablet, Refills: 0      rosuvastatin (CRESTOR) 20 mg tablet TAKE 1 TABLET BY MOUTH NIGHTLY  Qty: 90 Tab, Refills: 0    Associated Diagnoses: Hypercholesterolemia      Insulin Needles, Disposable, (BD Ultra-Fine Mini Pen Needle) 31 gauge x 3/16\" ndle INJECT 3 TIMES A DAY BEFORE MEALS E11.9  Qty: 100 Pen Needle, Refills: 5    Associated Diagnoses: Diabetes mellitus with no complication (HCC)      insulin lispro (HUMALOG) 100 unit/mL injection For Blood Sugar (mg/dL) of:     Less than 150=0 units           150 -199=2 units  200 -249=4 units  250 -299=6 units  300 -349=8 units  350 and above=10 units  Check blood glucose 3 times daily and at bedtime and inject insulin per SS. Qty: 1 Vial, Refills: 0      cholecalciferol, VITAMIN D3, (VITAMIN D3) 5,000 unit tab tablet Take 1 Tab by mouth every seven (7) days. Qty: 30 Tab, Refills: 2      aspirin delayed-release 81 mg tablet Take 81 mg by mouth daily. glucose 4 gram chewable tablet Take 4 Tabs by mouth as needed. Qty: 50 Tab, Refills: 0      omega-3 fatty acids-vitamin e (FISH OIL) 1,000 mg Cap Take 1,400 mg by mouth daily.              Current Facility-Administered Medications   Medication Dose Route Frequency    ciprofloxacin (CIPRO) 400 mg in D5W IVPB (premix)  400 mg IntraVENous Q12H    sodium chloride (NS) flush 5-40 mL  5-40 mL IntraVENous Q8H    sodium chloride (NS) flush 5-40 mL  5-40 mL IntraVENous PRN    acetaminophen (TYLENOL) tablet 650 mg  650 mg Oral Q6H PRN    Or    acetaminophen (TYLENOL) suppository 650 mg  650 mg Rectal Q6H PRN    ondansetron (ZOFRAN ODT) tablet 4 mg  4 mg Oral Q8H PRN    Or    ondansetron (ZOFRAN) injection 4 mg  4 mg IntraVENous Q6H PRN    insulin lispro (HUMALOG) injection   SubCUTAneous Q6H    glucose chewable tablet 16 g  4 Tablet Oral PRN    glucagon (GLUCAGEN) injection 1 mg  1 mg IntraMUSCular PRN    dextrose 10% infusion 125-250 mL  125-250 mL IntraVENous PRN    famotidine (PF) (PEPCID) 20 mg in 0.9% sodium chloride 10 mL injection  20 mg IntraVENous Q24H    dextrose 5 % - 0.45% NaCl infusion  75 mL/hr IntraVENous CONTINUOUS    sodium chloride (NS) flush 5-10 mL  5-10 mL IntraVENous PRN       Allergies: Bactrim [sulfamethoxazole-trimethoprim]    Family History   Problem Relation Age of Onset    Hypertension Mother     Diabetes Father     Cancer Father         prostate    Heart Disease Father 70        CABG    Hypertension Sister     Hypertension Brother     Diabetes Brother      Social History     Socioeconomic History    Marital status:      Spouse name: Not on file    Number of children: Not on file    Years of education: Not on file    Highest education level: Not on file   Occupational History    Not on file   Tobacco Use    Smoking status: Former Smoker    Smokeless tobacco: Never Used   Vaping Use    Vaping Use: Never used   Substance and Sexual Activity    Alcohol use: No    Drug use: No    Sexual activity: Never   Other Topics Concern    Not on file   Social History Narrative    Not on file     Social Determinants of Health     Financial Resource Strain:     Difficulty of Paying Living Expenses: Not on file   Food Insecurity:     Worried About Running Out of Food in the Last Year: Not on file    Redd of Food in the Last Year: Not on file   Transportation Needs:     Lack of Transportation (Medical): Not on file    Lack of Transportation (Non-Medical):  Not on file   Physical Activity:     Days of Exercise per Week: Not on file    Minutes of Exercise per Session: Not on file   Stress:     Feeling of Stress : Not on file   Social Connections:     Frequency of Communication with Friends and Family: Not on file    Frequency of Social Gatherings with Friends and Family: Not on file    Attends Rastafari Services: Not on file    Active Member of [address] [place] [address] or Organizations: Not on file    Attends Club or Organization Meetings: Not on file    Marital Status: Not on file   Intimate Partner Violence:     Fear of Current or Ex-Partner: Not on file    Emotionally Abused: Not on file    Physically Abused: Not on file    Sexually Abused: Not on file   Housing Stability:     Unable to Pay for Housing in the Last Year: Not on file    Number of [place] in the Last Year: Not on file    Unstable Housing in the Last Year: Not on file     Social History     Tobacco Use   Smoking Status Former Smoker   Smokeless Tobacco Never Used        Temp (24hrs), Av °F (37.2 °C), Min:97.9 °F (36.6 °C), Max:99.6 °F (37.6 °C)    Visit Vitals  BP (!) 120/55 (BP 1 Location: Left upper arm, BP Patient Position: At rest;Supine)   Pulse 70   Temp 99.6 °F (37.6 °C)   Resp 25   Ht 5' 6\" (1.676 m)   Wt 64.8 kg (142 lb 12.8 oz)   SpO2 96%   BMI 23.05 kg/m²       ROS: unable to obtain due to patient factors    Physical Exam:    General:          laying on bed, sleepy, non verbal  HEENT:           Head NCAT.  sclerae anicteric. Neck:               Supple, trachea midline. Lungs:             Clear, no wheezes. Effort nonlabored, no accessory muscle use. Chest wall:      No chest wall tenderness. Chest expansion equal and symmetrical bilaterally. Heart:              RRR. Abdomen:        Soft, NT/ND. Bowel sounds normoactive. SP catheter in place lower abdomen. Extremities:     Warm, no edema. No evidence for ischemia. Skin:                Not pale. Not Jaundiced  No rashes   Psych:             Mood normal.  Calm, no agitation. Neurologic:      sleepy. Non verbal.  Does not follow commands. Moves extremities spontaneously.       Labs: Results:   Chemistry Recent Labs     02/17/22  0505 02/16/22  0510 02/15/22  1453 02/15/22  0432 02/15/22  0432   * 206* 88   < > 131*    145 147*   < > 147*   K 4.2 3.9 3.9   < > QUESTIONABLE RESULTS OBTAINED, SPECIMEN BEING RECOLLECTED   * 119* 122*   < > 122*   CO2 24 20* 19*   < > 18*   BUN 24* 26* 30*   < > 33*   CREA 1.77* 1.79* 1.86*   < > 1.87*   CA 8.6 8.4* 9.0   < > QUESTIONABLE RESULTS OBTAINED, SPECIMEN BEING RECOLLECTED   AGAP 3 6 6   < > 7   BUCR 14 15 16   < > 18   AP 52 50  --   --  56   TP 5.6* 5.4*  --   --  5.4*   ALB 2.1* 1.9*  --   --  1.9*   GLOB 3.5 3.5  --   --  3.5   AGRAT 0.6* 0.5*  --   --  0.5*    < > = values in this interval not displayed.       CBC w/Diff Recent Labs     02/17/22  0505 02/16/22  0510 02/15/22  1453 02/15/22  0432 02/15/22  0432   WBC 9.8 10.7 12.1   < > 12.6   RBC 3.48* 3.42* 3.86*   < > 3.34*   HGB 9.5* 9.3* 10.3*   < > 9.3*   HCT 29.2* 28.5* 32.1*   < > 27.8*    216 206   < > 187   GRANS 65 75* --   --  79*   LYMPH 23 16*  --   --  14*   EOS 4 3  --   --  3    < > = values in this interval not displayed. Microbiology Recent Labs     02/14/22  1725 02/14/22  1543   CULT No growth (<1,000 CFU/ML) NO GROWTH 3 DAYS          RADIOLOGY:    All available imaging studies/reports in [facility] for this admission were reviewed      Disclaimer: Sections of this note are dictated utilizing voice recognition software, which may have resulted in some phonetic based errors in grammar and contents. Even though attempts were made to correct all the mistakes, some may have been missed, and remained in the body of the document. If questions arise, please contact our department.     Dr. Oli Valverde, Infectious Disease Specialist  [phone]  February 17, 2022  7:45 AM

## 2022-02-17 NOTE — PROGRESS NOTES
[facility] Hospitalist Group  Progress Note    Patient: Jeffery Alvarenga Age: 67 y.o. : 1945 MR#: [id number] SSN: [id number]  Date/Time: 2022     Subjective:     Patient seen and evaluated, lying in bed, no acute distress. Family at bedside. Hospital course:  Jeffery Alvarenga is a 67 y.o.  male who presented to the ED late yesterday evening for evaluation of altered mental status. Patient was found to be poorly responsive in triage and significantly hypotensive with the systolic blood pressure in the 50s. He was taken back to a room immediately for initiation of treatment. Work-up in the ED showed evidence for UTI and CT imaging showed bilateral hydronephrosis with hydroureter. There is no obvious evidence for obstruction on imaging but patient was noted to be retaining urine. A Sparrow catheter has been placed and broad-spectrum antibiotic therapy initiated. Patient did require vasopressor support initially but this is been able to be weaned. He is being referred for hospital admission for further evaluation. Patient admitted to the ICU, treated with vasopressor support and broad-spectrum IV antibiotics. Patient also received IV fluids, stabilized and transferred down to CVT stepdown. Blood cultures positive, ID consulted    Patient failed swallow eval - discussed with family , will place NGT & nutrition consulted for TF. Palliative care consulted, continue current treatment plan. Patient remains full code at this time. Assessment/Plan:     1. Sepsis with septic shocksecondary to UTI, continue broad-spectrum IV antibiotics. 2. Acute cystitis with pyuria and mallika pus noted from suprapubic catheter on presentationcontinue IV antibiotics, urine culture not sent, blood cultures positive for GNR, awaiting sensitivities, ID on board. ID recommended to remove R femoral CVC, repeat blood cultures are negative from   3.  EMIL on CKD 3creatinine at baseline, continue to monitor. 4. Elevated troponinlikely secondary to demand ischemia  5. History of hypertensionholding home medications since patient was hypotensive on presentation. 6. Change in mental status, per family patient was able to swallow prior to him coming to the hospital but now he is unable to do that this may be related to sepsis or questionable acute infarct, will order MRI brain for further evaluation. DVT prophylaxisSCDs  DNR    Treatment plan discussed with patient and family at bedside  Continue TF , Nutrition on board. Parul Dunn MD  22      Case discussed with:  [x]Patient  [x]Family  []Nursing  []Case Management  DVT Prophylaxis:  []Lovenox  []Hep SQ  []SCDs  []Coumadin   []On Heparin gtt    Objective:   VS:   Visit Vitals  BP (!) 136/59 (BP 1 Location: Left upper arm, BP Patient Position: At rest)   Pulse 61   Temp 98.7 °F (37.1 °C)   Resp (!) 31   Ht 5' 6\" (1.676 m)   Wt 64.8 kg (142 lb 12.8 oz)   SpO2 100%   BMI 23.05 kg/m²      Tmax/24hrs: Temp (24hrs), Av.1 °F (37.3 °C), Min:98.7 °F (37.1 °C), Max:99.6 °F (37.6 °C)  IOBRIEF    Intake/Output Summary (Last 24 hours) at 2022 1523  Last data filed at 2022 1509  Gross per 24 hour   Intake 1670 ml   Output 1000 ml   Net 670 ml       General:  Alert, cooperative, no acute distress    Pulmonary: Decreased breath sounds in bases  Cardiovascular: Regular rate and Rhythm. GI:  Soft, Non distended, Non tender. + Bowel sounds. Extremities:  No edema, cyanosis, clubbing. No calf tenderness.    Neurologic: Alert and awake, lying in bed  Additional:    Medications:   Current Facility-Administered Medications   Medication Dose Route Frequency    [START ON 2022] multivitamin-minerals-ferrous gluconate oral liquid 15 mL  15 mL Per NG tube DAILY    ciprofloxacin (CIPRO) 400 mg in D5W IVPB (premix)  400 mg IntraVENous Q12H    sodium chloride (NS) flush 5-40 mL  5-40 mL IntraVENous Q8H    sodium chloride (NS) flush 5-40 mL  5-40 mL IntraVENous PRN    acetaminophen (TYLENOL) tablet 650 mg  650 mg Oral Q6H PRN    Or    acetaminophen (TYLENOL) suppository 650 mg  650 mg Rectal Q6H PRN    ondansetron (ZOFRAN ODT) tablet 4 mg  4 mg Oral Q8H PRN    Or    ondansetron (ZOFRAN) injection 4 mg  4 mg IntraVENous Q6H PRN    insulin lispro (HUMALOG) injection   SubCUTAneous Q6H    glucose chewable tablet 16 g  4 Tablet Oral PRN    glucagon (GLUCAGEN) injection 1 mg  1 mg IntraMUSCular PRN    dextrose 10% infusion 125-250 mL  125-250 mL IntraVENous PRN    famotidine (PF) (PEPCID) 20 mg in 0.9% sodium chloride 10 mL injection  20 mg IntraVENous Q24H    dextrose 5 % - 0.45% NaCl infusion  75 mL/hr IntraVENous CONTINUOUS    sodium chloride (NS) flush 5-10 mL  5-10 mL IntraVENous PRN       Imaging:   XR Results (most recent):  Results from Hospital Encounter encounter on 02/12/22    XR ABD (KUB)    Narrative  EXAM: XR ABD (KUB)    INDICATION: NGT replaced. COMPARISON: Abdominal radiograph 2/15/2022 at 14:18    TECHNIQUE: 1 view of the abdomen was obtained. FINDINGS:  Enteric tube tip is in the stomach. Oral contrast seen in the colon. Air is seen throughout the small and large  bowel, without clear evidence for obstruction. No significant change from recent  prior. Impression  Enteric tube tip in the stomach. CT Results (most recent):  Results from Hospital Encounter encounter on 02/12/22    CT ABD PELV WO CONT    Narrative  EXAM: CT ABDOMEN AND PELVIS WITHOUT CONTRAST    CLINICAL HISTORY/INDICATION:  sepsis , hypotensive, confusion, vomiting    COMPARISON: CT abdomen and pelvis 8/19/2016. TECHNIQUE: No intravenous contrast was utilized for this helical thin section CT  of the abdomen and pelvis. Coronal and sagittal reformations obtained. Evaluation of the solid organs, bowel wall, and vascular structures are  therefore limited.     All CT scans at this facility are performed using dose optimization technique as  appropriate to a performed exam, to include automated exposure control,  adjustment of the mA and/or kV according to patient's size (including  appropriate matching for site-specific examinations), or use of iterative  reconstruction technique. FINDINGS:    Abdomen-    Mild crowding of the bronchovascular markings at the bases. Trace pericardial  effusion. The liver and spleen are normal in size and density. The biliary tree is not  dilated. The gallbladder is normal in size. Increased density within the lumen of the  gallbladder. No gallbladder wall thickening nor surrounding inflammation. Bilateral mild hydronephrosis and hydroureter which can be followed down to the  bladder base. No renal nor ureteral calculus. Normal size kidneys. Bilateral mild adrenal nodular thickening without change    Tiny amount of fluid in the paracolic gutters. The large and small bowel seem unremarkable. Pelvis -    There is no free pelvic fluid. The pelvic viscera are unremarkable. Mild circumferential thickening of the urinary bladder wall. Suprapubic Sparrow  catheter. Small amount of air within the bladder lumen. 5 mm calculus layering  within the urinary bladder. Review of osseous structures throughout on bone window settings shows no  significant osseous pathology. Impression  Bilateral mild hydronephrosis and hydroureter without evidence of ureteral  calculus. 5 mm calculus layering within the urinary bladder. Mild circumferential urinary bladder wall thickening. This may be seen with  cystitis or mild chronic outlet obstruction. Suprapubic bladder catheter in expected position. Tiny amount of gas within the  lumen of the urinary bladder. Sludge and/or stones within the gallbladder. As clinically indicated follow-up  ultrasound of the right upper quadrant could be of benefit.   Mild micronodular adrenal thickening consistent with hyperplasia without change. Report provided to the emergency department at 2317 hrs. Labs:    Recent Results (from the past 48 hour(s))   GLUCOSE, POC    Collection Time: 02/15/22  6:49 PM   Result Value Ref Range    Glucose (POC) 131 (H) 70 - 110 mg/dL   GLUCOSE, POC    Collection Time: 02/16/22 12:09 AM   Result Value Ref Range    Glucose (POC) 151 (H) 70 - 110 mg/dL   MAGNESIUM    Collection Time: 02/16/22  5:10 AM   Result Value Ref Range    Magnesium 2.2 1.6 - 2.6 mg/dL   PHOSPHORUS    Collection Time: 02/16/22  5:10 AM   Result Value Ref Range    Phosphorus 2.0 (L) 2.5 - 4.9 MG/DL   PROTHROMBIN TIME + INR    Collection Time: 02/16/22  5:10 AM   Result Value Ref Range    Prothrombin time 15.1 11.5 - 15.2 sec    INR 1.2 0.8 - 1.2     CBC WITH AUTOMATED DIFF    Collection Time: 02/16/22  5:10 AM   Result Value Ref Range    WBC 10.7 4.6 - 13.2 K/uL    RBC 3.42 (L) 4.35 - 5.65 M/uL    HGB 9.3 (L) 13.0 - 16.0 g/dL    HCT 28.5 (L) 36.0 - 48.0 %    MCV 83.3 78.0 - 100.0 FL    MCH 27.2 24.0 - 34.0 PG    MCHC 32.6 31.0 - 37.0 g/dL    RDW 16.8 (H) 11.6 - 14.5 %    PLATELET 892 479 - 054 K/uL    MPV 12.1 (H) 9.2 - 11.8 FL    NRBC 0.0 0  WBC    ABSOLUTE NRBC 0.00 0.00 - 0.01 K/uL    NEUTROPHILS 75 (H) 40 - 73 %    LYMPHOCYTES 16 (L) 21 - 52 %    MONOCYTES 5 3 - 10 %    EOSINOPHILS 3 0 - 5 %    BASOPHILS 0 0 - 2 %    IMMATURE GRANULOCYTES 1 (H) 0.0 - 0.5 %    ABS. NEUTROPHILS 8.0 1.8 - 8.0 K/UL    ABS. LYMPHOCYTES 1.7 0.9 - 3.6 K/UL    ABS. MONOCYTES 0.5 0.05 - 1.2 K/UL    ABS. EOSINOPHILS 0.3 0.0 - 0.4 K/UL    ABS. BASOPHILS 0.0 0.0 - 0.1 K/UL    ABS. IMM.  GRANS. 0.1 (H) 0.00 - 0.04 K/UL    DF AUTOMATED     METABOLIC PANEL, COMPREHENSIVE    Collection Time: 02/16/22  5:10 AM   Result Value Ref Range    Sodium 145 136 - 145 mmol/L    Potassium 3.9 3.5 - 5.5 mmol/L    Chloride 119 (H) 100 - 111 mmol/L    CO2 20 (L) 21 - 32 mmol/L    Anion gap 6 3.0 - 18 mmol/L    Glucose 206 (H) 74 - 99 mg/dL    BUN 26 (H) 7.0 - 18 MG/DL Creatinine 1.79 (H) 0.6 - 1.3 MG/DL    BUN/Creatinine ratio 15 12 - 20      GFR est AA 45 (L) >60 ml/min/1.73m2    GFR est non-AA 37 (L) >60 ml/min/1.73m2    Calcium 8.4 (L) 8.5 - 10.1 MG/DL    Bilirubin, total 0.4 0.2 - 1.0 MG/DL    ALT (SGPT) 9 (L) 16 - 61 U/L    AST (SGOT) 7 (L) 10 - 38 U/L    Alk. phosphatase 50 45 - 117 U/L    Protein, total 5.4 (L) 6.4 - 8.2 g/dL    Albumin 1.9 (L) 3.4 - 5.0 g/dL    Globulin 3.5 2.0 - 4.0 g/dL    A-G Ratio 0.5 (L) 0.8 - 1.7     GLUCOSE, POC    Collection Time: 02/16/22  6:13 AM   Result Value Ref Range    Glucose (POC) 196 (H) 70 - 110 mg/dL   GLUCOSE, POC    Collection Time: 02/16/22 11:38 AM   Result Value Ref Range    Glucose (POC) 96 70 - 110 mg/dL   GLUCOSE, POC    Collection Time: 02/16/22  5:54 PM   Result Value Ref Range    Glucose (POC) 159 (H) 70 - 110 mg/dL   GLUCOSE, POC    Collection Time: 02/17/22 12:50 AM   Result Value Ref Range    Glucose (POC) 137 (H) 70 - 110 mg/dL   MAGNESIUM    Collection Time: 02/17/22  5:05 AM   Result Value Ref Range    Magnesium 2.0 1.6 - 2.6 mg/dL   PHOSPHORUS    Collection Time: 02/17/22  5:05 AM   Result Value Ref Range    Phosphorus 2.1 (L) 2.5 - 4.9 MG/DL   PROTHROMBIN TIME + INR    Collection Time: 02/17/22  5:05 AM   Result Value Ref Range    Prothrombin time 14.3 11.5 - 15.2 sec    INR 1.1 0.8 - 1.2     CBC WITH AUTOMATED DIFF    Collection Time: 02/17/22  5:05 AM   Result Value Ref Range    WBC 9.8 4.6 - 13.2 K/uL    RBC 3.48 (L) 4.35 - 5.65 M/uL    HGB 9.5 (L) 13.0 - 16.0 g/dL    HCT 29.2 (L) 36.0 - 48.0 %    MCV 83.9 78.0 - 100.0 FL    MCH 27.3 24.0 - 34.0 PG    MCHC 32.5 31.0 - 37.0 g/dL    RDW 16.9 (H) 11.6 - 14.5 %    PLATELET 939 115 - 689 K/uL    MPV 11.2 9.2 - 11.8 FL    NRBC 0.0 0  WBC    ABSOLUTE NRBC 0.00 0.00 - 0.01 K/uL    NEUTROPHILS 65 40 - 73 %    LYMPHOCYTES 23 21 - 52 %    MONOCYTES 7 3 - 10 %    EOSINOPHILS 4 0 - 5 %    BASOPHILS 0 0 - 2 %    IMMATURE GRANULOCYTES 1 (H) 0.0 - 0.5 %    ABS. NEUTROPHILS 6.4 1.8 - 8.0 K/UL    ABS. LYMPHOCYTES 2.3 0.9 - 3.6 K/UL    ABS. MONOCYTES 0.7 0.05 - 1.2 K/UL    ABS. EOSINOPHILS 0.4 0.0 - 0.4 K/UL    ABS. BASOPHILS 0.0 0.0 - 0.1 K/UL    ABS. IMM. GRANS. 0.1 (H) 0.00 - 0.04 K/UL    DF AUTOMATED     METABOLIC PANEL, COMPREHENSIVE    Collection Time: 02/17/22  5:05 AM   Result Value Ref Range    Sodium 145 136 - 145 mmol/L    Potassium 4.2 3.5 - 5.5 mmol/L    Chloride 118 (H) 100 - 111 mmol/L    CO2 24 21 - 32 mmol/L    Anion gap 3 3.0 - 18 mmol/L    Glucose 208 (H) 74 - 99 mg/dL    BUN 24 (H) 7.0 - 18 MG/DL    Creatinine 1.77 (H) 0.6 - 1.3 MG/DL    BUN/Creatinine ratio 14 12 - 20      GFR est AA 45 (L) >60 ml/min/1.73m2    GFR est non-AA 37 (L) >60 ml/min/1.73m2    Calcium 8.6 8.5 - 10.1 MG/DL    Bilirubin, total 0.6 0.2 - 1.0 MG/DL    ALT (SGPT) 9 (L) 16 - 61 U/L    AST (SGOT) 9 (L) 10 - 38 U/L    Alk. phosphatase 52 45 - 117 U/L    Protein, total 5.6 (L) 6.4 - 8.2 g/dL    Albumin 2.1 (L) 3.4 - 5.0 g/dL    Globulin 3.5 2.0 - 4.0 g/dL    A-G Ratio 0.6 (L) 0.8 - 1.7     GLUCOSE, POC    Collection Time: 02/17/22  5:56 AM   Result Value Ref Range    Glucose (POC) 231 (H) 70 - 110 mg/dL   GLUCOSE, POC    Collection Time: 02/17/22 11:17 AM   Result Value Ref Range    Glucose (POC) 166 (H) 70 - 110 mg/dL       Signed By: Deepika Trinidad MD     February 17, 2022      I spent 25 minutes with the patient in face-to-face consultation, of which greater than 50% was spent in counseling and coordination of care as described above    Disclaimer: Sections of this note are dictated using utilizing voice recognition software. Minor typographical errors may be present. If questions arise, please do not hesitate to contact me or call our department.

## 2022-02-17 NOTE — ACP (ADVANCE CARE PLANNING)
Advanced Steps [facility] (Physician Orders for Scope of Treatment)       Date of conversation: 2/17/2022     [address]   Length (minutes):40     Participants:   [x] Patient Patient remains lethargic and not able to engage in conversation. [x] Healthcare agent (already designated in existing ACP document)    Name: Cholo Lane     Relationship to Patient:son  Phone number: [phone]  [x] Other Surrogate Decision Maker / Next of Kin    Name: Bobby Franco     Relationship to Patient: daughter   Phone Number: [phone]        Advanced Steps® ACP Facilitator: Luli Chacon RN, Julio Dias NP       Conversation Topics     Palliative Medicine team members, Julio Dias NP for follow up visit and family meeting this AM at patient's bedside. Mr. Karissa Gnozalez alerted to verbal stimuli and opened eyes but was non verbal. Appeared as if he made attempts at speech but no words were audible. Introduced the role of palliative medicine to patients son and daughter, Alayna Castle and Eddie Oliveros. Both children were able to provide a clear picture of their father's level of function prior to this hospital admit. They have seen a slow but steady decline in his ambulation, endurance and eating. They added that he had  Other hospital admits to treatment of UTIs. France Sanchez is a 67year old male with a past history of CKD 3, DM2, HTN, CVA in 2008, who was admitted on 2/12/2022 from home with a diagnosis of altered mental status and UTI. Mr. Karissa Gonzalez is . He has 2 adult children. . He has completed a Medical Power of  in Sept 2019 naming his son, Tessa Farfan as primary Healthcare agent. He named his daughter Eddie Oliveros as surrogate. Copy of MPOA form was provided to this writer, copy was placed on chart for scanning. Discussed the benefits and burdens of artificial nutrition via PEG tube in the event of inability to take food/fluids in a safe manner orally. Mr Tracie Suero currently has and NG tube for feeding. Speech therapy is following. Family would like to see how their father progresses and if his alertness impoves. Palliative team briefly explained comfort feedings with hospice support to family and provided Tube Feeding Fact sheet for their review. Discussed the benefits and burdens of intubation and CPR in the event of respiratory decline or cardiopulmonary arrest.  Both  Amanda Rivero and Carlie Boast stated their father would not want CPR. They have had those discussions with him in the past.    Introduced the completion of Physician Order For Scope of Treatment. POST form signed by Amanda Rivero and Iva Rizvi NP for DNR/DNI, Limited Medical Interventions and Feeding Tube for a defined trial period. Needs to discuss with spiritual/Amish advisor: [] Yes  [x] No    Needs more information about illness and complications:  [x] Yes  [] No    Cardiopulmonary Resuscitation      \"What do you understand about CPR? \" Response: According to patient's son, Maureen Fajardo his father has shared with him that he would not want CPR when heart/breathing stop. He added that \"he did not want to go like\" Mr. Nga Ayers father did.      Order Elected for CPR:  []  Attempt Resuscitation [x]  Do Not Attempt Resuscitation    When NOT in Cardiopulmonary Arrest, Order Elected:      [] Comfort Measures  [x] Limited Additional Interventions  [] Full Interventions    Artificially Administered Nutrition, Order Elected:    [] No Feeding Tube   [x] Feeding Tube for a defined trial period  [] Feeding Tube long-term if indicated    The following was provided (check all that apply):      Healthcare Decision Information Cards:   [] Help with Breathing Facts   [x] Tube Feeding Facts   [] CPR Facts      [x] Review of existing Advance Directive  [] Assistance with Completion of New Advance Directive   [x] Review of [place] POST Form       Meeting Outcomes:   [] ACP discussion completed   [x] [place] form completed  [x] [place] prepared for Provider review and signature   [x] Original placed on Chart, if in facility (form to be sent with patient at discharge)  [x] Copy given to healthcare agent    [x] Copy scanned to electronic medical record    If ACP discussion not completed, last interview topic discussed:     Follow-up plan:     [x] Schedule follow-up conversation to continue planning    [] Referred individual to Provider for additional questions/concerns   [x] Advised patient/agent/surrogate to review completed POST form and update if needed with changes in condition, patient preferences or care setting     [] This note routed to one or more involved healthcare providers    Shelly SUMNERN, RN, [facility]  Palliative Medicine Inpatient RN  Palliative COPE Line: [phone]

## 2022-02-17 NOTE — PROGRESS NOTES
Nutrition Assessment     Type and Reason for Visit: Reassess,Positive nutrition screen,Consult    Nutrition Recommendations/Plan:   - Continue tube feeding of Jevity 1.5 at goal rate of 45 mL/hr with 150 mL q 4 hour water flushes with daily multivitamin as tolerated via NGT as medically appropriate per goals of care (goal enteral regimen to provide 1620 kcal, 69 gm dextrose, 821 mL free water, 100% RDIs). - IVF per MD.      Nutrition Assessment:  Patient lethargic and unable to participate in swallow evaluation, coughing and not safe for po trials at follow up swallow evaluation attempt this afternoon with SLP. Palliative following for goals of care with plan to administer artificial nutrition with feeding tube for a defined trial period with code status of DNR/DNI. Hypernatremia resolved and renal function improving with hyperglycemia, recent glucose 137-231 mg/dL. Tolerating tube feeding at goal, started via NGT 2/15/22. Malnutrition Assessment:  Malnutrition Status: At risk for malnutrition (specify) (wt loss over last several years PTA per chart hx)     Estimated Daily Nutrient Needs:  Energy (kcal):  9508-2836  Protein (g):  34-45       Fluid (ml/day):  6267-7915    Nutrition Related Findings:  Last BM 2/16. Pertinent Medications: D5 1/2NS at 75 mL/hr (90 gm dextrose, 306 kcal per day), famotidine, SSI      Current Nutrition Therapies:  DIET NPO  ADULT TUBE FEEDING Nasogastric; Standard with Fiber; Delivery Method: Continuous; Continuous Initial Rate (mL/hr): 15; Continuous Advance Tube Feeding: Yes; Advancement Volume (mL/hr): 10; Advancement Frequency: Q 8 hours; Continuous Goal Rate (mL. ..     Anthropometric Measures:  · Height:  5' 6\" (167.6 cm)  · Current Body Wt:  61.2 kg (134 lb 14.7 oz)  · BMI: 21.8    Nutrition Diagnosis:   · Inadequate oral intake related to swallowing difficulty as evidenced by NPO or clear liquid status due to medical condition,swallowing study results,nutrition support-enteral nutrition      Nutrition Intervention:  Food and/or Nutrient Delivery: Continue NPO,Vitamin supplement,Continue tube feeding,IV fluid delivery  Nutrition Education and Counseling: No recommendations at this time,Education not indicated  Coordination of Nutrition Care: Continue to monitor while inpatient,Swallow evaluation    Goals:  Provide nutrition interventions consistent with goals of care. Nutritional needs will be met through adequate oral intake or nutrition support by next follow up date       Nutrition Monitoring and Evaluation:   Behavioral-Environmental Outcomes: None identified  Food/Nutrient Intake Outcomes: Enteral nutrition intake/tolerance,IVF intake,Vitamin/mineral intake  Physical Signs/Symptoms Outcomes: Biochemical data,GI status,Nutrition focused physical findings    Discharge Planning:     Too soon to determine     Electronically signed by Jenny Koroma RD, [address]  on 2/17/2022 at 3:19 PM    Contact Number: [phone]

## 2022-02-17 NOTE — PROGRESS NOTES
Chart reviewed. Pt is on NG tube at this time. Per pt's son Irene Rosenbaum, when pt is medially ready for discharge, they will like him to [facility] for short term rehab or home with home health.   When appropriate, pt will need PTOT eval.          TAISHA VizcainoN RN  Care Management  Pager: [phone]

## 2022-02-17 NOTE — PROGRESS NOTES
[facility]: [phone]  [facility]: [phone]     Patient Name: Samm Bolden  YOB: 1945    Date of progress note : 2/17/22  Reason for Consult: establish goals of care  Requesting Provider: Nestor Horne MD   Primary Care Physician: Chung Houser MD      SUMMARY:   Samm Bolden is a 67 y.o. male with a past history of CKD 3, DM2, HTN, CVA in 2008, who was admitted on 2/12/2022 from home with a diagnosis of altered mental status and UTI. Current medical issues leading to Palliative Medicine involvement include: establish goals of care. CHIEF COMPLAINT: altered mental status    HPI/SUBJECTIVE:    Pt is a very debilitated AAM that lives at home with his son who is his primary caregiver. Pt was admitted through the ED from home for altered mental status and found to have significant hypotensive. Found to have a UTI. Pt has an indwelling suprapubic catheter with bilateral hydronephrosis and hydroureter. 2/17/22:  Opens eyes to stimulation but not answering questions. Minimal changes in altered LOC pt remains encephalopathic     The patient is:   [] Verbal and participatory  [x] Non-participatory due to:  Minimally responsive at this time     GOALS OF CARE:  Patient/Health Care Proxy Stated Goals: Prolong life      TREATMENT PREFERENCES:   Code Status: DNR         PALLIATIVE DIAGNOSES:   1. Goals of care/ACP  2. Altered mental status  3. CVA old  4. Debility       PLAN:   2/17/22: This NP and April Ferreira RN in to see patient at the bedside. In attendance were his 2 children Marilee Fines. And Winkler Sina. Yamilet Paige is patient's MPOA and provided ACP paperwork for the chart. Reviewed patient's prior functional status noting that he was ambulatory with a walker with 1 fairly recent fall. Family notes that he has been in some decline for the past year.   Discussed their understanding of the patient's present medical condition with emphasis on burdens and benefits of CPR and mechanical ventilation with intubation. Both are in agreement that patient has specified that he would not want that for the end of his life. Have stated that patient was clear that he watched his father die that way and would want to be a DO NOT RESUSCITATE. Our team completed a POST for DNR/DNI, limited interventions, with temporary feeding tube to be discussed later. Did have in-depth discussion regarding PEG tube placement in the event patient does not pass his swallowing evaluation. We also provided some written material for them to review. They would like some time to discuss. Patient has NG tube placed at this time which he pulled out last night and was replaced today. Also reviewed possibility of home with hospice versus rehabilitation or hospice after rehab completion. Our team will continue to follow for support and further decisions that may need to be made for treatments. 1.   Goals of care/ACP  This NP and Dre Kim MSW in to see the patient at the bedside. Present were his sister and brother in law. That verified that the patient has 2 children. Have contacted patient son Sukh Jaramillo. Have reached out to patient's son and set up a meeting to discuss goals of care tomorrow 9:30 AM.  Goals of care: At this time patient remains a full code with full interventions  2. Altered mental status  Likely related to UTI. Unsure of patient's baseline mental status. Reported that he was completely dependent at home. 3.  CVA  Reported 2008 patient has been very debilitated since 2008. Noted on CT scan patient has tiny focal chronic lacunar infarction within the left fouzia and bilateral basal ganglia small lacunar infarct in the right thalamus, old.   4.  Debility  Patient at this time has a palliative performance score of around 30% is bedbound unable to do any work due to extensive disease, is total care, has reduced to minimal oral intake and drowsy level of consciousness. Unsure patient's baseline. 5.   Initial consult note routed to primary continuity provider  6. Communicated plan of care with: Palliative IDT      Advance Care Planning:  [] [facility] Interdisciplinary Team has updated the ACP Navigator with [address] and Patient Capacity    Primary Decision Maker ([address]): Pt's son James Cherry Interventions: Full interventions   Other Instructions:         As far as possible, the palliative care team has discussed with patient / health care proxy about goals of care / treatment preferences for patient. HISTORY:     History obtained from: chart review   Active Problems:    Sepsis ([facility] [address] (2/13/2022)      Past Medical History:   Diagnosis Date    Cataract     both    Colon polyp 9-2004    Diabetes (HCC)     IDDM    DJD (degenerative joint disease) of knee     ED (erectile dysfunction)     Sparrow catheter in place     GERD (gastroesophageal reflux disease) 12-01-08    Glaucoma suspect     Gout, unspecified 10-25-06    Hyperlipidemia     Hypertension     Insomnia 7-20-06    Phimosis 6-16-00    Prostate cancer ([facility] [address] 3-3-09    Stroke ([facility] [address] 2007    weakness right side      Past Surgical History:   Procedure Laterality Date    HX POLYPECTOMY  9-2004    PA COLONOSCOPY FLX DX W/COLLJ SPEC WHEN PFRMD  9/1/2004    polyp/Dr Alvarado    PA COLONOSCOPY FLX DX W/COLLJ SPEC WHEN PFRMD  9-2007    incomplete; Dr. Hieu Brooks      Family History   Problem Relation Age of Onset    Hypertension Mother     Diabetes Father     Cancer Father         prostate    Heart Disease Father 70        CABG    Hypertension Sister     Hypertension Brother     Diabetes Brother      History reviewed, no pertinent family history.   Social History     Tobacco Use    Smoking status: Former Smoker    Smokeless tobacco: Never Used   Substance Use Topics    Alcohol use: No     Allergies   Allergen Reactions    Bactrim [Sulfamethoxazole-Trimethoprim] Other (comments)     CAUSED ACUTE RENAL FAILURE      Current Facility-Administered Medications   Medication Dose Route Frequency    ciprofloxacin (CIPRO) 400 mg in D5W IVPB (premix)  400 mg IntraVENous Q12H    sodium chloride (NS) flush 5-40 mL  5-40 mL IntraVENous Q8H    sodium chloride (NS) flush 5-40 mL  5-40 mL IntraVENous PRN    acetaminophen (TYLENOL) tablet 650 mg  650 mg Oral Q6H PRN    Or    acetaminophen (TYLENOL) suppository 650 mg  650 mg Rectal Q6H PRN    ondansetron (ZOFRAN ODT) tablet 4 mg  4 mg Oral Q8H PRN    Or    ondansetron (ZOFRAN) injection 4 mg  4 mg IntraVENous Q6H PRN    insulin lispro (HUMALOG) injection   SubCUTAneous Q6H    glucose chewable tablet 16 g  4 Tablet Oral PRN    glucagon (GLUCAGEN) injection 1 mg  1 mg IntraMUSCular PRN    dextrose 10% infusion 125-250 mL  125-250 mL IntraVENous PRN    famotidine (PF) (PEPCID) 20 mg in 0.9% sodium chloride 10 mL injection  20 mg IntraVENous Q24H    dextrose 5 % - 0.45% NaCl infusion  75 mL/hr IntraVENous CONTINUOUS    sodium chloride (NS) flush 5-10 mL  5-10 mL IntraVENous PRN          Clinical Pain Assessment (nonverbal scale for nonverbal patients):        Activity (Movement): Lying quietly, normal position    Duration: for how long has pt been experiencing pain (e.g., 2 days, 1 month, years)  Frequency: how often pain is an issue (e.g., several times per day, once every few days, constant)     FUNCTIONAL ASSESSMENT:     Palliative Performance Scale (PPS):  PPS: 30    ECOG  ECOG Status : Completely disabled     PSYCHOSOCIAL/SPIRITUAL SCREENING:      Any spiritual / Jewish concerns:  [] Yes /  [x] No    Caregiver Burnout:  [] Yes /  [x] No /  [] No Caregiver Present      Anticipatory grief assessment:   [x] Normal  / [] Maladaptive        REVIEW OF SYSTEMS:     Systems: constitutional, ears/nose/mouth/throat, respiratory, gastrointestinal, genitourinary, musculoskeletal, integumentary, neurologic, psychiatric, endocrine. Positive findings noted below. Modified ESAS Completed by: provider                       Dyspnea: 0               Positive and pertinent negative findings in ROS are noted above in HPI. The following systems were [x] reviewed / [] unable to be reviewed as noted in HPI  Other findings are noted below. PHYSICAL EXAM:     Constitutional: resting with eyes closed did not appear in distress  Eyes: closed   ENMT: no nasal discharge, moist mucous membranes  Cardiovascular: regular rhythm, distal pulses intact  Respiratory: breathing not labored, symmetric  Gastrointestinal: soft non-tender, +bowel sounds  Musculoskeletal: no deformity, no tenderness to palpation  Skin: warm, dry  Neurologic: Not following commands, moving all extremities    Other: Wt Readings from Last 3 Encounters:   02/16/22 64.8 kg (142 lb 12.8 oz)   02/11/22 56.7 kg (125 lb)   05/26/21 58.1 kg (128 lb)     Blood pressure (!) 136/59, pulse 61, temperature 98.7 °F (37.1 °C), resp. rate (!) 31, height 5' 6\" (1.676 m), weight 64.8 kg (142 lb 12.8 oz), SpO2 100 %. Pain:  Pain Scale 1: Adult Nonverbal Pain Scale                         LAB AND IMAGING FINDINGS:     Lab Results   Component Value Date/Time    WBC 9.8 02/17/2022 05:05 AM    HGB 9.5 (L) 02/17/2022 05:05 AM    PLATELET 850 99/70/7137 05:05 AM     Lab Results   Component Value Date/Time    Sodium 145 02/17/2022 05:05 AM    Potassium 4.2 02/17/2022 05:05 AM    Chloride 118 (H) 02/17/2022 05:05 AM    CO2 24 02/17/2022 05:05 AM    BUN 24 (H) 02/17/2022 05:05 AM    Creatinine 1.77 (H) 02/17/2022 05:05 AM    Calcium 8.6 02/17/2022 05:05 AM    Magnesium 2.0 02/17/2022 05:05 AM    Phosphorus 2.1 (L) 02/17/2022 05:05 AM      Lab Results   Component Value Date/Time    Alk.  phosphatase 52 02/17/2022 05:05 AM    Protein, total 5.6 (L) 02/17/2022 05:05 AM    Albumin 2.1 (L) 02/17/2022 05:05 AM    Globulin 3.5 02/17/2022 05:05 AM     Lab Results Component Value Date/Time    INR 1.1 02/17/2022 05:05 AM    Prothrombin time 14.3 02/17/2022 05:05 AM      No results found for: IRON, FE, TIBC, IBCT, PSAT, FERR   No results found for: PH, PCO2, PO2  No components found for: [place]   Lab Results   Component Value Date/Time    CK 88 10/21/2016 09:04 PM    CK - MB 1.1 10/21/2016 09:04 PM              Total time: 35 minutes   Counseling / coordination time, spent as noted above:   > 50% counseling / coordination:  Time spent in direct consultation with the patient, medical team, and family     Prolonged service was provided for  []30 min   []75 min in face to face time in the presence of the patient, spent as noted above. Time Start:   Time End:     Disclaimer: Sections of this note are dictated using utilizing voice recognition software, which may have resulted in some phonetic based errors in grammar and contents. Even though attempts were made to correct all the mistakes, some may have been missed, and remained in the body of the document. If questions arise, please contact our department.

## 2022-02-18 NOTE — PROGRESS NOTES
Problem: Dysphagia (Adult)  Goal: *Acute Goals and Plan of Care (Insert Text)  Description: Patient will:  1. Tolerate PO trials with 0 s/s overt distress in 4/5 trials  2. Utilize compensatory swallow strategies/maneuvers (decrease bite/sip, size/rate, alt. liq/sol) with min cues in 4/5 trials  3. Perform oral-motor/laryngeal exercises to increase oropharyngeal swallow function with min cues  4. Complete an objective swallow study (i.e., MBSS) to assess swallow integrity, r/o aspiration, and determine of safest LRD, min A - met 2/4/2022    Recommend:   NPO with consideration of an alternate means of nutrition/hydration vs comfort feeds  1-2 ice chips per hour after oral care   Strict aspiration precautions (HOB >30 degrees at all times, Oral care TID)  May benefit from a palliative consult per MD discretion? Outcome: Progressing Towards Goal     SPEECH LANGUAGE PATHOLOGY DYSPHAGIA TREATMENT    Patient: Nirmala Parekh (78 y.o. male)  Date: 2/18/2022  Diagnosis: Sepsis ([address] [A41.9] <principal problem not specified>       Precautions: aspiration, Fall,Skin  PLOF:as per H&P     ASSESSMENT:  Patient seen on this date for dysphagia management. Patient's son at bedside. Patient asleep upon entry, aroused to voice. Patient required max cueing from clinician and son to open mouth for oral care. Oral care performed. PO trials consumed: ice x2 and cup sip thin x2 and mildly thick liquids via spoon x1. Patient demonstrated no swallow trigger for cup sips, despite max cueing patient would not swallow and observed to hold PO in mouth until he coughed. Patient demonstrated concerns for aspiration on all trials with strong, wet cough. Weak laryngeal elevation upon palpation noted. Patient required assistance to take cup sip. Limited command following. Recommend patient remain NPO with alternative nutrition via NG tube. Son ok to give patient 1-2 ice cubes per hour after oral care.      Progression toward goals:  [] Improving appropriately and progressing toward goals  [x]         Improving slowly and progressing toward goals  []         Not making progress toward goals and plan of care will be adjusted     PLAN:  Recommendations and Planned Interventions:  See above  Patient continues to benefit from skilled intervention to address the above impairments. Continue treatment per established plan of care. Discharge Recommendations: To Be Determined     SUBJECTIVE:   Patient did not respond to questions. OBJECTIVE:   Cognitive and Communication Status:  Neurologic State: Eyes open to voice,Alert  Orientation Level: Unable to verbalize  Cognition: No command following,Decreased attention/concentration  Perception: Appears intact  Perseveration: No perseveration noted  Safety/Judgement: Fall prevention  Dysphagia Treatment:  Oral Assessment:  Oral Assessment  Labial: No impairment  Dentition: Natural,Intact  Oral Hygiene: adequate  Lingual: Decreased rate,Decreased strength  Velum: No impairment  Mandible: No impairment  P.O. Trials:   Patient Position: [address] at [place]   Vocal quality prior to P.O.: No impairment   Consistency Presented:  Thin liquid,Puree   How Presented: SLP-fed/presented,Spoon,Cup/sip   Bolus Acceptance: Impaired   Bolus Formation/Control: Impaired   Type of Impairment: Anterior,Delayed,Poor,Mastication,Lip closure   Propulsion: Delayed (# of seconds)   Oral Residue: None   Initiation of Swallow: Delayed (# of seconds)   Laryngeal Elevation: Weak,Decreased   Aspiration Signs/Symptoms: Change vocal quality,Strong cough,Watery eyes,Clear throat   Pharyngeal Phase Characteristics: Suspected pharyngeal residue,Poor endurance,Multiple swallows   Effective Modifications: None   Cues for Modifications: Maximal   Oral Phase Severity: Mild   Pharyngeal Phase Severity : Moderate-severe     PAIN:  Pain level pre-treatment: Unable to assess   Pain level post-treatment: Unable to assess     After treatment:   [] Patient left in no apparent distress sitting up in chair  [x]              Patient left in no apparent distress in bed  [x]              Call bell left within reach  [x]              Nursing notified  [x]              Family present  []              Caregiver present  []              Bed alarm activated      COMMUNICATION/EDUCATION:   [x] Aspiration precautions; swallow safety; compensatory techniques  [x]        Patient unable to participate in education; education ongoing with staff  [x]  Posted safety precautions in patient's room.   [] Oral-motor/laryngeal strengthening exercises      Jessenia Hancock, SLP-CFY

## 2022-02-18 NOTE — PROGRESS NOTES
Problem: Self Care Deficits Care Plan (Adult)  Goal: *Acute Goals and Plan of Care (Insert Text)  Description: Occupational Therapy Goals  Initiated 2/18/2022 within 7 day(s). 1.  Patient will perform self-feeding with supervision/set-up using adaptive equipment pending SLP recs. 2.  Patient will perform bilateral grooming tasks with supervision/set-up. 3.  Patient will perform upper body dressing with minimal assistance/contact guard assist.  4.  Patient will perform bed mobility in preparation for selfcare with moderate assistance x 1.  5.  Patient will perform functional activity sitting EOB with supervision/set-up, F+ balance for 6 - 8 minutes. 6.  Patient will participate in upper extremity therapeutic exercise/activities with supervision/set-up for 8-10 minutes to increase ROM/strength for self-care. 7.  Patient will perform toilet transfer with moderate assistance. Prior Level of Function: Patient needed assist with self-care and functional transfers to wheelchair PTA from son. Son reports patient was able to feed self, assist with bathing minimally but has needed increased assistance lately. Outcome: Progressing Towards Goal       OCCUPATIONAL THERAPY EVALUATION    Patient: Evita Ferrell (37 y.o. male)  Date: 2/18/2022  Primary Diagnosis: Sepsis ([facility] [address] [A41.9]  Precautions: Fall,Skin    ASSESSMENT :  Patient cleared to participate in OT evaluation by RN. Upon entering the room, the patient was semi reclined in bed, alert, and agreeable to participate in OT evaluation with son (caregiver) present. Patient was seen with PT to maximize patient safety, participation, and functional mobility in preparation for self-care tasks. Patient minimally verbal this session, nods appropriately to questions. Patient observed with limited B shoulder AROM this session, moderate assist needed for getting B hands to head simulating grooming.  Patient max assist for supine <> sit and able to sit EOB for approx 7 - 9 minutes this session, F balance. Patient participated in BLE therapeutic exercises with PT while EOB, encouraged to increase neck ROM as well. Based on the objective data described below, the patient presents with decreased strength, decreased independence, decreased activity tolerance, decreased functional balance, and decreased functional mobility, which impedes pt performance in basic self-care/ADL tasks. Patient would benefit from skilled OT to restore PLOF and maximize function. Patient will benefit from skilled intervention to address the above impairments. Patient's rehabilitation potential is considered to be Fair  Factors which may influence rehabilitation potential include:   []             None noted  [x]             Mental ability/status  [x]             Medical condition  [x]             Home/family situation and support systems  [x]             Safety awareness  []             Pain tolerance/management  []             Other:      PLAN :  Recommendations and Planned Interventions:  [x]               Self Care Training                  [x]      Therapeutic Activities  [x]               Functional Mobility Training   []      Cognitive Retraining  [x]               Therapeutic Exercises           [x]      Endurance Activities  [x]               Balance Training                    [x]      Neuromuscular Re-Education  []               Visual/Perceptual Training     [x]      Home Safety Training  [x]               Patient Education                   [x]      Family Training/Education  []               Other (comment):    Frequency/Duration: Patient will be followed by occupational therapy 3 - 6 times a week to address goals.   Discharge Recommendations: Rehab  Further Equipment Recommendations for Discharge: hospital bed and mechanical lift     SUBJECTIVE:   Patient stated Esperanza Ruose when asked how he felt    OBJECTIVE DATA SUMMARY:     Past Medical History:   Diagnosis Date    Cataract     both    Colon polyp 9-2004    Diabetes ([facility]     IDDM    DJD (degenerative joint disease) of knee     ED (erectile dysfunction)     Sparrow catheter in place     GERD (gastroesophageal reflux disease) 12-01-08    Glaucoma suspect     Gout, unspecified 10-25-06    Hyperlipidemia     Hypertension     Insomnia 7-20-06    Phimosis 6-16-00    Prostate cancer ([facility] [address] 3-3-09    Stroke [facility]) 2007    weakness right side     Past Surgical History:   Procedure Laterality Date    HX POLYPECTOMY  9-2004    ID COLONOSCOPY FLX DX W/COLLJ SPEC WHEN PFRMD  9/1/2004    polyp/Dr Alvarado    ID COLONOSCOPY FLX DX W/COLLJ SPEC WHEN PFRMD  9-2007    incomplete; Dr. Pierce Lopez     Barriers to Learning/Limitations: yes;  altered mental status (i.Confusion)  Compensate with: visual, verbal, tactile, kinesthetic cues/model    Home Situation:   Home Situation  Home Environment: Private residence  # Steps to Enter: 3  One/Two Story Residence: One story  Living Alone: No  Support Systems: Child(edemlira) (son (primary caregiver))  Patient Expects to be Discharged to[de-identified] Skilled nursing facility (vs home health)  Current DME Used/Available at Home: [employer], rolling,Com[employer], bedside  Tub or Shower Type:  ([address] bath)  [x]  Right hand dominant   []  Left hand dominant    Cognitive/Behavioral Status:  Neurologic State: Alert  Orientation Level:  (did not respond when asked)  Cognition: Follows commands;Decreased attention/concentration  Safety/Judgement: Fall prevention    Skin: Intact  Edema: None noted    Vision/Perceptual:    Tracking: Requires cues, head turns, or add eye shifts to track      Coordination: BUE     Fine Motor Skills-Upper: Left Intact; Right Intact    Gross Motor Skills-Upper: Left Impaired;Right Impaired    Balance:  Sitting: Impaired; Without support  Sitting - Static: Fair (occasional) (F+)  Sitting - Dynamic: Fair (occasional)    Strength: BUE  Strength:  (3-/5 R hand ; 4-/5 L hand )       Tone & Sensation: BUE  Tone: Normal  Sensation: Intact    Range of Motion: BUE  AROM: Generally decreased, functional (B shoulders limited)  PROM: Within functional limits (B shoulders)      Functional Mobility and Transfers for ADLs:  Bed Mobility:  Supine to Sit: Maximum assistance;Assist x2  Sit to Supine: Maximum assistance;Assist x2  Scooting: Maximum assistance;Assist x1 (towards EOB)      ADL Assessment:   Feeding: Minimum assistance    Oral Facial Hygiene/Grooming: Moderate assistance;Maximum assistance    Bathing: Maximum assistance    Upper Body Dressing: Moderate assistance;Maximum assistance    Lower Body Dressing: Maximum assistance; Total assistance    Toileting: Maximum assistance; Total assistance    ADL Intervention:  Feeding  Feeding Assistance:  (pt tube feed currrently; plans for MBS later this date)    Grooming  Grooming Assistance: Moderate assistance  Washing Face: Moderate assistance (simulation; assist needed at elbow )    Cognitive Retraining  Safety/Judgement: Fall prevention    Pain:  Pain level pre-treatment: 0/10   Pain level post-treatment: 0/10   Pain Intervention(s): Medication (see MAR); Rest, Ice, Repositioning   Response to intervention: Nurse notified, See doc flow    Activity Tolerance:   Patient demonstrated decreased activity tolerance during OT session. Please refer to the flowsheet for vital signs taken during this treatment. After treatment:   [] Patient left in no apparent distress sitting up in chair  [x] Patient left in no apparent distress in bed  [x] Call bell left within reach  [x] Nursing notified  [x] Caregiver present  [] Bed alarm activated    COMMUNICATION/EDUCATION:   [x] Role of Occupational Therapy in the acute care setting  [x] Home safety education was provided and the patient/caregiver indicated understanding. [x] Patient/family have participated as able in goal setting and plan of care. [x] Patient/family agree to work toward stated goals and plan of care.   [] Patient understands intent and goals of therapy, but is neutral about his/her participation. [] Patient is unable to participate in goal setting and plan of care. Thank you for this referral.  Adri Cisneros OTR/L  Time Calculation: 22 mins    Eval Complexity: History: MEDIUM Complexity : Expanded review of history including physical, cognitive and psychosocial  history ; Examination: HIGH Complexity : 5 or more performance deficits relating to physical, cognitive , or psychosocial skils that result in activity limitations and / or participation restrictions; Decision Making:HIGH Complexity : Patient presents with comorbidities that affect occupational performance.  Signifigant modification of tasks or assistance (eg, physical or verbal) with assessment (s) is necessary to enable patient to complete evaluation

## 2022-02-18 NOTE — PROGRESS NOTES
Chart reviewed. Pt is on bed rest.  PT/OT to evaluate pt. Case Management will continue to follow. 1315: OT notes noted. Sent clinicals to [facility] for review. Awaiting PT notes.   Notified Veronica Kahn of [facility] for Oralia Steven, BSN RN  Care Management  Pager: [phone]

## 2022-02-18 NOTE — PROGRESS NOTES
OT order received and chart reviewed. Patient currently has an active bedrest complete order at 0722. Please discontinue bedrest order for full participation in skilled OT evaluation/treatment. 7841, patient off unit for MRI, will continue to follow and see as pt schedule allows.        Thank you for this referral,    Keegan Swanson MS, OTR/L

## 2022-02-18 NOTE — PROGRESS NOTES
[facility] Hospitalist Group  Progress Note    Patient: Katia Dougherty Age: 67 y.o. : 1945 MR#: [id number] SSN: [id number]  Date/Time: 2022     Subjective:     Patient seen and evaluated, lying in bed, no acute distress. Family at bedside. Hospital course:  Katia Dougherty is a 67 y.o.  male who presented to the ED late yesterday evening for evaluation of altered mental status. Patient was found to be poorly responsive in triage and significantly hypotensive with the systolic blood pressure in the 50s. He was taken back to a room immediately for initiation of treatment. Work-up in the ED showed evidence for UTI and CT imaging showed bilateral hydronephrosis with hydroureter. There is no obvious evidence for obstruction on imaging but patient was noted to be retaining urine. A Sparrow catheter has been placed and broad-spectrum antibiotic therapy initiated. Patient did require vasopressor support initially but this is been able to be weaned. He is being referred for hospital admission for further evaluation. Patient admitted to the ICU, treated with vasopressor support and broad-spectrum IV antibiotics. Patient also received IV fluids, stabilized and transferred down to CVT stepdown. Blood cultures positive, ID consulted    Patient failed swallow eval - discussed with family , will place NGT & nutrition consulted for TF. Palliative care consulted, continue current treatment plan. Patient remains full code at this time. MRI -shows 2 small areas of restricted diffusion/infarct involving the periventricular right frontal and parietal lobes. Assessment/Plan:     1. Sepsis with septic shocksecondary to UTI, continue broad-spectrum IV antibiotics.   2. Acute cystitis with pyuria and mallika pus noted from suprapubic catheter on presentationcontinue IV antibiotics, urine culture not sent, blood cultures positive for GNR, awaiting sensitivities, ID on board. ID recommended to remove R femoral CVC, repeat blood cultures are negative from   3. EMIL on CKD 3creatinine at baseline, continue to monitor. 4. Elevated troponinlikely secondary to demand ischemia  5. History of hypertensionholding home medications since patient was hypotensive on presentation. 6. Change in mental status, per family patient was able to swallow prior to him coming to the hospital but now he is unable to do that this may be related to sepsis or questionable acute infarct, will order MRI brain for further evaluation. 7. MRI Brain -2 small areas of restricted diffusion/infarct involving the periventricular right frontal and parietal lobes. Added aspirin 81 mg p.o. daily and statin. 8. Repeat SLP eval done, recommend to keep n.p.o. with alternative nutrition via NG tube. DVT prophylaxisSCDs  DNR    Treatment plan discussed with patient and family at bedside  Continue TF , Nutrition on board. Deepika Trinidad MD  22      Case discussed with:  [x]Patient  [x]Family  []Nursing  []Case Management  DVT Prophylaxis:  []Lovenox  []Hep SQ  []SCDs  []Coumadin   []On Heparin gtt    Objective:   VS:   Visit Vitals  BP (!) 140/62   Pulse (!) 58   Temp 98.8 °F (37.1 °C)   Resp 21   Ht 5' 6\" (1.676 m)   Wt 64 kg (141 lb)   SpO2 99%   BMI 22.76 kg/m²      Tmax/24hrs: Temp (24hrs), Av.4 °F (36.9 °C), Min:98 °F (36.7 °C), Max:98.8 °F (37.1 °C)  IOBRIEF    Intake/Output Summary (Last 24 hours) at 2022 1659  Last data filed at 2022 0600  Gross per 24 hour   Intake 1610 ml   Output 800 ml   Net 810 ml       General:  Alert, cooperative, no acute distress    Pulmonary: Decreased breath sounds in bases  Cardiovascular: Regular rate and Rhythm. GI:  Soft, Non distended, Non tender. + Bowel sounds. Extremities:  No edema, cyanosis, clubbing. No calf tenderness.    Neurologic: Alert and awake, lying in bed  Additional:    Medications:   Current Facility-Administered Medications   Medication Dose Route Frequency    multivitamin-minerals-ferrous gluconate oral liquid 15 mL  15 mL Per NG tube DAILY    ciprofloxacin (CIPRO) 400 mg in D5W IVPB (premix)  400 mg IntraVENous Q12H    sodium chloride (NS) flush 5-40 mL  5-40 mL IntraVENous Q8H    sodium chloride (NS) flush 5-40 mL  5-40 mL IntraVENous PRN    acetaminophen (TYLENOL) tablet 650 mg  650 mg Oral Q6H PRN    Or    acetaminophen (TYLENOL) suppository 650 mg  650 mg Rectal Q6H PRN    ondansetron (ZOFRAN ODT) tablet 4 mg  4 mg Oral Q8H PRN    Or    ondansetron (ZOFRAN) injection 4 mg  4 mg IntraVENous Q6H PRN    insulin lispro (HUMALOG) injection   SubCUTAneous Q6H    glucose chewable tablet 16 g  4 Tablet Oral PRN    glucagon (GLUCAGEN) injection 1 mg  1 mg IntraMUSCular PRN    dextrose 10% infusion 125-250 mL  125-250 mL IntraVENous PRN    famotidine (PF) (PEPCID) 20 mg in 0.9% sodium chloride 10 mL injection  20 mg IntraVENous Q24H    dextrose 5 % - 0.45% NaCl infusion  75 mL/hr IntraVENous CONTINUOUS    sodium chloride (NS) flush 5-10 mL  5-10 mL IntraVENous PRN       Imaging:   XR Results (most recent):  Results from Hospital Encounter encounter on 02/12/22    XR ABD (KUB)    Narrative  EXAM: XR ABD (KUB)    INDICATION: NGT replaced. COMPARISON: Abdominal radiograph 2/15/2022 at 14:18    TECHNIQUE: 1 view of the abdomen was obtained. FINDINGS:  Enteric tube tip is in the stomach. Oral contrast seen in the colon. Air is seen throughout the small and large  bowel, without clear evidence for obstruction. No significant change from recent  prior. Impression  Enteric tube tip in the stomach.        CT Results (most recent):  Results from Hospital Encounter encounter on 02/12/22    CT ABD PELV WO CONT    Narrative  EXAM: CT ABDOMEN AND PELVIS WITHOUT CONTRAST    CLINICAL HISTORY/INDICATION:  sepsis , hypotensive, confusion, vomiting    COMPARISON: CT abdomen and pelvis 8/19/2016. TECHNIQUE: No intravenous contrast was utilized for this helical thin section CT  of the abdomen and pelvis. Coronal and sagittal reformations obtained. Evaluation of the solid organs, bowel wall, and vascular structures are  therefore limited. All CT scans at this facility are performed using dose optimization technique as  appropriate to a performed exam, to include automated exposure control,  adjustment of the mA and/or kV according to patient's size (including  appropriate matching for site-specific examinations), or use of iterative  reconstruction technique. FINDINGS:    Abdomen-    Mild crowding of the bronchovascular markings at the bases. Trace pericardial  effusion. The liver and spleen are normal in size and density. The biliary tree is not  dilated. The gallbladder is normal in size. Increased density within the lumen of the  gallbladder. No gallbladder wall thickening nor surrounding inflammation. Bilateral mild hydronephrosis and hydroureter which can be followed down to the  bladder base. No renal nor ureteral calculus. Normal size kidneys. Bilateral mild adrenal nodular thickening without change    Tiny amount of fluid in the paracolic gutters. The large and small bowel seem unremarkable. Pelvis -    There is no free pelvic fluid. The pelvic viscera are unremarkable. Mild circumferential thickening of the urinary bladder wall. Suprapubic Sparrow  catheter. Small amount of air within the bladder lumen. 5 mm calculus layering  within the urinary bladder. Review of osseous structures throughout on bone window settings shows no  significant osseous pathology. Impression  Bilateral mild hydronephrosis and hydroureter without evidence of ureteral  calculus. 5 mm calculus layering within the urinary bladder. Mild circumferential urinary bladder wall thickening. This may be seen with  cystitis or mild chronic outlet obstruction.   Suprapubic bladder catheter in expected position. Tiny amount of gas within the  lumen of the urinary bladder. Sludge and/or stones within the gallbladder. As clinically indicated follow-up  ultrasound of the right upper quadrant could be of benefit. Mild micronodular adrenal thickening consistent with hyperplasia without change. Report provided to the emergency department at 2317 hrs. Labs:    Recent Results (from the past 48 hour(s))   GLUCOSE, POC    Collection Time: 02/16/22  5:54 PM   Result Value Ref Range    Glucose (POC) 159 (H) 70 - 110 mg/dL   GLUCOSE, POC    Collection Time: 02/17/22 12:50 AM   Result Value Ref Range    Glucose (POC) 137 (H) 70 - 110 mg/dL   MAGNESIUM    Collection Time: 02/17/22  5:05 AM   Result Value Ref Range    Magnesium 2.0 1.6 - 2.6 mg/dL   PHOSPHORUS    Collection Time: 02/17/22  5:05 AM   Result Value Ref Range    Phosphorus 2.1 (L) 2.5 - 4.9 MG/DL   PROTHROMBIN TIME + INR    Collection Time: 02/17/22  5:05 AM   Result Value Ref Range    Prothrombin time 14.3 11.5 - 15.2 sec    INR 1.1 0.8 - 1.2     CBC WITH AUTOMATED DIFF    Collection Time: 02/17/22  5:05 AM   Result Value Ref Range    WBC 9.8 4.6 - 13.2 K/uL    RBC 3.48 (L) 4.35 - 5.65 M/uL    HGB 9.5 (L) 13.0 - 16.0 g/dL    HCT 29.2 (L) 36.0 - 48.0 %    MCV 83.9 78.0 - 100.0 FL    MCH 27.3 24.0 - 34.0 PG    MCHC 32.5 31.0 - 37.0 g/dL    RDW 16.9 (H) 11.6 - 14.5 %    PLATELET 490 597 - 204 K/uL    MPV 11.2 9.2 - 11.8 FL    NRBC 0.0 0  WBC    ABSOLUTE NRBC 0.00 0.00 - 0.01 K/uL    NEUTROPHILS 65 40 - 73 %    LYMPHOCYTES 23 21 - 52 %    MONOCYTES 7 3 - 10 %    EOSINOPHILS 4 0 - 5 %    BASOPHILS 0 0 - 2 %    IMMATURE GRANULOCYTES 1 (H) 0.0 - 0.5 %    ABS. NEUTROPHILS 6.4 1.8 - 8.0 K/UL    ABS. LYMPHOCYTES 2.3 0.9 - 3.6 K/UL    ABS. MONOCYTES 0.7 0.05 - 1.2 K/UL    ABS. EOSINOPHILS 0.4 0.0 - 0.4 K/UL    ABS. BASOPHILS 0.0 0.0 - 0.1 K/UL    ABS. IMM.  GRANS. 0.1 (H) 0.00 - 0.04 K/UL    DF AUTOMATED     METABOLIC PANEL, COMPREHENSIVE Collection Time: 02/17/22  5:05 AM   Result Value Ref Range    Sodium 145 136 - 145 mmol/L    Potassium 4.2 3.5 - 5.5 mmol/L    Chloride 118 (H) 100 - 111 mmol/L    CO2 24 21 - 32 mmol/L    Anion gap 3 3.0 - 18 mmol/L    Glucose 208 (H) 74 - 99 mg/dL    BUN 24 (H) 7.0 - 18 MG/DL    Creatinine 1.77 (H) 0.6 - 1.3 MG/DL    BUN/Creatinine ratio 14 12 - 20      GFR est AA 45 (L) >60 ml/min/1.73m2    GFR est non-AA 37 (L) >60 ml/min/1.73m2    Calcium 8.6 8.5 - 10.1 MG/DL    Bilirubin, total 0.6 0.2 - 1.0 MG/DL    ALT (SGPT) 9 (L) 16 - 61 U/L    AST (SGOT) 9 (L) 10 - 38 U/L    Alk.  phosphatase 52 45 - 117 U/L    Protein, total 5.6 (L) 6.4 - 8.2 g/dL    Albumin 2.1 (L) 3.4 - 5.0 g/dL    Globulin 3.5 2.0 - 4.0 g/dL    A-G Ratio 0.6 (L) 0.8 - 1.7     GLUCOSE, POC    Collection Time: 02/17/22  5:56 AM   Result Value Ref Range    Glucose (POC) 231 (H) 70 - 110 mg/dL   GLUCOSE, POC    Collection Time: 02/17/22 11:17 AM   Result Value Ref Range    Glucose (POC) 166 (H) 70 - 110 mg/dL   GLUCOSE, POC    Collection Time: 02/17/22  6:19 PM   Result Value Ref Range    Glucose (POC) 222 (H) 70 - 110 mg/dL   GLUCOSE, POC    Collection Time: 02/17/22 11:10 PM   Result Value Ref Range    Glucose (POC) 140 (H) 70 - 110 mg/dL   MAGNESIUM    Collection Time: 02/18/22  4:39 AM   Result Value Ref Range    Magnesium 2.1 1.6 - 2.6 mg/dL   PHOSPHORUS    Collection Time: 02/18/22  4:39 AM   Result Value Ref Range    Phosphorus 2.4 (L) 2.5 - 4.9 MG/DL   PROTHROMBIN TIME + INR    Collection Time: 02/18/22  4:39 AM   Result Value Ref Range    Prothrombin time 15.6 (H) 11.5 - 15.2 sec    INR 1.3 (H) 0.8 - 1.2     CBC W/O DIFF    Collection Time: 02/18/22  4:39 AM   Result Value Ref Range    WBC 10.7 4.6 - 13.2 K/uL    RBC 3.53 (L) 4.35 - 5.65 M/uL    HGB 9.6 (L) 13.0 - 16.0 g/dL    HCT 29.1 (L) 36.0 - 48.0 %    MCV 82.4 78.0 - 100.0 FL    MCH 27.2 24.0 - 34.0 PG    MCHC 33.0 31.0 - 37.0 g/dL    RDW 16.9 (H) 11.6 - 14.5 %    PLATELET 195 456 - 803 K/uL MPV 11.2 9.2 - 11.8 FL    NRBC 0.0 0  WBC    ABSOLUTE NRBC 0.00 0.00 - 9.68 K/uL   METABOLIC PANEL, BASIC    Collection Time: 02/18/22  4:39 AM   Result Value Ref Range    Sodium 144 136 - 145 mmol/L    Potassium 4.4 3.5 - 5.5 mmol/L    Chloride 114 (H) 100 - 111 mmol/L    CO2 26 21 - 32 mmol/L    Anion gap 4 3.0 - 18 mmol/L    Glucose 190 (H) 74 - 99 mg/dL    BUN 25 (H) 7.0 - 18 MG/DL    Creatinine 1.76 (H) 0.6 - 1.3 MG/DL    BUN/Creatinine ratio 14 12 - 20      GFR est AA 46 (L) >60 ml/min/1.73m2    GFR est non-AA 38 (L) >60 ml/min/1.73m2    Calcium 8.6 8.5 - 10.1 MG/DL   GLUCOSE, POC    Collection Time: 02/18/22  5:12 AM   Result Value Ref Range    Glucose (POC) 219 (H) 70 - 110 mg/dL   GLUCOSE, POC    Collection Time: 02/18/22  5:13 AM   Result Value Ref Range    Glucose (POC) 210 (H) 70 - 110 mg/dL   GLUCOSE, POC    Collection Time: 02/18/22 11:25 AM   Result Value Ref Range    Glucose (POC) 145 (H) 70 - 110 mg/dL       Signed By: Ronnie Mack MD     February 18, 2022      I spent 25 minutes with the patient in face-to-face consultation, of which greater than 50% was spent in counseling and coordination of care as described above    Disclaimer: Sections of this note are dictated using utilizing voice recognition software. Minor typographical errors may be present. If questions arise, please do not hesitate to contact me or call our department.

## 2022-02-18 NOTE — PROGRESS NOTES
Problem: Mobility Impaired (Adult and Pediatric)  Goal: *Acute Goals and Plan of Care (Insert Text)  Description: Physical Therapy Goals  Initiated 2/18/2022 and to be accomplished within 7 day(s)  1. Patient will move from supine to sit and sit to supine , scoot up and down, and roll side to side in bed with moderate assistance . 2.  Patient will transfer from bed to chair and chair to bed with moderate assistance  using the least restrictive device. 3.  Patient will perform sit to stand with moderate assistance . 4. Patient will ambulate with moderate assistance  for 5 feet with the least restrictive device. 5.  Patient will perform BLE therex as prescribed to tolerance     PLOF: Pt lives with son in a 1 SH with 3-4 BRUNILDA, pt son in primary caregiver who assists with all mobility, reports he can walk short distances with walker but primarily uses wc for mobility. Son plans to get a ramp for the house     Outcome: Progressing Towards Goal     PHYSICAL THERAPY EVALUATION    Patient: Duc Deluna (1 y.o. male)  Date: 2/18/2022  Primary Diagnosis: Sepsis ([facility] [address] [A41.9]        Precautions:   Fall,Skin  WBAT  PLOF: see above     ASSESSMENT :  Based on the objective data described below, the patient presents with generalized weakness, impaired balance and gait, decreased functional mobility and activity tolerance from baseline. Pt received in bed in bed in NAD and agreeable, seen with OT to maximize functional mobility and safety. Pt with little AROM noted to BLE with showing ability to contract quads and wiggle toes however full PROM noted at hip and knees. Pt required max A x 2 for supine <> sit to EOB, tolerated sitting with fair sitting balance at least 5 min this date, given verbal and tactile cues to facilitate upright posture, pt able to elicit minor knee extension in sitting on the LLE but none on the RLE.  Pt returns to supine and positioned for comfort, heels floated for skin integrity, by end of session, pt was able to verbalize one word responses very intermittently. Pt to benefit from skilled PT in the acute setting and left with all needs met, recommend rehab at discharge, son/caregiver agreeable. Patient will benefit from skilled intervention to address the above impairments. Patient's rehabilitation potential is considered to be Good  Factors which may influence rehabilitation potential include:   []         None noted  [x]         Mental ability/status  [x]         Medical condition  []         Home/family situation and support systems  []         Safety awareness  []         Pain tolerance/management  []         Other:      PLAN :  Recommendations and Planned Interventions:   [x]           Bed Mobility Training             [x]    Neuromuscular Re-Education  [x]           Transfer Training                   []    Orthotic/Prosthetic Training  [x]           Gait Training                          []    Modalities  [x]           Therapeutic Exercises           []    Edema Management/Control  [x]           Therapeutic Activities            [x]    Family Training/Education  [x]           Patient Education  []           Other (comment):    Frequency/Duration: Patient will be followed by physical therapy 1-2 times per day/3-5 days per week to address goals. Discharge Recommendations: Rehab  Further Equipment Recommendations for Discharge: TBD at next level of care     SUBJECTIVE:   Patient stated yes.     OBJECTIVE DATA SUMMARY:     Past Medical History:   Diagnosis Date    Cataract     both    Colon polyp 9-2004    Diabetes ([facility] [address]     IDDM    DJD (degenerative joint disease) of knee     ED (erectile dysfunction)     Sparrow catheter in place     GERD (gastroesophageal reflux disease) 12-01-08    Glaucoma suspect     Gout, unspecified 10-25-06    Hyperlipidemia     Hypertension     Insomnia 7-20-06    Phimosis 6-16-00    Prostate cancer ([facility] [address] 3-3-09    St[facility] 2007    weakness right side Past Surgical History:   Procedure Laterality Date    HX POLYPECTOMY  9-2004    ME COLONOSCOPY FLX DX W/COLLJ SPEC WHEN PFRMD  9/1/2004    polyp/Dr Alvarado    ME COLONOSCOPY FLX DX W/COLLJ SPEC WHEN PFRMD  9-2007    incomplete; Dr. Meredith Palacios     Barriers to Learning/Limitations: yes;  physical  Compensate with: Visual Cues, Verbal Cues, and Tactile Cues  Home Situation:  Home Situation  Home Environment: Private residence  # Steps to Enter: 3  One/Two Story Residence: One story  Living Alone: No  Support Systems: Child(edelmira)  Patient Expects to be Discharged to[de-identified] Skilled nursing facility (vs home health)  Current DME Used/Available at Home: Druscilla Covert, rolling,Wheelchair,Commode, bedside  Tub or Shower Type:  (Basin bath)  Critical Behavior:  Neurologic State: Alert  Orientation Level: Unable to verbalize  Cognition: Follows commands;Decreased attention/concentration  Safety/Judgement: Fall prevention  Psychosocial  Patient Behaviors: Calm  Family  Behaviors: Calm; Cooperative  Caring Interventions: Reassure  Reassure: Caring rounds     Family  Behaviors: Calm; Cooperative           Strength:    Strength: Generally decreased, functional                    Tone & Sensation:   Tone: Normal              Sensation: Intact               Range Of Motion:  AROM: Generally decreased, functional           PROM: Within functional limits       Functional Mobility:  Bed Mobility:     Supine to Sit: Maximum assistance;Assist x2  Sit to Supine: Maximum assistance;Assist x2  Scooting: Maximum assistance;Assist x1 (towards EOB)  Transfers:  NT this date     Balance:   Sitting: Impaired; Without support  Sitting - Static: Fair (occasional) (F+)  Sitting - Dynamic: Fair (occasional)    Pain:  Pain level pre-treatment: pt does not verbalize but demonstrates no pain signs during session /10   Pain level post-treatment: \"    \" /10   Pain Intervention(s) : Medication (see MAR);  Rest, Ice, Repositioning  Response to intervention: Nurse notified    Activity Tolerance:   Fair    Please refer to the flowsheet for vital signs taken during this treatment. After treatment:   []         Patient left in no apparent distress sitting up in chair  [x]         Patient left in no apparent distress in bed  [x]         Call bell left within reach  [x]         Nursing notified  [x]         Caregiver present- son   []         Bed alarm activated  []         SCDs applied    COMMUNICATION/EDUCATION:   [x]         Role of Physical Therapy in the acute care setting. [x]         Fall prevention education was provided and the patient/caregiver indicated understanding. [x]         Patient/family have participated as able in goal setting and plan of care. [x]         Patient/family agree to work toward stated goals and plan of care. []         Patient understands intent and goals of therapy, but is neutral about his/her participation. []         Patient is unable to participate in goal setting/plan of care: ongoing with therapy staff.  []         Other:     Thank you for this referral.  Orion Moon   Time Calculation: 22 mins      Eval Complexity: History: MEDIUM  Complexity : 1-2 comorbidities / personal factors will impact the outcome/ POC Exam:MEDIUM Complexity : 3 Standardized tests and measures addressing body structure, function, activity limitation and / or participation in recreation  Presentation: MEDIUM Complexity : Evolving with changing characteristics  Clinical Decision Making:Medium Complexity    Overall Complexity:MEDIUM

## 2022-02-18 NOTE — PROGRESS NOTES
PT order received and chart reviewed. Patient currently has an active bedrest order. Please discontinue bedrest order for full participation in skilled PT evaluation/treatment. Will follow up as patient schedule allows.      Thank you for the referral.    Mendy Steel, PT, DPT

## 2022-02-18 NOTE — DIABETES MGMT
Diabetes Plan of Care    TF advanced to goal rate on 2/17  Jevity 1.5 at 45 mL/hr with 150 mL q 4 hour water flushes   Receiving corrective humalog, advanced per protocol     If patient has two or more BG > 200 mg/dl in 24 hours, recommend low dose of lantus 4 units daily.         TTD pervious day - 10 units humalog    Tho Dominguez MPH RN [facility]  Pager [phone]  Office [phone]

## 2022-02-19 NOTE — PROGRESS NOTES
NGT placed in right nare by Jonnie VILLALOBOS. 60 cm. Awaiting KUB. Adult child, Rebecca Melvi, notified and agrees with bilateral soft wrist restraints.

## 2022-02-19 NOTE — PROGRESS NOTES
Pt in room, bilateral wrist restrains in place r/t dislodging NG tube overnight. Pt alert and able to nod y/n to questions. Restraints removed this AM ~1100. Pt given bedbath after finding SP catheter leaking around insertion site. Irrigated for patency, will monitor. Otherwise pt tolerating TF and IVF at this time.  Will continue to monitor

## 2022-02-19 NOTE — PROGRESS NOTES
Problem: Pressure Injury - Risk of  Goal: *Prevention of pressure injury  Description: Document Breezy Scale and appropriate interventions in the flowsheet. Outcome: Progressing Towards Goal  Note: Pressure Injury Interventions:  Sensory Interventions: Assess changes in LOC    Moisture Interventions: Absorbent underpads    Activity Interventions: Increase time out of bed    Mobility Interventions: Pressure redistribution bed/mattress (bed type)    Nutrition Interventions: Document food/fluid/supplement intake    Friction and Shear Interventions: Lift sheet                Problem: Falls - Risk of  Goal: *Absence of Falls  Description: Document Armen Fall Risk and appropriate interventions in the flowsheet.   Outcome: Progressing Towards Goal  Note: Fall Risk Interventions:       Mentation Interventions: Adequate sleep, hydration, pain control    Medication Interventions: Bed/chair exit alarm    Elimination Interventions: Bed/chair exit alarm

## 2022-02-19 NOTE — PROGRESS NOTES
[facility]  [facility]    Patient: Duc Deluna Age: 67 y.o. : 1945 MR#: [id number] SSN: [id number]  Date/Time: 2022     Subjective:     Patient seen and evaluated, lying in bed, no acute distress. Family at bedside. Hospital course:  Duc Deluna is a 67 y.o.  male who presented to the ED late yesterday evening for evaluation of altered mental status. Patient was found to be poorly responsive in triage and significantly hypotensive with the systolic blood pressure in the 50s. He was taken back to a room immediately for initiation of treatment. Work-up in the ED showed evidence for UTI and CT imaging showed bilateral hydronephrosis with hydroureter. There is no obvious evidence for obstruction on imaging but patient was noted to be retaining urine. A Sparrow catheter has been placed and broad-spectrum antibiotic therapy initiated. Patient did require vasopressor support initially but this is been able to be weaned. He is being referred for hospital admission for further evaluation. Patient admitted to the ICU, treated with vasopressor support and broad-spectrum IV antibiotics. Patient also received IV fluids, stabilized and transferred down to CVT stepdown. Blood cultures positive, ID consulted    Patient failed swallow eval - discussed with family , will place NGT & nutrition consulted for TF. Palliative care consulted, continue current treatment plan. Patient remains full code at this time. MRI -shows 2 small areas of restricted diffusion/infarct involving the periventricular right frontal and parietal lobes. Patient failed repeat swallow eval yesterday. Continue NG tube feeding. Overnight events noted where patient was placed in restraints secondary to concern for pulling NG tube. Restraints have now been removed and family is currently at bedside. Assessment/Plan:     1.  Sepsis with septic shocksecondary to UTI, continue broad-spectrum IV antibiotics. 2. Acute cystitis with pyuria and mallika pus noted from suprapubic catheter on presentationcontinue IV antibiotics, urine culture not sent, blood cultures positive for GNR, awaiting sensitivities, ID on board. ID recommended to remove R femoral CVC, repeat blood cultures are negative from   3. EMIL on CKD 3creatinine at baseline, continue to monitor. 4. Elevated troponinlikely secondary to demand ischemia  5. History of hypertensionholding home medications since patient was hypotensive on presentation. 6. Change in mental status, per family patient was able to swallow prior to him coming to the hospital but now he is unable to do that this may be related to sepsis or questionable acute infarct. 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 Continue aspirin, statin therapy. 7. MRI Brain -2 small areas of restricted diffusion/infarct involving the periventricular right frontal and parietal lobes. Added aspirin 81 mg p.o. daily and statin. 8. Repeat SLP eval done, recommend to keep n.p.o. with alternative nutrition via NG tube. DVT prophylaxisSCDs  DNR    Treatment plan discussed with patient and family at bedside  Continue TF , Nutrition on board. Attempt for repeat SLP on Monday. Continue to follow.                     Golden Lewis MD  22      Case discussed with:  [x]Patient  [x]Family  []Nursing  []Case Management  DVT Prophylaxis:  []Lovenox  []Hep SQ  []SCDs  []Coumadin   []On Heparin gtt    Objective:   VS:   Visit Vitals  BP (!) 154/66 (BP 1 Location: Right upper arm, BP Patient Position: At rest)   Pulse 60   Temp 98.2 °F (36.8 °C)   Resp 19   Ht 5' 6\" (1.676 m)   Wt 64.7 kg (142 lb 11.2 oz)   SpO2 100%   BMI 23.03 kg/m²      Tmax/24hrs: Temp (24hrs), Av.4 °F (36.9 °C), Min:98.1 °F (36.7 °C), Max:99.7 °F (37.6 °C)  IOBRIEF    Intake/Output Summary (Last 24 hours) at 2022 1542  Last data filed at 2022 1419  Gross per 24 hour Intake 800 ml   Output 700 ml   Net 100 ml       General:  Alert, cooperative, no acute distress    Pulmonary: Decreased breath sounds in bases  Cardiovascular: Regular rate and Rhythm. GI:  Soft, Non distended, Non tender. + Bowel sounds. Extremities:  No edema, cyanosis, clubbing. No calf tenderness. Neurologic: Alert and awake, lying in bed  Additional:    Medications:   Current Facility-Administered Medications   Medication Dose Route Frequency    aspirin chewable tablet 81 mg  81 mg Oral DAILY    atorvastatin (LIPITOR) tablet 40 mg  40 mg Oral DAILY    multivitamin-minerals-ferrous gluconate oral liquid 15 mL  15 mL Per NG tube DAILY    ciprofloxacin (CIPRO) 400 mg in D5W IVPB (premix)  400 mg IntraVENous Q12H    sodium chloride (NS) flush 5-40 mL  5-40 mL IntraVENous Q8H    sodium chloride (NS) flush 5-40 mL  5-40 mL IntraVENous PRN    acetaminophen (TYLENOL) tablet 650 mg  650 mg Oral Q6H PRN    Or    acetaminophen (TYLENOL) suppository 650 mg  650 mg Rectal Q6H PRN    ondansetron (ZOFRAN ODT) tablet 4 mg  4 mg Oral Q8H PRN    Or    ondansetron (ZOFRAN) injection 4 mg  4 mg IntraVENous Q6H PRN    insulin lispro (HUMALOG) injection   SubCUTAneous Q6H    glucose chewable tablet 16 g  4 Tablet Oral PRN    glucagon (GLUCAGEN) injection 1 mg  1 mg IntraMUSCular PRN    dextrose 10% infusion 125-250 mL  125-250 mL IntraVENous PRN    famotidine (PF) (PEPCID) 20 mg in 0.9% sodium chloride 10 mL injection  20 mg IntraVENous Q24H    dextrose 5 % - 0.45% NaCl infusion  75 mL/hr IntraVENous CONTINUOUS    sodium chloride (NS) flush 5-10 mL  5-10 mL IntraVENous PRN       Imaging:   XR Results (most recent):  Results from Hospital Encounter encounter on 02/12/22    XR ABD PORT  1 V    Narrative  Abdomen    HISTORY: NG tube placement. Comparison 2/15/2022. NG tube terminates in the gastric fundus.  Persistent nonspecific colonic  distention with fecal retention and stasis, persistent contrast in the colon. Atelectasis and infiltrate in left lung base. Mild vascular congestion in the  chest.    Impression  NG tube tip in the gastric fundus with additional findings as  described. CT Results (most recent):  Results from Hospital Encounter encounter on 02/12/22    CT ABD PELV WO CONT    Narrative  EXAM: CT ABDOMEN AND PELVIS WITHOUT CONTRAST    CLINICAL HISTORY/INDICATION:  sepsis , hypotensive, confusion, vomiting    COMPARISON: CT abdomen and pelvis 8/19/2016. TECHNIQUE: No intravenous contrast was utilized for this helical thin section CT  of the abdomen and pelvis. Coronal and sagittal reformations obtained. Evaluation of the solid organs, bowel wall, and vascular structures are  therefore limited. All CT scans at this facility are performed using dose optimization technique as  appropriate to a performed exam, to include automated exposure control,  adjustment of the mA and/or kV according to patient's size (including  appropriate matching for site-specific examinations), or use of iterative  reconstruction technique. FINDINGS:    Abdomen-    Mild crowding of the bronchovascular markings at the bases. Trace pericardial  effusion. The liver and spleen are normal in size and density. The biliary tree is not  dilated. The gallbladder is normal in size. Increased density within the lumen of the  gallbladder. No gallbladder wall thickening nor surrounding inflammation. Bilateral mild hydronephrosis and hydroureter which can be followed down to the  bladder base. No renal nor ureteral calculus. Normal size kidneys. Bilateral mild adrenal nodular thickening without change    Tiny amount of fluid in the paracolic gutters. The large and small bowel seem unremarkable. Pelvis -    There is no free pelvic fluid. The pelvic viscera are unremarkable. Mild circumferential thickening of the urinary bladder wall. Suprapubic Sparrow  catheter.  Small amount of air within the bladder lumen. 5 mm calculus layering  within the urinary bladder. Review of osseous structures throughout on bone window settings shows no  significant osseous pathology. Impression  Bilateral mild hydronephrosis and hydroureter without evidence of ureteral  calculus. 5 mm calculus layering within the urinary bladder. Mild circumferential urinary bladder wall thickening. This may be seen with  cystitis or mild chronic outlet obstruction. Suprapubic bladder catheter in expected position. Tiny amount of gas within the  lumen of the urinary bladder. Sludge and/or stones within the gallbladder. As clinically indicated follow-up  ultrasound of the right upper quadrant could be of benefit. Mild micronodular adrenal thickening consistent with hyperplasia without change. Report provided to the emergency department at 2317 hrs.            Labs:    Recent Results (from the past 48 hour(s))   GLUCOSE, POC    Collection Time: 02/17/22  6:19 PM   Result Value Ref Range    Glucose (POC) 222 (H) 70 - 110 mg/dL   GLUCOSE, POC    Collection Time: 02/17/22 11:10 PM   Result Value Ref Range    Glucose (POC) 140 (H) 70 - 110 mg/dL   MAGNESIUM    Collection Time: 02/18/22  4:39 AM   Result Value Ref Range    Magnesium 2.1 1.6 - 2.6 mg/dL   PHOSPHORUS    Collection Time: 02/18/22  4:39 AM   Result Value Ref Range    Phosphorus 2.4 (L) 2.5 - 4.9 MG/DL   PROTHROMBIN TIME + INR    Collection Time: 02/18/22  4:39 AM   Result Value Ref Range    Prothrombin time 15.6 (H) 11.5 - 15.2 sec    INR 1.3 (H) 0.8 - 1.2     CBC W/O DIFF    Collection Time: 02/18/22  4:39 AM   Result Value Ref Range    WBC 10.7 4.6 - 13.2 K/uL    RBC 3.53 (L) 4.35 - 5.65 M/uL    HGB 9.6 (L) 13.0 - 16.0 g/dL    HCT 29.1 (L) 36.0 - 48.0 %    MCV 82.4 78.0 - 100.0 FL    MCH 27.2 24.0 - 34.0 PG    MCHC 33.0 31.0 - 37.0 g/dL    RDW 16.9 (H) 11.6 - 14.5 %    PLATELET 331 232 - 125 K/uL    MPV 11.2 9.2 - 11.8 FL    NRBC 0.0 0  WBC    ABSOLUTE NRBC 0.00 0.00 - 9.16 K/uL   METABOLIC PANEL, BASIC    Collection Time: 02/18/22  4:39 AM   Result Value Ref Range    Sodium 144 136 - 145 mmol/L    Potassium 4.4 3.5 - 5.5 mmol/L    Chloride 114 (H) 100 - 111 mmol/L    CO2 26 21 - 32 mmol/L    Anion gap 4 3.0 - 18 mmol/L    Glucose 190 (H) 74 - 99 mg/dL    BUN 25 (H) 7.0 - 18 MG/DL    Creatinine 1.76 (H) 0.6 - 1.3 MG/DL    BUN/Creatinine ratio 14 12 - 20      GFR est AA 46 (L) >60 ml/min/1.73m2    GFR est non-AA 38 (L) >60 ml/min/1.73m2    Calcium 8.6 8.5 - 10.1 MG/DL   GLUCOSE, POC    Collection Time: 02/18/22  5:12 AM   Result Value Ref Range    Glucose (POC) 219 (H) 70 - 110 mg/dL   GLUCOSE, POC    Collection Time: 02/18/22  5:13 AM   Result Value Ref Range    Glucose (POC) 210 (H) 70 - 110 mg/dL   GLUCOSE, POC    Collection Time: 02/18/22 11:25 AM   Result Value Ref Range    Glucose (POC) 145 (H) 70 - 110 mg/dL   GLUCOSE, POC    Collection Time: 02/18/22  5:49 PM   Result Value Ref Range    Glucose (POC) 256 (H) 70 - 110 mg/dL   GLUCOSE, POC    Collection Time: 02/18/22  9:20 PM   Result Value Ref Range    Glucose (POC) 208 (H) 70 - 110 mg/dL   GLUCOSE, POC    Collection Time: 02/19/22 12:14 AM   Result Value Ref Range    Glucose (POC) 138 (H) 70 - 110 mg/dL   GLUCOSE, POC    Collection Time: 02/19/22  5:59 AM   Result Value Ref Range    Glucose (POC) 167 (H) 70 - 110 mg/dL   GLUCOSE, POC    Collection Time: 02/19/22 11:16 AM   Result Value Ref Range    Glucose (POC) 222 (H) 70 - 110 mg/dL       Signed By: Yuliya Olson MD     February 19, 2022      I spent 25 minutes with the patient in face-to-face consultation, of which greater than 50% was spent in counseling and coordination of care as described above    Disclaimer: Sections of this note are dictated using utilizing voice recognition software. Minor typographical errors may be present. If questions arise, please do not hesitate to contact me or call our department.

## 2022-02-20 NOTE — PROGRESS NOTES
[facility] Hospitalist Group  Progress Note    Patient: Juan Alberto Owens Age: 67 y.o. : 1945 MR#: [id number] SSN: [id number]  Date/Time: 2022     Subjective:     Patient seen and evaluated, lying in bed, no acute distress. Family at bedside. Hospital course:  Juan Alberto Owens is a 67 y.o.  male who presented to the ED late yesterday evening for evaluation of altered mental status. Patient was found to be poorly responsive in triage and significantly hypotensive with the systolic blood pressure in the 50s. He was taken back to a room immediately for initiation of treatment. Work-up in the ED showed evidence for UTI and CT imaging showed bilateral hydronephrosis with hydroureter. There is no obvious evidence for obstruction on imaging but patient was noted to be retaining urine. A Sparrow catheter has been placed and broad-spectrum antibiotic therapy initiated. Patient did require vasopressor support initially but this is been able to be weaned. He is being referred for hospital admission for further evaluation. Patient admitted to the ICU, treated with vasopressor support and broad-spectrum IV antibiotics. Patient also received IV fluids, stabilized and transferred down to CVT stepdown. Blood cultures positive, ID consulted    Patient failed swallow eval - discussed with family , will place NGT & nutrition consulted for TF. Palliative care consulted, continue current treatment plan. Patient remains full code at this time. MRI -shows 2 small areas of restricted diffusion/infarct involving the periventricular right frontal and parietal lobes. Patient failed repeat swallow eval yesterday. Continue NG tube feeding. Overnight events noted where patient was placed in restraints secondary to concern for pulling NG tube. Restraints have now been removed and family is currently at bedside.      -no new events overnight, patient seen and evaluated, lying in bed, no acute distress, NG tube in place and tube feeding continuing. We will attempt for repeat SLP eval in a.m. if possible. Assessment/Plan:     1. Sepsis with septic shocksecondary to UTI, continue broad-spectrum IV antibiotics. 2. Acute cystitis with pyuria and mallika pus noted from suprapubic catheter on presentationcontinue IV antibiotics, urine culture not sent, blood cultures positive for GNR, awaiting sensitivities, ID on board. ID recommended to remove R femoral CVC, repeat blood cultures are negative from   3. EMIL on CKD 3creatinine at baseline, continue to monitor. 4. Elevated troponinlikely secondary to demand ischemia  5. History of hypertensionholding home medications since patient was hypotensive on presentation. 6. Change in mental status, per family patient was able to swallow prior to him coming to the hospital but now he is unable to do that this may be related to sepsis or questionable acute infarct. 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 Continue aspirin, statin therapy. 7. MRI Brain -2 small areas of restricted diffusion/infarct involving the periventricular right frontal and parietal lobes. Added aspirin 81 mg p.o. daily and statin. 8. Repeat SLP eval done, recommend to keep n.p.o. with alternative nutrition via NG tube. DVT prophylaxisSCDs  DNR    Treatment plan discussed with patient and family at bedside  Continue TF , Nutrition on board. Attempt for repeat SLP on Monday. Continue to follow.                     Deepika Trinidad MD  22      Case discussed with:  [x]Patient  [x]Family  []Nursing  []Case Management  DVT Prophylaxis:  []Lovenox  []Hep SQ  []SCDs  []Coumadin   []On Heparin gtt    Objective:   VS:   Visit Vitals  BP (!) 163/83   Pulse 64   Temp 97.3 °F (36.3 °C)   Resp 14   Ht 5' 6\" (1.676 m)   Wt 62.8 kg (138 lb 6.4 oz)   SpO2 99%   BMI 22.34 kg/m²      Tmax/24hrs: Temp (24hrs), Av °F (36.7 °C), Min:97.3 °F (36.3 °C), Max:98.8 °F (37.1 °C)  IOBRIEF    Intake/Output Summary (Last 24 hours) at 2/20/2022 1511  Last data filed at 2/20/2022 1311  Gross per 24 hour   Intake 3940 ml   Output 2460 ml   Net 1480 ml       General:  Alert, cooperative, no acute distress    Pulmonary: Decreased breath sounds in bases  Cardiovascular: Regular rate and Rhythm. GI:  Soft, Non distended, Non tender. + Bowel sounds. Extremities:  No edema, cyanosis, clubbing. No calf tenderness. Neurologic: Alert and awake, lying in bed  Additional:    Medications:   Current Facility-Administered Medications   Medication Dose Route Frequency    aspirin chewable tablet 81 mg  81 mg Oral DAILY    atorvastatin (LIPITOR) tablet 40 mg  40 mg Oral DAILY    multivitamin-minerals-ferrous gluconate oral liquid 15 mL  15 mL Per NG tube DAILY    ciprofloxacin (CIPRO) 400 mg in D5W IVPB (premix)  400 mg IntraVENous Q12H    sodium chloride (NS) flush 5-40 mL  5-40 mL IntraVENous Q8H    sodium chloride (NS) flush 5-40 mL  5-40 mL IntraVENous PRN    acetaminophen (TYLENOL) tablet 650 mg  650 mg Oral Q6H PRN    Or    acetaminophen (TYLENOL) suppository 650 mg  650 mg Rectal Q6H PRN    ondansetron (ZOFRAN ODT) tablet 4 mg  4 mg Oral Q8H PRN    Or    ondansetron (ZOFRAN) injection 4 mg  4 mg IntraVENous Q6H PRN    insulin lispro (HUMALOG) injection   SubCUTAneous Q6H    glucose chewable tablet 16 g  4 Tablet Oral PRN    glucagon (GLUCAGEN) injection 1 mg  1 mg IntraMUSCular PRN    dextrose 10% infusion 125-250 mL  125-250 mL IntraVENous PRN    famotidine (PF) (PEPCID) 20 mg in 0.9% sodium chloride 10 mL injection  20 mg IntraVENous Q24H    dextrose 5 % - 0.45% NaCl infusion  75 mL/hr IntraVENous CONTINUOUS    sodium chloride (NS) flush 5-10 mL  5-10 mL IntraVENous PRN       Imaging:   XR Results (most recent):  Results from Hospital Encounter encounter on 02/12/22    XR ABD PORT  1 V    Narrative  Abdomen    HISTORY: NG tube placement. Comparison 2/15/2022.     NG tube terminates in the gastric fundus. Persistent nonspecific colonic  distention with fecal retention and stasis, persistent contrast in the colon. Atelectasis and infiltrate in left lung base. Mild vascular congestion in the  chest.    Impression  NG tube tip in the gastric fundus with additional findings as  described. CT Results (most recent):  Results from Hospital Encounter encounter on 02/12/22    CT ABD PELV WO CONT    Narrative  EXAM: CT ABDOMEN AND PELVIS WITHOUT CONTRAST    CLINICAL HISTORY/INDICATION:  sepsis , hypotensive, confusion, vomiting    COMPARISON: CT abdomen and pelvis 8/19/2016. TECHNIQUE: No intravenous contrast was utilized for this helical thin section CT  of the abdomen and pelvis. Coronal and sagittal reformations obtained. Evaluation of the solid organs, bowel wall, and vascular structures are  therefore limited. All CT scans at this facility are performed using dose optimization technique as  appropriate to a performed exam, to include automated exposure control,  adjustment of the mA and/or kV according to patient's size (including  appropriate matching for site-specific examinations), or use of iterative  reconstruction technique. FINDINGS:    Abdomen-    Mild crowding of the bronchovascular markings at the bases. Trace pericardial  effusion. The liver and spleen are normal in size and density. The biliary tree is not  dilated. The gallbladder is normal in size. Increased density within the lumen of the  gallbladder. No gallbladder wall thickening nor surrounding inflammation. Bilateral mild hydronephrosis and hydroureter which can be followed down to the  bladder base. No renal nor ureteral calculus. Normal size kidneys. Bilateral mild adrenal nodular thickening without change    Tiny amount of fluid in the paracolic gutters. The large and small bowel seem unremarkable. Pelvis -    There is no free pelvic fluid.     The pelvic viscera are unremarkable. Mild circumferential thickening of the urinary bladder wall. Suprapubic Sparrow  catheter. Small amount of air within the bladder lumen. 5 mm calculus layering  within the urinary bladder. Review of osseous structures throughout on bone window settings shows no  significant osseous pathology. Impression  Bilateral mild hydronephrosis and hydroureter without evidence of ureteral  calculus. 5 mm calculus layering within the urinary bladder. Mild circumferential urinary bladder wall thickening. This may be seen with  cystitis or mild chronic outlet obstruction. Suprapubic bladder catheter in expected position. Tiny amount of gas within the  lumen of the urinary bladder. Sludge and/or stones within the gallbladder. As clinically indicated follow-up  ultrasound of the right upper quadrant could be of benefit. Mild micronodular adrenal thickening consistent with hyperplasia without change. Report provided to the emergency department at 2317 hrs.            Labs:    Recent Results (from the past 48 hour(s))   GLUCOSE, POC    Collection Time: 02/18/22  5:49 PM   Result Value Ref Range    Glucose (POC) 256 (H) 70 - 110 mg/dL   GLUCOSE, POC    Collection Time: 02/18/22  9:20 PM   Result Value Ref Range    Glucose (POC) 208 (H) 70 - 110 mg/dL   GLUCOSE, POC    Collection Time: 02/19/22 12:14 AM   Result Value Ref Range    Glucose (POC) 138 (H) 70 - 110 mg/dL   GLUCOSE, POC    Collection Time: 02/19/22  5:59 AM   Result Value Ref Range    Glucose (POC) 167 (H) 70 - 110 mg/dL   GLUCOSE, POC    Collection Time: 02/19/22 11:16 AM   Result Value Ref Range    Glucose (POC) 222 (H) 70 - 110 mg/dL   GLUCOSE, POC    Collection Time: 02/19/22  5:53 PM   Result Value Ref Range    Glucose (POC) 193 (H) 70 - 110 mg/dL   MAGNESIUM    Collection Time: 02/19/22  7:00 PM   Result Value Ref Range    Magnesium 1.8 1.6 - 2.6 mg/dL   PHOSPHORUS    Collection Time: 02/19/22  7:00 PM   Result Value Ref Range Phosphorus 2.0 (L) 2.5 - 4.9 MG/DL   PROTHROMBIN TIME + INR    Collection Time: 02/19/22  7:00 PM   Result Value Ref Range    Prothrombin time 15.1 11.5 - 15.2 sec    INR 1.2 0.8 - 1.2     CBC W/O DIFF    Collection Time: 02/19/22  7:00 PM   Result Value Ref Range    WBC 9.6 4.6 - 13.2 K/uL    RBC 3.46 (L) 4.35 - 5.65 M/uL    HGB 9.2 (L) 13.0 - 16.0 g/dL    HCT 29.5 (L) 36.0 - 48.0 %    MCV 85.3 78.0 - 100.0 FL    MCH 26.6 24.0 - 34.0 PG    MCHC 31.2 31.0 - 37.0 g/dL    RDW 17.6 (H) 11.6 - 14.5 %    PLATELET 154 387 - 768 K/uL    MPV 10.5 9.2 - 11.8 FL    NRBC 0.0 0  WBC    ABSOLUTE NRBC 0.00 0.00 - 7.88 K/uL   METABOLIC PANEL, BASIC    Collection Time: 02/19/22  7:00 PM   Result Value Ref Range    Sodium 140 136 - 145 mmol/L    Potassium 4.6 3.5 - 5.5 mmol/L    Chloride 109 100 - 111 mmol/L    CO2 26 21 - 32 mmol/L    Anion gap 5 3.0 - 18 mmol/L    Glucose 178 (H) 74 - 99 mg/dL    BUN 24 (H) 7.0 - 18 MG/DL    Creatinine 1.62 (H) 0.6 - 1.3 MG/DL    BUN/Creatinine ratio 15 12 - 20      GFR est AA 50 (L) >60 ml/min/1.73m2    GFR est non-AA 42 (L) >60 ml/min/1.73m2    Calcium 8.4 (L) 8.5 - 10.1 MG/DL   GLUCOSE, POC    Collection Time: 02/19/22 11:24 PM   Result Value Ref Range    Glucose (POC) 183 (H) 70 - 110 mg/dL   GLUCOSE, POC    Collection Time: 02/20/22  5:18 AM   Result Value Ref Range    Glucose (POC) 206 (H) 70 - 110 mg/dL   GLUCOSE, POC    Collection Time: 02/20/22 11:28 AM   Result Value Ref Range    Glucose (POC) 206 (H) 70 - 110 mg/dL   MAGNESIUM    Collection Time: 02/20/22  1:40 PM   Result Value Ref Range    Magnesium 1.9 1.6 - 2.6 mg/dL   PHOSPHORUS    Collection Time: 02/20/22  1:40 PM   Result Value Ref Range    Phosphorus 2.5 2.5 - 4.9 MG/DL   PROTHROMBIN TIME + INR    Collection Time: 02/20/22  1:40 PM   Result Value Ref Range    Prothrombin time 14.0 11.5 - 15.2 sec    INR 1.1 0.8 - 1.2     METABOLIC PANEL, BASIC    Collection Time: 02/20/22  1:40 PM   Result Value Ref Range    Sodium 140 136 - 145 mmol/L    Potassium 4.9 3.5 - 5.5 mmol/L    Chloride 110 100 - 111 mmol/L    CO2 25 21 - 32 mmol/L    Anion gap 5 3.0 - 18 mmol/L    Glucose 222 (H) 74 - 99 mg/dL    BUN 23 (H) 7.0 - 18 MG/DL    Creatinine 1.46 (H) 0.6 - 1.3 MG/DL    BUN/Creatinine ratio 16 12 - 20      GFR est AA 57 (L) >60 ml/min/1.73m2    GFR est non-AA 47 (L) >60 ml/min/1.73m2    Calcium 8.6 8.5 - 10.1 MG/DL       Signed By: Deepika Trinidad MD     February 20, 2022      I spent 25 minutes with the patient in face-to-face consultation, of which greater than 50% was spent in counseling and coordination of care as described above    Disclaimer: Sections of this note are dictated using utilizing voice recognition software. Minor typographical errors may be present. If questions arise, please do not hesitate to contact me or call our department.

## 2022-02-20 NOTE — PROGRESS NOTES
Bedside and Verbal shift change report given to Donato Smith, RN (oncoming nurse) by Marimar Machado, RN (offgoing nurse). Report included the following information SBAR, Kardex, ED Summary, Intake/Output, MAR, Recent Results, Med Rec Status and Cardiac Rhythm NSR, Sinus nica. 2005: Pt alert, oriented to self, no distress noted, NGT At R nare, infusing and in place, suprapubic cath draining, V/S and shift assessment completed, bed locked and low position, call bell within reach    2238: Reposition pt comfortably in bed, NGT re tape    2334:  - Humalog 3 units sc given per protocol, V/S monitoring done    0008: sleeping comfortably, no distress noted, no changes from previous assessment    0209: Antibiotic administered, infusing well    0315: V/S done, TF lines/bag changed, infusing well    0413: Pt sleeping, no visual sign of distress, no changes from previous assessment    0522:  - Humalog 6 units sc given    0551: Bathed and suprapubic cath care provided, linen, pad and gown changed    Bedside and Verbal shift change report given to Marimar Machado, WILFREDO (oncoming nurse) by Donato Smith RN (offgoing nurse). Report included the following information SBAR, Kardex, ED Summary, Intake/Output, MAR, Recent Results, Med Rec Status and Cardiac Rhythm NSR, Sinus Andreachristofer Curtis.

## 2022-02-20 NOTE — PROGRESS NOTES
Problem: Pressure Injury - Risk of  Goal: *Prevention of pressure injury  Description: Document Breezy Scale and appropriate interventions in the flowsheet. Outcome: Progressing Towards Goal  Note: Pressure Injury Interventions:  Sensory Interventions: Assess changes in LOC    Moisture Interventions: Assess need for specialty bed    Activity Interventions: Pressure redistribution bed/mattress(bed type)    Mobility Interventions: HOB 30 degrees or less    Nutrition Interventions: Document food/fluid/supplement intake    Friction and Shear Interventions: Apply protective barrier, creams and emollients                Problem: Patient Education: Go to Patient Education Activity  Goal: Patient/Family Education  Outcome: Progressing Towards Goal     Problem: Falls - Risk of  Goal: *Absence of Falls  Description: Document Armen Fall Risk and appropriate interventions in the flowsheet. Outcome: Progressing Towards Goal  Note: Fall Risk Interventions:       Mentation Interventions: Bed/chair exit alarm    Medication Interventions: Bed/chair exit alarm    Elimination Interventions:  Toileting schedule/hourly rounds

## 2022-02-20 NOTE — PROGRESS NOTES
Pt at baseline, VSS, no issues. Small leak around SP catheter again, flushed to clear sediment and no further leaking occurred. IVF d/c this shift. Family present.

## 2022-02-21 NOTE — PROGRESS NOTES
[facility]  Progress Note    Patient: Umair Hanley Age: 67 y.o. : 1945 MR#: [id number] SSN: [id number]  Date/Time: 2022     Subjective:     Patient seen and evaluated, lying in bed, no acute distress. Family at bedside. Patient failed SLP eval again this morning. Continues to have silent aspiration. Family would like to proceed with comfort feeds and stop NG tube feeding    Hospital course:  Umair Hanley is a 67 y.o.  male who presented to the ED late yesterday evening for evaluation of altered mental status. Patient was found to be poorly responsive in triage and significantly hypotensive with the systolic blood pressure in the 50s. He was taken back to a room immediately for initiation of treatment. Work-up in the ED showed evidence for UTI and CT imaging showed bilateral hydronephrosis with hydroureter. There is no obvious evidence for obstruction on imaging but patient was noted to be retaining urine. A Sparrow catheter has been placed and broad-spectrum antibiotic therapy initiated. Patient did require vasopressor support initially but this is been able to be weaned. He is being referred for hospital admission for further evaluation. Patient admitted to the ICU, treated with vasopressor support and broad-spectrum IV antibiotics. Patient also received IV fluids, stabilized and transferred down to CVT stepdown. Blood cultures positive, ID consulted    Patient failed swallow eval - discussed with family , will place NGT & nutrition consulted for TF. Palliative care consulted, continue current treatment plan. Patient remains full code at this time. MRI -shows 2 small areas of restricted diffusion/infarct involving the periventricular right frontal and parietal lobes. Patient failed repeat swallow eval yesterday. Continue NG tube feeding.     Overnight events noted where patient was placed in restraints secondary to concern for pulling NG tube. Restraints have now been removed and family is currently at bedside. 2/20 -no new events overnight, patient seen and evaluated, lying in bed, no acute distress, NG tube in place and tube feeding continuing. We will attempt for repeat SLP eval in a.m. if possible. 2/21no new events overnight, addressed with family about SLP and MBS results. They would like to proceed with comfort feeds at this time. Assessment/Plan:     1. Sepsis with septic shocksecondary to UTI, continue broad-spectrum IV antibiotics. 2. Acute cystitis with pyuria and mallika pus noted from suprapubic catheter on presentationcontinue IV antibiotics, urine culture not sent, blood cultures positive for GNR, awaiting sensitivities, ID on board. ID recommended to remove R femoral CVC, repeat blood cultures are negative from 2/14  3. EMIL on CKD 3creatinine at baseline, continue to monitor. 4. Elevated troponinlikely secondary to demand ischemia  5. History of hypertensionholding home medications since patient was hypotensive on presentation. 6. Change in mental status, per family patient was able to swallow prior to him coming to the hospital but now he is unable to do that this may be related to sepsis or questionable acute infarct. 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 Continue aspirin, statin therapy. 7. MRI Brain -2 small areas of restricted diffusion/infarct involving the periventricular right frontal and parietal lobes. Added aspirin 81 mg p.o. daily and statin. 8. Repeat SLP eval done, patient continues to have silent aspiration. Family wishes to proceed with comfort feeds. DVT prophylaxisSCDs  DNR    Treatment plan discussed with patient and family at bedside  Continue TF , Nutrition on board. Attempt for repeat SLP on Monday. Continue to follow.                     Pattie Corrales MD  02/21/22      Case discussed with:  [x]Patient  [x]Family  []Nursing  []Case Management  DVT Prophylaxis:  []Lovenox  []Hep SQ  []SCDs  []Coumadin   []On Heparin gtt    Objective:   VS:   Visit Vitals  /62 (BP 1 Location: Right upper arm, BP Patient Position: Lying)   Pulse 71   Temp 98.3 °F (36.8 °C)   Resp 21   Ht 5' 6\" (1.676 m)   Wt 62.8 kg (138 lb 6.4 oz)   SpO2 99%   BMI 22.34 kg/m²      Tmax/24hrs: Temp (24hrs), Av.3 °F (36.8 °C), Min:98 °F (36.7 °C), Max:98.6 °F (37 °C)  IOBRIEF    Intake/Output Summary (Last 24 hours) at 2022 1631  Last data filed at 2022 1420  Gross per 24 hour   Intake 4074 ml   Output 1740 ml   Net 2334 ml       General:  Alert, cooperative, no acute distress    Pulmonary: Decreased breath sounds in bases  Cardiovascular: Regular rate and Rhythm. GI:  Soft, Non distended, Non tender. + Bowel sounds. Extremities:  No edema, cyanosis, clubbing. No calf tenderness.    Neurologic: Alert and awake, lying in bed  Additional:    Medications:   Current Facility-Administered Medications   Medication Dose Route Frequency    aspirin chewable tablet 81 mg  81 mg Oral DAILY    atorvastatin (LIPITOR) tablet 40 mg  40 mg Oral DAILY    multivitamin-minerals-ferrous gluconate oral liquid 15 mL  15 mL Per NG tube DAILY    ciprofloxacin (CIPRO) 400 mg in D5W IVPB (premix)  400 mg IntraVENous Q12H    sodium chloride (NS) flush 5-40 mL  5-40 mL IntraVENous Q8H    sodium chloride (NS) flush 5-40 mL  5-40 mL IntraVENous PRN    acetaminophen (TYLENOL) tablet 650 mg  650 mg Oral Q6H PRN    Or    acetaminophen (TYLENOL) suppository 650 mg  650 mg Rectal Q6H PRN    ondansetron (ZOFRAN ODT) tablet 4 mg  4 mg Oral Q8H PRN    Or    ondansetron (ZOFRAN) injection 4 mg  4 mg IntraVENous Q6H PRN    insulin lispro (HUMALOG) injection   SubCUTAneous Q6H    glucose chewable tablet 16 g  4 Tablet Oral PRN    glucagon (GLUCAGEN) injection 1 mg  1 mg IntraMUSCular PRN    dextrose 10% infusion 125-250 mL  125-250 mL IntraVENous PRN    famotidine (PF) (PEPCID) 20 mg in 0.9% sodium chloride 10 mL injection  20 mg IntraVENous Q24H    sodium chloride (NS) flush 5-10 mL  5-10 mL IntraVENous PRN       Imaging:   XR Results (most recent):  Results from Hospital Encounter encounter on 02/12/22    XR SWALLOW FUNC VIDEO    Narrative  MODIFIED BARIUM SWALLOW. CLINICAL INDICATION/HISTORY: Dysphagia. Feeding difficulties. History of CVA. Aspiration and penetration priors speech swallow studies. Re-evaluation for  dietary advancement. COMPARISON:  February 14, 2022. TECHNIQUE: A live videoradiographic swallowing function study was performed in  conjunction with the speech therapist.  The video is available in the department  for review by speech pathology and ancillary staff. Fluoroscopic images were not made available in PACS for radiologist review and  this report is strictly a procedural documentation by the physician assistant  with input from speech pathology. It is not considered inclusive or exclusive  of anatomic abnormalities and is not diagnostic beyond the specific  considerations regarding swallowing function as it relates to airway protection  while eating and drinking. Fluoroscopy time: 1 minutes 12 seconds    Fluoroscopic images: 0    Electronic capture cine loops: 6    FINDINGS:    Patient swallowed multiple consistencies of barium mixtures including honey and  pudding. There was silent aspiration of both tested consistencies. Swallow delay, premature spillage, and pharyngeal residuals were seen with both  tested consistencies. Impression  Silent aspiration of both tested consistencies. Please see speech pathologist report for additional details and recommendations.        CT Results (most recent):  Results from Hospital Encounter encounter on 02/12/22    CT ABD PELV WO CONT    Narrative  EXAM: CT ABDOMEN AND PELVIS WITHOUT CONTRAST    CLINICAL HISTORY/INDICATION:  sepsis , hypotensive, confusion, vomiting    COMPARISON: CT abdomen and pelvis 8/19/2016. TECHNIQUE: No intravenous contrast was utilized for this helical thin section CT  of the abdomen and pelvis. Coronal and sagittal reformations obtained. Evaluation of the solid organs, bowel wall, and vascular structures are  therefore limited. All CT scans at this facility are performed using dose optimization technique as  appropriate to a performed exam, to include automated exposure control,  adjustment of the mA and/or kV according to patient's size (including  appropriate matching for site-specific examinations), or use of iterative  reconstruction technique. FINDINGS:    Abdomen-    Mild crowding of the bronchovascular markings at the bases. Trace pericardial  effusion. The liver and spleen are normal in size and density. The biliary tree is not  dilated. The gallbladder is normal in size. Increased density within the lumen of the  gallbladder. No gallbladder wall thickening nor surrounding inflammation. Bilateral mild hydronephrosis and hydroureter which can be followed down to the  bladder base. No renal nor ureteral calculus. Normal size kidneys. Bilateral mild adrenal nodular thickening without change    Tiny amount of fluid in the paracolic gutters. The large and small bowel seem unremarkable. Pelvis -    There is no free pelvic fluid. The pelvic viscera are unremarkable. Mild circumferential thickening of the urinary bladder wall. Suprapubic Sparrow  catheter. Small amount of air within the bladder lumen. 5 mm calculus layering  within the urinary bladder. Review of osseous structures throughout on bone window settings shows no  significant osseous pathology. Impression  Bilateral mild hydronephrosis and hydroureter without evidence of ureteral  calculus. 5 mm calculus layering within the urinary bladder. Mild circumferential urinary bladder wall thickening. This may be seen with  cystitis or mild chronic outlet obstruction.   Suprapubic bladder catheter in expected position. Tiny amount of gas within the  lumen of the urinary bladder. Sludge and/or stones within the gallbladder. As clinically indicated follow-up  ultrasound of the right upper quadrant could be of benefit. Mild micronodular adrenal thickening consistent with hyperplasia without change. Report provided to the emergency department at 2317 hrs.            Labs:    Recent Results (from the past 48 hour(s))   GLUCOSE, POC    Collection Time: 02/19/22  5:53 PM   Result Value Ref Range    Glucose (POC) 193 (H) 70 - 110 mg/dL   MAGNESIUM    Collection Time: 02/19/22  7:00 PM   Result Value Ref Range    Magnesium 1.8 1.6 - 2.6 mg/dL   PHOSPHORUS    Collection Time: 02/19/22  7:00 PM   Result Value Ref Range    Phosphorus 2.0 (L) 2.5 - 4.9 MG/DL   PROTHROMBIN TIME + INR    Collection Time: 02/19/22  7:00 PM   Result Value Ref Range    Prothrombin time 15.1 11.5 - 15.2 sec    INR 1.2 0.8 - 1.2     CBC W/O DIFF    Collection Time: 02/19/22  7:00 PM   Result Value Ref Range    WBC 9.6 4.6 - 13.2 K/uL    RBC 3.46 (L) 4.35 - 5.65 M/uL    HGB 9.2 (L) 13.0 - 16.0 g/dL    HCT 29.5 (L) 36.0 - 48.0 %    MCV 85.3 78.0 - 100.0 FL    MCH 26.6 24.0 - 34.0 PG    MCHC 31.2 31.0 - 37.0 g/dL    RDW 17.6 (H) 11.6 - 14.5 %    PLATELET 582 702 - 460 K/uL    MPV 10.5 9.2 - 11.8 FL    NRBC 0.0 0  WBC    ABSOLUTE NRBC 0.00 0.00 - 5.88 K/uL   METABOLIC PANEL, BASIC    Collection Time: 02/19/22  7:00 PM   Result Value Ref Range    Sodium 140 136 - 145 mmol/L    Potassium 4.6 3.5 - 5.5 mmol/L    Chloride 109 100 - 111 mmol/L    CO2 26 21 - 32 mmol/L    Anion gap 5 3.0 - 18 mmol/L    Glucose 178 (H) 74 - 99 mg/dL    BUN 24 (H) 7.0 - 18 MG/DL    Creatinine 1.62 (H) 0.6 - 1.3 MG/DL    BUN/Creatinine ratio 15 12 - 20      GFR est AA 50 (L) >60 ml/min/1.73m2    GFR est non-AA 42 (L) >60 ml/min/1.73m2    Calcium 8.4 (L) 8.5 - 10.1 MG/DL   GLUCOSE, POC    Collection Time: 02/19/22 11:24 PM   Result Value Ref Range    Glucose (POC) 183 (H) 70 - 110 mg/dL   GLUCOSE, POC    Collection Time: 02/20/22  5:18 AM   Result Value Ref Range    Glucose (POC) 206 (H) 70 - 110 mg/dL   GLUCOSE, POC    Collection Time: 02/20/22 11:28 AM   Result Value Ref Range    Glucose (POC) 206 (H) 70 - 110 mg/dL   MAGNESIUM    Collection Time: 02/20/22  1:40 PM   Result Value Ref Range    Magnesium 1.9 1.6 - 2.6 mg/dL   PHOSPHORUS    Collection Time: 02/20/22  1:40 PM   Result Value Ref Range    Phosphorus 2.5 2.5 - 4.9 MG/DL   PROTHROMBIN TIME + INR    Collection Time: 02/20/22  1:40 PM   Result Value Ref Range    Prothrombin time 14.0 11.5 - 15.2 sec    INR 1.1 0.8 - 1.2     METABOLIC PANEL, BASIC    Collection Time: 02/20/22  1:40 PM   Result Value Ref Range    Sodium 140 136 - 145 mmol/L    Potassium 4.9 3.5 - 5.5 mmol/L    Chloride 110 100 - 111 mmol/L    CO2 25 21 - 32 mmol/L    Anion gap 5 3.0 - 18 mmol/L    Glucose 222 (H) 74 - 99 mg/dL    BUN 23 (H) 7.0 - 18 MG/DL    Creatinine 1.46 (H) 0.6 - 1.3 MG/DL    BUN/Creatinine ratio 16 12 - 20      GFR est AA 57 (L) >60 ml/min/1.73m2    GFR est non-AA 47 (L) >60 ml/min/1.73m2    Calcium 8.6 8.5 - 10.1 MG/DL   GLUCOSE, POC    Collection Time: 02/20/22  6:00 PM   Result Value Ref Range    Glucose (POC) 223 (H) 70 - 110 mg/dL   GLUCOSE, POC    Collection Time: 02/20/22 11:49 PM   Result Value Ref Range    Glucose (POC) 161 (H) 70 - 110 mg/dL   CBC W/O DIFF    Collection Time: 02/21/22  5:11 AM   Result Value Ref Range    WBC 9.8 4.6 - 13.2 K/uL    RBC 3.62 (L) 4.35 - 5.65 M/uL    HGB 9.8 (L) 13.0 - 16.0 g/dL    HCT 30.3 (L) 36.0 - 48.0 %    MCV 83.7 78.0 - 100.0 FL    MCH 27.1 24.0 - 34.0 PG    MCHC 32.3 31.0 - 37.0 g/dL    RDW 17.1 (H) 11.6 - 14.5 %    PLATELET 665 530 - 240 K/uL    MPV 10.7 9.2 - 11.8 FL    NRBC 0.0 0  WBC    ABSOLUTE NRBC 0.00 0.00 - 5.33 K/uL   METABOLIC PANEL, BASIC    Collection Time: 02/21/22  5:11 AM   Result Value Ref Range    Sodium 141 136 - 145 mmol/L    Potassium 4.6 3.5 - 5.5 mmol/L    Chloride 107 100 - 111 mmol/L    CO2 32 21 - 32 mmol/L    Anion gap 2 (L) 3.0 - 18 mmol/L    Glucose 162 (H) 74 - 99 mg/dL    BUN 23 (H) 7.0 - 18 MG/DL    Creatinine 1.47 (H) 0.6 - 1.3 MG/DL    BUN/Creatinine ratio 16 12 - 20      GFR est AA 56 (L) >60 ml/min/1.73m2    GFR est non-AA 46 (L) >60 ml/min/1.73m2    Calcium 8.9 8.5 - 10.1 MG/DL   GLUCOSE, POC    Collection Time: 02/21/22  6:35 AM   Result Value Ref Range    Glucose (POC) 168 (H) 70 - 110 mg/dL   GLUCOSE, POC    Collection Time: 02/21/22 12:02 PM   Result Value Ref Range    Glucose (POC) 162 (H) 70 - 110 mg/dL       Signed By: Joey Brunson MD     February 21, 2022      I spent 25 minutes with the patient in face-to-face consultation, of which greater than 50% was spent in counseling and coordination of care as described above    Disclaimer: Sections of this note are dictated using utilizing voice recognition software. Minor typographical errors may be present. If questions arise, please do not hesitate to contact me or call our department.

## 2022-02-21 NOTE — DIABETES MGMT
Diabetes/ Glycemic Control Plan of Care    Recommendations: recommend low dose of lantus 4 units daily. Assessment:  TF remain at goal.  Receiving corrective humalog - required 21 units yesterday. Recommend low dose of basal  Will continue to monitor    DX:   1. Sepsis, due to unspecified organism, unspecified whether acute organ dysfunction present ([facility] [address]     2. Shock ([facility] [address]     3. Acute encephalopathy     4. Goals of care, counseling/discussion     5. Altered mental status, unspecified altered mental status type     6. CVA, old, alterations of sensations     7. Debility        Fasting/ Morning blood glucose:   Lab Results   Component Value Date/Time    Glucose 162 (H) 02/21/2022 05:11 AM    Glucose (POC) 168 (H) 02/21/2022 06:35 AM     IV Fluids containing dextrose: none  Steroids: none    Blood glucose values: Within target range (70-180mg/dL):  no    Current insulin orders:   Corrective humalog, very insulin resistant, every 6 hours    Total Daily Dose previous 24 hours = 21 units humalog    Current A1c:   Lab Results   Component Value Date/Time    Hemoglobin A1c 6.3 (H) 02/13/2022 04:40 AM      equivalent  to ave Blood Glucose of  134 mg/dl for 2-3 months prior to admission  Adequate glycemic control PTA: yes    Nutrition/Diet: NPO  Jevity 1.5 at 45 mL/hr with 150 mL q 4 hour water flushes     Home diabetes medications:   Key Antihyperglycemic Medications             insulin lispro (HUMALOG) 100 unit/mL injection For Blood Sugar (mg/dL) of:     Less than 150=0 units           150 -199=2 units  200 -249=4 units  250 -299=6 units  300 -349=8 units  350 and above=10 units  Check blood glucose 3 times daily and at bedtime and inject insulin per SS. Plan/Goals:   Blood glucose will be within target of 70 - 180 mg/dl within 72 hours  Reinforce dietary and medication compliance at home.         Education:  [] Refer to Diabetes Education Record                       [x] Education not indicated at this time Chadwick Runner MPH RN 1 [facility]  Pager [phone]  Office [phone]

## 2022-02-21 NOTE — PROGRESS NOTES
0800:  Report received, care assumed. Complete assessment performed. In bed. Awake, alert. Follow command x4. Denies pain, SOB or discomforts. HOB elevated 30degree; full fall and aspiration precautions in effect. Bed pad wet with urine; unable to assess where urine is coming from at this time; will continue to assess. Skin cleansed, pads and linens changed. Repositioned. Cooperative with care. No family at side. 1030:  Report from PT OT providers pt incontinent with urine from penis. Son at side states sometimes at home pt is wet with urine from penis. Will notify MD provider today. Skin cleansed and linens changed by PT, OT providers. To Radiology for Swallow Eval via stretcher, HOB elevated, TF held. 1200:  Returned to room. Linens wet. Skin cleansed. Linens changed. Repositioned. HOB elevated 30degree. Repositioned. Tube feeds resumed after placement verified. Family at side. 1300:  Dr Ellen Altamirano attending on rounds, informed of urine leakage from penis, condom cath applied. MD ordered Tube Feed rate decreased from 45cc/hr to 25cc/hr. Will page MD per his request when family at bedside. 1445:  Dr Ellen Altamirano pagetraci, spoke with patient's son Sara Chadwick via phone. 1615:  No further incontinence/ wet linens since 1200 today. Condom cath is dry/ empty. Suprapubic cath draining appropriately into collection bag. This RN received call from Dr Ellen Altamirano that family has decided on Comfort Feedings. MD will place order to d/c NGT and tube feeds. Oral diet to be ordered. 1645:  New diet orders received. New d/c NGT order and d/c NPO order received. Kitchen called for new diet food. 1700:  NGT removed without difficulty. Await dinner tray. 1830Deluisana Hoyt, RN feeding patient dinner tray Dysphagia Minced and Moist. HOB remains elevated. Monitor. No leakage noted at Suprapubic cath. Small amount clear yellow urine collected in Condom Cath collection bag. Linens remain clean and dry.    1900:  Shift report given to [facility] with bedside rounds.

## 2022-02-21 NOTE — PROGRESS NOTES
Chart reviewed. Pt remains on NG tube feeding. CM will continue to follow and assist with d/c plan when appropriate.               Collin Payton BSN RN  Care Management  Pager: [phone]

## 2022-02-21 NOTE — PROGRESS NOTES
Repeat SLP eval with MBS this a.m., patient continues to have silent aspiration. Discussed with son and daughter. Explained to them about possible PEG tube feeding versus comfort feeding given patient's current medical problems. They would like to proceed with comfort feeds at this time, will remove NG tube and stop tube feeding.

## 2022-02-21 NOTE — PROGRESS NOTES
Problem: Mobility Impaired (Adult and Pediatric)  Goal: *Acute Goals and Plan of Care (Insert Text)  Description: Physical Therapy Goals  Initiated 2/18/2022 and to be accomplished within 7 day(s)  1. Patient will move from supine to sit and sit to supine , scoot up and down, and roll side to side in bed with moderate assistance . 2.  Patient will transfer from bed to chair and chair to bed with moderate assistance  using the least restrictive device. 3.  Patient will perform sit to stand with moderate assistance . 4. Patient will ambulate with moderate assistance  for 5 feet with the least restrictive device. 5.  Patient will perform BLE therex as prescribed to tolerance     PLOF: Pt lives with son in a 1 SH with 3-4 BRUNILDA, pt son in primary caregiver who assists with all mobility, reports he can walk short distances with walker but primarily uses wc for mobility. Son plans to get a ramp for the house     Outcome: Progressing Towards Goal   PHYSICAL THERAPY TREATMENT    Patient: Jazmyn Nunn (49 y.o. male)  Date: 2/21/2022  Diagnosis: Sepsis ([facility] [address] [A41.9] <principal problem not specified>       Precautions: Fall,Skin    ASSESSMENT:  Pt cleared to participate in PT session, pt received semi-reclined in bed and agreeable to therapy session, son at bedside. Completing with OT to maximize safety and mobility. Per son he is helping 75% of transfers. MaxA for supine to sit, pt nonverbal but nodding/shaking head to yes/no questions. Pt  sitting on EOB with intermittent support, posterior trunk lean. Standing with modAx2, large amount of urine spilling onto the floor, unsure if from penis or suprapubic catheter, RN notified. Pt agreeable to transitioning into recliner but transport arrived to take pt off floor. Pt returned to supine with maxA. TotalA to scoot to stretcher. Pt positioned for comfort, being wheeled off floor by transport.      Pt may be at baseline, unsure if family can continue to care for pt at home. May benefit from SNF/LTC     Progression toward goals:   []      Improving appropriately and progressing toward goals  [x]      Improving slowly and progressing toward goals  []      Not making progress toward goals and plan of care will be adjusted     PLAN:  Patient continues to benefit from skilled intervention to address the above impairments. Continue treatment per established plan of care. Discharge Recommendations:  Edenilson Hoffmann  Further Equipment Recommendations for Discharge:  hospital bed, slideboard. SUBJECTIVE:   Pt nonverbal this session     OBJECTIVE DATA SUMMARY:   Critical Behavior:  Neurologic State: Alert  Orientation Level: Oriented to person  Cognition: Follows commands  Safety/Judgement: Fall prevention  Functional Mobility Training:  Bed Mobility:     Supine to Sit: Maximum assistance  Sit to Supine: Maximum assistance  Scooting: Maximum assistance    Transfers:  Sit to Stand: Moderate assistance;Assist x2  Stand to Sit: Moderate assistance;Assist x2    Balance:  Sitting: Impaired; With support  Sitting - Static: Fair (occasional)  Sitting - Dynamic: Fair (occasional)  Standing: Impaired; With support  Standing - Static: Fair      Posture:   Posture (WDL): Exceptions to WDL   Posture Assessment:  (trunk sway)      Pain:  Pain level pre-treatment: 0/10  Pain level post-treatment: 0/10   FLACC     Activity Tolerance:   Fair activity tolerance     Please refer to the flowsheet for vital signs taken during this treatment. After treatment:   [] Patient left in no apparent distress sitting up in chair  [x] Patient left in no apparent distress in bed  [] Call bell left within reach  [x] Nursing notified  [] Caregiver present  [] Bed alarm activated  [] SCDs applied      COMMUNICATION/EDUCATION:   [x]         Role of Physical Therapy in the acute care setting. [x]         Fall prevention education was provided and the patient/caregiver indicated understanding.   [x] Patient/family have participated as able in working toward goals and plan of care. [x]         Patient/family agree to work toward stated goals and plan of care. []         Patient understands intent and goals of therapy, but is neutral about his/her participation.   []         Patient is unable to participate in stated goals/plan of care: ongoing with therapy staff.  []         Other:        Slainas Martinez, PT   Time Calculation: 29 mins

## 2022-02-21 NOTE — PALLIATIVE CARE
[address] [facility] [phone]  [facility] [phone]    This [facility] made telepohone contact with pt's son, Valeria Holden. At [phone] to set up family meeting to review pt's condition and address goals of care. Meeting scheduled for Tuesday, February 22, 0900 hr. Thank you for this referral to Palliative Care. The Palliative care team remains available to provide support for pt and his family and to address goals of care, as required.      Clarissa Baum, [address] [facility]  Palliative Medicine Inpatient   DR. KIM[facility]  Palliative COPE Line: [phone])

## 2022-02-21 NOTE — PROGRESS NOTES
Speech Pathology:     MBS completed with recs of NPO with NG. May want to consider a more permanent means of nutrition/hydration vs comfort feeds. Full report to follow.      Thank you for this referral.    Fermin Eastman M.S. CCC-SLP/L  Speech-Language Pathologist

## 2022-02-21 NOTE — PROGRESS NOTES
Bedside and Verbal shift change report given to Linda Gomez RN (oncoming nurse) by Litzy Galindo RN (offgoing nurse). Report included the following information SBAR, Kardex, ED Summary, Intake/Output, MAR, Recent Results, Med Rec Status and Cardiac Rhythm NSR, Sinus Segun Car. 2003:Pt sleeping, easily awaken, oriented to self, Disoriented to time, place and situation, V/S done, shift assessment completed, bed alarm on    Bedside and Verbal shift change report given to Jai Langley RN (oncoming nurse) by Linda Gomez RN (offgoing nurse). Report included the following information SBAR, Kardex, ED Summary, Procedure Summary, Intake/Output, MAR, Recent Results, Med Rec Status and Cardiac Rhythm NSR, Sinus Sgeun Car.

## 2022-02-21 NOTE — PROGRESS NOTES
Problem: Self Care Deficits Care Plan (Adult)  Goal: *Acute Goals and Plan of Care (Insert Text)  Description: Occupational Therapy Goals  Initiated 2/18/2022 within 7 day(s). 1.  Patient will perform self-feeding with supervision/set-up using adaptive equipment pending SLP recs. 2.  Patient will perform bilateral grooming tasks with supervision/set-up. 3.  Patient will perform upper body dressing with minimal assistance/contact guard assist.  4.  Patient will perform bed mobility in preparation for selfcare with moderate assistance x 1.  5.  Patient will perform functional activity sitting EOB with supervision/set-up, F+ balance for 6 - 8 minutes. 6.  Patient will participate in upper extremity therapeutic exercise/activities with supervision/set-up for 8-10 minutes to increase ROM/strength for self-care. 7.  Patient will perform toilet transfer with moderate assistance. Prior Level of Function: Patient needed assist with self-care and functional transfers to wheelchair PTA from son. Son reports patient was able to feed self, assist with bathing minimally but has needed increased assistance lately. Outcome: Progressing Towards Goal   OCCUPATIONAL THERAPY TREATMENT    Patient: Umair Hanley (3 y.o. male)  Date: 2/21/2022  Diagnosis: Sepsis ([facility] [address] [A41.9] <principal problem not specified>       Precautions: Fall,Skin  PLOF: Patient needed assist with self-care and functional transfers to wheelchair PTA from son. Son reports patient was able to feed self, assist with bathing minimally but has needed increased assistance lately. Chart, occupational therapy assessment, plan of care, and goals were reviewed. ASSESSMENT:  PT co tx to maximize pt safety and participation. Pt presented supine in bed upon therapist arrival, son present, and agreeable for participation. Pt assisted to EOB from supine with MAX A in prep for functional task.  Once sitting, he demo F balance with occasional posterior lean requiring 1 UE support on bed to maintain balance for ~ 10 mins. STS transfer MOD A X 2 with RW in prep for toilet transfers. Observed that when pt was in stance he had large amt of urine spill on floor, unsure if from penis or catheter, RN made aware. Transport arriving in room for pt. Pt was returned back to supine MAX A  and required TOTAL A to scoot onto stretcher. Pt left with transport and RN aware. Progression toward goals:  []          Improving appropriately and progressing toward goals  [x]          Improving slowly and progressing toward goals  []          Not making progress toward goals and plan of care will be adjusted     PLAN:  Patient continues to benefit from skilled intervention to address the above impairments. Continue treatment per established plan of care. Discharge Recommendations: SNF  Further Equipment Recommendations for Discharge:  hospital bed     SUBJECTIVE:   Pt nonverbal but able to shake head to yes/no questions. OBJECTIVE DATA SUMMARY:   Cognitive/Behavioral Status:  Neurologic State: Alert  Orientation Level: Oriented to person  Cognition: Follows commands  Safety/Judgement: Fall prevention    Functional Mobility and Transfers for ADLs:   Bed Mobility:     Supine to Sit: Maximum assistance  Sit to Supine: Maximum assistance  Scooting: Maximum assistance   Transfers:  Sit to Stand: Moderate assistance;Assist x2  Stand to Sit: Moderate assistance;Assist x2     Balance:  Sitting: Impaired; With support  Sitting - Static: Fair (occasional)  Sitting - Dynamic: Fair (occasional)  Standing: Impaired; With support  Standing - Static: Fair    Pain:  Pain level pre-treatment: 0/10   Pain level post-treatment: 0/10    Activity Tolerance:    Fair   Please refer to the flowsheet for vital signs taken during this treatment.   After treatment:   []  Patient left in no apparent distress sitting up in chair  [x]  Patient left in no apparent distress in bed  [x]  Call bell left within reach  [x]  Nursing notified  []  Caregiver present  []  Bed alarm activated    COMMUNICATION/EDUCATION:   [x] Role of Occupational Therapy in the acute care setting  [] Home safety education was provided and the patient/caregiver indicated understanding. [x] Patient/family have participated as able in working towards goals and plan of care. [x] Patient/family agree to work toward stated goals and plan of care. [] Patient understands intent and goals of therapy, but is neutral about his/her participation. [] Patient is unable to participate in goal setting and plan of care.       Thank you for this referral.  JULIET Wooten  Time Calculation: 29 mins

## 2022-02-22 NOTE — PROGRESS NOTES
0800:  Report received, care assumed. In bed. HOB elevated. NSR. VSS. Skin clean, warm, dry. Complete assessment performed. Denies pain. Mouth care. Repositioned. 0900:  Staff assisted with comfort feed breakfast tray. Palliative Care team here for family conference meeting. 1818:  Son has arrived in patients room. Palliative Care team informed, they will return to room for family conference. 2292: Son leaving bedside after family conference with [facility]. No new orders received. Monitor. 1315:  Son at side. Pt awakened to call of name. Lunch fed to pt by staff and son. 1530:  Bedside and Verbal shift change report given to WILFREDO Grififn (oncoming nurse) by Rosetta Orona RN(offgoing nurse). Report included the following information SBAR, Kardex, Intake/Output, MAR, Accordion, Recent Results, Cardiac Rhythm NSR. and Alarm Parameters .

## 2022-02-22 NOTE — ACP (ADVANCE CARE PLANNING)
[address] [facility] [phone]  DR. [facility] [place] Marty Buckner, NP and this LMSW consulted to meet with pt's son again. Per MD, pt's family has decided to take pt home on hospice. NP and LMSW met with pt's son, Jesus Landry., to discuss family's decision and to complete documentation. Pt's son, reports he has spoken with his sister and they have to take pt home on hospice measures. He reports they would like to take him home tomorrow. LMSW assisted with completion of POST. LMSW notified Dilip Mike CM of family's desire to take pt home tomorrow on Hospice, as soon as possible. Pt will continue to be treated with same level of care, while he remains hospitalized; however, there will be no escalation of care. Anticipate pt will DC home tomorrow with hospice support. Thank you for this referral to Palliative Care. Goals of care have been addressed at this time. CODE STATUS:  DNR/DNI    Advanced Steps Advance Care Planning Conversation  [facility] (Physician Orders for Scope of Treatment)       Date of conversation:  02/22/2022 Location:  [facility]   Length (minutes): 20     Participants:   [x] Other Surrogate Decision Maker / Next of Kin    Name: Jesus Landry.     Relationship to Patient: Son    Phone Number: [phone]       Advanced Steps® ACP Facilitator: Mychal Marquez LMSW      Conversation Topics   (If Patient does not have decision making capacity, Agent/Surrogate responds based on understanding of how patient would respond if capable)    Understanding of Medical Condition/s AND Potential Complications:    Patient response: Unable to participate due to medical condition   Healthcare Agent/Other Surrogate: Pt's son verbalized understanding of pt's medical condition and potential complications thereof.      Lesson [place] from Experiences Related to Serious Illness: Pt would not want to live in current medical condition for long period of time. Identifies the following as important for living well: Being home with family. Hopes: Pt will not suffer    Worries/Fears about Medical Condition: Pt will suffer. Sources of support/comfort described as: Family     Cultural, Holiness, spiritual, or personal beliefs described as: None stated. Needs to discuss with spiritual/Holiness advisor: [] Yes  [x] No    Needs more information about illness and complications:  [] Yes  [x] No      Cardiopulmonary Resuscitation      \"What do you understand about CPR? \" Response: Would not be of benefit.      Order Elected for CPR:  []  Attempt Resuscitation [x]  Do Not Attempt Resuscitation      When NOT in Cardiopulmonary Arrest, Order Elected:      [x] Comfort Measures (AT [address] [place] [address])  [] Limited Additional Interventions  [] Full Interventions    Artificially Administered Nutrition, Order Elected:    [x] No Feeding Tube   [] Feeding Tube for a defined trial period  [] Feeding Tube long-term if indicated    Meeting Outcomes:   [x] ACP discussion completed   [x] Isabella form completed  [x] Isabella prepared for Provider review and signature   [x] Original placed on Chart, if in facility (form to be sent with patient at discharge)  [x] Copy given to healthcare agent    [x] Copy scanned to electronic medical record    Sabi Suarez, [address] [facility]   Jaswinder Lujan 76: [phone]

## 2022-02-22 NOTE — PROGRESS NOTES
Infectious Disease progress Note        Reason: Sepsis, gram-negative bloodstream infection    Current abx Prior abx   Cefepime since 2/12/2022-2/15/22  Ciprofloxacin since 2/15/22  levofloxacin, vancomycin 2/12-2/13     Lines:   Right femoral cvc since 2/13/22-2/15/22    Assessment :     67 y.o. male who is seen in consultation as referred by Sole Guzman  for UTI, retained SP catheter, bilateral hydronephrosis, bladder stones, prostate cancer, urinary retention managed with SP tube  presented to ED on 2/12/22 for hypotension, confusion, foul smelling urine, brown colored urine. Patient seen in ED on 2/11 for blocked SP catheter, this was irrigated and patient was discharged home. clnical presentation c/w septic shock - POA due to proteus bloodstream infection (positive blood culture 2/12, negative blood culture 2/14),  Urinary retention, complicated UTI  S/p catheter exchange in ED per urology    Resolved hydronephrosis noted on renal ultrasound 2/15/2022    Most likely source of Proteus bloodstream infection is complicated urinary tract infection. Unfortunately no urine culture sent on admission. Urine culture sent on 2/14 could be false negative due to prior antibiotics. Altered mentation-likely metabolic encephalopathy-improving    Acute on chronic kidney disease-likely due to sepsis, volume depletion-improving    Recommendations:   - continue ciprofloxacin till 2/24/22  -Await family's decisions regarding goals of care  -Monitor clinically  -Follow-up urology recommendations regarding leakage of urine per urethra     Above plan was discussed in details with primary team, RN. Please call me if any further questions or concerns. Will continue to participate in the care of this patient. HPI:    Patient was nonverbal at the time of my evaluation.     Past Medical History:   Diagnosis Date    Cataract     both    Colon polyp 9-2004    Diabetes (HCC)     IDDM    DJD (degenerative joint disease) of knee  ED (erectile dysfunction)     Sparrow catheter in place     GERD (gastroesophageal reflux disease) 12-01-08    Glaucoma suspect     Gout, unspecified 10-25-06    Hyperlipidemia     Hypertension     Insomnia 7-20-06    Phimosis 6-16-00    Prostate cancer ([facility] [address] 3-3-09    Stroke [facility]) 2007    weakness right side       Past Surgical History:   Procedure Laterality Date    HX POLYPECTOMY  9-2004    [place] COLONOSCOPY FLX DX W/COLLJ SPEC WHEN PFRMD  9/1/2004    polyp/Dr Alvarado    [place] COLONOSCOPY FLX DX W/COLLJ SPEC WHEN PFRMD  9-2007    incomplete; Dr. Renea Atkins       Current Discharge Medication List      CONTINUE these medications which have NOT CHANGED    Details   carvediloL (COREG) 12.5 mg tablet Take 1 Tablet by mouth two (2) times a day. THIS REFILL IS  APPROVED. APPOINTMENT WILL BE REQUIRED BEFORE NEXT REFILL IS  APPROVED. Qty: 180 Tablet, Refills: 0    Associated Diagnoses: Essential hypertension      allopurinoL (ZYLOPRIM) 100 mg tablet Take 1 Tablet by mouth two (2) times a day. THIS REFILL IS  APPROVED. APPOINTMENT WILL BE REQUIRED BEFORE NEXT REFILL IS  APPROVED. Qty: 180 Tablet, Refills: 0    Associated Diagnoses: Idiopathic gout, unspecified chronicity, unspecified site      levoFLOXacin (LEVAQUIN) 500 mg tablet Take 1 Tablet by mouth daily.   Qty: 7 Tablet, Refills: 0      rosuvastatin (CRESTOR) 20 mg tablet TAKE 1 TABLET BY MOUTH NIGHTLY  Qty: 90 Tab, Refills: 0    Associated Diagnoses: Hypercholesterolemia      Insulin Needles, Disposable, (BD Ultra-Fine Mini Pen Needle) 31 gauge x 3/16\" ndle INJECT 3 TIMES A DAY BEFORE MEALS E11.9  Qty: 100 Pen Needle, Refills: 5    Associated Diagnoses: Diabetes mellitus with no complication (HCC)      insulin lispro (HUMALOG) 100 unit/mL injection For Blood Sugar (mg/dL) of:     Less than 150=0 units           150 -199=2 units  200 -249=4 units  250 -299=6 units  300 -349=8 units  350 and above=10 units  Check blood glucose 3 times daily and at bedtime and inject insulin per SS. Qty: 1 Vial, Refills: 0      cholecalciferol, VITAMIN D3, (VITAMIN D3) 5,000 unit tab tablet Take 1 Tab by mouth every seven (7) days. Qty: 30 Tab, Refills: 2      aspirin delayed-release 81 mg tablet Take 81 mg by mouth daily. glucose 4 gram chewable tablet Take 4 Tabs by mouth as needed. Qty: 50 Tab, Refills: 0      omega-3 fatty acids-vitamin e (FISH OIL) 1,000 mg Cap Take 1,400 mg by mouth daily.              Current Facility-Administered Medications   Medication Dose Route Frequency    aspirin chewable tablet 81 mg  81 mg Oral DAILY    atorvastatin (LIPITOR) tablet 40 mg  40 mg Oral DAILY    multivitamin-minerals-ferrous gluconate oral liquid 15 mL  15 mL Per NG tube DAILY    ciprofloxacin (CIPRO) 400 mg in D5W IVPB (premix)  400 mg IntraVENous Q12H    sodium chloride (NS) flush 5-40 mL  5-40 mL IntraVENous Q8H    sodium chloride (NS) flush 5-40 mL  5-40 mL IntraVENous PRN    acetaminophen (TYLENOL) tablet 650 mg  650 mg Oral Q6H PRN    Or    acetaminophen (TYLENOL) suppository 650 mg  650 mg Rectal Q6H PRN    ondansetron (ZOFRAN ODT) tablet 4 mg  4 mg Oral Q8H PRN    Or    ondansetron (ZOFRAN) injection 4 mg  4 mg IntraVENous Q6H PRN    insulin lispro (HUMALOG) injection   SubCUTAneous Q6H    glucose chewable tablet 16 g  4 Tablet Oral PRN    glucagon (GLUCAGEN) injection 1 mg  1 mg IntraMUSCular PRN    dextrose 10% infusion 125-250 mL  125-250 mL IntraVENous PRN    famotidine (PF) (PEPCID) 20 mg in 0.9% sodium chloride 10 mL injection  20 mg IntraVENous Q24H    sodium chloride (NS) flush 5-10 mL  5-10 mL IntraVENous PRN       Allergies: Bactrim [sulfamethoxazole-trimethoprim]    Family History   Problem Relation Age of Onset    Hypertension Mother     Diabetes Father     Cancer Father         prostate    Heart Disease Father 70        CABG    Hypertension Sister     Hypertension Brother     Diabetes Brother      Social History     Socioeconomic History    Marital status:      Spouse name: Not on file    Number of children: Not on file    Years of education: Not on file    Highest education level: Not on file   Occupational History    Not on file   Tobacco Use    Smoking status: Former Smoker    Smokeless tobacco: Never Used   Vaping Use    Vaping Use: Never used   Substance and Sexual Activity    Alcohol use: No    Drug use: No    Sexual activity: Never   Other Topics Concern    Not on file   Social History Narrative    Not on file     Social Determinants of Health     Financial Resource Strain:     Difficulty of Paying Living Expenses: Not on file   Food Insecurity:     Worried About [facility] in the Last Year: Not on file    Redd of Food in the Last Year: Not on file   Transportation Needs:     Lack of Transportation (Medical): Not on file    Lack of Transportation (Non-Medical):  Not on file   Physical Activity:     Days of Exercise per Week: Not on file    Minutes of Exercise per Session: Not on file   Stress:     Feeling of Stress : Not on file   Social Connections:     Frequency of Communication with Friends and Family: Not on file    Frequency of Social Gatherings with Friends and Family: Not on file    Attends Jehovah's witness Services: Not on file    Active Member of [address] [place] [address] [employer] or Organizations: Not on file    Attends Club or Organization Meetings: Not on file    Marital Status: Not on file   Intimate Partner Violence:     Fear of Current or Ex-Partner: Not on file    Emotionally Abused: Not on file    Physically Abused: Not on file    Sexually Abused: Not on file   Housing Stability:     Unable to Pay for Housing in the Last Year: Not on file    Number of [place] in the Last Year: Not on file    Unstable Housing in the Last Year: Not on file     Social History     Tobacco Use   Smoking Status Former Smoker   Smokeless Tobacco Never Used        Temp (24hrs), Av.6 °F (37 °C), Min:98 °F (36.7 °C), Max:99.3 °F (37.4 °C)    Visit Vitals  BP (!) 128/51 (BP 1 Location: Right upper arm, BP Patient Position: At rest)   Pulse 71   Temp 98.6 °F (37 °C)   Resp 28   Ht 5' 6\" (1.676 m)   Wt 64.7 kg (142 lb 9.6 oz)   SpO2 96%   BMI 23.02 kg/m²       ROS: unable to obtain due to patient factors    Physical Exam:    General:          laying on bed, alert, non verbal  HEENT:           Head NCAT.  sclerae anicteric. Neck:               Supple, trachea midline. Lungs:             Clear, no wheezes. Effort nonlabored, no accessory muscle use. Chest wall:      No chest wall tenderness. Chest expansion equal and symmetrical bilaterally. Heart:              RRR. Abdomen:        Soft, NT/ND. Bowel sounds normoactive. SP catheter in place lower abdomen. [place] cath in place  Extremities:     Warm, no edema. No evidence for ischemia. Skin:                Not pale. Not Jaundiced  No rashes   Psych:             Mood normal.  Calm, no agitation. Neurologic:      sleepy. Non verbal.  Does not follow commands. Moves extremities spontaneously.       Labs: Results:   Chemistry Recent Labs     02/21/22  0511 02/20/22  1340 02/19/22  1900   * 222* 178*    140 140   K 4.6 4.9 4.6    110 109   CO2 32 25 26   BUN 23* 23* 24*   CREA 1.47* 1.46* 1.62*   CA 8.9 8.6 8.4*   AGAP 2* 5 5   BUCR 16 16 15      CBC w/Diff Recent Labs     02/22/22  0510 02/21/22  0511 02/19/22  1900   WBC 10.5 9.8 9.6   RBC 3.52* 3.62* 3.46*   HGB 9.4* 9.8* 9.2*   HCT 29.3* 30.3* 29.5*    315 245      Microbiology No results for input(s): CULT in the last 72 hours. RADIOLOGY:    All available imaging studies/reports in [facility] for this admission were reviewed      Disclaimer: Sections of this note are dictated utilizing voice recognition software, which may have resulted in some phonetic based errors in grammar and contents.  Even though attempts were made to correct all the mistakes, some may have been missed, and remained in the body of the document. If questions arise, please contact our department.     Dr. Hamlet Christianson, Infectious Disease Specialist  [phone]  February 22, 2022  7:45 AM

## 2022-02-22 NOTE — PROGRESS NOTES
[facility]: [phone]  [facility]: [phone]     Patient Name: Juan Alberto Owens  YOB: 1945    Date of progress note : 2/22/22  Reason for Consult: establish goals of care  Requesting Provider: Roberto Reyna MD   Primary Care Physician: Tracie Frank MD      SUMMARY:   Juan Alberto Owens is a 67 y.o. male with a past history of CKD 3, DM2, HTN, CVA in 2008, who was admitted on 2/12/2022 from home with a diagnosis of altered mental status and UTI. Current medical issues leading to Palliative Medicine involvement include: establish goals of care. CHIEF COMPLAINT: altered mental status    HPI/SUBJECTIVE:    Pt is a very debilitated AAM that lives at home with his son who is his primary caregiver. Pt was admitted through the ED from home for altered mental status and found to have significant hypotensive. Found to have a UTI. Pt has an indwelling suprapubic catheter with bilateral hydronephrosis and hydroureter. 2/22/22:  NGT removed yesterday and patient placed on comfort feedings. 2/17/22:  Opens eyes to stimulation but not answering questions. Minimal changes in altered LOC pt remains encephalopathic     The patient is:   [] Verbal and participatory  [x] Non-participatory due to:  Minimally responsive at this time     GOALS OF CARE:  Patient/Health Care Proxy Stated Goals: Prolong life      TREATMENT PREFERENCES:   Code Status: DNR         PALLIATIVE DIAGNOSES:   1. Goals of care/ACP  2. Altered mental status  3. CVA old  4. Debility       PLAN:   2/22/22: This NP and Froy Smith MSW met with patient's son Orion Dorado at the bedside. He had been updated by hospitalist Dr. Tisha Whyte last night and made the decision to place his father on comfort feeds. Reviewed with him the burdens and benefits of allowing comfort feeds versus a PEG tube.   Also suggested that if this is the course he wants to take he may want to consider excepting hospice services at home. This seemed to surprise him stating that he did not understand that his dad was going to die soon. We have updated him on patient's current likely long-term poor prognosis and inability to ingest enough nutrition to sustain him long-term. He would like to take some time and speak with his sister before making any decisions asking how long his father would be in the hospital.  I have explained that this is a decision for the hospitalist and case management team.  We also offered to place a hospice consult for more information but he wants to again to wait till he speaks with his sister. We confirmed his CODE STATUS. At this time patient remains DO NOT RESUSCITATE DO NOT INTUBATE, limited interventions with short-term feeding tube option  POST is on file    Addendum:  Met again with the patient's son Ana Maria Edward and he has spoken with his sister and aunt the decision is to move to hospice care at time of discharge. Would like no escalation of care and patient to be sent home with hospice services asap. Have placed the orders and updated the treatment team. New POST completed for Comfort Care at time of discharge and DNR/DNI no feeding tubes. 2/17/22: This NP and Shelly Joshi RN in to see patient at the bedside. In attendance were his 2 children Ilya Zabala. And Burgess Hoffmann. Mila Hooker is patient's MPOA and provided ACP paperwork for the chart. Reviewed patient's prior functional status noting that he was ambulatory with a walker with 1 fairly recent fall. Family notes that he has been in some decline for the past year. Discussed their understanding of the patient's present medical condition with emphasis on burdens and benefits of CPR and mechanical ventilation with intubation. Both are in agreement that patient has specified that he would not want that for the end of his life.   Have stated that patient was clear that he watched his father die that way and would want to be a DO NOT RESUSCITATE. Our team completed a POST for DNR/DNI, limited interventions, with temporary feeding tube to be discussed later. Did have in-depth discussion regarding PEG tube placement in the event patient does not pass his swallowing evaluation. We also provided some written material for them to review. They would like some time to discuss. Patient has NG tube placed at this time which he pulled out last night and was replaced today. Also reviewed possibility of home with hospice versus rehabilitation or hospice after rehab completion. Our team will continue to follow for support and further decisions that may need to be made for treatments. 1.   Goals of care/ACP  This NP and Heather Emerson MSW in to see the patient at the bedside. Present were his sister and brother in law. That verified that the patient has 2 children. Have contacted patient son France Sanchez. Have reached out to patient's son and set up a meeting to discuss goals of care tomorrow 9:30 AM.  Goals of care: At this time patient remains a full code with full interventions  2. Altered mental status  Likely related to UTI. Unsure of patient's baseline mental status. Reported that he was completely dependent at home. 3.  CVA  Reported 2008 patient has been very debilitated since 2008. Noted on CT scan patient has tiny focal chronic lacunar infarction within the left fouzia and bilateral basal ganglia small lacunar infarct in the right thalamus, old. 4.  Debility  Patient at this time has a palliative performance score of around 30% is bedbound unable to do any work due to extensive disease, is total care, has reduced to minimal oral intake and drowsy level of consciousness. Unsure patient's baseline. 5.   Initial consult note routed to primary continuity provider  6.    Communicated plan of care with: Palliative IDT      Advance Care Planning:  [] The [facility] Interdisciplinary Team has updated the ACP Navigator with [address] and Patient Capacity    Primary Decision Maker ([address]): Pt's son Chantel Obrien Interventions: Full interventions   Other Instructions:         As far as possible, the palliative care team has discussed with patient / health care proxy about goals of care / treatment preferences for patient. HISTORY:     History obtained from: chart review   Active Problems:    Sepsis ([facility] [address] (2/13/2022)      Past Medical History:   Diagnosis Date    Cataract     both    Colon polyp 9-2004    Diabetes (HCC)     IDDM    DJD (degenerative joint disease) of knee     ED (erectile dysfunction)     Sparrow catheter in place     GERD (gastroesophageal reflux disease) 12-01-08    Glaucoma suspect     Gout, unspecified 10-25-06    Hyperlipidemia     Hypertension     Insomnia 7-20-06    Phimosis 6-16-00    Prostate cancer ([facility] [address] 3-3-09    [facility]    weakness right side      Past Surgical History:   Procedure Laterality Date    HX POLYPECTOMY  9-2004    [place] COLONOSCOPY FLX DX W/COLLJ SPEC WHEN PFRMD  9/1/2004    polyp/Dr Alvarado    [place] COLONOSCOPY FLX DX W/COLLJ SPEC WHEN PFRMD  9-2007    incomplete; Dr. Linda Castaneda      Family History   Problem Relation Age of Onset    Hypertension Mother     Diabetes Father     Cancer Father         prostate    Heart Disease Father 70        CABG    Hypertension Sister     Hypertension Brother     Diabetes Brother      History reviewed, no pertinent family history.   Social History     Tobacco Use    Smoking status: Former Smoker    Smokeless tobacco: Never Used   Substance Use Topics    Alcohol use: No     Allergies   Allergen Reactions    Bactrim [Sulfamethoxazole-Trimethoprim] Other (comments)     CAUSED ACUTE RENAL FAILURE      Current Facility-Administered Medications   Medication Dose Route Frequency    aspirin chewable tablet 81 mg  81 mg Oral DAILY    atorvastatin (LIPITOR) tablet 40 mg  40 mg Oral DAILY    multivitamin-minerals-ferrous gluconate oral liquid 15 mL  15 mL Per NG tube DAILY    ciprofloxacin (CIPRO) 400 mg in D5W IVPB (premix)  400 mg IntraVENous Q12H    sodium chloride (NS) flush 5-40 mL  5-40 mL IntraVENous Q8H    sodium chloride (NS) flush 5-40 mL  5-40 mL IntraVENous PRN    acetaminophen (TYLENOL) tablet 650 mg  650 mg Oral Q6H PRN    Or    acetaminophen (TYLENOL) suppository 650 mg  650 mg Rectal Q6H PRN    ondansetron (ZOFRAN ODT) tablet 4 mg  4 mg Oral Q8H PRN    Or    ondansetron (ZOFRAN) injection 4 mg  4 mg IntraVENous Q6H PRN    insulin lispro (HUMALOG) injection   SubCUTAneous Q6H    glucose chewable tablet 16 g  4 Tablet Oral PRN    glucagon (GLUCAGEN) injection 1 mg  1 mg IntraMUSCular PRN    dextrose 10% infusion 125-250 mL  125-250 mL IntraVENous PRN    famotidine (PF) (PEPCID) 20 mg in 0.9% sodium chloride 10 mL injection  20 mg IntraVENous Q24H    sodium chloride (NS) flush 5-10 mL  5-10 mL IntraVENous PRN          Clinical Pain Assessment (nonverbal scale for nonverbal patients): Activity (Movement): Lying quietly, normal position    Duration: for how long has pt been experiencing pain (e.g., 2 days, 1 month, years)  Frequency: how often pain is an issue (e.g., several times per day, once every few days, constant)     FUNCTIONAL ASSESSMENT:     Palliative Performance Scale (PPS):  PPS: 30    ECOG  ECOG Status : Completely disabled     PSYCHOSOCIAL/SPIRITUAL SCREENING:      Any spiritual / Temple concerns:  [] Yes /  [x] No    Caregiver Burnout:  [] Yes /  [x] No /  [] No Caregiver Present      Anticipatory grief assessment:   [x] Normal  / [] Maladaptive        REVIEW OF SYSTEMS:     Systems: constitutional, ears/nose/mouth/throat, respiratory, gastrointestinal, genitourinary, musculoskeletal, integumentary, neurologic, psychiatric, endocrine. Positive findings noted below.   Modified ESAS Completed by: provider                       Dyspnea: 0 Positive and pertinent negative findings in ROS are noted above in HPI. The following systems were [x] reviewed / [] unable to be reviewed as noted in HPI  Other findings are noted below. PHYSICAL EXAM:     Constitutional: resting with eyes closed did not appear in distress  Eyes: closed   ENMT: no nasal discharge, moist mucous membranes  Cardiovascular: regular rhythm, distal pulses intact  Respiratory: breathing not labored, symmetric  Gastrointestinal: soft non-tender, +bowel sounds  Musculoskeletal: no deformity, no tenderness to palpation  Skin: warm, dry  Neurologic: Not following commands, moving all extremities    Other: Wt Readings from Last 3 Encounters:   02/22/22 64.7 kg (142 lb 9.6 oz)   02/11/22 56.7 kg (125 lb)   05/26/21 58.1 kg (128 lb)     Blood pressure (!) 129/54, pulse 62, temperature 98.6 °F (37 °C), resp. rate 19, height 5' 6\" (1.676 m), weight 64.7 kg (142 lb 9.6 oz), SpO2 99 %. Pain:  Pain Scale 1: Numeric (0 - 10)  Pain Intensity 1: 0                      LAB AND IMAGING FINDINGS:     Lab Results   Component Value Date/Time    WBC 10.5 02/22/2022 05:10 AM    HGB 9.4 (L) 02/22/2022 05:10 AM    PLATELET 615 60/31/7113 05:10 AM     Lab Results   Component Value Date/Time    Sodium 140 02/22/2022 05:10 AM    Potassium 5.2 02/22/2022 05:10 AM    Chloride 107 02/22/2022 05:10 AM    CO2 29 02/22/2022 05:10 AM    BUN 24 (H) 02/22/2022 05:10 AM    Creatinine 1.62 (H) 02/22/2022 05:10 AM    Calcium 8.3 (L) 02/22/2022 05:10 AM    Magnesium 1.9 02/20/2022 01:40 PM    Phosphorus 2.5 02/20/2022 01:40 PM      Lab Results   Component Value Date/Time    Alk.  phosphatase 52 02/17/2022 05:05 AM    Protein, total 5.6 (L) 02/17/2022 05:05 AM    Albumin 2.1 (L) 02/17/2022 05:05 AM    Globulin 3.5 02/17/2022 05:05 AM     Lab Results   Component Value Date/Time    INR 1.1 02/20/2022 01:40 PM    Prothrombin time 14.0 02/20/2022 01:40 PM      No results found for: IRON, FE, TIBC, IBCT, PSAT, FERR   No results found for: PH, PCO2, PO2  No components found for: [place]   Lab Results   Component Value Date/Time    CK 88 10/21/2016 09:04 PM    CK - MB 1.1 10/21/2016 09:04 PM              Total time: 35 minutes   Counseling / coordination time, spent as noted above:   > 50% counseling / coordination:  Time spent in direct consultation with the patient, medical team, and family     Prolonged service was provided for  []30 min   []75 min in face to face time in the presence of the patient, spent as noted above. Time Start:   Time End:     Disclaimer: Sections of this note are dictated using utilizing voice recognition software, which may have resulted in some phonetic based errors in grammar and contents. Even though attempts were made to correct all the mistakes, some may have been missed, and remained in the body of the document. If questions arise, please contact our department.

## 2022-02-22 NOTE — PROGRESS NOTES
Hospice consult noted. Called Bud Fried of [facility].           TAISHA MosquedaN RN  Care Management  Pager: [phone]

## 2022-02-22 NOTE — PROGRESS NOTES
Bedside and Verbal shift change report given to Isabelle Valadez RN (oncoming nurse) by Renay Shay RN (offgoing nurse). Report included the following information SBAR, Kardex, ED Summary, Intake/Output, MAR, Recent Results, Med Rec Status and Cardiac Rhythm NSR.     2024: Pt awake, watching TV, denies pain or sob, pt non verbal, shift assessment done, bed locked and low position, bed alarm on, call bell within reach    2330: V/S monitoring done,  - no insulin coverage, Reposition pt comfortably    0020: No visual sign of distress, no changes from previous assessment    0137: sleeping    0415: reassessment done, no changes    0631: Milla care provided    Bedside and Verbal shift change report given to Renay Shay RN (oncoming nurse) by Isabelle Valadez RN (offgoing nurse). Report included the following information SBAR, Kardex, ED Summary, Intake/Output, MAR, Recent Results and Cardiac Rhythm NSR.

## 2022-02-22 NOTE — PALLIATIVE CARE
[address] Marty 83, NP and this SW met with pt's son at bedside to address goals of care. Upon entry, pt laying in bed with head elevated. Pt was awake and alert. NP provided information about pt's current condition and why he may not be clearing cognitively as would be expected. She addressed family's decision to stop. NG tube and transition to comfort feeds and the burdens and benefits of this decision. She further explained, by making this transition, it would be recommended to transition pt to comfort measures, with support of hospice at time of DC.  Pt's son was very surprised by this. He stated he though hospice was for when people are expected to die very soon. NP provided additional information about qualifications for hospice, services provided by hospice, and options for hospice care such as home vs in SNF. Pt's son was tearful and stated a need to talk to his sister about this before making decision. He was encouraged to do so, and we will follow up in a day or two. Pt's son was given Palliative Care's contact information. He was encouraged to call with questions/concerns, or if they make any decisions. Thank you for this referral to Palliative Care. The palliative care team remains available to provide support to patient and his family. Will follow up regarding goals of care, as appropriate.      Segundo Bolaños, [facility]  Palliative Medicine Inpatient   [facility]  Palliative COPE Line: [phone])

## 2022-02-22 NOTE — PROGRESS NOTES
Problem: Dysphagia (Adult)  Goal: *Acute Goals and Plan of Care (Insert Text)  Description: Patient will:  1. Tolerate PO trials with 0 s/s overt distress in 4/5 trials - not met  2. Utilize compensatory swallow strategies/maneuvers (decrease bite/sip, size/rate, alt. liq/sol) with min cues in 4/5 trials - not met  3. Perform oral-motor/laryngeal exercises to increase oropharyngeal swallow function with min cues - not met  4. Complete an objective swallow study (i.e., MBSS) to assess swallow integrity, r/o aspiration, and determine of safest LRD, min A - met 2/4/2022, 2/21/2022    Recommend:   NPO with consideration of an alternate means of nutrition/hydration vs comfort feeds  1-2 ice chips per hour after oral care   Strict aspiration precautions (HOB >30 degrees at all times, Oral care TID)    **Pt transitioned to comfort feeds s/p MBS**    Outcome: Resolved/Met    SPEECH PATHOLOGY MODIFIED BARIUM SWALLOW STUDY/TREATMENT AND DISCHARGE    Patient: Duc Deluna (78 y.o. male)  Date: 2/21/2022  [facility] [A41.9]        Precautions: aspiration  Fall,Skin    ASSESSMENT :  Based on the objective data described below, the patient presents with severe pharyngeal dysphagia c/b silent aspiration during the swallow with tsp trials of honey-thick and puree. Pt with further silent aspiration events after the swallow secondary to pharyngeal residuals. Small bites, effortful swallows, and chin tuck ineffective at improving airway protection. Deficits include decreased tongue base retraction, weak laryngeal elevation/excursion, and impaired pharyngeal motility/sensation. Pt is currently at a high risk of aspiration, malnutrition, and dehydration with PO. Recommend NPO with consideration of an alternate means of nutrition/hydration vs comfort feeds, aspiration precautions, and oral care TID.      TREATMENT :  Treatment provided post diagnostic testing including oropharyngeal anatomy/physiology, MBS results, high risk of aspiration with PO, and diet recs. Pt verbalized understanding. Pt was transitioned to comfort feeds after MBS. Will complete SLP order set. Please re-consult if needed. PLAN :  Recommendations and Planned Interventions: See above  Discharge Recommendations: comfort feeds, aspiration precautions, meds as tolerated     SUBJECTIVE:   Patient stated Thank you.     OBJECTIVE:     Past Medical History:   Diagnosis Date    Cataract     both    Colon polyp 9-2004    Diabetes ([facility] [address]     IDDM    DJD (degenerative joint disease) of knee     ED (erectile dysfunction)     Sparrow catheter in place     GERD (gastroesophageal reflux disease) 12-01-08    Glaucoma suspect     Gout, unspecified 10-25-06    Hyperlipidemia     Hypertension     Insomnia 7-20-06    Phimosis 6-16-00    Prostate cancer ([facility] [address] 3-3-09    Stroke [facility]) 2007    weakness right side     Past Surgical History:   Procedure Laterality Date    HX POLYPECTOMY  9-2004    [place] COLONOSCOPY FLX DX W/COLLJ SPEC WHEN PFRMD  9/1/2004    polyp/Dr Alvarado    [place] COLONOSCOPY FLX DX W/COLLJ SPEC WHEN PFRMD  9-2007    incomplete; Dr. Huggins Settles     Prior Level of Function/Home Situation: see below  Home Situation  Home Environment: Private residence  # Steps to Enter: 3  One/Two Story Residence: One story  Living Alone: No  Support Systems: Child(edelmira)  Patient Expects to be Discharged to[de-identified] Skilled nursing facility (vs home health)  Current DME Used/Available at Home: Walker, rolling,Wheelchair,Commode, bedside  Tub or Shower Type:  ([address] bath)  Diet prior to admission: NPO per previous SLP recs  Current Diet:  NPO with consideration of an alternate means of nutrition/hydration vs comfort feeds - transitioned to comfort feeds post MBS  Radiologist:    Film Views: Lateral;Fluoro  Patient Position: 90 in fluoro chair    Trial 1:   Consistency Presented: Honey thick liquid;Puree   How Presented: Spoon;SLP-fed/presented;Cup/sip       Bolus Acceptance: No impairment   Bolus Formation/Control: Impaired: Premature spillage   Propulsion: No impairment   Oral Residue: None   Initiation of Swallow: Triggered at pyriform sinus(es)   Timing: No impairment   Penetration: None   Aspiration/Timing: Silent ;During; After;From initial swallow;From residual   Pharyngeal Clearance: Vallecular residue;Pyriform residue ;10-50%   Attempted Modifications: Small sips and bites; Chin tuck;Effortful swallow   Effective Modifications: None   Cues for Modifications: Moderate       Decreased Tongue Base Retraction?: Yes  Laryngeal Elevation: Reduced excursion with laryngeal vestibule gap;Minimal movement of larynx/epiglottis  Aspiration/Penetration Score: 8 (Aspiration-Contrast passes cords/glottis with no effort to eject, ie/silent aspiration)  Pharyngeal Symmetry: Not assessed  Pharyngeal-Esophageal Segment: No impairment  Pharyngeal Dysfunction: Decreased strength;Decreased elevation/closure;Decreased tongue base retraction;Decreased pharyngeal wall constriction    Oral Phase Severity: No impairment  Pharyngeal Phase Severity: Severe    8-point Penetration-Aspiration Scale: Score 8    PAIN:  Pt reports 0/10 pain or discomfort prior to MBS. Pt reports 0/10 pain or discomfort post MBS. COMMUNICATION/EDUCATION:   [x]  Patient educated regarding MBS results and diet recommendations. []  Patient/family have participated as able in goal setting and plan of care. [x]  Patient/family agree to work toward stated goals and plan of care. []  Patient understands intent and goals of therapy, but is neutral about his/her participation. []  Patient is unable to participate in goal setting and plan of care.     Thank you for this referral.    Fermin Eastman M.S. CCC-SLP/L  Speech-Language Pathologist

## 2022-02-22 NOTE — PROGRESS NOTES
Discussed with Dr. Deepa Garcia. Pt is on comfort feeds. Met with pt's son Jose Ocasio At pt's bedside. He stated Palliative Care discussed hospice with him but he is so overwhelmed at this time for pt to be discharged home. He stated his aunt is also admitted in the hospital.   He is aware that pt unable to go for rehab because he is on comfort feeds. He asked CM about hospice and equipment and CM answered as best as possible. He stated he he like to speak with Dr. Deepa Garcia. Sent text message to Dr. Deepa Garcia.               Keiko Rivera BSN RN  Care Management  Pager: [phone]

## 2022-02-22 NOTE — PROGRESS NOTES
Problem: Pressure Injury - Risk of  Goal: *Prevention of pressure injury  Description: Document Breezy Scale and appropriate interventions in the flowsheet. Outcome: Progressing Towards Goal  Note: Pressure Injury Interventions:  Sensory Interventions: Assess changes in LOC,Check visual cues for pain    Moisture Interventions: Absorbent underpads,Internal/External urinary devices,Maintain skin hydration (lotion/cream)    Activity Interventions: Pressure redistribution bed/mattress(bed type)    Mobility Interventions: HOB 30 degrees or less,Pressure redistribution bed/mattress (bed type)    Nutrition Interventions: Offer support with meals,snacks and hydration    Friction and Shear Interventions: Foam dressings/transparent film/skin sealants,Minimize layers                Problem: Patient Education: Go to Patient Education Activity  Goal: Patient/Family Education  Outcome: Progressing Towards Goal     Problem: Falls - Risk of  Goal: *Absence of Falls  Description: Document Armen Fall Risk and appropriate interventions in the flowsheet. Outcome: Progressing Towards Goal  Note: Fall Risk Interventions:       Mentation Interventions: Adequate sleep, hydration, pain control,Door open when patient unattended    Medication Interventions: Bed/chair exit alarm    Elimination Interventions:  Toileting schedule/hourly rounds,Patient to call for help with toileting needs,Call light in reach              Problem: Patient Education: Go to Patient Education Activity  Goal: Patient/Family Education  Outcome: Progressing Towards Goal

## 2022-02-23 NOTE — PROGRESS NOTES
Bedside and Verbal shift change report given to Myles Barr RN (oncoming nurse) by Marlen Connor RN (offgoing nurse). Report included the following information SBAR, Kardex, ED Summary, Intake/Output, MAR, Accordion, Med Rec Status and Cardiac Rhythm NSR.     2016: Pt awake, no distress noted, V/S and shift assessment done, bed locked and low position, bed alarm on    2327:  - No insulin coverage per protocol    0146: Ceprofloxaxcin 400 mg IV administered, infusing well    0400: sleeping, no distress noted    0519: Bathed provided, linen, pad and gown changed,  - no insulin coverage per protocol    Bedside and Verbal shift change report given to January Ramirez RN (oncoming nurse) by Myles Barr RN (offgoing nurse). Report included the following information SBAR, Kardex, ED Summary, Procedure Summary, Intake/Output, MAR, Recent Results, Med Rec Status and Cardiac Rhythm NSR.

## 2022-02-23 NOTE — PROGRESS NOTES
Call made to [facility] [phone], spoke with Manuel Obrien, can transport patient at 4:15 pm. CM made aware.

## 2022-02-23 NOTE — DISCHARGE INSTRUCTIONS
Patient Education        Learning About Sepsis  What is sepsis? Sepsis is an intense reaction to an infection. It can cause deadly damage to the body and lead to dangerously low blood pressure. You may have inflammation across large areas of your body. It can damage tissue and even go deep into your organs. Sepsis can develop very quickly. It requires immediate care in a hospital.  Infections that can lead to sepsis include:  · A skin infection such as from a cut. · A lung infection like pneumonia. · A kidney infection. · A gut infection such as E. coli. Symptoms can include low blood pressure, breathing problems, fast heartbeat, and confusion. Other symptoms include fever or low body temperature, chills, cool clammy skin, skin rashes, and shaking. Sepsis can cause problems in many organs. People with sepsis might need to be treated in an intensive care unit (ICU) for several days or weeks. An ICU is a part of the hospital where very sick people get care. Septic shock is sepsis that causes extremely low blood pressure, which limits blood flow to the body. It can cause organ failure and death. How is sepsis treated? Doctors will treat the person with antibiotics. They will try to find the infection that led to sepsis. Machines will track the person's vital signs, including temperature, blood pressure, breathing rate, and pulse rate. The person will get fluids through an IV. He or she may also get strong medicine. This can help raise the blood pressure. If a person with sepsis is very sick, equipment in the ICU can support many body systems. That includes breathing, circulation, fluids, and help for organs like the kidneys and heart. If the person needs help breathing, a ventilator may be used. What can you expect when someone has sepsis? The person may start new treatments while still in the hospital. Different doctors may help with different symptoms.   If a person needs to be treated in the intensive care unit (ICU), the ICU staff will do everything they can to treat all of the problems sepsis causes, including the infection. The ICU can be scary and confusing for patients and their families, friends, and supporters. But it's designed to keep your loved one comfortable and safe and to provide the best medical care. Expect a long recovery after the person leaves the ICU. If you need it, ask for support from friends and family. Where can you learn more? Go to http://www.gray.com/  Enter [id number] in the search box to learn more about \"Learning About Sepsis. \"  Current as of: July 1, 2021               Content Version: 13.0  © 2006-2021 [employer]. Care instructions adapted under license by [employer] (which disclaims liability or warranty for this information). If you have questions about a medical condition or this instruction, always ask your healthcare professional. [address] any warranty or liability for your use of this information.

## 2022-02-23 NOTE — HOSPICE
DME will be delivered between 8 and 12pm today. Please arrange transportation for 1-2pm.    Please send scripts for comfort medications to the outpatient pharmacy to be filled and sent home with the pt at discharge. I have verified with the pharmacy the medication is available.      1. Morphine concentrate 20mg/ml  Give 0.25-1ml po/sl every 3 hours PRN for pain/air hunger  Quantity 30ml     2. Lorazepam oral solution 2mg/ml   Give 0.5mg -1ml (0.25-0.5ml) po/sl every 6 hours PRN anxiety/agitation   Quantity 30 ml     3. Acetaminophen Suppository 650mg  Give one suppository rectally every four (4)   hours PRN  Quantity 4 suppositories    4. Hyoscyamine 0.125mg tablets  Give 1 tab po/sl every 6 hours PRN terminal congestion  Quantity 10 tabs.      5.  Bisacodyl Suppository 10mg  Give one suppository rectally every day PRN  Quantity 5 suppositories      Carmen Wang RN  Clinical Manager  [address], [address] [place] [address], [address] [place] [address] Str.  [phone]  Email: [email or web address]

## 2022-02-23 NOTE — PROGRESS NOTES
[facility]  Progress Note    Patient: Nirmala Parekh Age: 67 y.o. : 1945 MR#: [id number] SSN: [id number]  Date/Time: 2022     Subjective:     Patient seen and evaluated, lying in bed, no acute distress. Family at bedside. Patient failed SLP eval again this morning. Continues to have silent aspiration. Family would like to proceed with comfort feeds and stop NG tube feeding    Hospital course:  Nirmala Parekh is a 67 y.o.  male who presented to the ED late yesterday evening for evaluation of altered mental status. Patient was found to be poorly responsive in triage and significantly hypotensive with the systolic blood pressure in the 50s. He was taken back to a room immediately for initiation of treatment. Work-up in the ED showed evidence for UTI and CT imaging showed bilateral hydronephrosis with hydroureter. There is no obvious evidence for obstruction on imaging but patient was noted to be retaining urine. A Sparrow catheter has been placed and broad-spectrum antibiotic therapy initiated. Patient did require vasopressor support initially but this is been able to be weaned. He is being referred for hospital admission for further evaluation. Patient admitted to the ICU, treated with vasopressor support and broad-spectrum IV antibiotics. Patient also received IV fluids, stabilized and transferred down to CVT stepdown. Blood cultures positive, ID consulted    Patient failed swallow eval - discussed with family , will place NGT & nutrition consulted for TF. Palliative care consulted, continue current treatment plan. Patient remains full code at this time. MRI -shows 2 small areas of restricted diffusion/infarct involving the periventricular right frontal and parietal lobes. Patient failed repeat swallow eval yesterday. Continue NG tube feeding.     Overnight events noted where patient was placed in restraints secondary to concern for pulling NG tube. Restraints have now been removed and family is currently at bedside. 2/20 -no new events overnight, patient seen and evaluated, lying in bed, no acute distress, NG tube in place and tube feeding continuing. We will attempt for repeat SLP eval in a.m. if possible. 2/21no new events overnight, addressed with family about SLP and MBS results. They would like to proceed with comfort feeds at this time. 2/22no new events overnight, patient is currently on comfort feeds. Patient seen by palliative care, discuss hospice with son who is currently concerned. Later this afternoon, son has finally agreed to hospice measures, patient will be discharged home with hospice care. Assessment/Plan:     1. Sepsis with septic shocksecondary to UTI, continue broad-spectrum IV antibiotics. 2. Acute cystitis with pyuria and mallika pus noted from suprapubic catheter on presentationcontinue IV antibiotics, urine culture not sent, blood cultures positive for GNR, awaiting sensitivities, ID on board. ID recommended to remove R femoral CVC, repeat blood cultures are negative from 2/14  3. EMIL on CKD 3creatinine at baseline, continue to monitor. 4. Elevated troponinlikely secondary to demand ischemia  5. History of hypertensionholding home medications since patient was hypotensive on presentation. 6. Change in mental status, per family patient was able to swallow prior to him coming to the hospital but now he is unable to do that this may be related to sepsis or questionable acute infarct. 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 Continue aspirin, statin therapy. 7. MRI Brain -2 small areas of restricted diffusion/infarct involving the periventricular right frontal and parietal lobes. Added aspirin 81 mg p.o. daily and statin. 8. Repeat SLP eval done, patient continues to have silent aspiration. Family wishes to proceed with comfort feeds.   DVT prophylaxisSCDs  DNR    Treatment plan discussed with patient and family at bedside  Continue TF , Nutrition on board. Attempt for repeat SLP on Monday. Continue to follow. Patient failed repeat swallow eval on Monday. Anticipate discharge home with hospice in a.m.,  Case management on board. Savannah Cabrales MD  22      Case discussed with:  [x]Patient  [x]Family  []Nursing  []Case Management  DVT Prophylaxis:  []Lovenox  []Hep SQ  []SCDs  []Coumadin   []On Heparin gtt    Objective:   VS:   Visit Vitals  BP (!) 148/83 (BP 1 Location: Left upper arm, BP Patient Position: At rest;Supine)   Pulse 67   Temp 98.6 °F (37 °C)   Resp 21   Ht 5' 6\" (1.676 m)   Wt 64.7 kg (142 lb 9.6 oz)   SpO2 99%   BMI 23.02 kg/m²      Tmax/24hrs: Temp (24hrs), Av.6 °F (37 °C), Min:97.8 °F (36.6 °C), Max:99 °F (37.2 °C)  IOBRIEF    Intake/Output Summary (Last 24 hours) at 2022  Last data filed at 2022 1722  Gross per 24 hour   Intake 320 ml   Output 745 ml   Net -425 ml       General:  Alert, cooperative, no acute distress    Pulmonary: Decreased breath sounds in bases  Cardiovascular: Regular rate and Rhythm. GI:  Soft, Non distended, Non tender. + Bowel sounds. Extremities:  No edema, cyanosis, clubbing. No calf tenderness.    Neurologic: Alert and awake, lying in bed  Additional:    Medications:   Current Facility-Administered Medications   Medication Dose Route Frequency    aspirin chewable tablet 81 mg  81 mg Oral DAILY    atorvastatin (LIPITOR) tablet 40 mg  40 mg Oral DAILY    multivitamin-minerals-ferrous gluconate oral liquid 15 mL  15 mL Per NG tube DAILY    ciprofloxacin (CIPRO) 400 mg in D5W IVPB (premix)  400 mg IntraVENous Q12H    sodium chloride (NS) flush 5-40 mL  5-40 mL IntraVENous Q8H    sodium chloride (NS) flush 5-40 mL  5-40 mL IntraVENous PRN    acetaminophen (TYLENOL) tablet 650 mg  650 mg Oral Q6H PRN    Or    acetaminophen (TYLENOL) suppository 650 mg  650 mg Rectal Q6H PRN    ondansetron (ZOFRAN ODT) tablet 4 mg  4 mg Oral Q8H PRN    Or    ondansetron (ZOFRAN) injection 4 mg  4 mg IntraVENous Q6H PRN    insulin lispro (HUMALOG) injection   SubCUTAneous Q6H    glucose chewable tablet 16 g  4 Tablet Oral PRN    glucagon (GLUCAGEN) injection 1 mg  1 mg IntraMUSCular PRN    dextrose 10% infusion 125-250 mL  125-250 mL IntraVENous PRN    famotidine (PF) (PEPCID) 20 mg in 0.9% sodium chloride 10 mL injection  20 mg IntraVENous Q24H       Imaging:   XR Results (most recent):  Results from Hospital Encounter encounter on 02/12/22    XR SWALLOW FUNC VIDEO    Narrative  MODIFIED BARIUM SWALLOW. CLINICAL INDICATION/HISTORY: Dysphagia. Feeding difficulties. History of CVA. Aspiration and penetration priors speech swallow studies. Re-evaluation for  dietary advancement. COMPARISON:  February 14, 2022. TECHNIQUE: A live videoradiographic swallowing function study was performed in  conjunction with the speech therapist.  The video is available in the department  for review by speech pathology and ancillary staff. Fluoroscopic images were not made available in PACS for radiologist review and  this report is strictly a procedural documentation by the physician assistant  with input from speech pathology. It is not considered inclusive or exclusive  of anatomic abnormalities and is not diagnostic beyond the specific  considerations regarding swallowing function as it relates to airway protection  while eating and drinking. Fluoroscopy time: 1 minutes 12 seconds    Fluoroscopic images: 0    Electronic capture cine loops: 6    FINDINGS:    Patient swallowed multiple consistencies of barium mixtures including honey and  pudding. There was silent aspiration of both tested consistencies. Swallow delay, premature spillage, and pharyngeal residuals were seen with both  tested consistencies. Impression  Silent aspiration of both tested consistencies.     Please see speech pathologist report for additional details and recommendations. CT Results (most recent):  Results from Hospital Encounter encounter on 02/12/22    CT ABD PELV WO CONT    Narrative  EXAM: CT ABDOMEN AND PELVIS WITHOUT CONTRAST    CLINICAL HISTORY/INDICATION:  sepsis , hypotensive, confusion, vomiting    COMPARISON: CT abdomen and pelvis 8/19/2016. TECHNIQUE: No intravenous contrast was utilized for this helical thin section CT  of the abdomen and pelvis. Coronal and sagittal reformations obtained. Evaluation of the solid organs, bowel wall, and vascular structures are  therefore limited. All CT scans at this facility are performed using dose optimization technique as  appropriate to a performed exam, to include automated exposure control,  adjustment of the mA and/or kV according to patient's size (including  appropriate matching for site-specific examinations), or use of iterative  reconstruction technique. FINDINGS:    Abdomen-    Mild crowding of the bronchovascular markings at the bases. Trace pericardial  effusion. The liver and spleen are normal in size and density. The biliary tree is not  dilated. The gallbladder is normal in size. Increased density within the lumen of the  gallbladder. No gallbladder wall thickening nor surrounding inflammation. Bilateral mild hydronephrosis and hydroureter which can be followed down to the  bladder base. No renal nor ureteral calculus. Normal size kidneys. Bilateral mild adrenal nodular thickening without change    Tiny amount of fluid in the paracolic gutters. The large and small bowel seem unremarkable. Pelvis -    There is no free pelvic fluid. The pelvic viscera are unremarkable. Mild circumferential thickening of the urinary bladder wall. Suprapubic Sparrow  catheter. Small amount of air within the bladder lumen. 5 mm calculus layering  within the urinary bladder.     Review of osseous structures throughout on bone window settings shows no  significant osseous pathology. Impression  Bilateral mild hydronephrosis and hydroureter without evidence of ureteral  calculus. 5 mm calculus layering within the urinary bladder. Mild circumferential urinary bladder wall thickening. This may be seen with  cystitis or mild chronic outlet obstruction. Suprapubic bladder catheter in expected position. Tiny amount of gas within the  lumen of the urinary bladder. Sludge and/or stones within the gallbladder. As clinically indicated follow-up  ultrasound of the right upper quadrant could be of benefit. Mild micronodular adrenal thickening consistent with hyperplasia without change. Report provided to the emergency department at 2317 hrs.            Labs:    Recent Results (from the past 48 hour(s))   GLUCOSE, POC    Collection Time: 02/20/22 11:49 PM   Result Value Ref Range    Glucose (POC) 161 (H) 70 - 110 mg/dL   CBC W/O DIFF    Collection Time: 02/21/22  5:11 AM   Result Value Ref Range    WBC 9.8 4.6 - 13.2 K/uL    RBC 3.62 (L) 4.35 - 5.65 M/uL    HGB 9.8 (L) 13.0 - 16.0 g/dL    HCT 30.3 (L) 36.0 - 48.0 %    MCV 83.7 78.0 - 100.0 FL    MCH 27.1 24.0 - 34.0 PG    MCHC 32.3 31.0 - 37.0 g/dL    RDW 17.1 (H) 11.6 - 14.5 %    PLATELET 306 646 - 163 K/uL    MPV 10.7 9.2 - 11.8 FL    NRBC 0.0 0  WBC    ABSOLUTE NRBC 0.00 0.00 - 1.12 K/uL   METABOLIC PANEL, BASIC    Collection Time: 02/21/22  5:11 AM   Result Value Ref Range    Sodium 141 136 - 145 mmol/L    Potassium 4.6 3.5 - 5.5 mmol/L    Chloride 107 100 - 111 mmol/L    CO2 32 21 - 32 mmol/L    Anion gap 2 (L) 3.0 - 18 mmol/L    Glucose 162 (H) 74 - 99 mg/dL    BUN 23 (H) 7.0 - 18 MG/DL    Creatinine 1.47 (H) 0.6 - 1.3 MG/DL    BUN/Creatinine ratio 16 12 - 20      GFR est AA 56 (L) >60 ml/min/1.73m2    GFR est non-AA 46 (L) >60 ml/min/1.73m2    Calcium 8.9 8.5 - 10.1 MG/DL   GLUCOSE, POC    Collection Time: 02/21/22  6:35 AM   Result Value Ref Range    Glucose (POC) 168 (H) 70 - 110 mg/dL   GLUCOSE, POC Collection Time: 02/21/22 12:02 PM   Result Value Ref Range    Glucose (POC) 162 (H) 70 - 110 mg/dL   GLUCOSE, POC    Collection Time: 02/21/22  5:50 PM   Result Value Ref Range    Glucose (POC) 160 (H) 70 - 110 mg/dL   GLUCOSE, POC    Collection Time: 02/21/22 11:34 PM   Result Value Ref Range    Glucose (POC) 142 (H) 70 - 110 mg/dL   CBC W/O DIFF    Collection Time: 02/22/22  5:10 AM   Result Value Ref Range    WBC 10.5 4.6 - 13.2 K/uL    RBC 3.52 (L) 4.35 - 5.65 M/uL    HGB 9.4 (L) 13.0 - 16.0 g/dL    HCT 29.3 (L) 36.0 - 48.0 %    MCV 83.2 78.0 - 100.0 FL    MCH 26.7 24.0 - 34.0 PG    MCHC 32.1 31.0 - 37.0 g/dL    RDW 17.3 (H) 11.6 - 14.5 %    PLATELET 434 235 - 393 K/uL    MPV 10.8 9.2 - 11.8 FL    NRBC 0.0 0  WBC    ABSOLUTE NRBC 0.00 0.00 - 6.39 K/uL   METABOLIC PANEL, BASIC    Collection Time: 02/22/22  5:10 AM   Result Value Ref Range    Sodium 140 136 - 145 mmol/L    Potassium 5.2 3.5 - 5.5 mmol/L    Chloride 107 100 - 111 mmol/L    CO2 29 21 - 32 mmol/L    Anion gap 4 3.0 - 18 mmol/L    Glucose 117 (H) 74 - 99 mg/dL    BUN 24 (H) 7.0 - 18 MG/DL    Creatinine 1.62 (H) 0.6 - 1.3 MG/DL    BUN/Creatinine ratio 15 12 - 20      GFR est AA 50 (L) >60 ml/min/1.73m2    GFR est non-AA 42 (L) >60 ml/min/1.73m2    Calcium 8.3 (L) 8.5 - 10.1 MG/DL   GLUCOSE, POC    Collection Time: 02/22/22  6:01 AM   Result Value Ref Range    Glucose (POC) 117 (H) 70 - 110 mg/dL   GLUCOSE, POC    Collection Time: 02/22/22 11:17 AM   Result Value Ref Range    Glucose (POC) 114 (H) 70 - 110 mg/dL   GLUCOSE, POC    Collection Time: 02/22/22  5:17 PM   Result Value Ref Range    Glucose (POC) 113 (H) 70 - 110 mg/dL       Signed By: Lucero Wilson MD     February 22, 2022      I spent 25 minutes with the patient in face-to-face consultation, of which greater than 50% was spent in counseling and coordination of care as described above    Disclaimer: Sections of this note are dictated using utilizing voice recognition software. Minor typographical errors may be present. If questions arise, please do not hesitate to contact me or call our department.

## 2022-02-23 NOTE — ROUTINE PROCESS
4328 Patient for discharge home today with Hospice. Spoke with Patient's daughter Trevon Garsia and informed her of new medications. Actual medications will be going home with patient. Informed also that Lorazepam will need to be stored in the Fridge. Patient  time will be 4:30 PM.    1630 Patient getting ready for discharge, put on regular clothes. PAtient going home with suprapubic catheter. Transport here to [facility] patient home.

## 2022-02-23 NOTE — PROGRESS NOTES
Palliative Medicine     Mr Hanh Rojas has been accepted by [address] [place] [address]. Plan is for discharge to home today with needed DME. Physician Order for Scope of Treatment on file, signed by Tawanna Watson. For DNR/DNI, Comfort Measures and NO Feeding Tube. Thank you for the Palliative Medicine consult and allowing us to participate in the care of Mr Hanh Rojas and his family.      Erin SUMNERN, RN, [facility]  Palliative Medicine Inpatient RN  Palliative COPE Line: [phone]

## 2022-02-23 NOTE — PROGRESS NOTES
Per Lesa Horne of [address] [place] [address] notes, arrange transport for between 1-2pm.          Requested Case Management specialist to assist with transportation to [address] [place] [address] B, [facility] and phone number is [phone]  Patient will require BLS transport. Pt requires Stretcher If stretcher, reason: altered mental status, encephalopathy, CVA, degenerative joint disease, cancer, comfort care  Patient is currently requiring oxygen No No  Height:5'6   Weight: 134 Ib  Pt is on isolation: No    Is the pt ready now? no  Requested time: 2pm  PCS Faxed: yes  Insurance verified on face sheet: yes  Auth needed for transport: N/A  CM completed PCS/ Envelope and placed on chart. 1019: Per Kaylee of Case Management, [facility] unable to  pt at 1 or 2pm but can pick him up at 4:30pm.   Informed pt's son Cholo Lane and pt's nurse Jeremiah Mena. Left a message for Lesa Horne of [address] [place] [address]. Sent text message to Dr. Yousuf Wayne for pt's prescriptions to be sent to outpatient pharmacy per Chiqui's notes.       [facility], BSN RN  Care Management  Pager: [phone]

## 2022-02-23 NOTE — PROGRESS NOTES
Pt discharging home with hospice this date, will follow for any further acute PT needs should they arise. Pt to be getting all necessary equipment delivered to home.  Ney Pereira, PT, DPT

## 2022-02-23 NOTE — PROGRESS NOTES
Infectious Disease progress Note        Reason: Sepsis, gram-negative bloodstream infection    Current abx Prior abx   Cefepime since 2/12/2022-2/15/22  Ciprofloxacin since 2/15/22  levofloxacin, vancomycin 2/12-2/13     Lines:   Right femoral cvc since 2/13/22-2/15/22    Assessment :     67 y.o. male who is seen in consultation as referred by Sunny Waller  for UTI, retained SP catheter, bilateral hydronephrosis, bladder stones, prostate cancer, urinary retention managed with SP tube  presented to ED on 2/12/22 for hypotension, confusion, foul smelling urine, brown colored urine. Patient seen in ED on 2/11 for blocked SP catheter, this was irrigated and patient was discharged home. clnical presentation c/w septic shock - POA due to proteus bloodstream infection (positive blood culture 2/12, negative blood culture 2/14),  Urinary retention, complicated UTI  S/p catheter exchange in ED per urology    Resolved hydronephrosis noted on renal ultrasound 2/15/2022    Most likely source of Proteus bloodstream infection is complicated urinary tract infection. Unfortunately no urine culture sent on admission. Urine culture sent on 2/14 could be false negative due to prior antibiotics. Altered mentation-likely metabolic encephalopathy-improving    Acute on chronic kidney disease-likely due to sepsis, volume depletion-improving    Plans to transition patient to outpatient hospice noted. Recommendations:   - continue ciprofloxacin till 2/24/22  -Follow-up palliative care recommendations regarding outpatient hospice    We will sign off. Please call if any new questions or concerns. Thanks     Above plan was discussed in details with primary team, RN.      HPI:    Patient was nonverbal at the time of my evaluation.     Past Medical History:   Diagnosis Date    Cataract     both    Colon polyp 9-2004    Diabetes (HCC)     IDDM    DJD (degenerative joint disease) of knee     ED (erectile dysfunction)     [place] catheter in place     GERD (gastroesophageal reflux disease) 12-01-08    Glaucoma suspect     Gout, unspecified 10-25-06    Hyperlipidemia     Hypertension     Insomnia 7-20-06    Phimosis 6-16-00    Prostate cancer ([facility] [address] 3-3-09    Stroke [facility]) 2007    weakness right side       Past Surgical History:   Procedure Laterality Date    HX POLYPECTOMY  9-2004    [facility] COLONOSCOPY FLX DX W/COLLJ SPEC WHEN PFRMD  9/1/2004    polyp/Dr Alvarado    [facility] COLONOSCOPY FLX DX W/COLLJ SPEC WHEN PFRMD  9-2007    incomplete; Dr. Faizan Robertson       Current Discharge Medication List        START taking these medications    Details   morphine (ROXANOL) 100 mg/5 mL (20 mg/mL) concentrated solution Take 0.25 mL by mouth every three (3) hours as needed for Pain for up to 15 days. Max Daily Amount: 40 mg.  Qty: 30 mL, Refills: 0    Associated Diagnoses: Sepsis with acute renal failure without septic shock, due to unspecified organism, unspecified acute renal failure type ([facility])      acetaminophen (TYLENOL) 650 mg suppository Insert 1 Suppository into rectum every four (4) hours as needed for Fever. Qty: 4 Suppository, Refills: 0      bisacodyL (DULCOLAX) 10 mg supp Insert 10 mg into rectum daily as needed for Constipation. Qty: 5 Suppository, Refills: 0      hyoscyamine SL (LEVSIN/SL) 0.125 mg SL tablet 1 Tablet by SubLINGual route every six (6) hours as needed for Secretions. Qty: 10 Tablet, Refills: 0      LORazepam (INTENSOL) 2 mg/mL concentrated solution Take 0.25 mL by mouth every six (6) hours as needed for Anxiety. Max Daily Amount: 2 mg. Qty: 30 mL, Refills: 0    Associated Diagnoses: Sepsis with acute renal failure without septic shock, due to unspecified organism, unspecified acute renal failure type ([facility] [address]           CONTINUE these medications which have NOT CHANGED    Details   carvediloL (COREG) 12.5 mg tablet Take 1 Tablet by mouth two (2) times a day. THIS REFILL IS  APPROVED.  APPOINTMENT WILL BE REQUIRED BEFORE NEXT REFILL IS APPROVED. Qty: 180 Tablet, Refills: 0    Associated Diagnoses: Essential hypertension      allopurinoL (ZYLOPRIM) 100 mg tablet Take 1 Tablet by mouth two (2) times a day. THIS REFILL IS  APPROVED. APPOINTMENT WILL BE REQUIRED BEFORE NEXT REFILL IS  APPROVED. Qty: 180 Tablet, Refills: 0    Associated Diagnoses: Idiopathic gout, unspecified chronicity, unspecified site      rosuvastatin (CRESTOR) 20 mg tablet TAKE 1 TABLET BY MOUTH NIGHTLY  Qty: 90 Tab, Refills: 0    Associated Diagnoses: Hypercholesterolemia      Insulin Needles, Disposable, (BD Ultra-Fine Mini Pen Needle) 31 gauge x 3/16\" ndle INJECT 3 TIMES A DAY BEFORE MEALS E11.9  Qty: 100 Pen Needle, Refills: 5    Associated Diagnoses: Diabetes mellitus with no complication (HCC)      insulin lispro (HUMALOG) 100 unit/mL injection For Blood Sugar (mg/dL) of:     Less than 150=0 units           150 -199=2 units  200 -249=4 units  250 -299=6 units  300 -349=8 units  350 and above=10 units  Check blood glucose 3 times daily and at bedtime and inject insulin per SS. Qty: 1 Vial, Refills: 0      cholecalciferol, VITAMIN D3, (VITAMIN D3) 5,000 unit tab tablet Take 1 Tab by mouth every seven (7) days. Qty: 30 Tab, Refills: 2      aspirin delayed-release 81 mg tablet Take 81 mg by mouth daily. glucose 4 gram chewable tablet Take 4 Tabs by mouth as needed. Qty: 50 Tab, Refills: 0      omega-3 fatty acids-vitamin e (FISH OIL) 1,000 mg Cap Take 1,400 mg by mouth daily.            STOP taking these medications       levoFLOXacin (LEVAQUIN) 500 mg tablet Comments:   Reason for Stopping:               Current Facility-Administered Medications   Medication Dose Route Frequency    aspirin chewable tablet 81 mg  81 mg Oral DAILY    atorvastatin (LIPITOR) tablet 40 mg  40 mg Oral DAILY    multivitamin-minerals-ferrous gluconate oral liquid 15 mL  15 mL Per NG tube DAILY    ciprofloxacin (CIPRO) 400 mg in D5W IVPB (premix)  400 mg IntraVENous Q12H    sodium chloride (NS) flush 5-40 mL  5-40 mL IntraVENous Q8H    sodium chloride (NS) flush 5-40 mL  5-40 mL IntraVENous PRN    acetaminophen (TYLENOL) tablet 650 mg  650 mg Oral Q6H PRN    Or    acetaminophen (TYLENOL) suppository 650 mg  650 mg Rectal Q6H PRN    ondansetron (ZOFRAN ODT) tablet 4 mg  4 mg Oral Q8H PRN    Or    ondansetron (ZOFRAN) injection 4 mg  4 mg IntraVENous Q6H PRN    insulin lispro (HUMALOG) injection   SubCUTAneous Q6H    glucose chewable tablet 16 g  4 Tablet Oral PRN    glucagon (GLUCAGEN) injection 1 mg  1 mg IntraMUSCular PRN    dextrose 10% infusion 125-250 mL  125-250 mL IntraVENous PRN    famotidine (PF) (PEPCID) 20 mg in 0.9% sodium chloride 10 mL injection  20 mg IntraVENous Q24H       Allergies: Bactrim [sulfamethoxazole-trimethoprim]    Family History   Problem Relation Age of Onset    Hypertension Mother     Diabetes Father     Cancer Father         prostate    Heart Disease Father 70        CABG    Hypertension Sister     Hypertension Brother     Diabetes Brother      Social History     Socioeconomic History    Marital status:      Spouse name: Not on file    Number of children: Not on file    Years of education: Not on file    Highest education level: Not on file   Occupational History    Not on file   Tobacco Use    Smoking status: Former Smoker    Smokeless tobacco: Never Used   Vaping Use    Vaping Use: Never used   Substance and Sexual Activity    Alcohol use: No    Drug use: No    Sexual activity: Never   Other Topics Concern    Not on file   Social History Narrative    Not on file     Social Determinants of Health     Financial Resource Strain:     Difficulty of Paying Living Expenses: Not on file   Food Insecurity:     Worried About Running Out of Food in the Last Year: Not on file    Redd of Food in the Last Year: Not on file   Transportation Needs:     Lack of Transportation (Medical): Not on file    Lack of Transportation (Non-Medical):  Not on file   Physical Activity:     Days of Exercise per Week: Not on file    Minutes of Exercise per Session: Not on file   Stress:     Feeling of Stress : Not on file   Social Connections:     Frequency of Communication with Friends and Family: Not on file    Frequency of Social Gatherings with Friends and Family: Not on file    Attends [employer] Services: Not on file    Active Member of Clubs or Organizations: Not on file    Attends Club or Organization Meetings: Not on file    Marital Status: Not on file   Intimate Partner Violence:     Fear of Current or Ex-Partner: Not on file    Emotionally Abused: Not on file    Physically Abused: Not on file    Sexually Abused: Not on file   Housing Stability:     Unable to Pay for Housing in the Last Year: Not on file    Number of [place] in the Last Year: Not on file    Unstable Housing in the Last Year: Not on file     Social History     Tobacco Use   Smoking Status Former Smoker   Smokeless Tobacco Never Used        Temp (24hrs), Av.6 °F (37 °C), Min:97.8 °F (36.6 °C), Max:99.3 °F (37.4 °C)    Visit Vitals  BP (!) 112/54   Pulse 74   Temp 99.3 °F (37.4 °C)   Resp 19   Ht 5' 6\" (1.676 m)   Wt 61.2 kg (134 lb 14.4 oz)   SpO2 97%   BMI 21.77 kg/m²       ROS: unable to obtain due to patient factors    Physical Exam:    General:          laying on bed, alert, non verbal  HEENT:           Head NCAT.  sclerae anicteric. Neck:               Supple, trachea midline. Lungs:             Clear, no wheezes. Effort nonlabored, no accessory muscle use. Chest wall:      No chest wall tenderness. Chest expansion equal and symmetrical bilaterally. Heart:              RRR. Abdomen:        Soft, NT/ND. Bowel sounds normoactive. SP catheter in place lower abdomen. [address] cath in place  Extremities:     Warm, no edema. No evidence for ischemia. Skin:                Not pale. Not Jaundiced  No rashes   Psych:             Mood normal.  Calm, no agitation. Neurologic:      sleepy.  Non verbal.  Does not follow commands. Moves extremities spontaneously. Labs: Results:   Chemistry Recent Labs     02/23/22  0516 02/22/22  0510 02/21/22  0511   GLU 91 117* 162*    140 141   K 4.8 5.2 4.6    107 107   CO2 29 29 32   BUN 31* 24* 23*   CREA 1.90* 1.62* 1.47*   CA 8.6 8.3* 8.9   AGAP 4 4 2*   BUCR 16 15 16      CBC w/Diff Recent Labs     02/23/22  0516 02/22/22  0510 02/21/22  0511   WBC 9.8 10.5 9.8   RBC 3.64* 3.52* 3.62*   HGB 9.7* 9.4* 9.8*   HCT 30.1* 29.3* 30.3*    318 315      Microbiology No results for input(s): CULT in the last 72 hours. RADIOLOGY:    All available imaging studies/reports in [facility] for this admission were reviewed      Disclaimer: Sections of this note are dictated utilizing voice recognition software, which may have resulted in some phonetic based errors in grammar and contents. Even though attempts were made to correct all the mistakes, some may have been missed, and remained in the body of the document. If questions arise, please contact our department.     Dr. Samantha Herr, Infectious Disease Specialist  [phone]  February 23, 2022  7:45 AM

## 2022-02-23 NOTE — ROUTINE PROCESS
3051 Received report from [facility]. Patient asleep, equal and non labored breathing. Adilson Tomlin

## 2022-02-23 NOTE — PROGRESS NOTES
Pt discharging home with hospice this date, will follow for any further acute OT needs should they arise. Pt to be getting all necessary equipment delivered to home.            Thank you for this referral,  Margret Dumont MS, OTR/L

## 2022-02-23 NOTE — PROGRESS NOTES
Problem: Pressure Injury - Risk of  Goal: *Prevention of pressure injury  Description: Document Breezy Scale and appropriate interventions in the flowsheet. Outcome: Progressing Towards Goal  Note: Pressure Injury Interventions:  Sensory Interventions: Assess changes in LOC,Maintain/enhance activity level,Check visual cues for pain    Moisture Interventions: Absorbent underpads,Internal/External urinary devices    Activity Interventions: Pressure redistribution bed/mattress(bed type)    Mobility Interventions: Pressure redistribution bed/mattress (bed type),HOB 30 degrees or less    Nutrition Interventions: Offer support with meals,snacks and hydration    Friction and Shear Interventions: Apply protective barrier, creams and emollients,Minimize layers                Problem: Patient Education: Go to Patient Education Activity  Goal: Patient/Family Education  Outcome: Progressing Towards Goal     Problem: Falls - Risk of  Goal: *Absence of Falls  Description: Document Armen Fall Risk and appropriate interventions in the flowsheet. Outcome: Progressing Towards Goal  Note: Fall Risk Interventions:       Mentation Interventions: Door open when patient unattended,Adequate sleep, hydration, pain control    Medication Interventions: Bed/chair exit alarm    Elimination Interventions:  Toileting schedule/hourly rounds,Bed/chair exit alarm              Problem: Patient Education: Go to Patient Education Activity  Goal: Patient/Family Education  Outcome: Progressing Towards Goal     Problem: Patient Education: Go to Patient Education Activity  Goal: Patient/Family Education  Outcome: Progressing Towards Goal     Problem: Nutrition Deficit  Goal: *Optimize nutritional status  Outcome: Progressing Towards Goal

## 2022-02-23 NOTE — DISCHARGE SUMMARY
Hospitalist Discharge Summary    Patient: Juan Alberto Owens MRN: [id number]  CSN: [id number]    YOB: 1945  Age: 67 y.o. Sex: male    DOA: 2/12/2022 LOS:  LOS: 10 days   Discharge Date:     Admission Diagnoses: Sepsis ([address] [A41.9]    Discharge Diagnoses:    1. Sepsis with septic shock  2. Acute cystitis with pyuria  3. EMIL on CKD 3  4. Elevated troponin secondary to demand ischemia  5. History of hypertension  6. Acute encephalopathy secondary to #2  7. Acute CVA    Discharge Condition: Fair    Discharge To: Home    CODE STATUS:    PHYSICAL EXAM  Visit Vitals  BP (!) 112/54   Pulse 74   Temp 99.3 °F (37.4 °C)   Resp 19   Ht 5' 6\" (1.676 m)   Wt 61.2 kg (134 lb 14.4 oz)   SpO2 97%   BMI 21.77 kg/m²       General: Alert, cooperative, no acute distress    Lungs:  Decreased breath sounds in bases, poor effort  Heart:  Regular rate and Rhythm. Abdomen: Soft, Non distended, Non tender. + Bowel sounds. Extremities: No edema/ cyanosis/ clubbing  Neurologic:  AA oriented X 3. Moves all extremities. HPI:  Juan Alberto Owens is a 67 y.o.  male who presented to the ED late yesterday evening for evaluation of altered mental status. Patient was found to be poorly responsive in triage and significantly hypotensive with the systolic blood pressure in the 50s. He was taken back to a room immediately for initiation of treatment. Work-up in the ED showed evidence for UTI and CT imaging showed bilateral hydronephrosis with hydroureter. There is no obvious evidence for obstruction on imaging but patient was noted to be retaining urine. A Sparrow catheter has been placed and broad-spectrum antibiotic therapy initiated. Patient did require vasopressor support initially but this is been able to be weaned. He is being referred for hospital admission for further evaluation/and.       Hospital Course:   Pio Medina is a 67 y.o.   male who presented to the ED late yesterday evening for evaluation of altered mental status. Patient was found to be poorly responsive in triage and significantly hypotensive with the systolic blood pressure in the 50s. He was taken back to a room immediately for initiation of treatment. Work-up in the ED showed evidence for UTI and CT imaging showed bilateral hydronephrosis with hydroureter. There is no obvious evidence for obstruction on imaging but patient was noted to be retaining urine. A Sparrow catheter has been placed and broad-spectrum antibiotic therapy initiated. Patient did require vasopressor support initially but this is been able to be weaned. He is being referred for hospital admission for further evaluation.     Patient admitted to the ICU, treated with vasopressor support and broad-spectrum IV antibiotics. Patient also received IV fluids, stabilized and transferred down to CVT stepdown. Blood cultures positive, ID consulted. Patient completed antibiotic therapy during his hospital stay.     Patient failed swallow eval - discussed with family , will place NGT & nutrition consulted for TF. Patient given tube feeds. Repeated swallow eval on 2 separate occasions and patient failed both of those swallow eval's. Discussed with family about PEG tube versus comfort feeding and they wish to proceed with comfort feeding.     Palliative care consulted, continue current treatment plan. Patient transition to DNR.     MRI -shows 2 small areas of restricted diffusion/infarct involving the periventricular right frontal and parietal lobes. Patient failed repeat swallow eval yesterday. Continue NG tube feeding.       2/20 -no new events overnight, patient seen and evaluated, lying in bed, no acute distress, NG tube in place and tube feeding continuing. We will attempt for repeat SLP eval in a.m. if possible.     2/21no new events overnight, addressed with family about SLP and MBS results.   They would like to proceed with comfort feeds at this time.     2/22no new events overnight, patient is currently on comfort feeds. Patient seen by palliative care, discuss hospice with son who is currently concerned. Later this afternoon, son has finally agreed to hospice measures, patient will be discharged home with hospice care. Patient is being discharged home with hospice care    Follow up Care: With PCP in 2 weeks    Consults: Infectious Disease, Pulmonary/Critical Care, Urology and Palliative Care     Significant Diagnostic Studies:     Imaging:  XR Results (most recent):  Results from Hospital Encounter encounter on 02/12/22    XR SWALLOW FUNC VIDEO    Narrative  MODIFIED BARIUM SWALLOW. CLINICAL INDICATION/HISTORY: Dysphagia. Feeding difficulties. History of CVA. Aspiration and penetration priors speech swallow studies. Re-evaluation for  dietary advancement. COMPARISON:  February 14, 2022. TECHNIQUE: A live videoradiographic swallowing function study was performed in  conjunction with the speech therapist.  The video is available in the department  for review by speech pathology and ancillary staff. Fluoroscopic images were not made available in PACS for radiologist review and  this report is strictly a procedural documentation by the physician assistant  with input from speech pathology. It is not considered inclusive or exclusive  of anatomic abnormalities and is not diagnostic beyond the specific  considerations regarding swallowing function as it relates to airway protection  while eating and drinking. Fluoroscopy time: 1 minutes 12 seconds    Fluoroscopic images: 0    Electronic capture cine loops: 6    FINDINGS:    Patient swallowed multiple consistencies of barium mixtures including honey and  pudding. There was silent aspiration of both tested consistencies. Swallow delay, premature spillage, and pharyngeal residuals were seen with both  tested consistencies.     Impression  Silent aspiration of both tested consistencies. Please see speech pathologist report for additional details and recommendations. CT Results (most recent):  Results from Hospital Encounter encounter on 02/12/22    CT ABD PELV WO CONT    Narrative  EXAM: CT ABDOMEN AND PELVIS WITHOUT CONTRAST    CLINICAL HISTORY/INDICATION:  sepsis , hypotensive, confusion, vomiting    COMPARISON: CT abdomen and pelvis 8/19/2016. TECHNIQUE: No intravenous contrast was utilized for this helical thin section CT  of the abdomen and pelvis. Coronal and sagittal reformations obtained. Evaluation of the solid organs, bowel wall, and vascular structures are  therefore limited. All CT scans at this facility are performed using dose optimization technique as  appropriate to a performed exam, to include automated exposure control,  adjustment of the mA and/or kV according to patient's size (including  appropriate matching for site-specific examinations), or use of iterative  reconstruction technique. FINDINGS:    Abdomen-    Mild crowding of the bronchovascular markings at the bases. Trace pericardial  effusion. The liver and spleen are normal in size and density. The biliary tree is not  dilated. The gallbladder is normal in size. Increased density within the lumen of the  gallbladder. No gallbladder wall thickening nor surrounding inflammation. Bilateral mild hydronephrosis and hydroureter which can be followed down to the  bladder base. No renal nor ureteral calculus. Normal size kidneys. Bilateral mild adrenal nodular thickening without change    Tiny amount of fluid in the paracolic gutters. The large and small bowel seem unremarkable. Pelvis -    There is no free pelvic fluid. The pelvic viscera are unremarkable. Mild circumferential thickening of the urinary bladder wall. Suprapubic Sparrow  catheter. Small amount of air within the bladder lumen. 5 mm calculus layering  within the urinary bladder.     Review of osseous structures throughout on bone window settings shows no  significant osseous pathology. Impression  Bilateral mild hydronephrosis and hydroureter without evidence of ureteral  calculus. 5 mm calculus layering within the urinary bladder. Mild circumferential urinary bladder wall thickening. This may be seen with  cystitis or mild chronic outlet obstruction. Suprapubic bladder catheter in expected position. Tiny amount of gas within the  lumen of the urinary bladder. Sludge and/or stones within the gallbladder. As clinically indicated follow-up  ultrasound of the right upper quadrant could be of benefit. Mild micronodular adrenal thickening consistent with hyperplasia without change. Report provided to the emergency department at 2317 hrs. Procedures:   None    Discharge Medications:     Current Discharge Medication List      START taking these medications    Details   morphine (ROXANOL) 100 mg/5 mL (20 mg/mL) concentrated solution Take 0.25 mL by mouth every three (3) hours as needed for Pain for up to 15 days. Max Daily Amount: 40 mg.  Qty: 30 mL, Refills: 0    Associated Diagnoses: Sepsis with acute renal failure without septic shock, due to unspecified organism, unspecified acute renal failure type (HCC)      acetaminophen (TYLENOL) 650 mg suppository Insert 1 Suppository into rectum every four (4) hours as needed for Fever. Qty: 4 Suppository, Refills: 0      bisacodyL (DULCOLAX) 10 mg supp Insert 10 mg into rectum daily as needed for Constipation. Qty: 5 Suppository, Refills: 0      hyoscyamine SL (LEVSIN/SL) 0.125 mg SL tablet 1 Tablet by SubLINGual route every six (6) hours as needed for Secretions. Qty: 10 Tablet, Refills: 0      LORazepam (INTENSOL) 2 mg/mL concentrated solution Take 0.25 mL by mouth every six (6) hours as needed for Anxiety. Max Daily Amount: 2 mg.   Qty: 30 mL, Refills: 0    Associated Diagnoses: Sepsis with acute renal failure without septic shock, due to unspecified organism, unspecified acute renal failure type ([facility] [address]         CONTINUE these medications which have NOT CHANGED    Details   carvediloL (COREG) 12.5 mg tablet Take 1 Tablet by mouth two (2) times a day. THIS REFILL IS  APPROVED. APPOINTMENT WILL BE REQUIRED BEFORE NEXT REFILL IS  APPROVED. Qty: 180 Tablet, Refills: 0    Associated Diagnoses: Essential hypertension      allopurinoL (ZYLOPRIM) 100 mg tablet Take 1 Tablet by mouth two (2) times a day. THIS REFILL IS  APPROVED. APPOINTMENT WILL BE REQUIRED BEFORE NEXT REFILL IS  APPROVED. Qty: 180 Tablet, Refills: 0    Associated Diagnoses: Idiopathic gout, unspecified chronicity, unspecified site      rosuvastatin (CRESTOR) 20 mg tablet TAKE 1 TABLET BY MOUTH NIGHTLY  Qty: 90 Tab, Refills: 0    Associated Diagnoses: Hypercholesterolemia      Insulin Needles, Disposable, (BD Ultra-Fine Mini Pen Needle) 31 gauge x 3/16\" ndle INJECT 3 TIMES A DAY BEFORE MEALS E11.9  Qty: 100 Pen Needle, Refills: 5    Associated Diagnoses: Diabetes mellitus with no complication (HCC)      insulin lispro (HUMALOG) 100 unit/mL injection For Blood Sugar (mg/dL) of:     Less than 150=0 units           150 -199=2 units  200 -249=4 units  250 -299=6 units  300 -349=8 units  350 and above=10 units  Check blood glucose 3 times daily and at bedtime and inject insulin per SS. Qty: 1 Vial, Refills: 0      cholecalciferol, VITAMIN D3, (VITAMIN D3) 5,000 unit tab tablet Take 1 Tab by mouth every seven (7) days. Qty: 30 Tab, Refills: 2      aspirin delayed-release 81 mg tablet Take 81 mg by mouth daily. glucose 4 gram chewable tablet Take 4 Tabs by mouth as needed. Qty: 50 Tab, Refills: 0      omega-3 fatty acids-vitamin e (FISH OIL) 1,000 mg Cap Take 1,400 mg by mouth daily.          STOP taking these medications       levoFLOXacin (LEVAQUIN) 500 mg tablet Comments:   Reason for Stopping:               Current Facility-Administered Medications:     aspirin chewable tablet 81 mg, 81 mg, Oral, DAILY, Allison Sorensen MD, 81 mg at 02/23/22 0959    atorvastatin (LIPITOR) tablet 40 mg, 40 mg, Oral, DAILY, Allison Sorensen MD, 40 mg at 02/23/22 0959    multivitamin-minerals-ferrous gluconate oral liquid 15 mL, 15 mL, Per NG tube, DAILY, Allison Sorensen MD, 15 mL at 02/23/22 0959    ciprofloxacin (CIPRO) 400 mg in D5W IVPB (premix), 400 mg, IntraVENous, Q12H, Elder Sheehan MD, Last Rate: 200 mL/hr at 02/23/22 0146, 400 mg at 02/23/22 0146    sodium chloride (NS) flush 5-40 mL, 5-40 mL, IntraVENous, Q8H, Suresh Figueroa PA-C, 10 mL at 02/23/22 0518    sodium chloride (NS) flush 5-40 mL, 5-40 mL, IntraVENous, PRN, Christiano Draper PA-C    acetaminophen (TYLENOL) tablet 650 mg, 650 mg, Oral, Q6H PRN, 650 mg at 02/17/22 2051 **OR** acetaminophen (TYLENOL) suppository 650 mg, 650 mg, Rectal, Q6H PRN, Christiano Draper PA-C, 650 mg at 02/17/22 0437    ondansetron (ZOFRAN ODT) tablet 4 mg, 4 mg, Oral, Q8H PRN **OR** ondansetron (ZOFRAN) injection 4 mg, 4 mg, IntraVENous, Q6H PRN, Christiano Draper PA-C    insulin lispro (HUMALOG) injection, , SubCUTAneous, Q6H, Allison Sorensen MD, 3 Units at 02/21/22 1811    glucose chewable tablet 16 g, 4 Tablet, Oral, PRN, Christiano Draper PA-C    glucagon [facility]) injection 1 mg, 1 mg, IntraMUSCular, PRN, Suresh Dias PA-C    dextrose 10% infusion 125-250 mL, 125-250 mL, IntraVENous, PRN, Barrie Dias PA-C    famotidine (PF) (PEPCID) 20 mg in 0.9% sodium chloride 10 mL injection, 20 mg, IntraVENous, Q24H, Oskar Rich MD, 20 mg at 02/23/22 1000     Activity: Activity as tolerated    Diet: Comfort feeding    Wound Care: As directed      Lc Gomez MD  2/23/2022, 2:33 PM    Total time spent 38 mins  Disclaimer: Sections of this note are dictated using utilizing voice recognition software. Minor typographical errors may be present. If questions arise, please do not hesitate to contact me or call our department.

## 2022-02-24 NOTE — HOSPICE
Hospice Admission Summary  Mr. Jose Quintanilla, 67 y.o. (1945) Male, admitted to Hospice services for a terminal diagnosis of Prostate Cancer. Patient's son has elected hospice services and is no longer seeking aggressive treatment. Co-morbidities related to the terminal diagnosis are:  Debility   CVA  CKD   Sepsis   Encephalopathy      Patient also has a past medical history of:  Suprapubic Catheter  Urinary retention   Chronic and Acute Kidney Disease  Debility  DJD   DM  Stroke with right sided weakness  Gout  GERD  Insomnia   Phimosis    Mr. Jose Quintanilla  status 6 months Patient was able to ambulate and tend to his own ADL care. This hospital Admission, the patient took a rapid decline and is now bedbound and requiring total care. The patient/family/caregiver is present and willing and safely able to provide care and administer medications, their availability is daily 24hours a day. The patient/family/caregiver participated in goal setting, care planning, and are agreeable to the care plan. Admission booklet reviewed with patient/family/caregiver; services provided under hospice benefit, review of rights and responsibilities, disposal of medications, contact information for , Joint Commission, Medicare, QIO, and outside resources for independence. The patient/family/caregiver educated on IDT and their right to attend meetings. Education provided regarding 24-hour availability of hospice services and on-call number provided.     Attending physician: Dr. Tee Lindsey Director: SISSY Frank  Level of Care: Routine  Advance Directives: DNR-POST  Allergies: Bactrim  MAC:   Height: 5ft6  Weight:134lbs  PPS:30  FAST: N/A  NYHA:  N/A   EF%:  N/A   Tobacco usage:  N/A,   Functional status: Dependent  Infection:  Recent Sepsis given ABX during Hospital stay  Pain: Gout, DJD  Bowel Regimen: Docusate  Lines Suprapubic Catheter  Wounds present: NONE-old areas that have healed to his Coccyx  Symptoms to monitor to maintain comfort:Patient was comfortable at Admission  Hospice Aide Services Requested: 2xweek  MSW Requested:Yes-would like assistance with AID and Attendence, patient served in [address] [facility] Requested:Yes  [facility] Requested:Yes  Bereavement risk/contact:LOW  Patient/family/caregiver specific end of life goal:Patient to be as comfortable as possible with the highest quality of life that he may have. Training and education provided this visit:   Plan for next visit:SNA, education  Care coordination with Medical Director, IDG, patient and family, regarding admission to hospice and all are in agreement with plan of care.

## 2022-02-25 NOTE — HOSPICE
The following standard precautions for infection control were utilized:  Mask  Patient was in bed resting so the  and Adrian Augustin were able to talk. Luna Zamarripa. talk about their family being active and that they are all in the area. Patients [employer] has been notified. Will continue to follow up with patient and family.

## 2022-02-25 NOTE — HOSPICE
Medication refills ordered this visit: senna, colace for bowel regimen    Medications reconciled and all medications are/are not available in the home this visit. The following education was provided regarding medications, medication interactions, and look alike medications: yes. Response to teaching: good. Medications are effective/not effective at this time. Supplies by type and quantity ordered this visit include: n/a    Consulted medical director/attending physician regarding:  senna, colace for bowel regimen    Instructed patient/family/caregiver on 24-hour hospice availability and phone number.     Plan for next visit:  Monday

## 2022-02-27 NOTE — HOSPICE
The following standard precautions for infection control were utilized:  Gloves, goggles and mask    Care provided per established plan of care:  yes    Patient is without complaints during care provided. Patient requests: N/A    Family/Caregiver requests: N/A    Communicated to , Clinical Manager, or Director regarding patient/family/caregiver/aide findings: N/A    Status of patient upon end of hospice aide visit:  In bed, resting comfortably.

## 2022-03-01 NOTE — HOSPICE
Medication refills ordered this visit: n/a    Medications reconciled and all medications are/are not available in the home this visit. The following education was provided regarding medications, medication interactions, and look alike medications: yes. Response to teaching: good. Medications are effective/not effective at this time. Supplies by type and quantity ordered this visit include: n/a    Consulted medical director/attending physician regarding: n/a    Instructed patient/family/caregiver on 24-hour hospice availability and phone number. Plan for next visit:  friday     Education given on senna and colace once more and on bowel regimen and sx to report. Abdomen distension, pain in abdomen, nausea, vomiting.

## 2022-03-04 NOTE — HOSPICE
Medication refills ordered this visit: n/a    Medications reconciled and all medications aree in the home this visit. The following education was provided regarding medications, medication interactions, and look alike medications: yes. Response to teaching: good. Medications are effectiv at this time. Supplies by type and quantity ordered this visit include: n/a    Consulted medical director/attending physician regarding: n/a    Instructed patient/family/caregiver on 24-hour hospice availability and phone number.     Plan for next visit:  Monday

## 2022-03-05 NOTE — HOSPICE
The following standard precautions for infection control were utilized: Gloves, goggles and mask    Care provided per established plan of care:  Yes    Patient is without complaints during care provided.     Patient requests: N/A    Family/Caregiver requests: N/A    Communicated to , Clinical Manager, or Director regarding patient/family/caregiver/aide findings: N/A    Status of patient upon end of hospice aide visit: In bed, resting comfortably

## 2022-03-05 NOTE — HOSPICE
The following standard precautions for infection control were utilized:  Gloves, goggles, mask    Care provided per established plan of care: Yes    Patient is without complaints during care provided. Patient requests: N/A    Family/Caregiver requests:  N/A    Communicated to , Clinical Manager, or Director regarding patient/family/caregiver/aide findings:  N/A    Status of patient upon end of hospice aide visit:  In bed, comfortably resting.

## 2022-03-08 NOTE — HOSPICE
The following standard precautions for infection control were utilized:  GLOVES, GOGGLES, [address] provided per established plan of care:  YES    Patient is without complaints during care provided.      Patient requests: N/A    Family/Caregiver requests: N/A    Communicated to , Clinical Manager, or Director regarding patient/family/caregiver/aide findings:  N/A    Status of patient upon end of hospice aide visit: IN BED, COMFORTABLY RESTING

## 2022-03-11 NOTE — HOSPICE
The following standard precautions for infection control were utilized: GLOVES, [address] provided per established plan of care:  YES    Patient is without complaints during care provided. Patient requests: N/A    Family/Caregiver requests:  N/A    Communicated to , Clinical Manager, or Director regarding patient/family/caregiver/aide findings:  N/A    Status of patient upon end of hospice aide visit:  IN BED, COMFORTABLY RESTING.

## 2022-03-13 NOTE — HOSPICE
Medication refills ordered this visit: no requested refills     Medications reconciled and all medications are available in the home this visit. The following education was provided regarding medications, medication interactions, and look alike medications:morphine  Response to teaching: understanding. Medications are effective at this time. Supplies by type and quantity ordered this visit include: NA    Consulted medical director/attending physician regarding: NA    Instructed patient/family/caregiver on 24-hour hospice availability and phone number.     Plan for next visit:  EOL care/comfort

## 2022-03-14 NOTE — HOSPICE
Medication refills ordered this visit: misa     Medications reconciled and all medications are/are not available in the home this visit. The following education was provided regarding medications, medication interactions, and look alike medications: yes. Response to teaching: good. Medications are effective/not effective at this time. Supplies by type and quantity ordered this visit include: misa     Consulted medical director/attending physician regarding: misa     Instructed patient/family/caregiver on 24-hour hospice availability and phone number. Plan for next visit:  Tuesday     Pt imminent. Continues at a PPS of 10%. Morphine 0.5mL given to pt w/ increased effort and tacypnea. Placed pt on oxygen. Pt on exiting home more comfrotable w/ regular respirations. Pt had morphine 1200 last night. Gave support to son.  reached out.

## 2022-03-14 NOTE — HOSPICE
Medication refills ordered this visit: misa    Medications reconciled and all medications are/are not available in the home this visit. The following education was provided regarding medications, medication interactions, and look alike medications: yes. Response to teaching: good. Medications are effective/not effective at this time. Stated to no longer given oral medications besides comfort medications. Educated once more on morphine and levsin. Supplies by type and quantity ordered this visit include: misa    Consulted medical director/attending physician regarding: misa    Instructed patient/family/caregiver on 24-hour hospice availability and phone number. Plan for next visit:  saturday    Pt moved to PPS of 10% now imminent AEB decrease responsivness, only responsive to painful stimuli and is a weak response, increased respirations well controlled w/ morphine, and start of secretions. Son states appetite has been decrease to absent past couple of days and pt no longer responsive enough to eat at this time. Educated son on this as well as aspiration precautions. Spoke to daughter on the phone w/ son's request to discuss imminent status. Educated on symptoms and how to [employer]. Encouraged office number use. Exited home.

## 2022-03-15 NOTE — HOSPICE
The following standard precautions for infection control were utilized:  Mask  Patient  and  went to be with family. Son and daughter were present at bedside and Jurline Loser picked up the patient. Family was grieving appropriately.

## 2022-04-16 DIAGNOSIS — I10 ESSENTIAL HYPERTENSION: ICD-10-CM

## 2022-04-16 DIAGNOSIS — M10.00 IDIOPATHIC GOUT, UNSPECIFIED CHRONICITY, UNSPECIFIED SITE: ICD-10-CM

## 2022-04-16 RX ORDER — ALLOPURINOL 100 MG/1
TABLET ORAL
Qty: 180 TABLET | Refills: 0 | OUTPATIENT
Start: 2022-04-16

## 2022-04-16 RX ORDER — CARVEDILOL 12.5 MG/1
TABLET ORAL
Qty: 180 TABLET | Refills: 0 | OUTPATIENT
Start: 2022-04-16

## 2024-07-31 NOTE — PROGRESS NOTES
Case Management Assessment  Initial Evaluation    Date/Time of Evaluation: 7/31/2024 11:56 AM  Assessment Completed by: SHALOM STEWART RN    If patient is discharged prior to next notation, then this note serves as note for discharge by case management.    Patient Name: Arias Martin                   YOB: 1941  Diagnosis: Kidney stone [N20.0]  UTI (urinary tract infection) [N39.0]  Acute cystitis with hematuria [N30.01]                   Date / Time: 7/30/2024  4:30 PM    Patient Admission Status: Inpatient   Readmission Risk (Low < 19, Mod (19-27), High > 27): Readmission Risk Score: 16.5    Current PCP: Marcelo Olivia APRN - CNP  PCP verified by CM? Yes    Chart Reviewed: Yes      History Provided by: Child/Family (son and daughter in law)  Patient Orientation: Person    Patient Cognition: Other (see comment) (confused)    Hospitalization in the last 30 days (Readmission):  No    If yes, Readmission Assessment in CM Navigator will be completed.    Advance Directives:      Code Status: Full Code   Patient's Primary Decision Maker is:      Primary Decision Maker: Nate Martin - Child - [phone]    Discharge Planning:    Patient lives with: Other (Comment) (facility) Type of Home: Skilled Nursing Facility  Primary Care Giver: Other (Comment) (facility)  Patient Support Systems include: Children   Current Financial resources:    Current community resources: ECF/Home Care  Current services prior to admission: Skilled Nursing Facility            Current DME:              Type of Home Care services:  None    ADLS  Prior functional level: Assistance with the following:  Current functional level: Assistance with the following:    PT AM-PAC:   /24  OT AM-PAC:   /24    Family can provide assistance at DC: No  Would you like Case Management to discuss the discharge plan with any other family members/significant others, and if so, who? Yes (daughter in law Celine)  Plans to Return to Present Housing:  Problem: Dysphagia (Adult)  Goal: *Acute Goals and Plan of Care (Insert Text)  Description  Patient will:  1. Tolerate PO trials with 0 s/s overt distress in 4/5 trials  2. Utilize compensatory swallow strategies/maneuvers (decrease bite/sip, size/rate, alt. liq/sol) with min cues in 4/5 trials  3. Perform oral-motor/laryngeal exercises to increase oropharyngeal swallow function with min cues  4. Complete an objective swallow study (i.e., MBSS) to assess swallow integrity, r/o aspiration, and determine of safest LRD, min A    Recommend:   NPO until completion of MBS to further assess oropharyngeal swallow fxn and rule out aspiration  Strict aspiration precautions (HOB >30 degrees at all times, Oral care TID)       Outcome: Progressing Towards Goal    SPEECH LANGUAGE PATHOLOGY BEDSIDE SWALLOW EVALUATION/TREATMENT    Patient: Evita Zhao [de-identified]75 y.o. male)  Date: 8/2/2019  Primary Diagnosis: Stroke ([address] [I63.9]  Sepsis ([address] [A41.9]        Precautions: aspiration     PLOF: As per H&P    ASSESSMENT :  Based on the objective data described below, the patient presents with mod-sev pharyngeal dysphagia c/b wet congested cough post all PO presentations. Pt poor historian. Per son at bedside: pt hates thickened liquids and refused to consume them at home, but coughs/chokes every time he eats. Pt's son also reported that pt has been having difficulty for almost ten years with eating/drinking from previous CVA. Laryngeal elevation was weak to palpation across all PO trials. Rec NPO until completion of MBS to identify LRD, aspiration precautions, and oral care TID. D/w pt, son, and RN. ST will continue to follow. TREATMENT :  Skilled therapy initiated; Educated pt and family on aspiration precautions, diet recs, SLP POC, and plans for MBS; verbalized comprehension. Patient will benefit from skilled intervention to address the above impairments.   Patient's rehabilitation potential is considered to be Fair  Factors Unknown at present  Other Identified Issues/Barriers to RETURNING to current housing: confusion  Potential Assistance needed at discharge: Transportation, Skilled Nursing Facility            Potential DME:    Patient expects to discharge to: Skilled nursing facility  Plan for transportation at discharge:      Financial    Payor: [employer] / Plan: [facility] [facility] OFFICES ONLY / Product Type: *No Product type* /     Does insurance require precert for SNF: Yes    Potential assistance Purchasing Medications: No  Meds-to-Beds request: Yes      [facility] - [place], [place] - [address] - P [phone] - F [phone]  [address] [place] [address]  Phone: [phone] Fax: [phone]    [facility] - [place] - [address] [facility] - P [phone] - F [phone]  [address] [facility]  [place] [address]  Phone: [phone] Fax: [phone]      Notes:    Factors facilitating achievement of predicted outcomes: Family support    Barriers to discharge: Medical complications    Additional Case Management Notes: son Nate defers decision making to his wife, patient's daughter in law Celine ([phone]) From [place], family does not wish patient to return.  Celine to provide additional SNF options.  PS Dr. Clark for PT/OT orders    The Plan for Transition of Care is related to the following treatment goals of Kidney stone [N20.0]  UTI (urinary tract infection) [N39.0]  Acute cystitis with hematuria [N30.01]    IF APPLICABLE: The Patient and/or patient representative Arias and his family were provided with a choice of provider and agrees with the discharge plan. Freedom of choice list with basic dialogue that supports the patient's individualized plan of care/goals and shares the quality data associated with the providers was provided to:     Patient Representative Name:       The Patient and/or Patient Representative Agree with the Discharge Plan?      SHALOM   which may influence rehabilitation potential include:   ?            Mental ability/status  ? Medical condition     PLAN :  Recommendations and Planned Interventions: See above  Frequency/Duration: Patient will be followed by speech-language pathology 1-2 times per day/3-5 days per week to address goals. Discharge Recommendations: [address] and To Be Determined     SUBJECTIVE:   Patient stated I don't have any trouble.     OBJECTIVE:     Past Medical History:   Diagnosis Date    Cataract     both    Colon polyp 9-2004    Diabetes ([facility]     IDDM    DJD (degenerative joint disease) of knee     ED (erectile dysfunction)     Sparrow catheter in place     GERD (gastroesophageal reflux disease) 12-01-08    Glaucoma suspect     Gout, unspecified 10-25-06    Hyperlipidemia     Hypertension     Insomnia 7-20-06    Phimosis 6-16-00    Prostate cancer ([facility] 3-3-09    Stroke [facility]) 2007    weakness right side     Past Surgical History:   Procedure Laterality Date    HX POLYPECTOMY  9-2004    GA COLONOSCOPY FLX DX W/COLLJ SPEC WHEN PFRMD  9/1/2004    polyp/Dr Alvarado    GA COLONOSCOPY FLX DX W/COLLJ SPEC WHEN PFRMD  9-2007    incomplete; Dr. Rayna Armstrong     Prior Level of Function/Home Situation: see below  Home Situation  Home Environment: Private residence  # Steps to Enter: 0  One/Two Story Residence: One story  Living Alone: No  Support Systems: Child(edelmira)  Patient Expects to be Discharged to[de-identified] Private residence  Current DME Used/Available at Home: Maricarmen Treviño, Bonita Lapoint, quad, Shower chair    Diet prior to admission: puree with thin per family  Current Diet:  NPO with MBS     Cognitive and Communication Status:  Neurologic State: Alert  Orientation Level: Oriented to person, Oriented to place  Cognition: Follows commands  Oral Assessment:  Oral Assessment  Labial: No impairment  Dentition: Natural;Limited  Oral Hygiene: adequate  Lingual: No impairment  Velum: No impairment  Mandible: No impairment  P.O. Trials:  Patient Position: [facility]  Vocal quality prior to P.O.: No impairment  Consistency Presented: Honey thick liquid;Puree; Thin liquid  How Presented: Self-fed/presented;Cup/sip;Spoon  Bolus Acceptance: No impairment  Bolus Formation/Control: No impairment  Propulsion: No impairment  Oral Residue: None  Initiation of Swallow: Delayed (# of seconds)  Laryngeal Elevation: Weak  Aspiration Signs/Symptoms: Change vocal quality;Clear throat;Strong cough  Pharyngeal Phase Characteristics: Suspected pharyngeal residue;Poor endurance;Multiple swallows  Effective Modifications: None  Cues for Modifications: Moderate     Oral Phase Severity: No impairment  Pharyngeal Phase Severity : Moderate-severe    PAIN:  Start of Eval: 0  End of Eval: 0     After treatment:   ?            Patient left in no apparent distress sitting up in chair  ? Patient left in no apparent distress in bed  ? Call bell left within reach  ? Nursing notified  ? Family present  ? Caregiver present  ? Bed alarm activated    COMMUNICATION/EDUCATION:   ?            Aspiration precautions; swallow safety; compensatory techniques. ?            Patient/family have participated as able in goal setting and plan of care. ? Posted safety precautions in patient's room.     Thank you for this referral.    Kristi Sebastian M.S. CCC-SLP/L  Speech-Language Pathologist

## 2025-06-20 NOTE — H&P
History and Physical    Patient is a 58-year-old -American male who is known to this office with a previous visit here last month. The patient developed urinary retention and has had a Sparrow catheter placed. The patient has a history of cryoablation for prostate cancer in the distant past.  Cystoscopy has shown that there is some lateral lobe encroachment with convergence that could be causing obstruction but I cannot rule out a detrusor hypoactivity the patient was last seen with a urinary tract infection and was given Bactrim and developed dehydration and worsening of his baseline renal failure. The patient was discharged with return to his baseline renal function and was given Cipro.   He comes now for a catheter change in for discussion again about the alternatives          Past Medical History:   Diagnosis Date    Cataract       both    Colon polyp 9-2004    Diabetes ([address]       IDDM    DJD (degenerative joint disease) of knee      ED (erectile dysfunction)      GERD (gastroesophageal reflux disease) 12-01-08    Glaucoma suspect     Gout, unspecified 10-25-06    Hyperlipidemia      Hypertension      Insomnia 7-20-06    Phimosis 6-16-00    Prostate cancer ([address] 3-3-09    Stroke [facility])             Patient Active Problem List   Diagnosis Code    Essential hypertension I10    Type I (juvenile type) diabetes mellitus with renal manifestations, not stated as uncontrolled(250.41) ([address] E10.29    Hypercholesterolemia E78.00    Gout M10.9    GERD (gastroesophageal reflux disease) K21.9    Vitamin D deficiency E55.9    Stroke ([address] I63.9    Prostate cancer ([facility] C61    Acute encephalopathy G93.40    Hypoglycemia E16.2    Need for Tdap vaccination Z23    Proteinuria R80.9    CKD (chronic kidney disease) stage 3, GFR 30-59 ml/min ([facility]) N18.3    Diabetes mellitus type 2, controlled ([address] E11.9    Advance care planning Z71.89    Encounter for immunization Z23    Need for pneumococcal vaccine Z23    Encounter for screening colonoscopy Z12.11    Medicare annual wellness visit, subsequent Z00.00    History of CVA (cerebrovascular accident) Z80.78    Pneumonia J18.9   [facility] discharge follow-up Z09    Gastrointestinal hemorrhage associated with duodenitis K29.81    Need for influenza vaccination Z23    Hyperglycemia due to type 2 diabetes mellitus (HCC) E11.65    Cough R05    Benign prostatic hyperplasia with urinary retention N40.1, R33.8    Type 2 diabetes with nephropathy (HCC) E11.21    Dehydration E86.0    Generalized weakness R53.1    Failure to thrive in adult R62.7    Acute on chronic renal failure (HCC) N17.9, N18.9    Poor conditioning Z78.9    Indwelling Sparrow catheter present Z96.0    Other constipation K59.09    Hyperkalemia E87.5            Past Surgical History:   Procedure Laterality Date    HX POLYPECTOMY   9-2004    IN COLONOSCOPY FLX DX W/COLLJ SPEC WHEN PFRMD   9/1/2004     polyp/Dr Alvarado    IN COLONOSCOPY FLX DX W/COLLJ SPEC WHEN PFRMD   9-2007     incomplete; Dr. Naren Winn             Current Outpatient Medications   Medication Sig Dispense Refill    insulin lispro (HUMALOG) 100 unit/mL injection For Blood Sugar (mg/dL) of:     Less than 150=0 units           150 -199=2 units  200 -249=4 units  250 -299=6 units  300 -349=8 units  350 and above=10 units  Check blood glucose 3 times daily and at bedtime and inject insulin per SS. 1 Vial 0    tamsulosin (FLOMAX) 0.4 mg capsule One cap q d pc 30 Cap 6    aspirin delayed-release 81 mg tablet Take 81 mg by mouth daily.        cholecalciferol, VITAMIN D3, (VITAMIN D3) 5,000 unit tab tablet Take  by mouth every seven (7) days.        carvedilol (COREG) 12.5 mg tablet TAKE 1 TABLET BY MOUTH TWICE A  Tab 0    allopurinol (ZYLOPRIM) 100 mg tablet TAKE 1 TABLET BY MOUTH TWICE DAILY 180 Tab 0    dilTIAZem (TIAZAC) 360 mg ER capsule TAKE ONE CAPSULE BY MOUTH EVERY DAY 90 Cap 0    glucose 4 gram chewable tablet Take 4 Tabs by mouth as needed.  50 Tab 0    rosuvastatin (CRESTOR) 20 mg tablet TAKE 1 TABLET BY MOUTH NIGHTLY 90 Tab 0    Insulin Needles, Disposable, (BD INSULIN PEN NEEDLE UF MINI) 31 gauge x 3/16\" ndle Sig: test blood glucose BID. E11.9 100 Pen Needle 5    omega-3 fatty acids-vitamin e (FISH OIL) 1,000 mg Cap Take 1,400 mg by mouth daily.        OTHER Check CBC, CMP, Mg in 5 days, results to PCP 1 each 0            Allergies   Allergen Reactions    Bactrim [Sulfamethoxazole-Trimethoprim] Other (comments)       CAUSED ACUTE RENAL FAILURE    Ciprofloxacin Other (comments)       PATIENT IS NOT ALLERGIC      Social History            Socioeconomic History    Marital status:        Spouse name: Not on file    Number of children: Not on file    Years of education: Not on file    Highest education level: Not on file   Social Needs    Financial resource strain: Not on file    Food insecurity - worry: Not on file    Food insecurity - inability: Not on file   [employer] needs - medical: Not on file   [employer] needs - non-medical: Not on file   Occupational History    Not on file   Tobacco Use    Smoking status: Former Smoker    Smokeless tobacco: Never Used   Substance and Sexual Activity    Alcohol use: No    Drug use: No    Sexual activity: No   Other Topics Concern    Not on file   Social History Narrative    Not on file            Family History   Problem Relation Age of Onset    Hypertension Mother      Diabetes Father      Cancer Father           prostate    Heart Disease Father 70         CABG    Hypertension Sister      Hypertension Brother      Diabetes Brother                          Visit Vitals  /70 (BP 1 Location: Left arm, BP Patient Position: Sitting)   Pulse 71   Ht 5' 6.5\" (1.689 m)   SpO2 98%   BMI 24.77 kg/m²      Physical          Gen: WDWN adult NAD  Head  : normocephalic,  Normal ROM; eyes with normal pupils, EOMs, no masses;  conjunctiva normal  Neck: normal movement,  no evident mass,  No evident adenopathy, trachea midline,  Lungs: no respiratory distress or difficulties  CV:  No evident peripheral swelling  Abd :bowel sounds normal, no masses, tenderness, organomegaly  Flanks     -     Extremities- no edema, arthritis, deformity, swelling  Psych- oriented, no evident anxiety, no cognitive impairment evident     Catheter is changed out and I have placed another 16 [place] coudé Sparrow catheter.     It should be noted that the patient originally had an Enterobacter infection that was sensitive to Bactrim and that is why the sulfa was given. He subsequently was found to have enterococcus and Cipro was effective against that and the patient was discharged on that antibiotic                          Impression/ PLAN  Urinary retention  Recent urinary tract infection complicated by dehydration and transient worsening of renal failure     PLAN:  Another extended discussion with the patient and son. The patient wishes to proceed with suprapubic tube placement and transurethral resection or laser of the obstructing tissue in the hopes that he will be able to restore normal voiding. They are counseled regarding the preparation technique convalescence.   They are aware of the risks that include, but are not limited to, infection, bleeding, injury, failure, incontinence, and need for additional procedures    show

## 2025-06-23 NOTE — HOSPICE
Medication refills ordered this visit: ROSIE    Medications reconciled and all medications are available in the home this visit. The following education was provided regarding medications, medication interactions, : Morphine. Response to teaching: understanding. Medications are effective at this time. Supplies by type and quantity ordered this visit include:  1 pack,  Mouth swaps left in home with CG    Consulted medical director/attending physician regarding: ROSIE    Instructed patient/family/caregiver on 24-hour hospice availability and phone number.     Plan for next visit:  EOL care
Consent: The patient's consent was obtained including but not limited to risks of crusting, scabbing, blistering, scarring, darker or lighter pigmentary change, recurrence, incomplete removal and infection.
Post-Care Instructions: I reviewed with the patient in detail post-care instructions. Patient is to wear sunprotection, and avoid picking at any of the treated lesions. Pt may apply Vaseline to crusted or scabbing areas.
Number Of Freeze-Thaw Cycles: 2 freeze-thaw cycles
Render Post-Care Instructions In Note?: no
Duration Of Freeze Thaw-Cycle (Seconds): 0
Show Applicator Variable?: Yes
Detail Level: Detailed
Medical Necessity Clause: This procedure was medically necessary because the lesions that were treated were:
Medical Necessity Information: It is in your best interest to select a reason for this procedure from the list below. All of these items fulfill various CMS LCD requirements except the new and changing color options.
Spray Paint Text: The liquid nitrogen was applied to the skin utilizing a spray paint frosting technique.

## (undated) DEVICE — HEX-LOCKING BLADE ELECTRODE: Brand: EDGE

## (undated) DEVICE — NEEDLE HYPO 22GA L1.5IN BLK S STL HUB POLYPR SHLD REG BVL

## (undated) DEVICE — NEEDLE COUNTER

## (undated) DEVICE — DRAPE SURG L39XW46IN PERI OB

## (undated) DEVICE — DRAPE TWL SURG 16X26IN BLU ORB04] ALLCARE INC]

## (undated) DEVICE — BAG DRAINAGE CUST DISP

## (undated) DEVICE — SUTURE NONABSORBABLE SILK BRAID BLK PSL 2 0 30IN 486H

## (undated) DEVICE — SKIN MARKER,REGULAR TIP WITH RULER AND LABELS: Brand: DEVON

## (undated) DEVICE — PACK PROCEDURE SURG MAJ W/ BASIN LF

## (undated) DEVICE — STER SINGLE BASIN SET W/BOWLS: Brand: CARDINAL HEALTH

## (undated) DEVICE — CATHETER URETH 18FR BLLN 5CC SIL ALLY W/ SIL HYDRGEL 2 W F

## (undated) DEVICE — SYR 10ML LUER LOK 1/5ML GRAD --

## (undated) DEVICE — SYR 5ML 1/5 GRAD LL NSAF LF --

## (undated) DEVICE — (D)GLOVE SURG TRIFLX 8 PWD LTX -- DISC BY MFR USE ITEM 302994

## (undated) DEVICE — SHEET, T, LAPAROTOMY, STERILE: Brand: MEDLINE

## (undated) DEVICE — KIT CLN UP BON SECOURS MARYV

## (undated) DEVICE — SLIM BODY SKIN STAPLER: Brand: APPOSE ULC

## (undated) DEVICE — SOLUTION IV 1000ML 0.9% SOD CHL

## (undated) DEVICE — INTENDED FOR TISSUE SEPARATION, AND OTHER PROCEDURES THAT REQUIRE A SHARP SURGICAL BLADE TO PUNCTURE OR CUT.: Brand: BARD-PARKER ®  SAFETY SCALPED

## (undated) DEVICE — INTENDED FOR TISSUE SEPARATION, AND OTHER PROCEDURES THAT REQUIRE A SHARP SURGICAL BLADE TO PUNCTURE OR CUT.: Brand: BARD-PARKER SAFETY BLADES SIZE 10, STERILE

## (undated) DEVICE — 4-PORT MANIFOLD: Brand: NEPTUNE 2

## (undated) DEVICE — GAUZE,SPONGE,4"X4",12PLY,STERILE,LF,2'S: Brand: MEDLINE

## (undated) DEVICE — KENDALL SCD EXPRESS SLEEVES, KNEE LENGTH, MEDIUM: Brand: KENDALL SCD

## (undated) DEVICE — SOFT SILICONE HYDROCELLULAR SACRUM DRESSING WITH LOCK AWAY LAYER: Brand: ALLEVYN LIFE SACRUM (LARGE) PACK OF 10

## (undated) DEVICE — Z DUP USE 2565107 PACK SURG PROC LEG CYSTO T-DRAPE REINF TBL CVR HND TWL

## (undated) DEVICE — GOWN,PREVENTION PLUS,XLN/XL,ST,24/CS: Brand: MEDLINE

## (undated) DEVICE — Y-TYPE TUR/BLADDER IRRIGATION SET, REGULATING CLAMP

## (undated) DEVICE — GOWN,REINFORCED,POLY,AURORA,XLARGE,STRL: Brand: MEDLINE

## (undated) DEVICE — TRAY PREP DRY W/ PREM GLV 2 APPL 6 SPNG 2 UNDPD 1 OVERWRAP

## (undated) DEVICE — X-RAY SPONGES,12 PLY: Brand: DERMACEA

## (undated) DEVICE — BAG DRAIN URIN 2000ML LF STRL -- CONVERT TO ITEM 363123

## (undated) DEVICE — MEDI-VAC NON-CONDUCTIVE SUCTION TUBING: Brand: CARDINAL HEALTH

## (undated) DEVICE — GAUZE SPONGES,16 PLY: Brand: CURITY

## (undated) DEVICE — STERILE POLYISOPRENE POWDER-FREE SURGICAL GLOVES: Brand: PROTEXIS

## (undated) DEVICE — REM POLYHESIVE ADULT PATIENT RETURN ELECTRODE: Brand: VALLEYLAB

## (undated) DEVICE — BLADE ASSEMB CLP HAIR FINE --

## (undated) DEVICE — LUB SURG MEDC STRL 2OZ TUBE MC -- MEDICHOICE

## (undated) DEVICE — FLEX ADVANTAGE 3000CC: Brand: FLEX ADVANTAGE

## (undated) DEVICE — CATH URETH FOL 2W SH 20FRX5 --

## (undated) DEVICE — DEVICE STBL AD TRICOT ANCHR PD FOR 3 W F CATH STATLOK

## (undated) DEVICE — 3M™ BAIR PAWS FLEX™ WARMING GOWN, STANDARD, 20 PER CASE 81003: Brand: BAIR PAWS™

## (undated) DEVICE — SOLUTION IRRIG 3000ML 0.9% SOD CHL FLX CONT 0797208] ICU MEDICAL INC]